# Patient Record
Sex: FEMALE | Race: WHITE | Employment: PART TIME | URBAN - METROPOLITAN AREA
[De-identification: names, ages, dates, MRNs, and addresses within clinical notes are randomized per-mention and may not be internally consistent; named-entity substitution may affect disease eponyms.]

---

## 2017-01-12 DIAGNOSIS — R60.0 BILATERAL EDEMA OF LOWER EXTREMITY: ICD-10-CM

## 2017-01-12 RX ORDER — LOSARTAN POTASSIUM AND HYDROCHLOROTHIAZIDE 12.5; 1 MG/1; MG/1
TABLET ORAL
Qty: 90 TABLET | Refills: 2 | Status: SHIPPED | OUTPATIENT
Start: 2017-01-12 | End: 2018-06-15 | Stop reason: SDUPTHER

## 2017-01-17 DIAGNOSIS — E55.9 HYPOVITAMINOSIS D: ICD-10-CM

## 2017-01-17 RX ORDER — ERGOCALCIFEROL 1.25 MG/1
CAPSULE ORAL
Qty: 12 CAPSULE | Refills: 1 | Status: SHIPPED | OUTPATIENT
Start: 2017-01-17 | End: 2017-07-09 | Stop reason: SDUPTHER

## 2017-02-08 RX ORDER — BISOPROLOL FUMARATE AND HYDROCHLOROTHIAZIDE 10; 6.25 MG/1; MG/1
TABLET ORAL
Qty: 180 TABLET | Refills: 0 | Status: SHIPPED | OUTPATIENT
Start: 2017-02-08 | End: 2017-05-06 | Stop reason: SDUPTHER

## 2017-02-14 ENCOUNTER — OFFICE VISIT (OUTPATIENT)
Dept: INTERNAL MEDICINE CLINIC | Age: 57
End: 2017-02-14

## 2017-02-14 VITALS
BODY MASS INDEX: 34.96 KG/M2 | WEIGHT: 190 LBS | OXYGEN SATURATION: 100 % | HEART RATE: 64 BPM | HEIGHT: 62 IN | SYSTOLIC BLOOD PRESSURE: 120 MMHG | TEMPERATURE: 98.3 F | DIASTOLIC BLOOD PRESSURE: 76 MMHG

## 2017-02-14 DIAGNOSIS — S29.9XXA CHEST WALL INJURY, INITIAL ENCOUNTER: Primary | ICD-10-CM

## 2017-02-14 PROCEDURE — 99214 OFFICE O/P EST MOD 30 MIN: CPT | Performed by: INTERNAL MEDICINE

## 2017-02-14 ASSESSMENT — ENCOUNTER SYMPTOMS
BACK PAIN: 0
SINUS PRESSURE: 0
APNEA: 0
STRIDOR: 0
CHOKING: 0
EYES NEGATIVE: 1
COLOR CHANGE: 0

## 2017-02-27 RX ORDER — SITAGLIPTIN 100 MG/1
TABLET, FILM COATED ORAL
Qty: 30 TABLET | Refills: 2 | Status: SHIPPED | OUTPATIENT
Start: 2017-02-27 | End: 2017-05-22 | Stop reason: SDUPTHER

## 2017-03-09 RX ORDER — GLIPIZIDE 10 MG/1
TABLET ORAL
Qty: 180 TABLET | Refills: 1 | Status: SHIPPED | OUTPATIENT
Start: 2017-03-09 | End: 2017-08-31 | Stop reason: SDUPTHER

## 2017-05-08 RX ORDER — BISOPROLOL FUMARATE AND HYDROCHLOROTHIAZIDE 10; 6.25 MG/1; MG/1
TABLET ORAL
Qty: 180 TABLET | Refills: 1 | Status: SHIPPED | OUTPATIENT
Start: 2017-05-08 | End: 2017-11-01 | Stop reason: SDUPTHER

## 2017-05-30 RX ORDER — DILTIAZEM HYDROCHLORIDE 240 MG/1
CAPSULE, COATED, EXTENDED RELEASE ORAL
Qty: 90 CAPSULE | Refills: 3 | Status: SHIPPED | OUTPATIENT
Start: 2017-05-30 | End: 2018-05-16 | Stop reason: SDUPTHER

## 2017-06-19 RX ORDER — SITAGLIPTIN 100 MG/1
TABLET, FILM COATED ORAL
Qty: 90 TABLET | Refills: 1 | Status: SHIPPED | OUTPATIENT
Start: 2017-06-19 | End: 2017-12-10 | Stop reason: SDUPTHER

## 2017-07-09 DIAGNOSIS — E55.9 HYPOVITAMINOSIS D: ICD-10-CM

## 2017-07-10 RX ORDER — ERGOCALCIFEROL 1.25 MG/1
CAPSULE ORAL
Qty: 12 CAPSULE | Refills: 1 | Status: SHIPPED | OUTPATIENT
Start: 2017-07-10 | End: 2018-03-29

## 2017-08-31 RX ORDER — GLIPIZIDE 10 MG/1
TABLET ORAL
Qty: 180 TABLET | Refills: 1 | Status: SHIPPED | OUTPATIENT
Start: 2017-08-31 | End: 2018-02-22 | Stop reason: SDUPTHER

## 2017-11-02 RX ORDER — BISOPROLOL FUMARATE AND HYDROCHLOROTHIAZIDE 10; 6.25 MG/1; MG/1
TABLET ORAL
Qty: 180 TABLET | Refills: 1 | Status: SHIPPED | OUTPATIENT
Start: 2017-11-02 | End: 2018-03-29 | Stop reason: SDUPTHER

## 2017-11-20 RX ORDER — ATORVASTATIN CALCIUM 10 MG/1
TABLET, FILM COATED ORAL
Qty: 90 TABLET | Refills: 3 | Status: SHIPPED | OUTPATIENT
Start: 2017-11-20 | End: 2018-11-05 | Stop reason: SDUPTHER

## 2017-12-11 RX ORDER — SITAGLIPTIN 100 MG/1
TABLET, FILM COATED ORAL
Qty: 90 TABLET | Refills: 1 | Status: SHIPPED | OUTPATIENT
Start: 2017-12-11 | End: 2018-03-29

## 2017-12-30 DIAGNOSIS — E55.9 HYPOVITAMINOSIS D: ICD-10-CM

## 2018-01-02 RX ORDER — ERGOCALCIFEROL 1.25 MG/1
CAPSULE ORAL
Qty: 12 CAPSULE | Refills: 1 | OUTPATIENT
Start: 2018-01-02

## 2018-01-02 NOTE — TELEPHONE ENCOUNTER
Her Vit D was in good range. She does not need large dose of Vit D. She can use 2000u. of Vit D daily from [facility].

## 2018-02-22 RX ORDER — GLIPIZIDE 10 MG/1
TABLET ORAL
Qty: 180 TABLET | Refills: 1 | Status: SHIPPED | OUTPATIENT
Start: 2018-02-22 | End: 2018-08-15 | Stop reason: SDUPTHER

## 2018-03-20 ENCOUNTER — TELEPHONE (OUTPATIENT)
Dept: INTERNAL MEDICINE CLINIC | Age: 58
End: 2018-03-20

## 2018-03-20 DIAGNOSIS — I15.0 RENOVASCULAR HYPERTENSION: ICD-10-CM

## 2018-03-20 DIAGNOSIS — E11.8 TYPE 2 DIABETES MELLITUS WITH COMPLICATION, UNSPECIFIED LONG TERM INSULIN USE STATUS: Primary | ICD-10-CM

## 2018-03-20 DIAGNOSIS — E55.9 VITAMIN D DEFICIENCY: ICD-10-CM

## 2018-03-20 NOTE — TELEPHONE ENCOUNTER
Lab orders requested    Physical Scheduled Date: F/U DM 3/29/18    Last Physical KFOZ:9-15-99- LOV    Physician Scheduled with: Dr. Melly Morales    Patient will be using [facility]) Lab Yes: place orders in chart    No: fax orders to: N/A patient will  orders- Please call when ready     Be sure to tell the patient to fast 10 to 12 hours before having their blood draw.

## 2018-03-23 ENCOUNTER — NURSE ONLY (OUTPATIENT)
Dept: INTERNAL MEDICINE CLINIC | Age: 58
End: 2018-03-23

## 2018-03-23 DIAGNOSIS — I15.0 RENOVASCULAR HYPERTENSION: ICD-10-CM

## 2018-03-23 DIAGNOSIS — E11.8 TYPE 2 DIABETES MELLITUS WITH COMPLICATION, UNSPECIFIED LONG TERM INSULIN USE STATUS: ICD-10-CM

## 2018-03-23 DIAGNOSIS — E55.9 VITAMIN D DEFICIENCY: ICD-10-CM

## 2018-03-23 LAB
A/G RATIO: 1.5 (ref 1.1–2.2)
ALBUMIN SERPL-MCNC: 4.5 G/DL (ref 3.4–5)
ALP BLD-CCNC: 57 U/L (ref 40–129)
ALT SERPL-CCNC: 65 U/L (ref 10–40)
ANION GAP SERPL CALCULATED.3IONS-SCNC: 17 MMOL/L (ref 3–16)
AST SERPL-CCNC: 45 U/L (ref 15–37)
BILIRUB SERPL-MCNC: 0.4 MG/DL (ref 0–1)
BUN BLDV-MCNC: 27 MG/DL (ref 7–20)
CALCIUM SERPL-MCNC: 10.1 MG/DL (ref 8.3–10.6)
CHLORIDE BLD-SCNC: 99 MMOL/L (ref 99–110)
CHOLESTEROL, TOTAL: 139 MG/DL (ref 0–199)
CO2: 26 MMOL/L (ref 21–32)
CREAT SERPL-MCNC: 1.2 MG/DL (ref 0.6–1.1)
GFR AFRICAN AMERICAN: 56
GFR NON-AFRICAN AMERICAN: 46
GLOBULIN: 3 G/DL
GLUCOSE BLD-MCNC: 87 MG/DL (ref 70–99)
HDLC SERPL-MCNC: 52 MG/DL (ref 40–60)
LDL CHOLESTEROL CALCULATED: 50 MG/DL
POTASSIUM SERPL-SCNC: 4.5 MMOL/L (ref 3.5–5.1)
SODIUM BLD-SCNC: 142 MMOL/L (ref 136–145)
TOTAL PROTEIN: 7.5 G/DL (ref 6.4–8.2)
TRIGL SERPL-MCNC: 183 MG/DL (ref 0–150)
VLDLC SERPL CALC-MCNC: 37 MG/DL

## 2018-03-23 PROCEDURE — 36415 COLL VENOUS BLD VENIPUNCTURE: CPT | Performed by: INTERNAL MEDICINE

## 2018-03-24 LAB
ESTIMATED AVERAGE GLUCOSE: 159.9 MG/DL
HBA1C MFR BLD: 7.2 %
VITAMIN D 25-HYDROXY: 48.8 NG/ML

## 2018-03-29 ENCOUNTER — OFFICE VISIT (OUTPATIENT)
Dept: INTERNAL MEDICINE CLINIC | Age: 58
End: 2018-03-29

## 2018-03-29 VITALS
DIASTOLIC BLOOD PRESSURE: 82 MMHG | BODY MASS INDEX: 32.74 KG/M2 | HEART RATE: 60 BPM | WEIGHT: 179 LBS | SYSTOLIC BLOOD PRESSURE: 130 MMHG | TEMPERATURE: 97.7 F

## 2018-03-29 DIAGNOSIS — E08.21 DIABETES MELLITUS DUE TO UNDERLYING CONDITION WITH DIABETIC NEPHROPATHY, WITHOUT LONG-TERM CURRENT USE OF INSULIN (HCC): Primary | ICD-10-CM

## 2018-03-29 DIAGNOSIS — Z12.11 COLON CANCER SCREENING: ICD-10-CM

## 2018-03-29 DIAGNOSIS — I15.0 RENOVASCULAR HYPERTENSION: ICD-10-CM

## 2018-03-29 PROCEDURE — G8417 CALC BMI ABV UP PARAM F/U: HCPCS | Performed by: INTERNAL MEDICINE

## 2018-03-29 PROCEDURE — G8482 FLU IMMUNIZE ORDER/ADMIN: HCPCS | Performed by: INTERNAL MEDICINE

## 2018-03-29 PROCEDURE — 1036F TOBACCO NON-USER: CPT | Performed by: INTERNAL MEDICINE

## 2018-03-29 PROCEDURE — G8427 DOCREV CUR MEDS BY ELIG CLIN: HCPCS | Performed by: INTERNAL MEDICINE

## 2018-03-29 PROCEDURE — 3014F SCREEN MAMMO DOC REV: CPT | Performed by: INTERNAL MEDICINE

## 2018-03-29 PROCEDURE — 99214 OFFICE O/P EST MOD 30 MIN: CPT | Performed by: INTERNAL MEDICINE

## 2018-03-29 PROCEDURE — 3017F COLORECTAL CA SCREEN DOC REV: CPT | Performed by: INTERNAL MEDICINE

## 2018-03-29 RX ORDER — BISOPROLOL FUMARATE AND HYDROCHLOROTHIAZIDE 10; 6.25 MG/1; MG/1
2 TABLET ORAL DAILY
Qty: 180 TABLET | Refills: 3 | Status: SHIPPED | OUTPATIENT
Start: 2018-03-29 | End: 2019-01-21

## 2018-03-29 ASSESSMENT — PATIENT HEALTH QUESTIONNAIRE - PHQ9
SUM OF ALL RESPONSES TO PHQ9 QUESTIONS 1 & 2: 0
1. LITTLE INTEREST OR PLEASURE IN DOING THINGS: 0
2. FEELING DOWN, DEPRESSED OR HOPELESS: 0
SUM OF ALL RESPONSES TO PHQ QUESTIONS 1-9: 0

## 2018-03-29 ASSESSMENT — ENCOUNTER SYMPTOMS
DIARRHEA: 1
RESPIRATORY NEGATIVE: 1

## 2018-03-29 NOTE — PROGRESS NOTES
Subjective:      Patient ID: Danii Guerrero is a 63 y.o. female. Patient presents with: Follow-up: discuss labs,diabetic check up    Danii Guerrero is a 63 y.o. female with the [facility]:  Patient Active Problem List    Diabetes mellitus, type 2 ([address]         Date Noted: 02/27/2012      Obesity         Date Noted: 02/24/2012      HTN (hypertension)         Date Noted: 01/31/2011      Current Outpatient Prescriptions:  bisoprolol-hydrochlorothiazide (ZIAC) 10-6.25 MG per tablet, Take 2 tablets by mouth daily, Disp: 180 tablet, Rfl: 3  glipiZIDE (GLUCOTROL) 10 MG tablet, TAKE 1 TABLET BY MOUTH 2 TIMES DAILY (BEFORE MEAL). , Disp: 180 tablet, Rfl: 1  atorvastatin (LIPITOR) 10 MG tablet, TAKE 1 TABLET BY MOUTH DAILY, Disp: 90 tablet, Rfl: 3  metFORMIN (GLUCOPHAGE) 1000 MG tablet, TAKE 1 TABLET BY MOUTH 2 TIMES DAILY (WITH MEALS), Disp: 180 tablet, Rfl: 3  diltiazem (CARDIZEM CD) 240 MG extended release capsule, TAKE 1 CAPSULE BY MOUTH DAILY, Disp: 90 capsule, Rfl: 3  losartan-hydrochlorothiazide (HYZAAR) 100-12.5 MG per tablet, TAKE 1 TABLET BY MOUTH DAILY. , Disp: 90 tablet, Rfl: 2  sitaGLIPtin (JANUVIA) 50 MG tablet, Take 1 tablet by mouth 2 times daily, Disp: 180 tablet, Rfl: 2  amLODIPine (NORVASC) 10 MG tablet, Take 1 tablet by mouth daily, Disp: 30 tablet, Rfl: 3  glucose blood VI test strips (ONE TOUCH ULTRA TEST) strip, 1 each by Does not apply route daily. As needed. , Disp: 100 each, Rfl: 4  vitamin B-12 (CYANOCOBALAMIN) 100 MCG tablet, Take 250 mcg by mouth daily. , Disp: , Rfl:   Multiple Vitamin CAPS, Take  by mouth daily. , Disp: , Rfl:   aspirin 81 MG tablet, Take 81 mg by mouth daily. , Disp: , Rfl:   ONE TOUCH LANCETS MISC, by Does not apply route., Disp: 100 each, Rfl: 2    No current facility-administered medications for this visit.      Allergies: Ciprofloxacin and Hydrocodone  Past Medical History:  2009: Chronic kidney disease      Comment: kidney stones  No date: Hypertension  2013: MRSA infection      Comment: left lower leg  No date: Type II or unspecified type diabetes mellitus *  Past Surgical History:  1988: CHOLECYSTECTOMY  2009: LITHOTRIPSY  Review of patient's family history indicates:    Cancer                         Mother                      Comment: pancreatic    Cancer                         Father                      Comment: lung    Heart Disease                  Father                      Comment: Bypass x 5    Heart Disease                  Sister                      Comment: Stent- CAD    Diabetes                       Sister                    Smoking status: Never Smoker                                                              Smokeless tobacco: Never Used                      Alcohol use: No                  Diabetes   She presents for her follow-up diabetic visit. She has type 2 diabetes mellitus. Her disease course has been improving. There are no hypoglycemic associated symptoms. There are no diabetic associated symptoms. Pertinent negatives for diabetes include no fatigue. Diabetic complications include nephropathy. Pertinent negatives for diabetic complications include no retinopathy. Current diabetic treatment includes oral agent (triple therapy). She is compliant with treatment all of the time. Her weight is stable. Her breakfast blood glucose range is generally 130-140 mg/dl. An ACE inhibitor/angiotensin II receptor blocker is being taken. Eye exam is current. Review of Systems   Constitutional: Negative for fatigue. HENT: Negative. Eyes: Negative for visual disturbance. Respiratory: Negative. Cardiovascular: Negative. Gastrointestinal: Positive for diarrhea. Genitourinary: Positive for enuresis. Dropped bladder. Musculoskeletal: Negative. Objective:   Physical Exam   Constitutional: She is oriented to person, place, and time. She appears well-developed and well-nourished.    HENT:   Head:

## 2018-05-17 RX ORDER — DILTIAZEM HYDROCHLORIDE 240 MG/1
CAPSULE, COATED, EXTENDED RELEASE ORAL
Qty: 90 CAPSULE | Refills: 3 | Status: SHIPPED | OUTPATIENT
Start: 2018-05-17 | End: 2019-04-24 | Stop reason: SDUPTHER

## 2018-05-31 ENCOUNTER — PAT TELEPHONE (OUTPATIENT)
Dept: PREADMISSION TESTING | Age: 58
End: 2018-05-31

## 2018-05-31 VITALS — BODY MASS INDEX: 32.76 KG/M2 | WEIGHT: 178 LBS | HEIGHT: 62 IN

## 2018-05-31 RX ORDER — ACETAMINOPHEN 160 MG
TABLET,DISINTEGRATING ORAL
COMMUNITY
End: 2020-02-28

## 2018-06-04 RX ORDER — SITAGLIPTIN 100 MG/1
TABLET, FILM COATED ORAL
Qty: 90 TABLET | Refills: 1 | Status: SHIPPED | OUTPATIENT
Start: 2018-06-04 | End: 2018-11-29 | Stop reason: SDUPTHER

## 2018-06-07 ENCOUNTER — HOSPITAL ENCOUNTER (OUTPATIENT)
Dept: ENDOSCOPY | Age: 58
Discharge: HOME | End: 2018-06-07
Attending: INTERNAL MEDICINE | Admitting: INTERNAL MEDICINE

## 2018-06-07 VITALS
OXYGEN SATURATION: 99 % | WEIGHT: 174 LBS | DIASTOLIC BLOOD PRESSURE: 90 MMHG | HEART RATE: 66 BPM | SYSTOLIC BLOOD PRESSURE: 147 MMHG | HEIGHT: 62 IN | BODY MASS INDEX: 32.02 KG/M2 | TEMPERATURE: 97 F | RESPIRATION RATE: 16 BRPM

## 2018-06-07 DIAGNOSIS — Z12.11 ENCOUNTER FOR SCREENING FOR MALIGNANT NEOPLASM OF COLON: ICD-10-CM

## 2018-06-07 LAB
GLUCOSE BLD-MCNC: 146 MG/DL (ref 70–99)
PERFORMED ON: ABNORMAL

## 2018-06-07 RX ORDER — SODIUM CHLORIDE 9 MG/ML
INJECTION, SOLUTION INTRAVENOUS CONTINUOUS
Status: DISCONTINUED | OUTPATIENT
Start: 2018-06-07 | End: 2018-06-08 | Stop reason: HOSPADM

## 2018-06-07 RX ORDER — SODIUM CHLORIDE 0.9 % (FLUSH) 0.9 %
10 SYRINGE (ML) INJECTION EVERY 12 HOURS SCHEDULED
Status: DISCONTINUED | OUTPATIENT
Start: 2018-06-07 | End: 2018-06-08 | Stop reason: HOSPADM

## 2018-06-07 RX ORDER — SODIUM CHLORIDE 0.9 % (FLUSH) 0.9 %
10 SYRINGE (ML) INJECTION PRN
Status: DISCONTINUED | OUTPATIENT
Start: 2018-06-07 | End: 2018-06-08 | Stop reason: HOSPADM

## 2018-06-07 RX ADMIN — SODIUM CHLORIDE: 9 INJECTION, SOLUTION INTRAVENOUS at 09:27

## 2018-06-07 ASSESSMENT — PAIN SCALES - GENERAL
PAINLEVEL_OUTOF10: 0

## 2018-06-07 ASSESSMENT — PAIN - FUNCTIONAL ASSESSMENT: PAIN_FUNCTIONAL_ASSESSMENT: 0-10

## 2018-06-15 RX ORDER — LOSARTAN POTASSIUM AND HYDROCHLOROTHIAZIDE 12.5; 1 MG/1; MG/1
TABLET ORAL
Qty: 90 TABLET | Refills: 0 | Status: SHIPPED | OUTPATIENT
Start: 2018-06-15 | End: 2018-09-13 | Stop reason: SDUPTHER

## 2018-08-16 RX ORDER — GLIPIZIDE 10 MG/1
TABLET ORAL
Qty: 180 TABLET | Refills: 1 | Status: SHIPPED | OUTPATIENT
Start: 2018-08-16 | End: 2019-03-05 | Stop reason: SDUPTHER

## 2018-09-13 RX ORDER — LOSARTAN POTASSIUM AND HYDROCHLOROTHIAZIDE 12.5; 1 MG/1; MG/1
TABLET ORAL
Qty: 90 TABLET | Refills: 0 | Status: SHIPPED | OUTPATIENT
Start: 2018-09-13 | End: 2018-09-21 | Stop reason: SDUPTHER

## 2018-09-18 ENCOUNTER — TELEPHONE (OUTPATIENT)
Dept: INTERNAL MEDICINE CLINIC | Age: 58
End: 2018-09-18

## 2018-09-18 ENCOUNTER — NURSE ONLY (OUTPATIENT)
Dept: INTERNAL MEDICINE CLINIC | Age: 58
End: 2018-09-18

## 2018-09-18 DIAGNOSIS — E11.8 TYPE 2 DIABETES MELLITUS WITH COMPLICATION, UNSPECIFIED WHETHER LONG TERM INSULIN USE: Primary | ICD-10-CM

## 2018-09-18 DIAGNOSIS — E11.8 TYPE 2 DIABETES MELLITUS WITH COMPLICATION, UNSPECIFIED WHETHER LONG TERM INSULIN USE: ICD-10-CM

## 2018-09-18 DIAGNOSIS — E78.5 HYPERLIPIDEMIA, UNSPECIFIED HYPERLIPIDEMIA TYPE: ICD-10-CM

## 2018-09-18 DIAGNOSIS — I10 HYPERTENSION, UNSPECIFIED TYPE: ICD-10-CM

## 2018-09-18 LAB
BILIRUBIN, POC: NORMAL
BLOOD URINE, POC: NORMAL
CLARITY, POC: CLEAR
COLOR, POC: YELLOW
GLUCOSE URINE, POC: NORMAL
KETONES, POC: NORMAL
LEUKOCYTE EST, POC: NORMAL
NITRITE, POC: NORMAL
PH, POC: 5
PROTEIN, POC: NORMAL
SPECIFIC GRAVITY, POC: 1.03
UROBILINOGEN, POC: 0.2

## 2018-09-18 PROCEDURE — 36415 COLL VENOUS BLD VENIPUNCTURE: CPT | Performed by: INTERNAL MEDICINE

## 2018-09-18 PROCEDURE — 81002 URINALYSIS NONAUTO W/O SCOPE: CPT | Performed by: INTERNAL MEDICINE

## 2018-09-19 LAB
A/G RATIO: 1.7 (ref 1.1–2.2)
ALBUMIN SERPL-MCNC: 4.8 G/DL (ref 3.4–5)
ALP BLD-CCNC: 59 U/L (ref 40–129)
ALT SERPL-CCNC: 49 U/L (ref 10–40)
ANION GAP SERPL CALCULATED.3IONS-SCNC: 18 MMOL/L (ref 3–16)
AST SERPL-CCNC: 34 U/L (ref 15–37)
BILIRUB SERPL-MCNC: 0.4 MG/DL (ref 0–1)
BUN BLDV-MCNC: 27 MG/DL (ref 7–20)
CALCIUM SERPL-MCNC: 10.3 MG/DL (ref 8.3–10.6)
CHLORIDE BLD-SCNC: 100 MMOL/L (ref 99–110)
CHOLESTEROL, TOTAL: 145 MG/DL (ref 0–199)
CO2: 25 MMOL/L (ref 21–32)
CREAT SERPL-MCNC: 1.1 MG/DL (ref 0.6–1.1)
ESTIMATED AVERAGE GLUCOSE: 154.2 MG/DL
GFR AFRICAN AMERICAN: >60
GFR NON-AFRICAN AMERICAN: 51
GLOBULIN: 2.8 G/DL
GLUCOSE BLD-MCNC: 91 MG/DL (ref 70–99)
HBA1C MFR BLD: 7 %
HDLC SERPL-MCNC: 50 MG/DL (ref 40–60)
LDL CHOLESTEROL CALCULATED: 60 MG/DL
POTASSIUM SERPL-SCNC: 4.4 MMOL/L (ref 3.5–5.1)
SODIUM BLD-SCNC: 143 MMOL/L (ref 136–145)
TOTAL PROTEIN: 7.6 G/DL (ref 6.4–8.2)
TRIGL SERPL-MCNC: 174 MG/DL (ref 0–150)
VLDLC SERPL CALC-MCNC: 35 MG/DL

## 2018-09-21 ENCOUNTER — OFFICE VISIT (OUTPATIENT)
Dept: INTERNAL MEDICINE CLINIC | Age: 58
End: 2018-09-21

## 2018-09-21 VITALS
SYSTOLIC BLOOD PRESSURE: 128 MMHG | RESPIRATION RATE: 16 BRPM | DIASTOLIC BLOOD PRESSURE: 72 MMHG | BODY MASS INDEX: 32.76 KG/M2 | HEIGHT: 62 IN | WEIGHT: 178 LBS | OXYGEN SATURATION: 98 % | HEART RATE: 64 BPM

## 2018-09-21 DIAGNOSIS — I10 HYPERTENSION, UNSPECIFIED TYPE: ICD-10-CM

## 2018-09-21 DIAGNOSIS — N18.4 CHRONIC RENAL FAILURE, STAGE 4 (SEVERE) (HCC): ICD-10-CM

## 2018-09-21 DIAGNOSIS — E11.8 TYPE 2 DIABETES MELLITUS WITH COMPLICATION, UNSPECIFIED WHETHER LONG TERM INSULIN USE: Primary | ICD-10-CM

## 2018-09-21 PROCEDURE — G8427 DOCREV CUR MEDS BY ELIG CLIN: HCPCS | Performed by: INTERNAL MEDICINE

## 2018-09-21 PROCEDURE — 3045F PR MOST RECENT HEMOGLOBIN A1C LEVEL 7.0-9.0%: CPT | Performed by: INTERNAL MEDICINE

## 2018-09-21 PROCEDURE — 1036F TOBACCO NON-USER: CPT | Performed by: INTERNAL MEDICINE

## 2018-09-21 PROCEDURE — 99214 OFFICE O/P EST MOD 30 MIN: CPT | Performed by: INTERNAL MEDICINE

## 2018-09-21 PROCEDURE — G8417 CALC BMI ABV UP PARAM F/U: HCPCS | Performed by: INTERNAL MEDICINE

## 2018-09-21 PROCEDURE — 2022F DILAT RTA XM EVC RTNOPTHY: CPT | Performed by: INTERNAL MEDICINE

## 2018-09-21 PROCEDURE — 3017F COLORECTAL CA SCREEN DOC REV: CPT | Performed by: INTERNAL MEDICINE

## 2018-09-21 RX ORDER — LOSARTAN POTASSIUM AND HYDROCHLOROTHIAZIDE 12.5; 1 MG/1; MG/1
1 TABLET ORAL DAILY
Qty: 90 TABLET | Refills: 3 | Status: SHIPPED | OUTPATIENT
Start: 2018-09-21 | End: 2019-11-27 | Stop reason: SDUPTHER

## 2018-09-21 ASSESSMENT — ENCOUNTER SYMPTOMS: BLURRED VISION: 0

## 2018-09-21 NOTE — PROGRESS NOTES
Subjective:      Patient ID: Quiana Johnston is a 63 y.o. female.     HPI    Review of Systems    Objective:   Physical Exam    Assessment:            Plan:              Shila Elias MD
date: Hyperlipidemia  No date: Hypertension  No date: MDRO (multiple drug resistant organisms) resis*  2013: MRSA infection      Comment: left lower leg  No date: Type II or unspecified type diabetes mellitus *  Past Surgical History:  No date:  SECTION  1988: CHOLECYSTECTOMY  2018: COLONOSCOPY      Comment: Dr. Michele Lopez  2009: LITHOTRIPSY  Review of patient's family history indicates:  Problem: Cancer      Relation: Mother       Age of Onset: (Not Specified)       Comment: pancreatic  Problem: Cancer      Relation: Father       Age of Onset: (Not Specified)       Comment: lung  Problem: Heart Disease      Relation: Father       Age of Onset: (Not Specified)       Comment: Bypass x 5  Problem: Heart Disease      Relation: Sister       Age of Onset: (Not Specified)       Comment: Stent- CAD  Problem: Diabetes      Relation: Sister       Age of Onset: (Not Specified)     Smoking status: Never Smoker                                                              Smokeless tobacco: Never Used                      Alcohol use: No                  Diabetes   She presents for her follow-up diabetic visit. She has type 2 diabetes mellitus. Her disease course has been stable. There are no hypoglycemic associated symptoms. Pertinent negatives for diabetes include no blurred vision. There are no hypoglycemic complications. Symptoms are stable. There are no diabetic complications. Risk factors for coronary artery disease include dyslipidemia, diabetes mellitus and obesity. Current diabetic treatment includes oral agent (triple therapy). She is compliant with treatment all of the time. Her weight is stable. She is following a generally healthy diet. Her breakfast blood glucose range is generally 130-140 mg/dl. Review of Systems   Eyes: Negative for blurred vision. Objective:   Physical Exam    Assessment:      Encounter Diagnoses   Name Primary?     Type 2 diabetes mellitus with complication, unspecified

## 2018-11-05 RX ORDER — ATORVASTATIN CALCIUM 10 MG/1
TABLET, FILM COATED ORAL
Qty: 90 TABLET | Refills: 3 | Status: SHIPPED | OUTPATIENT
Start: 2018-11-05 | End: 2019-10-21 | Stop reason: SDUPTHER

## 2018-11-30 RX ORDER — SITAGLIPTIN 100 MG/1
TABLET, FILM COATED ORAL
Qty: 90 TABLET | Refills: 1 | Status: SHIPPED | OUTPATIENT
Start: 2018-11-30 | End: 2019-05-18 | Stop reason: SDUPTHER

## 2019-01-21 ENCOUNTER — TELEPHONE (OUTPATIENT)
Dept: INTERNAL MEDICINE CLINIC | Age: 59
End: 2019-01-21

## 2019-01-21 RX ORDER — METOPROLOL SUCCINATE 50 MG/1
50 TABLET, EXTENDED RELEASE ORAL DAILY
Qty: 90 TABLET | Refills: 0 | Status: SHIPPED | OUTPATIENT
Start: 2019-01-21 | End: 2019-04-15 | Stop reason: SDUPTHER

## 2019-03-05 RX ORDER — GLIPIZIDE 10 MG/1
TABLET ORAL
Qty: 180 TABLET | Refills: 1 | Status: SHIPPED | OUTPATIENT
Start: 2019-03-05 | End: 2020-05-11

## 2019-04-15 RX ORDER — METOPROLOL SUCCINATE 50 MG/1
TABLET, EXTENDED RELEASE ORAL
Qty: 90 TABLET | Refills: 0 | Status: SHIPPED | OUTPATIENT
Start: 2019-04-15 | End: 2019-07-27 | Stop reason: SDUPTHER

## 2019-04-24 RX ORDER — DILTIAZEM HYDROCHLORIDE 240 MG/1
CAPSULE, COATED, EXTENDED RELEASE ORAL
Qty: 90 CAPSULE | Refills: 0 | Status: SHIPPED | OUTPATIENT
Start: 2019-04-24 | End: 2019-07-21 | Stop reason: SDUPTHER

## 2019-05-20 RX ORDER — SITAGLIPTIN 100 MG/1
TABLET, FILM COATED ORAL
Qty: 90 TABLET | Refills: 1 | Status: SHIPPED | OUTPATIENT
Start: 2019-05-20 | End: 2019-11-12 | Stop reason: SDUPTHER

## 2019-06-20 ENCOUNTER — TELEPHONE (OUTPATIENT)
Dept: INTERNAL MEDICINE CLINIC | Age: 59
End: 2019-06-20

## 2019-06-20 NOTE — TELEPHONE ENCOUNTER
Pt had a cyst on her breast removed and it's bleeding profusely. Pt's surgeon needs to know if any of her meds are blood thinners? Pl advise.

## 2019-07-22 RX ORDER — DILTIAZEM HYDROCHLORIDE 240 MG/1
CAPSULE, COATED, EXTENDED RELEASE ORAL
Qty: 90 CAPSULE | Refills: 0 | Status: SHIPPED | OUTPATIENT
Start: 2019-07-22 | End: 2019-07-30

## 2019-07-23 DIAGNOSIS — E11.8 TYPE 2 DIABETES MELLITUS WITH COMPLICATION, UNSPECIFIED WHETHER LONG TERM INSULIN USE: ICD-10-CM

## 2019-07-23 DIAGNOSIS — E11.8 TYPE 2 DIABETES MELLITUS WITH COMPLICATION, UNSPECIFIED WHETHER LONG TERM INSULIN USE: Primary | ICD-10-CM

## 2019-07-23 DIAGNOSIS — I10 HYPERTENSION, UNSPECIFIED TYPE: ICD-10-CM

## 2019-07-23 DIAGNOSIS — E78.5 HYPERLIPIDEMIA, UNSPECIFIED HYPERLIPIDEMIA TYPE: ICD-10-CM

## 2019-07-23 DIAGNOSIS — N18.4 CHRONIC RENAL FAILURE, STAGE 4 (SEVERE) (HCC): ICD-10-CM

## 2019-07-23 LAB
A/G RATIO: 1.5 (ref 1.1–2.2)
ALBUMIN SERPL-MCNC: 4.6 G/DL (ref 3.4–5)
ALP BLD-CCNC: 57 U/L (ref 40–129)
ALT SERPL-CCNC: 67 U/L (ref 10–40)
ANION GAP SERPL CALCULATED.3IONS-SCNC: 14 MMOL/L (ref 3–16)
AST SERPL-CCNC: 47 U/L (ref 15–37)
BASOPHILS ABSOLUTE: 0 K/UL (ref 0–0.2)
BASOPHILS RELATIVE PERCENT: 0.7 %
BILIRUB SERPL-MCNC: 0.3 MG/DL (ref 0–1)
BUN BLDV-MCNC: 16 MG/DL (ref 7–20)
CALCIUM SERPL-MCNC: 10.1 MG/DL (ref 8.3–10.6)
CHLORIDE BLD-SCNC: 100 MMOL/L (ref 99–110)
CHOLESTEROL, TOTAL: 134 MG/DL (ref 0–199)
CO2: 26 MMOL/L (ref 21–32)
CREAT SERPL-MCNC: 0.9 MG/DL (ref 0.6–1.1)
EOSINOPHILS ABSOLUTE: 0.1 K/UL (ref 0–0.6)
EOSINOPHILS RELATIVE PERCENT: 2.2 %
ESTIMATED AVERAGE GLUCOSE: 148.5 MG/DL
GFR AFRICAN AMERICAN: >60
GFR NON-AFRICAN AMERICAN: >60
GLOBULIN: 3.1 G/DL
GLUCOSE BLD-MCNC: 110 MG/DL (ref 70–99)
HBA1C MFR BLD: 6.8 %
HCT VFR BLD CALC: 40.9 % (ref 36–48)
HDLC SERPL-MCNC: 48 MG/DL (ref 40–60)
HEMOGLOBIN: 13.5 G/DL (ref 12–16)
LDL CHOLESTEROL CALCULATED: 42 MG/DL
LYMPHOCYTES ABSOLUTE: 2.2 K/UL (ref 1–5.1)
LYMPHOCYTES RELATIVE PERCENT: 35.5 %
MCH RBC QN AUTO: 29.7 PG (ref 26–34)
MCHC RBC AUTO-ENTMCNC: 32.9 G/DL (ref 31–36)
MCV RBC AUTO: 90.4 FL (ref 80–100)
MONOCYTES ABSOLUTE: 0.4 K/UL (ref 0–1.3)
MONOCYTES RELATIVE PERCENT: 6.9 %
NEUTROPHILS ABSOLUTE: 3.4 K/UL (ref 1.7–7.7)
NEUTROPHILS RELATIVE PERCENT: 54.7 %
PDW BLD-RTO: 13.6 % (ref 12.4–15.4)
PLATELET # BLD: 238 K/UL (ref 135–450)
PMV BLD AUTO: 9.4 FL (ref 5–10.5)
POTASSIUM SERPL-SCNC: 4.1 MMOL/L (ref 3.5–5.1)
RBC # BLD: 4.53 M/UL (ref 4–5.2)
SODIUM BLD-SCNC: 140 MMOL/L (ref 136–145)
TOTAL PROTEIN: 7.7 G/DL (ref 6.4–8.2)
TRIGL SERPL-MCNC: 218 MG/DL (ref 0–150)
VLDLC SERPL CALC-MCNC: 44 MG/DL
WBC # BLD: 6.2 K/UL (ref 4–11)

## 2019-07-25 RX ORDER — DILTIAZEM HYDROCHLORIDE 240 MG/1
CAPSULE, COATED, EXTENDED RELEASE ORAL
Qty: 90 CAPSULE | Refills: 0 | Status: SHIPPED | OUTPATIENT
Start: 2019-07-25 | End: 2020-02-11

## 2019-07-29 RX ORDER — METOPROLOL SUCCINATE 50 MG/1
TABLET, EXTENDED RELEASE ORAL
Qty: 90 TABLET | Refills: 0 | Status: SHIPPED | OUTPATIENT
Start: 2019-07-29 | End: 2019-07-30 | Stop reason: SDUPTHER

## 2019-07-30 ENCOUNTER — OFFICE VISIT (OUTPATIENT)
Dept: INTERNAL MEDICINE CLINIC | Age: 59
End: 2019-07-30
Payer: COMMERCIAL

## 2019-07-30 VITALS
DIASTOLIC BLOOD PRESSURE: 82 MMHG | HEART RATE: 79 BPM | TEMPERATURE: 97.4 F | SYSTOLIC BLOOD PRESSURE: 136 MMHG | WEIGHT: 177 LBS | BODY MASS INDEX: 32.37 KG/M2 | OXYGEN SATURATION: 98 %

## 2019-07-30 DIAGNOSIS — E11.8 TYPE 2 DIABETES MELLITUS WITH COMPLICATION, UNSPECIFIED WHETHER LONG TERM INSULIN USE: Primary | ICD-10-CM

## 2019-07-30 DIAGNOSIS — R74.8 ELEVATED LIVER ENZYMES: ICD-10-CM

## 2019-07-30 DIAGNOSIS — I10 HYPERTENSION, UNSPECIFIED TYPE: ICD-10-CM

## 2019-07-30 PROCEDURE — G8427 DOCREV CUR MEDS BY ELIG CLIN: HCPCS | Performed by: INTERNAL MEDICINE

## 2019-07-30 PROCEDURE — 3044F HG A1C LEVEL LT 7.0%: CPT | Performed by: INTERNAL MEDICINE

## 2019-07-30 PROCEDURE — 3017F COLORECTAL CA SCREEN DOC REV: CPT | Performed by: INTERNAL MEDICINE

## 2019-07-30 PROCEDURE — 99214 OFFICE O/P EST MOD 30 MIN: CPT | Performed by: INTERNAL MEDICINE

## 2019-07-30 PROCEDURE — G8417 CALC BMI ABV UP PARAM F/U: HCPCS | Performed by: INTERNAL MEDICINE

## 2019-07-30 PROCEDURE — 2022F DILAT RTA XM EVC RTNOPTHY: CPT | Performed by: INTERNAL MEDICINE

## 2019-07-30 PROCEDURE — 1036F TOBACCO NON-USER: CPT | Performed by: INTERNAL MEDICINE

## 2019-07-30 RX ORDER — METOPROLOL SUCCINATE 50 MG/1
50 TABLET, EXTENDED RELEASE ORAL DAILY
Qty: 90 TABLET | Refills: 3 | Status: SHIPPED | OUTPATIENT
Start: 2019-07-30 | End: 2020-11-02 | Stop reason: SDUPTHER

## 2019-07-30 ASSESSMENT — PATIENT HEALTH QUESTIONNAIRE - PHQ9
2. FEELING DOWN, DEPRESSED OR HOPELESS: 0
SUM OF ALL RESPONSES TO PHQ9 QUESTIONS 1 & 2: 0
SUM OF ALL RESPONSES TO PHQ QUESTIONS 1-9: 0
1. LITTLE INTEREST OR PLEASURE IN DOING THINGS: 0
SUM OF ALL RESPONSES TO PHQ QUESTIONS 1-9: 0

## 2019-07-30 ASSESSMENT — ENCOUNTER SYMPTOMS
GASTROINTESTINAL NEGATIVE: 1
RESPIRATORY NEGATIVE: 1

## 2019-07-30 NOTE — PROGRESS NOTES
(multiple drug resistant organisms) resistance  2013: MRSA infection      Comment:  left lower leg  No date: Type II or unspecified type diabetes mellitus without   mention of complication, not stated as uncontrolled  Past Surgical History:  No date:  SECTION  1988: CHOLECYSTECTOMY  2018: COLONOSCOPY      Comment:  Dr. Nico Mariano  2009: LITHOTRIPSY  Review of patient's family history indicates:  Problem: Cancer      Relation: Mother          Age of Onset: (Not Specified)          Comment: pancreatic  Problem: Cancer      Relation: Father          Age of Onset: (Not Specified)          Comment: lung  Problem: Heart Disease      Relation: Father          Age of Onset: (Not Specified)          Comment: Bypass x 5  Problem: Heart Disease      Relation: Sister          Age of Onset: (Not Specified)          Comment: Stent- CAD  Problem: Diabetes      Relation: Sister          Age of Onset: (Not Specified)    Social History    Tobacco Use      Smoking status: Never Smoker      Smokeless tobacco: Never Used    Alcohol use: No      Diabetes   She presents for her follow-up diabetic visit. She has type 2 diabetes mellitus. There are no hypoglycemic associated symptoms. There are no diabetic associated symptoms. Pertinent negatives for diabetes include no fatigue. There are no hypoglycemic complications. Symptoms are stable. There are no diabetic complications. Risk factors for coronary artery disease include hypertension and obesity. She is compliant with treatment all of the time. She is following a diabetic diet. She participates in exercise intermittently. An ACE inhibitor/angiotensin II receptor blocker is being taken. Review of Systems   Constitutional: Negative for fatigue. HENT: Negative. Respiratory: Negative. Cardiovascular: Negative. Gastrointestinal: Negative. Genitourinary: Negative. Musculoskeletal: Positive for arthralgias (hips and knees). Skin: Negative.     Neurological:

## 2019-10-21 RX ORDER — ATORVASTATIN CALCIUM 10 MG/1
TABLET, FILM COATED ORAL
Qty: 90 TABLET | Refills: 3 | Status: SHIPPED | OUTPATIENT
Start: 2019-10-21 | End: 2020-12-03 | Stop reason: SDUPTHER

## 2019-11-12 RX ORDER — SITAGLIPTIN 100 MG/1
TABLET, FILM COATED ORAL
Qty: 90 TABLET | Refills: 1 | Status: SHIPPED | OUTPATIENT
Start: 2019-11-12 | End: 2020-05-11

## 2019-11-27 RX ORDER — LOSARTAN POTASSIUM AND HYDROCHLOROTHIAZIDE 12.5; 1 MG/1; MG/1
TABLET ORAL
Qty: 90 TABLET | Refills: 1 | Status: SHIPPED | OUTPATIENT
Start: 2019-11-27 | End: 2019-11-27 | Stop reason: RX

## 2019-11-27 RX ORDER — HYDROCHLOROTHIAZIDE 12.5 MG/1
12.5 CAPSULE, GELATIN COATED ORAL EVERY MORNING
Qty: 90 CAPSULE | Refills: 1 | Status: SHIPPED | OUTPATIENT
Start: 2019-11-27 | End: 2020-05-22

## 2019-11-27 RX ORDER — LOSARTAN POTASSIUM 100 MG/1
100 TABLET ORAL DAILY
Qty: 90 TABLET | Refills: 1 | Status: SHIPPED | OUTPATIENT
Start: 2019-11-27 | End: 2020-05-22

## 2020-02-11 RX ORDER — DILTIAZEM HYDROCHLORIDE 240 MG/1
CAPSULE, EXTENDED RELEASE ORAL
Qty: 90 CAPSULE | Refills: 0 | Status: SHIPPED | OUTPATIENT
Start: 2020-02-11 | End: 2020-05-11

## 2020-02-14 ENCOUNTER — TELEPHONE (OUTPATIENT)
Dept: INTERNAL MEDICINE CLINIC | Age: 60
End: 2020-02-14

## 2020-02-26 DIAGNOSIS — R74.8 ELEVATED LIVER ENZYMES: ICD-10-CM

## 2020-02-26 DIAGNOSIS — E78.5 HYPERLIPIDEMIA, UNSPECIFIED HYPERLIPIDEMIA TYPE: ICD-10-CM

## 2020-02-26 DIAGNOSIS — E11.69 TYPE 2 DIABETES MELLITUS WITH OTHER SPECIFIED COMPLICATION, WITHOUT LONG-TERM CURRENT USE OF INSULIN (HCC): ICD-10-CM

## 2020-02-26 DIAGNOSIS — I10 HYPERTENSION, UNSPECIFIED TYPE: ICD-10-CM

## 2020-02-26 LAB
A/G RATIO: 1.3 (ref 1.1–2.2)
ALBUMIN SERPL-MCNC: 4.4 G/DL (ref 3.4–5)
ALP BLD-CCNC: 57 U/L (ref 40–129)
ALT SERPL-CCNC: 64 U/L (ref 10–40)
ANION GAP SERPL CALCULATED.3IONS-SCNC: 16 MMOL/L (ref 3–16)
AST SERPL-CCNC: 50 U/L (ref 15–37)
BILIRUB SERPL-MCNC: 0.4 MG/DL (ref 0–1)
BUN BLDV-MCNC: 24 MG/DL (ref 7–20)
CALCIUM SERPL-MCNC: 10.1 MG/DL (ref 8.3–10.6)
CHLORIDE BLD-SCNC: 98 MMOL/L (ref 99–110)
CHOLESTEROL, TOTAL: 133 MG/DL (ref 0–199)
CO2: 26 MMOL/L (ref 21–32)
CREAT SERPL-MCNC: 1.2 MG/DL (ref 0.6–1.1)
GFR AFRICAN AMERICAN: 56
GFR NON-AFRICAN AMERICAN: 46
GLOBULIN: 3.3 G/DL
GLUCOSE BLD-MCNC: 84 MG/DL (ref 70–99)
HDLC SERPL-MCNC: 39 MG/DL (ref 40–60)
LDL CHOLESTEROL CALCULATED: 53 MG/DL
POTASSIUM SERPL-SCNC: 4.1 MMOL/L (ref 3.5–5.1)
SODIUM BLD-SCNC: 140 MMOL/L (ref 136–145)
TOTAL PROTEIN: 7.7 G/DL (ref 6.4–8.2)
TRIGL SERPL-MCNC: 204 MG/DL (ref 0–150)
VLDLC SERPL CALC-MCNC: 41 MG/DL

## 2020-02-27 LAB
ESTIMATED AVERAGE GLUCOSE: 145.6 MG/DL
HBA1C MFR BLD: 6.7 %

## 2020-02-28 ENCOUNTER — OFFICE VISIT (OUTPATIENT)
Dept: INTERNAL MEDICINE CLINIC | Age: 60
End: 2020-02-28
Payer: COMMERCIAL

## 2020-02-28 VITALS
HEART RATE: 74 BPM | OXYGEN SATURATION: 96 % | DIASTOLIC BLOOD PRESSURE: 82 MMHG | BODY MASS INDEX: 31.64 KG/M2 | TEMPERATURE: 97.5 F | SYSTOLIC BLOOD PRESSURE: 136 MMHG | WEIGHT: 173 LBS

## 2020-02-28 PROCEDURE — 1036F TOBACCO NON-USER: CPT | Performed by: INTERNAL MEDICINE

## 2020-02-28 PROCEDURE — 2022F DILAT RTA XM EVC RTNOPTHY: CPT | Performed by: INTERNAL MEDICINE

## 2020-02-28 PROCEDURE — 3044F HG A1C LEVEL LT 7.0%: CPT | Performed by: INTERNAL MEDICINE

## 2020-02-28 PROCEDURE — 3017F COLORECTAL CA SCREEN DOC REV: CPT | Performed by: INTERNAL MEDICINE

## 2020-02-28 PROCEDURE — 99214 OFFICE O/P EST MOD 30 MIN: CPT | Performed by: INTERNAL MEDICINE

## 2020-02-28 PROCEDURE — G8484 FLU IMMUNIZE NO ADMIN: HCPCS | Performed by: INTERNAL MEDICINE

## 2020-02-28 PROCEDURE — G8417 CALC BMI ABV UP PARAM F/U: HCPCS | Performed by: INTERNAL MEDICINE

## 2020-02-28 PROCEDURE — G8427 DOCREV CUR MEDS BY ELIG CLIN: HCPCS | Performed by: INTERNAL MEDICINE

## 2020-02-28 NOTE — PROGRESS NOTES
Subjective:      Patient ID: Lloyd Moore is a 62 y.o. female. Diabetes   She presents for her follow-up diabetic visit. She has type 2 diabetes mellitus. There are no diabetic associated symptoms. Pertinent negatives for diabetes include no fatigue. Diabetic complications include nephropathy. Risk factors for coronary artery disease include dyslipidemia and diabetes mellitus. Current diabetic treatment includes oral agent (dual therapy). She is compliant with treatment all of the time. Her weight is stable. She participates in exercise intermittently. Her breakfast blood glucose range is generally 130-140 mg/dl. An ACE inhibitor/angiotensin II receptor blocker is being taken. Eye exam is current. Review of Systems   Constitutional: Negative for fatigue. Neurological: Positive for numbness. Sx of CTS. Objective:   Physical Exam  Constitutional:       General: She is not in acute distress. Appearance: She is well-developed. She is not diaphoretic. HENT:      Head: Normocephalic and atraumatic. Right Ear: External ear normal.      Left Ear: External ear normal.      Nose: Nose normal.      Mouth/Throat:      Pharynx: No oropharyngeal exudate. Eyes:      General: No scleral icterus. Right eye: No discharge. Left eye: No discharge. Conjunctiva/sclera: Conjunctivae normal.      Pupils: Pupils are equal, round, and reactive to light. Neck:      Musculoskeletal: Normal range of motion and neck supple. Thyroid: No thyromegaly. Vascular: No JVD. Trachea: No tracheal deviation. Cardiovascular:      Rate and Rhythm: Normal rate and regular rhythm. Heart sounds: Normal heart sounds. No murmur. No friction rub. No gallop. Pulmonary:      Effort: Pulmonary effort is normal. No respiratory distress. Breath sounds: Normal breath sounds. No stridor. No wheezing or rales. Chest:      Chest wall: No tenderness.    Abdominal:      General: There is no distension. Palpations: Abdomen is soft. There is no mass. Tenderness: There is no abdominal tenderness. There is no guarding or rebound. Genitourinary:     Vagina: Normal. No vaginal discharge. Rectum: Guaiac result negative. Musculoskeletal: Normal range of motion. General: No tenderness. Lymphadenopathy:      Cervical: No cervical adenopathy. Skin:     General: Skin is warm and dry. Coloration: Skin is not pale. Findings: No erythema or rash. Neurological:      Mental Status: She is alert and oriented to person, place, and time. Cranial Nerves: No cranial nerve deficit. Motor: No abnormal muscle tone. Coordination: Coordination normal.      Deep Tendon Reflexes: Reflexes are normal and symmetric. Reflexes normal.   Psychiatric:         Behavior: Behavior normal.         Thought Content: Thought content normal.         Judgment: Judgment normal.         Assessment:      Encounter Diagnoses   Name Primary?  Type 2 diabetes mellitus with other specified complication, without long-term current use of insulin (HCC) Yes    Chronic renal failure, stage 2 (mild)     Hypertension, unspecified type     Hyperlipidemia, unspecified hyperlipidemia type            Plan:      Louie Sarah was seen today for diabetes. Diagnoses and all orders for this visit:    Type 2 diabetes mellitus with other specified complication, without long-term current use of insulin (HCC)    Chronic renal failure, stage 2 (mild)    Hypertension, unspecified type    Hyperlipidemia, unspecified hyperlipidemia type    Other orders  -     US Abdomen Complete; Future      Episode of renal stones.         Trell Hogan MD

## 2020-02-28 NOTE — PATIENT INSTRUCTIONS
Please call your pharmacy if you need any refills of your medication(s). Please call our office at [phone] if you don't hear from us about your test results. Bring an accurate list of your medications with you at every appointment to ensure that we have the correct information.     Our office hours are: Monday - Friday 8:30 am- 5 pm    Phone lines turn on at 8:30 am

## 2020-03-19 ENCOUNTER — HOSPITAL ENCOUNTER (OUTPATIENT)
Dept: ULTRASOUND IMAGING | Age: 60
Discharge: HOME | End: 2020-03-19
Payer: COMMERCIAL

## 2020-03-19 PROCEDURE — 76700 US EXAM ABDOM COMPLETE: CPT

## 2020-05-11 RX ORDER — GLIPIZIDE 10 MG/1
TABLET ORAL
Qty: 180 TABLET | Refills: 1 | Status: SHIPPED | OUTPATIENT
Start: 2020-05-11 | End: 2020-11-03

## 2020-05-11 RX ORDER — SITAGLIPTIN 100 MG/1
TABLET, FILM COATED ORAL
Qty: 90 TABLET | Refills: 1 | Status: SHIPPED | OUTPATIENT
Start: 2020-05-11 | End: 2020-11-03

## 2020-05-11 RX ORDER — DILTIAZEM HYDROCHLORIDE 240 MG/1
CAPSULE, EXTENDED RELEASE ORAL
Qty: 90 CAPSULE | Refills: 0 | Status: SHIPPED | OUTPATIENT
Start: 2020-05-11 | End: 2020-08-03

## 2020-05-22 RX ORDER — LOSARTAN POTASSIUM 100 MG/1
TABLET ORAL
Qty: 90 TABLET | Refills: 1 | Status: SHIPPED | OUTPATIENT
Start: 2020-05-22 | End: 2020-11-16

## 2020-05-22 RX ORDER — HYDROCHLOROTHIAZIDE 12.5 MG/1
CAPSULE, GELATIN COATED ORAL
Qty: 90 CAPSULE | Refills: 1 | Status: SHIPPED | OUTPATIENT
Start: 2020-05-22 | End: 2020-11-16

## 2020-07-23 RX ORDER — BLOOD SUGAR DIAGNOSTIC
STRIP MISCELLANEOUS
Qty: 100 STRIP | Refills: 3 | Status: SHIPPED | OUTPATIENT
Start: 2020-07-23 | End: 2021-07-27

## 2020-08-03 RX ORDER — DILTIAZEM HYDROCHLORIDE 240 MG/1
CAPSULE, EXTENDED RELEASE ORAL
Qty: 90 CAPSULE | Refills: 0 | Status: SHIPPED | OUTPATIENT
Start: 2020-08-03 | End: 2020-11-12 | Stop reason: SDUPTHER

## 2020-11-02 ENCOUNTER — TELEPHONE (OUTPATIENT)
Dept: FAMILY MEDICINE CLINIC | Age: 60
End: 2020-11-02

## 2020-11-02 RX ORDER — METOPROLOL SUCCINATE 50 MG/1
50 TABLET, EXTENDED RELEASE ORAL DAILY
Qty: 90 TABLET | Refills: 0 | Status: SHIPPED | OUTPATIENT
Start: 2020-11-02 | End: 2021-01-19

## 2020-11-02 NOTE — TELEPHONE ENCOUNTER
Patient states ADIS Wooten has been trying to get a refill on Metoprolol succinate 50 mg. She is completely out.       Please advise, [phone]

## 2020-11-02 NOTE — TELEPHONE ENCOUNTER
Lm to return call, looks like the rx was written on 7/30/19 for #90 with 3 refills. Has she been out of medication for 3 months?

## 2020-11-03 RX ORDER — SITAGLIPTIN 100 MG/1
TABLET, FILM COATED ORAL
Qty: 90 TABLET | Refills: 0 | Status: SHIPPED | OUTPATIENT
Start: 2020-11-03 | End: 2021-01-28

## 2020-11-03 RX ORDER — GLIPIZIDE 10 MG/1
TABLET ORAL
Qty: 180 TABLET | Refills: 0 | Status: SHIPPED | OUTPATIENT
Start: 2020-11-03 | End: 2021-01-25

## 2020-11-12 RX ORDER — DILTIAZEM HYDROCHLORIDE 240 MG/1
CAPSULE, EXTENDED RELEASE ORAL
Qty: 90 CAPSULE | Refills: 0 | Status: SHIPPED | OUTPATIENT
Start: 2020-11-12 | End: 2021-02-04

## 2020-11-16 RX ORDER — HYDROCHLOROTHIAZIDE 12.5 MG/1
CAPSULE, GELATIN COATED ORAL
Qty: 90 CAPSULE | Refills: 1 | Status: SHIPPED | OUTPATIENT
Start: 2020-11-16 | End: 2021-05-14 | Stop reason: SDUPTHER

## 2020-11-16 RX ORDER — LOSARTAN POTASSIUM 100 MG/1
TABLET ORAL
Qty: 90 TABLET | Refills: 1 | Status: SHIPPED | OUTPATIENT
Start: 2020-11-16 | End: 2021-05-14 | Stop reason: SDUPTHER

## 2020-11-30 ENCOUNTER — TELEPHONE (OUTPATIENT)
Dept: FAMILY MEDICINE CLINIC | Age: 60
End: 2020-11-30

## 2020-11-30 DIAGNOSIS — I10 HYPERTENSION, UNSPECIFIED TYPE: ICD-10-CM

## 2020-11-30 DIAGNOSIS — Z00.00 ANNUAL PHYSICAL EXAM: ICD-10-CM

## 2020-11-30 DIAGNOSIS — E11.69 TYPE 2 DIABETES MELLITUS WITH OTHER SPECIFIED COMPLICATION, UNSPECIFIED WHETHER LONG TERM INSULIN USE (HCC): ICD-10-CM

## 2020-11-30 LAB
A/G RATIO: 1.4 (ref 1.1–2.2)
ALBUMIN SERPL-MCNC: 4.6 G/DL (ref 3.4–5)
ALP BLD-CCNC: 61 U/L (ref 40–129)
ALT SERPL-CCNC: 36 U/L (ref 10–40)
ANION GAP SERPL CALCULATED.3IONS-SCNC: 16 MMOL/L (ref 3–16)
AST SERPL-CCNC: 27 U/L (ref 15–37)
BACTERIA: ABNORMAL /HPF
BILIRUB SERPL-MCNC: 0.5 MG/DL (ref 0–1)
BILIRUBIN URINE: ABNORMAL
BLOOD, URINE: ABNORMAL
BUN BLDV-MCNC: 31 MG/DL (ref 7–20)
CALCIUM SERPL-MCNC: 10.1 MG/DL (ref 8.3–10.6)
CHLORIDE BLD-SCNC: 99 MMOL/L (ref 99–110)
CHOLESTEROL, TOTAL: 126 MG/DL (ref 0–199)
CLARITY: ABNORMAL
CO2: 23 MMOL/L (ref 21–32)
COLOR: ABNORMAL
CREAT SERPL-MCNC: 1.7 MG/DL (ref 0.6–1.1)
EPITHELIAL CELLS, UA: 19 /HPF (ref 0–5)
GFR AFRICAN AMERICAN: 37
GFR NON-AFRICAN AMERICAN: 31
GLOBULIN: 3.3 G/DL
GLUCOSE BLD-MCNC: 87 MG/DL (ref 70–99)
GLUCOSE URINE: NEGATIVE MG/DL
HCT VFR BLD CALC: 37.8 % (ref 36–48)
HDLC SERPL-MCNC: 43 MG/DL (ref 40–60)
HEMOGLOBIN: 13 G/DL (ref 12–16)
KETONES, URINE: ABNORMAL MG/DL
LDL CHOLESTEROL CALCULATED: 52 MG/DL
LEUKOCYTE ESTERASE, URINE: ABNORMAL
MCH RBC QN AUTO: 30.3 PG (ref 26–34)
MCHC RBC AUTO-ENTMCNC: 34.3 G/DL (ref 31–36)
MCV RBC AUTO: 88.2 FL (ref 80–100)
MICROSCOPIC EXAMINATION: YES
MUCUS: ABNORMAL /LPF
NITRITE, URINE: NEGATIVE
PDW BLD-RTO: 13.6 % (ref 12.4–15.4)
PH UA: 5.5 (ref 5–8)
PLATELET # BLD: 229 K/UL (ref 135–450)
PMV BLD AUTO: 10 FL (ref 5–10.5)
POTASSIUM SERPL-SCNC: 4.2 MMOL/L (ref 3.5–5.1)
PROTEIN UA: >=300 MG/DL
RBC # BLD: 4.29 M/UL (ref 4–5.2)
RBC UA: ABNORMAL /HPF (ref 0–4)
SODIUM BLD-SCNC: 138 MMOL/L (ref 136–145)
SPECIFIC GRAVITY UA: 1.02 (ref 1–1.03)
TOTAL PROTEIN: 7.9 G/DL (ref 6.4–8.2)
TRIGL SERPL-MCNC: 157 MG/DL (ref 0–150)
URINE TYPE: ABNORMAL
UROBILINOGEN, URINE: 0.2 E.U./DL
VLDLC SERPL CALC-MCNC: 31 MG/DL
WBC # BLD: 7.8 K/UL (ref 4–11)
WBC UA: 621 /HPF (ref 0–5)

## 2020-12-01 LAB
ESTIMATED AVERAGE GLUCOSE: 137 MG/DL
HBA1C MFR BLD: 6.4 %

## 2020-12-03 ENCOUNTER — OFFICE VISIT (OUTPATIENT)
Dept: FAMILY MEDICINE CLINIC | Age: 60
End: 2020-12-03
Payer: COMMERCIAL

## 2020-12-03 VITALS
DIASTOLIC BLOOD PRESSURE: 62 MMHG | WEIGHT: 168 LBS | HEART RATE: 72 BPM | OXYGEN SATURATION: 98 % | BODY MASS INDEX: 30.73 KG/M2 | SYSTOLIC BLOOD PRESSURE: 116 MMHG | TEMPERATURE: 98.1 F

## 2020-12-03 PROCEDURE — 1036F TOBACCO NON-USER: CPT | Performed by: INTERNAL MEDICINE

## 2020-12-03 PROCEDURE — 3017F COLORECTAL CA SCREEN DOC REV: CPT | Performed by: INTERNAL MEDICINE

## 2020-12-03 PROCEDURE — G8427 DOCREV CUR MEDS BY ELIG CLIN: HCPCS | Performed by: INTERNAL MEDICINE

## 2020-12-03 PROCEDURE — 3044F HG A1C LEVEL LT 7.0%: CPT | Performed by: INTERNAL MEDICINE

## 2020-12-03 PROCEDURE — 99214 OFFICE O/P EST MOD 30 MIN: CPT | Performed by: INTERNAL MEDICINE

## 2020-12-03 PROCEDURE — 2022F DILAT RTA XM EVC RTNOPTHY: CPT | Performed by: INTERNAL MEDICINE

## 2020-12-03 PROCEDURE — G8484 FLU IMMUNIZE NO ADMIN: HCPCS | Performed by: INTERNAL MEDICINE

## 2020-12-03 PROCEDURE — G8417 CALC BMI ABV UP PARAM F/U: HCPCS | Performed by: INTERNAL MEDICINE

## 2020-12-03 RX ORDER — NITROFURANTOIN 25; 75 MG/1; MG/1
100 CAPSULE ORAL 2 TIMES DAILY
Qty: 14 CAPSULE | Refills: 0 | Status: SHIPPED | OUTPATIENT
Start: 2020-12-03 | End: 2020-12-10

## 2020-12-03 RX ORDER — ATORVASTATIN CALCIUM 10 MG/1
TABLET, FILM COATED ORAL
Qty: 90 TABLET | Refills: 3 | Status: SHIPPED | OUTPATIENT
Start: 2020-12-03 | End: 2021-08-23 | Stop reason: SDUPTHER

## 2020-12-03 ASSESSMENT — PATIENT HEALTH QUESTIONNAIRE - PHQ9
SUM OF ALL RESPONSES TO PHQ QUESTIONS 1-9: 0
2. FEELING DOWN, DEPRESSED OR HOPELESS: 0
1. LITTLE INTEREST OR PLEASURE IN DOING THINGS: 0
SUM OF ALL RESPONSES TO PHQ9 QUESTIONS 1 & 2: 0
SUM OF ALL RESPONSES TO PHQ QUESTIONS 1-9: 0
SUM OF ALL RESPONSES TO PHQ QUESTIONS 1-9: 0

## 2020-12-03 ASSESSMENT — ENCOUNTER SYMPTOMS
BACK PAIN: 0
RESPIRATORY NEGATIVE: 1

## 2020-12-03 NOTE — PROGRESS NOTES
Subjective:      Patient ID: Joseline Barros is a 62 y.o. female. Patient presents with:  Diabetes: diabetic check up, discuss labs, refill atorvastatin. Joseline Barros is a 62 y.o. female with the following history as recorded in [facility]:  Patient Active Problem List    Diabetes mellitus, type 2 ([facility] [address]         Date Noted: 02/27/2012      Obesity         Date Noted: 02/24/2012      HTN (hypertension)         Date Noted: 01/31/2011      Current Outpatient Medications:  hydroCHLOROthiazide (MICROZIDE) 12.5 MG capsule, TAKE 1 CAPSULE BY MOUTH EVERY DAY IN THE MORNING, Disp: 90 capsule, Rfl: 1  losartan (COZAAR) 100 MG tablet, TAKE 1 TABLET BY MOUTH EVERY DAY, Disp: 90 tablet, Rfl: 1  dilTIAZem (DILT-XR) 240 MG extended release capsule, TAKE 1 CAPSULE BY MOUTH EVERY DAY, Disp: 90 capsule, Rfl: 0  JANUVIA 100 MG tablet, TAKE 1 TABLET BY MOUTH EVERY DAY, Disp: 90 tablet, Rfl: 0  glipiZIDE (GLUCOTROL) 10 MG tablet, TAKE 1 TABLET BY MOUTH 2 TIMES DAILY (BEFORE MEAL). , Disp: 180 tablet, Rfl: 0  metoprolol succinate (TOPROL XL) 50 MG extended release tablet, Take 1 tablet by mouth daily, Disp: 90 tablet, Rfl: 0  metFORMIN (GLUCOPHAGE) 1000 MG tablet, TAKE 1 TABLET BY MOUTH 2 TIMES DAILY (WITH MEALS), Disp: 180 tablet, Rfl: 2  ONETOUCH ULTRA strip, 1 EACH BY DOES NOT APPLY ROUTE DAILY AS NEEDED. **ASK PT WHICH MONITOR SHES USING, Disp: 100 strip, Rfl: 3  atorvastatin (LIPITOR) 10 MG tablet, TAKE 1 TABLET BY MOUTH DAILY, Disp: 90 tablet, Rfl: 3  vitamin B-12 (CYANOCOBALAMIN) 100 MCG tablet, Take 250 mcg by mouth daily. , Disp: , Rfl:   Multiple Vitamin CAPS, Take  by mouth daily. , Disp: , Rfl:   ONE TOUCH LANCETS MISC, by Does not apply route., Disp: 100 each, Rfl: 2    No current facility-administered medications for this visit.      Allergies: Ciprofloxacin and Hydrocodone  Past Medical History:  2009: Chronic kidney disease      Comment:  kidney stones  No date: Hyperlipidemia  No date: Hypertension  No date: MDRO (multiple drug resistant organisms) resistance  2013: MRSA infection      Comment:  left lower leg  No date: Type II or unspecified type diabetes mellitus without   mention of complication, not stated as uncontrolled  Past Surgical History:  No date:  SECTION  1988: CHOLECYSTECTOMY  2018: COLONOSCOPY      Comment:  Dr. Ines Rojas  2009: LITHOTRIPSY  Review of patient's family history indicates:  Problem: Cancer      Relation: Mother          Age of Onset: (Not Specified)          Comment: pancreatic  Problem: Cancer      Relation: Father          Age of Onset: (Not Specified)          Comment: lung  Problem: Heart Disease      Relation: Father          Age of Onset: (Not Specified)          Comment: Bypass x 5  Problem: Heart Disease      Relation: Sister          Age of Onset: (Not Specified)          Comment: Stent- CAD  Problem: Diabetes      Relation: Sister          Age of Onset: (Not Specified)    Social History    Tobacco Use      Smoking status: Never Smoker      Smokeless tobacco: Never Used    Alcohol use: No      Diabetes   She presents for her follow-up diabetic visit. She has type 2 diabetes mellitus. Pertinent negatives for hypoglycemia include no dizziness. Associated symptoms include fatigue. There are no hypoglycemic complications. Symptoms are stable. Diabetic complications include nephropathy. Risk factors for coronary artery disease include dyslipidemia, diabetes mellitus and hypertension. Current diabetic treatment includes oral agent (triple therapy). She is compliant with treatment all of the time. Her weight is stable. She is following a low fat/cholesterol diet. She rarely participates in exercise. Her breakfast blood glucose range is generally 110-130 mg/dl. An ACE inhibitor/angiotensin II receptor blocker is being taken. Eye exam is current. Review of Systems   Constitutional: Positive for fatigue. Negative for chills. Eyes: Negative for visual disturbance.    Respiratory: Negative. Cardiovascular: Negative. Genitourinary: Positive for enuresis and frequency. Negative for difficulty urinating. Musculoskeletal: Negative. Negative for back pain. Neurological: Negative. Negative for dizziness. Psychiatric/Behavioral: Negative. Objective:   Physical Exam  Constitutional:       General: She is not in acute distress. Appearance: She is well-developed. She is not diaphoretic. HENT:      Head: Normocephalic and atraumatic. Right Ear: External ear normal.      Left Ear: External ear normal.      Nose: Nose normal.      Mouth/Throat:      Pharynx: No oropharyngeal exudate. Eyes:      General: No scleral icterus. Right eye: No discharge. Left eye: No discharge. Conjunctiva/sclera: Conjunctivae normal.      Pupils: Pupils are equal, round, and reactive to light. Neck:      Musculoskeletal: Normal range of motion and neck supple. Thyroid: No thyromegaly. Vascular: No JVD. Trachea: No tracheal deviation. Cardiovascular:      Rate and Rhythm: Normal rate and regular rhythm. Heart sounds: Normal heart sounds. No murmur. No friction rub. No gallop. Pulmonary:      Effort: Pulmonary effort is normal. No respiratory distress. Breath sounds: Normal breath sounds. No stridor. No wheezing or rales. Chest:      Chest wall: No tenderness. Abdominal:      General: There is no distension. Palpations: Abdomen is soft. There is no mass. Tenderness: There is no abdominal tenderness. There is no guarding or rebound. Genitourinary:     Vagina: Normal. No vaginal discharge. Rectum: Guaiac result negative. Musculoskeletal: Normal range of motion. General: No tenderness. Lymphadenopathy:      Cervical: No cervical adenopathy. Skin:     General: Skin is warm and dry. Coloration: Skin is not pale. Findings: No erythema or rash.    Neurological:      Mental Status: She is alert and oriented to

## 2020-12-03 NOTE — PATIENT INSTRUCTIONS
Please call your pharmacy if you need any refills of your medication(s). Please call our office at [phone] if you don't hear from us about your test results. Bring an accurate list of your medications with you at every appointment to ensure that we have the correct information.     Our office hours are: Monday - Friday 8:30 am- 5 pm    Phone lines turn on at 8:30 am

## 2020-12-03 NOTE — TELEPHONE ENCOUNTER
Pt asked for refill at today's visit but it wasn't done.  pt is waiting at [facility]

## 2021-01-19 DIAGNOSIS — I10 HYPERTENSION, UNSPECIFIED TYPE: ICD-10-CM

## 2021-01-19 RX ORDER — METOPROLOL SUCCINATE 50 MG/1
TABLET, EXTENDED RELEASE ORAL
Qty: 90 TABLET | Refills: 0 | Status: SHIPPED | OUTPATIENT
Start: 2021-01-19 | End: 2021-02-11 | Stop reason: SDUPTHER

## 2021-01-28 RX ORDER — SITAGLIPTIN 100 MG/1
TABLET, FILM COATED ORAL
Qty: 90 TABLET | Refills: 0 | Status: SHIPPED | OUTPATIENT
Start: 2021-01-28 | End: 2021-02-08

## 2021-02-04 RX ORDER — DILTIAZEM HYDROCHLORIDE 240 MG/1
CAPSULE, EXTENDED RELEASE ORAL
Qty: 90 CAPSULE | Refills: 0 | Status: SHIPPED | OUTPATIENT
Start: 2021-02-04 | End: 2021-05-06 | Stop reason: SDUPTHER

## 2021-02-08 RX ORDER — SITAGLIPTIN 100 MG/1
TABLET, FILM COATED ORAL
Qty: 90 TABLET | Refills: 0 | Status: SHIPPED | OUTPATIENT
Start: 2021-02-08 | End: 2021-09-16 | Stop reason: SDUPTHER

## 2021-02-11 DIAGNOSIS — I10 HYPERTENSION, UNSPECIFIED TYPE: ICD-10-CM

## 2021-02-11 RX ORDER — METOPROLOL SUCCINATE 50 MG/1
TABLET, EXTENDED RELEASE ORAL
Qty: 90 TABLET | Refills: 0 | Status: SHIPPED | OUTPATIENT
Start: 2021-02-11 | End: 2021-06-28 | Stop reason: SDUPTHER

## 2021-05-06 RX ORDER — DILTIAZEM HYDROCHLORIDE 240 MG/1
CAPSULE, EXTENDED RELEASE ORAL
Qty: 90 CAPSULE | Refills: 0 | Status: SHIPPED | OUTPATIENT
Start: 2021-05-06 | End: 2021-07-29

## 2021-05-14 RX ORDER — HYDROCHLOROTHIAZIDE 12.5 MG/1
CAPSULE, GELATIN COATED ORAL
Qty: 90 CAPSULE | Refills: 0 | Status: SHIPPED | OUTPATIENT
Start: 2021-05-14 | End: 2021-08-05

## 2021-05-14 RX ORDER — LOSARTAN POTASSIUM 100 MG/1
TABLET ORAL
Qty: 90 TABLET | Refills: 0 | Status: SHIPPED | OUTPATIENT
Start: 2021-05-14 | End: 2021-08-05

## 2021-06-01 ENCOUNTER — OFFICE VISIT (OUTPATIENT)
Dept: FAMILY MEDICINE CLINIC | Age: 61
End: 2021-06-01
Payer: COMMERCIAL

## 2021-06-01 VITALS
HEART RATE: 72 BPM | TEMPERATURE: 99.1 F | HEIGHT: 62 IN | BODY MASS INDEX: 30.18 KG/M2 | SYSTOLIC BLOOD PRESSURE: 130 MMHG | WEIGHT: 164 LBS | DIASTOLIC BLOOD PRESSURE: 82 MMHG

## 2021-06-01 DIAGNOSIS — I10 ESSENTIAL HYPERTENSION: Primary | ICD-10-CM

## 2021-06-01 DIAGNOSIS — Z11.59 NEED FOR HEPATITIS C SCREENING TEST: ICD-10-CM

## 2021-06-01 DIAGNOSIS — R80.9 TYPE 2 DIABETES MELLITUS WITH MICROALBUMINURIA, WITHOUT LONG-TERM CURRENT USE OF INSULIN (HCC): ICD-10-CM

## 2021-06-01 DIAGNOSIS — Z12.4 CERVICAL CANCER SCREENING: ICD-10-CM

## 2021-06-01 DIAGNOSIS — E11.29 TYPE 2 DIABETES MELLITUS WITH MICROALBUMINURIA, WITHOUT LONG-TERM CURRENT USE OF INSULIN (HCC): ICD-10-CM

## 2021-06-01 DIAGNOSIS — E78.49 OTHER HYPERLIPIDEMIA: ICD-10-CM

## 2021-06-01 PROCEDURE — G8427 DOCREV CUR MEDS BY ELIG CLIN: HCPCS | Performed by: FAMILY MEDICINE

## 2021-06-01 PROCEDURE — 3017F COLORECTAL CA SCREEN DOC REV: CPT | Performed by: FAMILY MEDICINE

## 2021-06-01 PROCEDURE — 1036F TOBACCO NON-USER: CPT | Performed by: FAMILY MEDICINE

## 2021-06-01 PROCEDURE — 2022F DILAT RTA XM EVC RTNOPTHY: CPT | Performed by: FAMILY MEDICINE

## 2021-06-01 PROCEDURE — 3046F HEMOGLOBIN A1C LEVEL >9.0%: CPT | Performed by: FAMILY MEDICINE

## 2021-06-01 PROCEDURE — G8417 CALC BMI ABV UP PARAM F/U: HCPCS | Performed by: FAMILY MEDICINE

## 2021-06-01 PROCEDURE — 99214 OFFICE O/P EST MOD 30 MIN: CPT | Performed by: FAMILY MEDICINE

## 2021-06-01 SDOH — ECONOMIC STABILITY: FOOD INSECURITY: WITHIN THE PAST 12 MONTHS, THE FOOD YOU BOUGHT JUST DIDN'T LAST AND YOU DIDN'T HAVE MONEY TO GET MORE.: NEVER TRUE

## 2021-06-01 SDOH — ECONOMIC STABILITY: FOOD INSECURITY: WITHIN THE PAST 12 MONTHS, YOU WORRIED THAT YOUR FOOD WOULD RUN OUT BEFORE YOU GOT MONEY TO BUY MORE.: NEVER TRUE

## 2021-06-01 ASSESSMENT — PATIENT HEALTH QUESTIONNAIRE - PHQ9
SUM OF ALL RESPONSES TO PHQ9 QUESTIONS 1 & 2: 0
1. LITTLE INTEREST OR PLEASURE IN DOING THINGS: 0
SUM OF ALL RESPONSES TO PHQ QUESTIONS 1-9: 0
2. FEELING DOWN, DEPRESSED OR HOPELESS: 0
SUM OF ALL RESPONSES TO PHQ QUESTIONS 1-9: 0
SUM OF ALL RESPONSES TO PHQ QUESTIONS 1-9: 0

## 2021-06-01 ASSESSMENT — ENCOUNTER SYMPTOMS
ABDOMINAL DISTENTION: 0
SHORTNESS OF BREATH: 0
NAUSEA: 0
ABDOMINAL PAIN: 0
CHEST TIGHTNESS: 0
DIARRHEA: 0
BLOOD IN STOOL: 0
VOMITING: 0
CONSTIPATION: 0

## 2021-06-01 ASSESSMENT — SOCIAL DETERMINANTS OF HEALTH (SDOH): HOW HARD IS IT FOR YOU TO PAY FOR THE VERY BASICS LIKE FOOD, HOUSING, MEDICAL CARE, AND HEATING?: NOT HARD AT ALL

## 2021-06-01 NOTE — PROGRESS NOTES
Subjective:      Patient ID: Hugo An is a 62 y.o. female. Chief Complaint   Patient presents with   Patt Martinez Doctor     hypertension, diabetes        Patient presents with:  Established New Doctor: hypertension, diabetes    Here for the above  She see dr Vannie Epley for renal    She is well and no c/o    YOB: 1960    Date of Visit:  6/1/2021     -- Ciprofloxacin -- Other (See Comments)    --  hallucinations   -- Hydrocodone -- Itching    Current Outpatient Medications:  citric acid-potassium citrate (POLYCITRA) 1100-334 MG/5ML solution, Take 5 mLs by mouth 2 times daily, Disp: , Rfl:   metFORMIN (GLUCOPHAGE) 1000 MG tablet, Take 1 tablet by mouth 2 times daily (with meals), Disp: 180 tablet, Rfl: 0  hydroCHLOROthiazide (MICROZIDE) 12.5 MG capsule, TAKE 1 CAPSULE BY MOUTH EVERY DAY IN THE MORNING, Disp: 90 capsule, Rfl: 0  losartan (COZAAR) 100 MG tablet, TAKE 1 TABLET BY MOUTH EVERY DAY, Disp: 90 tablet, Rfl: 0  dilTIAZem (DILT-XR) 240 MG extended release capsule, TAKE 1 CAPSULE BY MOUTH EVERY DAY, Disp: 90 capsule, Rfl: 0  metoprolol succinate (TOPROL XL) 50 MG extended release tablet, TAKE 1 TABLET BY MOUTH EVERY DAY, Disp: 90 tablet, Rfl: 0  JANUVIA 100 MG tablet, TAKE 1 TABLET BY MOUTH EVERY DAY, Disp: 90 tablet, Rfl: 0  glipiZIDE (GLUCOTROL) 10 MG tablet, TAKE 1 TABLET BY MOUTH 2 TIMES DAILY (BEFORE MEAL). , Disp: 180 tablet, Rfl: 3  atorvastatin (LIPITOR) 10 MG tablet, TAKE 1 TABLET BY MOUTH DAILY, Disp: 90 tablet, Rfl: 3  ONETOUCH ULTRA strip, 1 EACH BY DOES NOT APPLY ROUTE DAILY AS NEEDED. **ASK PT WHICH MONITOR SHES USING, Disp: 100 strip, Rfl: 3  vitamin B-12 (CYANOCOBALAMIN) 100 MCG tablet, Take 250 mcg by mouth daily. , Disp: , Rfl:   Multiple Vitamin CAPS, Take  by mouth daily. , Disp: , Rfl:   ONE TOUCH LANCETS MISC, by Does not apply route., Disp: 100 each, Rfl: 2    No current facility-administered medications for this visit.      --------------------------- 06/01/21                      1311         ---------------------------   BP:          130/82         Site:    Left Upper Arm     Position:     Sitting        Cuff Size:  Medium Adult      Pulse:         72           Temp:   99.1 °F (37.3 °C)   TempSrc:    Temporal        Weight: 164 lb (74.4 kg)    Height:  5' 2\" (1.575 m)   ---------------------------  Body mass index is 30 kg/m². Wt Readings from Last 3 Encounters:  She has been trying to lose   06/01/21 : 164 lb (74.4 kg)  12/03/20 : 168 lb (76.2 kg)  02/28/20 : 173 lb (78.5 kg)    BP Readings from Last 3 Encounters:  06/01/21 : 130/82  12/03/20 : 116/62  02/28/20 : 136/82        Review of Systems   Constitutional: Negative for appetite change, chills, fever and unexpected weight change. Respiratory: Negative for chest tightness and shortness of breath. Cardiovascular: Negative for chest pain, palpitations and leg swelling. Gastrointestinal: Negative for abdominal distention, abdominal pain, blood in stool, constipation, diarrhea, nausea and vomiting. Genitourinary: Negative for difficulty urinating, dysuria and hematuria. Musculoskeletal:        Feet ok no sores on the feet   No numbness    Neurological: Negative for dizziness and headaches. Objective:   Physical Exam  Constitutional:       General: She is not in acute distress. Appearance: Normal appearance. She is well-developed. She is not ill-appearing or diaphoretic. HENT:      Head: Normocephalic and atraumatic. Eyes:      General: No scleral icterus. Neck:      Thyroid: No thyroid mass or thyromegaly. Cardiovascular:      Rate and Rhythm: Normal rate and regular rhythm. Pulses:           Dorsalis pedis pulses are 2+ on the right side and 2+ on the left side. Posterior tibial pulses are 2+ on the right side and 2+ on the left side. Heart sounds: Normal heart sounds. No murmur heard. No friction rub. No gallop. Comments:   No edema legs  Pulmonary:      Effort: Pulmonary effort is normal. No tachypnea, accessory muscle usage or respiratory distress. Breath sounds: Normal breath sounds. No decreased breath sounds, wheezing, rhonchi or rales. Abdominal:      General: Bowel sounds are normal. There is no distension or abdominal bruit. Palpations: Abdomen is soft. There is no hepatomegaly, splenomegaly, mass or pulsatile mass. Tenderness: There is no abdominal tenderness. There is no guarding. Musculoskeletal:      Comments: Feet are warm, pink, dry. No lesions and the monofilament is normal both sides. Lymphadenopathy:      Cervical: No cervical adenopathy. Upper Body:      Right upper body: No supraclavicular adenopathy. Left upper body: No supraclavicular adenopathy. Skin:     General: Skin is warm and dry. Coloration: Skin is not pale. Nails: There is no clubbing. Neurological:      General: No focal deficit present. Mental Status: She is alert. Assessment:        Diagnosis Orders   1. Essential hypertension  Vitamin B12 & Folate   2. Type 2 diabetes mellitus with microalbuminuria, without long-term current use of insulin (HCC)  Vitamin B12 & Folate    Glucose    Hemoglobin A1C    HM DIABETES FOOT EXAM   3. Other hyperlipidemia     4. Need for hepatitis C screening test  HEPATITIS C ANTIBODY   5. Cervical cancer screening  [facility] - Jerson Alonso MD, Gynecology, [facility]         Lab Results   Component Value Date    LABA1C 6.4 11/30/2020     Lab Results   Component Value Date    .0 11/30/2020     Bmp much better on 5/26/21 and the cbc same day was fine   Hepatic profile on 2/12/21 was normal   Vit d ok on 2/12/21   Lipid on 11/30/20 was great   Former gyne is dr Moon Yousif   She declined covid vaccine    Colon on 6/7/18 because of family hx repeat in 5 years. Dr Oneida Ahuja.  Polyps adenoma          Plan:      Do remember to get the mammogram as you are due Remain on the diet and the medicines   Dr Roverto Salinas for the gynecology or her associate   When you have the visit with dr Torre this week ask to see if it is ok to remain on the metformin  See me back in about 3-4 months         Delores Lopez MD

## 2021-06-01 NOTE — PATIENT INSTRUCTIONS
Do remember to get the mammogram as you are due  Remain on the diet and the medicines   Dr Barrett De La Cruz for the gynecology or her associate   When you have the visit with dr Magali Rojas this week ask to see if it is ok to remain on the metformin  See me back in about 3-4 months

## 2021-06-28 DIAGNOSIS — I10 HYPERTENSION, UNSPECIFIED TYPE: ICD-10-CM

## 2021-06-28 RX ORDER — METOPROLOL SUCCINATE 50 MG/1
TABLET, EXTENDED RELEASE ORAL
Qty: 90 TABLET | Refills: 0 | Status: SHIPPED | OUTPATIENT
Start: 2021-06-28 | End: 2021-09-30

## 2021-07-12 ENCOUNTER — NURSE TRIAGE (OUTPATIENT)
Dept: OTHER | Facility: CLINIC | Age: 61
End: 2021-07-12

## 2021-07-12 ENCOUNTER — TELEPHONE (OUTPATIENT)
Dept: FAMILY MEDICINE CLINIC | Age: 61
End: 2021-07-12

## 2021-07-12 NOTE — TELEPHONE ENCOUNTER
She likely has covid   She should be seen at the local urgent care  Hospital is not letting us see in office  If [facility] she can go there  Or to ER especially if having breathing difficulties

## 2021-07-12 NOTE — TELEPHONE ENCOUNTER
Patient has headache, worn out, achy, no fever, can not taste anything. Her  is currently in hospital with covid, flu B, pneumonia. She had rapid test done last Wednesday and it was negative.  NO VV lives in [place]>      Please call, [phone]

## 2021-07-12 NOTE — TELEPHONE ENCOUNTER
Reason for Disposition   [1] HIGH RISK patient (e.g., age > 59 years, diabetes, heart or lung disease, weak immune system) AND [2] new or worsening symptoms    Answer Assessment - Initial Assessment Questions  1. COVID-19 DIAGNOSIS: \"Who made your Coronavirus (COVID-19) diagnosis? \" \"Was it confirmed by a positive lab test?\" If not diagnosed by a HCP, ask \"Are there lots of cases (community spread) where you live? \" (See public health department website, if unsure)      No dx    2. COVID-19 EXPOSURE: \"Was there any known exposure to COVID before the symptoms began? \" CDC Definition of close contact: within 6 feet (2 meters) for a total of 15 minutes or more over a 24-hour period.  is currently in hospital for Covid, Influenza B, and pneumonia    3. ONSET: \"When did the COVID-19 symptoms start? \"       A day or two ago    4. WORST SYMPTOM: \"What is your worst symptom? \" (e.g., cough, fever, shortness of breath, muscle aches)      Weakness    5. COUGH: \"Do you have a cough? \" If so, ask: \"How bad is the cough? \"        No     6. FEVER: \"Do you have a fever? \" If so, ask: \"What is your temperature, how was it measured, and when did it start? \"      Did have a fever of 101, but has gone away    7. RESPIRATORY STATUS: \"Describe your breathing? \" (e.g., shortness of breath, wheezing, unable to speak)       Denies     8. BETTER-SAME-WORSE: Nanette Lopes you getting better, staying the same or getting worse compared to yesterday? \"  If getting worse, ask, \"In what way? \"      About the same    9. HIGH RISK DISEASE: \"Do you have any chronic medical problems? \" (e.g., asthma, heart or lung disease, weak immune system, obesity, etc.)      \"kidney problems\", DM    10. PREGNANCY: \"Is there any chance you are pregnant? \" \"When was your last menstrual period? \"        N/a     11. OTHER SYMPTOMS: \"Do you have any other symptoms? \"  (e.g., chills, fatigue, headache, loss of smell or taste, muscle pain, sore throat; new loss of smell or taste especially support the diagnosis of COVID-19)        Fatigue, HA, loss of smell/taste, achiness on left side, not sleeping well, weakness    Protocols used: CORONAVIRUS (COVID-19) DIAGNOSED OR SUSPECTED-ADULT-AH    Received call from [facility] at [facility] with Red Flag Complaint. Brief description of triage: see above. Triage indicates for patient to call PCP now. Advised to go to [facility], walk in clinic or ED if no appts available. Care advice provided, patient verbalizes understanding; denies any other questions or concerns; instructed to call back for any new or worsening symptoms. Writer provided warm transfer to [employer] at [facility] for appointment scheduling. Attention Provider: Thank you for allowing me to participate in the care of your patient. The patient was connected to triage in response to information provided to the ECC. Please do not respond through this encounter as the response is not directed to a shared pool.

## 2021-07-13 ENCOUNTER — HOSPITAL ENCOUNTER (INPATIENT)
Age: 61
LOS: 11 days | Discharge: HOME | DRG: 987 | End: 2021-07-25
Attending: EMERGENCY MEDICINE | Admitting: HOSPITALIST
Payer: COMMERCIAL

## 2021-07-13 ENCOUNTER — APPOINTMENT (OUTPATIENT)
Dept: GENERAL RADIOLOGY | Age: 61
DRG: 987 | End: 2021-07-13
Payer: COMMERCIAL

## 2021-07-13 DIAGNOSIS — N17.9 AKI (ACUTE KIDNEY INJURY) (HCC): Primary | ICD-10-CM

## 2021-07-13 LAB
ANION GAP SERPL CALCULATED.3IONS-SCNC: 15 MMOL/L (ref 3–16)
BASOPHILS ABSOLUTE: 0 K/UL (ref 0–0.2)
BASOPHILS RELATIVE PERCENT: 0.5 %
BUN BLDV-MCNC: 55 MG/DL (ref 7–20)
CALCIUM SERPL-MCNC: 9 MG/DL (ref 8.3–10.6)
CHLORIDE BLD-SCNC: 94 MMOL/L (ref 99–110)
CO2: 23 MMOL/L (ref 21–32)
CREAT SERPL-MCNC: 2.3 MG/DL (ref 0.6–1.2)
EOSINOPHILS ABSOLUTE: 0 K/UL (ref 0–0.6)
EOSINOPHILS RELATIVE PERCENT: 0 %
GFR AFRICAN AMERICAN: 26
GFR NON-AFRICAN AMERICAN: 22
GLUCOSE BLD-MCNC: 104 MG/DL (ref 70–99)
HCT VFR BLD CALC: 37.7 % (ref 36–48)
HEMOGLOBIN: 13.2 G/DL (ref 12–16)
LYMPHOCYTES ABSOLUTE: 0.5 K/UL (ref 1–5.1)
LYMPHOCYTES RELATIVE PERCENT: 20.9 %
MCH RBC QN AUTO: 30.8 PG (ref 26–34)
MCHC RBC AUTO-ENTMCNC: 35.1 G/DL (ref 31–36)
MCV RBC AUTO: 87.7 FL (ref 80–100)
MONOCYTES ABSOLUTE: 0.3 K/UL (ref 0–1.3)
MONOCYTES RELATIVE PERCENT: 11.1 %
NEUTROPHILS ABSOLUTE: 1.7 K/UL (ref 1.7–7.7)
NEUTROPHILS RELATIVE PERCENT: 67.5 %
PDW BLD-RTO: 12.9 % (ref 12.4–15.4)
PLATELET # BLD: 93 K/UL (ref 135–450)
PLATELET SLIDE REVIEW: ABNORMAL
PMV BLD AUTO: 9.5 FL (ref 5–10.5)
POTASSIUM REFLEX MAGNESIUM: 4.6 MMOL/L (ref 3.5–5.1)
PRO-BNP: 55 PG/ML (ref 0–124)
RBC # BLD: 4.3 M/UL (ref 4–5.2)
RBC # BLD: NORMAL 10*6/UL
SARS-COV-2, NAAT: DETECTED
SLIDE REVIEW: ABNORMAL
SODIUM BLD-SCNC: 132 MMOL/L (ref 136–145)
TROPONIN: <0.01 NG/ML
WBC # BLD: 2.6 K/UL (ref 4–11)

## 2021-07-13 PROCEDURE — 85025 COMPLETE CBC W/AUTO DIFF WBC: CPT

## 2021-07-13 PROCEDURE — 96374 THER/PROPH/DIAG INJ IV PUSH: CPT

## 2021-07-13 PROCEDURE — 36415 COLL VENOUS BLD VENIPUNCTURE: CPT

## 2021-07-13 PROCEDURE — 83880 ASSAY OF NATRIURETIC PEPTIDE: CPT

## 2021-07-13 PROCEDURE — 0T768DZ DILATION OF RIGHT URETER WITH INTRALUMINAL DEVICE, VIA NATURAL OR ARTIFICIAL OPENING ENDOSCOPIC: ICD-10-PCS | Performed by: UROLOGY

## 2021-07-13 PROCEDURE — 87635 SARS-COV-2 COVID-19 AMP PRB: CPT

## 2021-07-13 PROCEDURE — 6360000002 HC RX W HCPCS: Performed by: EMERGENCY MEDICINE

## 2021-07-13 PROCEDURE — 94640 AIRWAY INHALATION TREATMENT: CPT

## 2021-07-13 PROCEDURE — 99284 EMERGENCY DEPT VISIT MOD MDM: CPT

## 2021-07-13 PROCEDURE — 2580000003 HC RX 258: Performed by: EMERGENCY MEDICINE

## 2021-07-13 PROCEDURE — 84484 ASSAY OF TROPONIN QUANT: CPT

## 2021-07-13 PROCEDURE — 80048 BASIC METABOLIC PNL TOTAL CA: CPT

## 2021-07-13 PROCEDURE — BT1D1ZZ FLUOROSCOPY OF RIGHT KIDNEY, URETER AND BLADDER USING LOW OSMOLAR CONTRAST: ICD-10-PCS | Performed by: UROLOGY

## 2021-07-13 PROCEDURE — 71045 X-RAY EXAM CHEST 1 VIEW: CPT

## 2021-07-13 PROCEDURE — 6370000000 HC RX 637 (ALT 250 FOR IP): Performed by: EMERGENCY MEDICINE

## 2021-07-13 PROCEDURE — 80076 HEPATIC FUNCTION PANEL: CPT

## 2021-07-13 PROCEDURE — 93005 ELECTROCARDIOGRAM TRACING: CPT | Performed by: EMERGENCY MEDICINE

## 2021-07-13 PROCEDURE — 94760 N-INVAS EAR/PLS OXIMETRY 1: CPT

## 2021-07-13 RX ORDER — ALBUTEROL SULFATE 2.5 MG/3ML
5 SOLUTION RESPIRATORY (INHALATION) ONCE
Status: DISCONTINUED | OUTPATIENT
Start: 2021-07-13 | End: 2021-07-13

## 2021-07-13 RX ORDER — DEXAMETHASONE SODIUM PHOSPHATE 10 MG/ML
6 INJECTION, SOLUTION INTRAMUSCULAR; INTRAVENOUS ONCE
Status: COMPLETED | OUTPATIENT
Start: 2021-07-13 | End: 2021-07-13

## 2021-07-13 RX ORDER — 0.9 % SODIUM CHLORIDE 0.9 %
1000 INTRAVENOUS SOLUTION INTRAVENOUS ONCE
Status: COMPLETED | OUTPATIENT
Start: 2021-07-13 | End: 2021-07-14

## 2021-07-13 RX ORDER — ALBUTEROL SULFATE 90 UG/1
2 AEROSOL, METERED RESPIRATORY (INHALATION) EVERY 6 HOURS PRN
Status: DISCONTINUED | OUTPATIENT
Start: 2021-07-13 | End: 2021-07-25 | Stop reason: HOSPADM

## 2021-07-13 RX ADMIN — DEXAMETHASONE SODIUM PHOSPHATE 6 MG: 10 INJECTION, SOLUTION INTRAMUSCULAR; INTRAVENOUS at 23:24

## 2021-07-13 RX ADMIN — ALBUTEROL SULFATE 2 PUFF: 90 AEROSOL, METERED RESPIRATORY (INHALATION) at 23:23

## 2021-07-13 RX ADMIN — SODIUM CHLORIDE 1000 ML: 9 INJECTION, SOLUTION INTRAVENOUS at 23:24

## 2021-07-13 ASSESSMENT — PAIN SCALES - GENERAL: PAINLEVEL_OUTOF10: 3

## 2021-07-13 ASSESSMENT — PAIN DESCRIPTION - DESCRIPTORS: DESCRIPTORS: ACHING

## 2021-07-13 ASSESSMENT — PAIN DESCRIPTION - LOCATION: LOCATION: FLANK

## 2021-07-13 ASSESSMENT — PAIN DESCRIPTION - PAIN TYPE: TYPE: ACUTE PAIN

## 2021-07-13 ASSESSMENT — PAIN DESCRIPTION - ONSET: ONSET: ON-GOING

## 2021-07-13 ASSESSMENT — PAIN DESCRIPTION - PROGRESSION: CLINICAL_PROGRESSION: NOT CHANGED

## 2021-07-13 ASSESSMENT — PAIN DESCRIPTION - ORIENTATION: ORIENTATION: RIGHT;LEFT

## 2021-07-13 ASSESSMENT — PAIN DESCRIPTION - FREQUENCY: FREQUENCY: CONTINUOUS

## 2021-07-14 ENCOUNTER — APPOINTMENT (OUTPATIENT)
Dept: GENERAL RADIOLOGY | Age: 61
DRG: 987 | End: 2021-07-14
Payer: COMMERCIAL

## 2021-07-14 PROBLEM — N17.9 ACUTE KIDNEY INJURY SUPERIMPOSED ON CKD (HCC): Status: ACTIVE | Noted: 2021-07-14

## 2021-07-14 PROBLEM — U07.1 COVID-19 VIRUS INFECTION: Status: ACTIVE | Noted: 2021-07-14

## 2021-07-14 PROBLEM — N18.9 ACUTE KIDNEY INJURY SUPERIMPOSED ON CKD (HCC): Status: ACTIVE | Noted: 2021-07-14

## 2021-07-14 LAB
ALBUMIN SERPL-MCNC: 3.2 G/DL (ref 3.4–5)
ALBUMIN SERPL-MCNC: 4 G/DL (ref 3.4–5)
ALP BLD-CCNC: 43 U/L (ref 40–129)
ALP BLD-CCNC: 47 U/L (ref 40–129)
ALT SERPL-CCNC: 37 U/L (ref 10–40)
ALT SERPL-CCNC: 45 U/L (ref 10–40)
ANION GAP SERPL CALCULATED.3IONS-SCNC: 20 MMOL/L (ref 3–16)
AST SERPL-CCNC: 34 U/L (ref 15–37)
AST SERPL-CCNC: 43 U/L (ref 15–37)
BILIRUB SERPL-MCNC: <0.2 MG/DL (ref 0–1)
BILIRUB SERPL-MCNC: <0.2 MG/DL (ref 0–1)
BILIRUBIN DIRECT: <0.2 MG/DL (ref 0–0.3)
BILIRUBIN DIRECT: <0.2 MG/DL (ref 0–0.3)
BILIRUBIN, INDIRECT: ABNORMAL MG/DL (ref 0–1)
BILIRUBIN, INDIRECT: ABNORMAL MG/DL (ref 0–1)
BUN BLDV-MCNC: 55 MG/DL (ref 7–20)
CALCIUM SERPL-MCNC: 8.2 MG/DL (ref 8.3–10.6)
CHLORIDE BLD-SCNC: 96 MMOL/L (ref 99–110)
CO2: 18 MMOL/L (ref 21–32)
CREAT SERPL-MCNC: 1.6 MG/DL (ref 0.6–1.2)
EKG ATRIAL RATE: 94 BPM
EKG DIAGNOSIS: NORMAL
EKG P AXIS: 27 DEGREES
EKG P-R INTERVAL: 168 MS
EKG Q-T INTERVAL: 342 MS
EKG QRS DURATION: 88 MS
EKG QTC CALCULATION (BAZETT): 427 MS
EKG R AXIS: 4 DEGREES
EKG T AXIS: 62 DEGREES
EKG VENTRICULAR RATE: 94 BPM
GFR AFRICAN AMERICAN: 40
GFR NON-AFRICAN AMERICAN: 33
GLUCOSE BLD-MCNC: 122 MG/DL (ref 70–99)
GLUCOSE BLD-MCNC: 171 MG/DL (ref 70–99)
GLUCOSE BLD-MCNC: 238 MG/DL (ref 70–99)
GLUCOSE BLD-MCNC: 247 MG/DL (ref 70–99)
GLUCOSE BLD-MCNC: 248 MG/DL (ref 70–99)
GLUCOSE BLD-MCNC: 263 MG/DL (ref 70–99)
HCT VFR BLD CALC: 34.6 % (ref 36–48)
HEMATOLOGY PATH CONSULT: YES
HEMOGLOBIN: 12.1 G/DL (ref 12–16)
MCH RBC QN AUTO: 30.6 PG (ref 26–34)
MCHC RBC AUTO-ENTMCNC: 34.9 G/DL (ref 31–36)
MCV RBC AUTO: 87.8 FL (ref 80–100)
PDW BLD-RTO: 12.6 % (ref 12.4–15.4)
PERFORMED ON: ABNORMAL
PLATELET # BLD: 80 K/UL (ref 135–450)
PMV BLD AUTO: 10.1 FL (ref 5–10.5)
POTASSIUM REFLEX MAGNESIUM: 3.7 MMOL/L (ref 3.5–5.1)
RBC # BLD: 3.94 M/UL (ref 4–5.2)
SODIUM BLD-SCNC: 134 MMOL/L (ref 136–145)
TOTAL PROTEIN: 6.7 G/DL (ref 6.4–8.2)
TOTAL PROTEIN: 7.8 G/DL (ref 6.4–8.2)
WBC # BLD: 1.1 K/UL (ref 4–11)

## 2021-07-14 PROCEDURE — 1200000000 HC SEMI PRIVATE

## 2021-07-14 PROCEDURE — 80048 BASIC METABOLIC PNL TOTAL CA: CPT

## 2021-07-14 PROCEDURE — 94761 N-INVAS EAR/PLS OXIMETRY MLT: CPT

## 2021-07-14 PROCEDURE — 6360000002 HC RX W HCPCS: Performed by: NURSE PRACTITIONER

## 2021-07-14 PROCEDURE — 94640 AIRWAY INHALATION TREATMENT: CPT

## 2021-07-14 PROCEDURE — 6370000000 HC RX 637 (ALT 250 FOR IP): Performed by: NURSE PRACTITIONER

## 2021-07-14 PROCEDURE — 80076 HEPATIC FUNCTION PANEL: CPT

## 2021-07-14 PROCEDURE — 2580000003 HC RX 258: Performed by: INTERNAL MEDICINE

## 2021-07-14 PROCEDURE — 93010 ELECTROCARDIOGRAM REPORT: CPT | Performed by: INTERNAL MEDICINE

## 2021-07-14 PROCEDURE — 6370000000 HC RX 637 (ALT 250 FOR IP): Performed by: HOSPITALIST

## 2021-07-14 PROCEDURE — 2580000003 HC RX 258: Performed by: NURSE PRACTITIONER

## 2021-07-14 PROCEDURE — 85027 COMPLETE CBC AUTOMATED: CPT

## 2021-07-14 PROCEDURE — 71045 X-RAY EXAM CHEST 1 VIEW: CPT

## 2021-07-14 PROCEDURE — 36415 COLL VENOUS BLD VENIPUNCTURE: CPT

## 2021-07-14 PROCEDURE — 6370000000 HC RX 637 (ALT 250 FOR IP): Performed by: INTERNAL MEDICINE

## 2021-07-14 RX ORDER — NICOTINE POLACRILEX 4 MG
15 LOZENGE BUCCAL PRN
Status: DISCONTINUED | OUTPATIENT
Start: 2021-07-14 | End: 2021-07-25 | Stop reason: HOSPADM

## 2021-07-14 RX ORDER — ACETAMINOPHEN 650 MG/1
650 SUPPOSITORY RECTAL EVERY 6 HOURS PRN
Status: DISCONTINUED | OUTPATIENT
Start: 2021-07-14 | End: 2021-07-25 | Stop reason: HOSPADM

## 2021-07-14 RX ORDER — DILTIAZEM HYDROCHLORIDE 240 MG/1
240 CAPSULE, COATED, EXTENDED RELEASE ORAL DAILY
Status: DISCONTINUED | OUTPATIENT
Start: 2021-07-14 | End: 2021-07-20

## 2021-07-14 RX ORDER — ALBUTEROL SULFATE 90 UG/1
2 AEROSOL, METERED RESPIRATORY (INHALATION) 4 TIMES DAILY
Status: DISCONTINUED | OUTPATIENT
Start: 2021-07-14 | End: 2021-07-18

## 2021-07-14 RX ORDER — ASCORBIC ACID 500 MG
500 TABLET ORAL DAILY
Status: DISCONTINUED | OUTPATIENT
Start: 2021-07-14 | End: 2021-07-25 | Stop reason: HOSPADM

## 2021-07-14 RX ORDER — DEXTROSE MONOHYDRATE 50 MG/ML
100 INJECTION, SOLUTION INTRAVENOUS PRN
Status: DISCONTINUED | OUTPATIENT
Start: 2021-07-14 | End: 2021-07-25 | Stop reason: HOSPADM

## 2021-07-14 RX ORDER — SODIUM CHLORIDE 9 MG/ML
25 INJECTION, SOLUTION INTRAVENOUS PRN
Status: DISCONTINUED | OUTPATIENT
Start: 2021-07-14 | End: 2021-07-25 | Stop reason: HOSPADM

## 2021-07-14 RX ORDER — SODIUM CHLORIDE 0.9 % (FLUSH) 0.9 %
5-40 SYRINGE (ML) INJECTION EVERY 12 HOURS SCHEDULED
Status: DISCONTINUED | OUTPATIENT
Start: 2021-07-14 | End: 2021-07-25 | Stop reason: HOSPADM

## 2021-07-14 RX ORDER — FAMOTIDINE 20 MG/1
20 TABLET, FILM COATED ORAL 2 TIMES DAILY
Status: DISCONTINUED | OUTPATIENT
Start: 2021-07-14 | End: 2021-07-14 | Stop reason: DRUGHIGH

## 2021-07-14 RX ORDER — VITAMIN B COMPLEX
1000 TABLET ORAL DAILY
Status: DISCONTINUED | OUTPATIENT
Start: 2021-07-14 | End: 2021-07-25 | Stop reason: HOSPADM

## 2021-07-14 RX ORDER — ZINC SULFATE 50(220)MG
50 CAPSULE ORAL DAILY
Status: DISCONTINUED | OUTPATIENT
Start: 2021-07-14 | End: 2021-07-25 | Stop reason: HOSPADM

## 2021-07-14 RX ORDER — 0.9 % SODIUM CHLORIDE 0.9 %
500 INTRAVENOUS SOLUTION INTRAVENOUS ONCE
Status: COMPLETED | OUTPATIENT
Start: 2021-07-14 | End: 2021-07-14

## 2021-07-14 RX ORDER — DEXTROSE MONOHYDRATE 25 G/50ML
12.5 INJECTION, SOLUTION INTRAVENOUS PRN
Status: DISCONTINUED | OUTPATIENT
Start: 2021-07-14 | End: 2021-07-25 | Stop reason: HOSPADM

## 2021-07-14 RX ORDER — POTASSIUM CITRATE 10 MEQ/1
10 TABLET, EXTENDED RELEASE ORAL 2 TIMES DAILY WITH MEALS
Status: DISCONTINUED | OUTPATIENT
Start: 2021-07-14 | End: 2021-07-25 | Stop reason: HOSPADM

## 2021-07-14 RX ORDER — SODIUM CHLORIDE 9 MG/ML
INJECTION, SOLUTION INTRAVENOUS CONTINUOUS
Status: DISCONTINUED | OUTPATIENT
Start: 2021-07-14 | End: 2021-07-15

## 2021-07-14 RX ORDER — BENZONATATE 100 MG/1
100 CAPSULE ORAL 3 TIMES DAILY PRN
Status: DISCONTINUED | OUTPATIENT
Start: 2021-07-14 | End: 2021-07-14

## 2021-07-14 RX ORDER — ONDANSETRON 2 MG/ML
4 INJECTION INTRAMUSCULAR; INTRAVENOUS EVERY 6 HOURS PRN
Status: DISCONTINUED | OUTPATIENT
Start: 2021-07-14 | End: 2021-07-25 | Stop reason: HOSPADM

## 2021-07-14 RX ORDER — POLYETHYLENE GLYCOL 3350 17 G/17G
17 POWDER, FOR SOLUTION ORAL DAILY PRN
Status: DISCONTINUED | OUTPATIENT
Start: 2021-07-14 | End: 2021-07-25 | Stop reason: HOSPADM

## 2021-07-14 RX ORDER — ATORVASTATIN CALCIUM 10 MG/1
10 TABLET, FILM COATED ORAL DAILY
Status: DISCONTINUED | OUTPATIENT
Start: 2021-07-14 | End: 2021-07-25 | Stop reason: HOSPADM

## 2021-07-14 RX ORDER — ACETAMINOPHEN 325 MG/1
650 TABLET ORAL EVERY 6 HOURS PRN
Status: DISCONTINUED | OUTPATIENT
Start: 2021-07-14 | End: 2021-07-25 | Stop reason: HOSPADM

## 2021-07-14 RX ORDER — BENZONATATE 100 MG/1
200 CAPSULE ORAL 3 TIMES DAILY PRN
Status: DISCONTINUED | OUTPATIENT
Start: 2021-07-14 | End: 2021-07-25 | Stop reason: HOSPADM

## 2021-07-14 RX ORDER — METOPROLOL SUCCINATE 50 MG/1
50 TABLET, EXTENDED RELEASE ORAL DAILY
Status: DISCONTINUED | OUTPATIENT
Start: 2021-07-14 | End: 2021-07-21

## 2021-07-14 RX ORDER — ONDANSETRON 4 MG/1
4 TABLET, ORALLY DISINTEGRATING ORAL EVERY 8 HOURS PRN
Status: DISCONTINUED | OUTPATIENT
Start: 2021-07-14 | End: 2021-07-25 | Stop reason: HOSPADM

## 2021-07-14 RX ORDER — FAMOTIDINE 20 MG/1
20 TABLET, FILM COATED ORAL DAILY
Status: DISCONTINUED | OUTPATIENT
Start: 2021-07-14 | End: 2021-07-25 | Stop reason: HOSPADM

## 2021-07-14 RX ORDER — DEXAMETHASONE SODIUM PHOSPHATE 4 MG/ML
8 INJECTION, SOLUTION INTRA-ARTICULAR; INTRALESIONAL; INTRAMUSCULAR; INTRAVENOUS; SOFT TISSUE NIGHTLY
Status: DISCONTINUED | OUTPATIENT
Start: 2021-07-14 | End: 2021-07-16

## 2021-07-14 RX ORDER — SODIUM CHLORIDE 0.9 % (FLUSH) 0.9 %
5-40 SYRINGE (ML) INJECTION PRN
Status: DISCONTINUED | OUTPATIENT
Start: 2021-07-14 | End: 2021-07-25 | Stop reason: HOSPADM

## 2021-07-14 RX ADMIN — SODIUM CHLORIDE, PRESERVATIVE FREE 10 ML: 5 INJECTION INTRAVENOUS at 22:14

## 2021-07-14 RX ADMIN — INSULIN LISPRO 2 UNITS: 100 INJECTION, SOLUTION INTRAVENOUS; SUBCUTANEOUS at 13:06

## 2021-07-14 RX ADMIN — POTASSIUM CITRATE 10 MEQ: 10 TABLET ORAL at 18:31

## 2021-07-14 RX ADMIN — Medication 1000 UNITS: at 08:41

## 2021-07-14 RX ADMIN — ZINC SULFATE 220 MG (50 MG) CAPSULE 50 MG: CAPSULE at 09:08

## 2021-07-14 RX ADMIN — INSULIN LISPRO 2 UNITS: 100 INJECTION, SOLUTION INTRAVENOUS; SUBCUTANEOUS at 18:29

## 2021-07-14 RX ADMIN — Medication 2 PUFF: at 20:18

## 2021-07-14 RX ADMIN — Medication 2 PUFF: at 17:05

## 2021-07-14 RX ADMIN — OXYCODONE HYDROCHLORIDE AND ACETAMINOPHEN 500 MG: 500 TABLET ORAL at 08:40

## 2021-07-14 RX ADMIN — Medication 2 PUFF: at 08:29

## 2021-07-14 RX ADMIN — BENZONATATE 200 MG: 100 CAPSULE ORAL at 22:13

## 2021-07-14 RX ADMIN — DEXAMETHASONE SODIUM PHOSPHATE 8 MG: 4 INJECTION, SOLUTION INTRAMUSCULAR; INTRAVENOUS at 22:13

## 2021-07-14 RX ADMIN — INSULIN LISPRO 2 UNITS: 100 INJECTION, SOLUTION INTRAVENOUS; SUBCUTANEOUS at 22:18

## 2021-07-14 RX ADMIN — ACETAMINOPHEN 650 MG: 325 TABLET ORAL at 18:37

## 2021-07-14 RX ADMIN — FAMOTIDINE 20 MG: 20 TABLET ORAL at 08:41

## 2021-07-14 RX ADMIN — SODIUM CHLORIDE: 9 INJECTION, SOLUTION INTRAVENOUS at 09:08

## 2021-07-14 RX ADMIN — DILTIAZEM HYDROCHLORIDE 240 MG: 240 CAPSULE, COATED, EXTENDED RELEASE ORAL at 08:40

## 2021-07-14 RX ADMIN — SODIUM CHLORIDE 500 ML: 9 INJECTION, SOLUTION INTRAVENOUS at 11:42

## 2021-07-14 RX ADMIN — INSULIN LISPRO 1 UNITS: 100 INJECTION, SOLUTION INTRAVENOUS; SUBCUTANEOUS at 08:56

## 2021-07-14 RX ADMIN — ENOXAPARIN SODIUM 30 MG: 30 INJECTION SUBCUTANEOUS at 08:41

## 2021-07-14 RX ADMIN — Medication 2 PUFF: at 12:04

## 2021-07-14 RX ADMIN — ATORVASTATIN CALCIUM 10 MG: 10 TABLET, FILM COATED ORAL at 08:41

## 2021-07-14 ASSESSMENT — ENCOUNTER SYMPTOMS
COLOR CHANGE: 0
SHORTNESS OF BREATH: 1
CONSTIPATION: 0
EYE ITCHING: 0
ABDOMINAL PAIN: 0
VOMITING: 0
EYE DISCHARGE: 0
COUGH: 1

## 2021-07-14 ASSESSMENT — PAIN SCALES - GENERAL
PAINLEVEL_OUTOF10: 3
PAINLEVEL_OUTOF10: 0
PAINLEVEL_OUTOF10: 0

## 2021-07-14 ASSESSMENT — PAIN DESCRIPTION - PROGRESSION
CLINICAL_PROGRESSION: NOT CHANGED

## 2021-07-14 NOTE — ED NOTES
Report called to Kathi Foster RN to assume care, all questions answered. Oneal fall/ pain discussed.      Baljit Palma RN  07/14/21 8081

## 2021-07-14 NOTE — PROGRESS NOTES
This patient has been alert, oriented x4, all morning, no change in LOC. This patient de-saturated down to 89%  while she was sleeping and came back up to 90 while awake. Patient placed on one liter of oxygen while sleeping. Patient still satting at 87-89% while awake on room air.  Place on continuous O2 at 1 LPM - O2 saturation : 91%    Patient's O2 is 88 at 1Lpm. Patient up to 2lpm

## 2021-07-14 NOTE — H&P
Hospital Medicine History & Physical      PCP: Mikaela Unger MD    Date of Admission: 7/13/2021    Date of Service: Pt seen/examined on 7/14/2021 and Admitted to Inpatient     Chief Complaint: Generalized weakness, poor appetite, poor p.o. intake      History Of Present Illness: The patient is a 62 y.o. female who presents to [facility] with PMHx: CKD, HLD, DM, HTN    No anticoagulation therapy  No recent travel  Patient spouse is admitted to the ICU: Positive for Covid    Patient presented to the emergency department with encouragement of her family due to the fact that her  was positive for Covid and she seems to be coming symptomatic with decreased appetite, generalized weakness. Apparently she is not really ate or drank anything over a 24 to 48-hour. .  She is reporting some shortness of breath without fever or chills. No diarrhea. No noticeable cough or abdominal pain. Patient did not receive the Covid vaccination. ED work-up: Mild leukopenia 2.6, mild thrombocytopenia of 93, MARCI on CKD: BUN 55, creatinine 2.3, GFR 22. Blood glucose is stable at 104. No indication of MA with AG. Potassium is stable. EKG is unremarkable. Chest x-ray no significant consolidation or opacities noted. Covid was positive. Patient was started on IV fluids, she was given an inhaler treatment and started on Decadron. On my exam the patient was very sleepy, looks very tired. She was awakened and remarked that she had not really gotten much sleep and was very tired. She did not appear to be any any type of respiratory distress. Skin was warm and dry. Lung fields clear. SPO2 93 to 94% on room air. Respirations were easy and regular.     Lives at home with her spouse,  CODE STATUS full    Past Medical History:        Diagnosis Date    Chronic kidney disease 2009 kidney stones    Hyperlipidemia     Hypertension     MDRO (multiple drug resistant organisms) resistance     MRSA infection 2013    left lower leg    Type II or unspecified type diabetes mellitus without mention of complication, not stated as uncontrolled        Past Surgical History:        Procedure Laterality Date   [address] COLONOSCOPY  06/07/2018    Dr. Merced Aguirre. adenoma polyp, repeat in 5 years    LITHOTRIPSY  2009       Medications Prior to Admission:    Prior to Admission medications    Medication Sig Start Date End Date Taking? Authorizing Provider   metoprolol succinate (TOPROL XL) 50 MG extended release tablet TAKE 1 TABLET BY MOUTH EVERY DAY 6/28/21  Yes Vaishali Jo MD   citric acid-potassium citrate [facility]) 1100-334 MG/5ML solution Take 5 mLs by mouth 2 times daily 5/15/21  Yes Historical Provider, MD   metFORMIN (GLUCOPHAGE) 1000 MG tablet Take 1 tablet by mouth 2 times daily (with meals) 5/24/21  Yes Vaishali Jo MD   hydroCHLOROthiazide (MICROZIDE) 12.5 MG capsule TAKE 1 CAPSULE BY MOUTH EVERY DAY IN THE MORNING 5/14/21  Yes Vaishali Jo MD   losartan (COZAAR) 100 MG tablet TAKE 1 TABLET BY MOUTH EVERY DAY 5/14/21  Yes Vaishali Jo MD   dilTIAZem (DILT-XR) 240 MG extended release capsule TAKE 1 CAPSULE BY MOUTH EVERY DAY 5/6/21  Yes Vaishali Jo MD   JANUVIA 100 MG tablet TAKE 1 TABLET BY MOUTH EVERY DAY 2/8/21  Yes Shira Jimenez MD   glipiZIDE (GLUCOTROL) 10 MG tablet TAKE 1 TABLET BY MOUTH 2 TIMES DAILY (BEFORE MEAL). 1/25/21  Yes Shira Jimenez MD   atorvastatin (LIPITOR) 10 MG tablet TAKE 1 TABLET BY MOUTH DAILY 12/3/20  Yes Shira Jimenez MD   ONETOUCH ULTRA strip 1 EACH BY DOES NOT APPLY ROUTE DAILY AS NEEDED. **ASK PT WHICH MONITOR SHES USING 7/23/20  Yes Shira Jimenez MD   vitamin B-12 (CYANOCOBALAMIN) 100 MCG tablet Take 250 mcg by mouth daily. Yes Historical Provider, MD   Multiple Vitamin CAPS Take  by mouth daily.    Yes Historical Provider, MD ONE TOUCH LANCETS MISC by Does not apply route. 2/27/12  Yes Nick Vanessa MD       Allergies:  Ciprofloxacin and Hydrocodone    Social History:  The patient currently lives at home    TOBACCO:   reports that she has never smoked. She has never used smokeless tobacco.  ETOH:   reports no history of alcohol use. Family History:  Reviewed in detail and negative for DM, Early CAD, Cancer, CVA. Positive as follows:        Problem Relation Age of Onset   Antoinette Porras Cancer Mother         pancreatic    Cancer Father         lung    Heart Disease Father         Bypass x 5    Heart Disease Sister         Stent- CAD    Diabetes Sister        REVIEW OF SYSTEMS:   Positive for fatigue, weakness, loss of appetite and as noted in the HPI. All other systems reviewed and negative. PHYSICAL EXAM:    BP (!) 90/58   Pulse 81   Temp 97.8 °F (36.6 °C) (Oral)   Resp 16   Ht 5' 2\" (1.575 m)   Wt 157 lb 3 oz (71.3 kg)   SpO2 91%   BMI 28.75 kg/m²     General appearance: No apparent distress appears stated age and cooperative. HEENT Normal cephalic, atraumatic without obvious deformity. Pupils equal, round, and reactive to light. Extra ocular muscles intact. Conjunctivae/corneas clear. Neck: Supple, No jugular venous distention/bruits. Trachea midline without thyromegaly or adenopathy with full range of motion. Lungs: Clear to auscultation, bilaterally without Rales/Wheezes/Rhonchi with good respiratory effort. Heart: Regular rate and rhythm with Normal S1/S2 without murmurs, rubs or gallops, point of maximum impulse non-displaced  Abdomen: Soft, non-tender or non-distended without rigidity or guarding and positive bowel sounds all four quadrants. Extremities: No clubbing, cyanosis, or edema bilaterally. Full range of motion without deformity and normal gait intact. Skin: Skin color, texture, turgor normal.  No rashes or lesions.   Neurologic: Alert and oriented X 3, neurovascularly intact with sensory/motor intact upper extremities/lower extremities, bilaterally. Cranial nerves: II-XII intact, grossly non-focal.  Mental status: Alert, oriented, thought content appropriate. Capillary Refill: Acceptable  < 3 seconds  Peripheral Pulses: +3 Easily felt, not easily obliterated with pressure      CXR:  I have reviewed the CXR with the following interpretation: Hypoinflation, scarring of the lung bases. EKG:  I have reviewed the EKG with the following interpretation: Sinus rhythm rate 94, narrow complex. MO interval 168, QRS 8 8,     CBC   Recent Labs     07/13/21 2145   WBC 2.6*   HGB 13.2   HCT 37.7   PLT 93*      RENAL  Recent Labs     07/13/21 2145   *   K 4.6   CL 94*   CO2 23   BUN 55*   CREATININE 2.3*     LFT'S  No results for input(s): AST, ALT, ALB, BILIDIR, BILITOT, ALKPHOS in the last 72 hours. COAG  No results for input(s): INR in the last 72 hours.   CARDIAC ENZYMES  Recent Labs     07/13/21 2145   TROPONINI <0.01       U/A:    Lab Results   Component Value Date    NITRITE n 09/18/2018    COLORU DK YELLOW 11/30/2020    WBCUA 621 11/30/2020    RBCUA 5-10 11/30/2020    MUCUS Rare 11/30/2020    BACTERIA 4+ 11/30/2020    CLARITYU TURBID 11/30/2020    SPECGRAV 1.024 11/30/2020    LEUKOCYTESUR LARGE 11/30/2020    BLOODU LARGE 11/30/2020    GLUCOSEU Negative 11/30/2020    AMORPHOUS Rare 01/12/2018       ABG  No results found for: GUH0PPA, BEART, K0RYAJAF, PHART, THGBART, FVS9XYN, PO2ART, BQE1DQN        Active Hospital Problems    Diagnosis Date Noted    COVID-19 virus infection [U07.1] 07/14/2021    Acute kidney injury superimposed on CKD ([facility] [address] [N17.9, N18.9] 07/14/2021         PHYSICIANS CERTIFICATION:    I certify that Cosme Ovalle is expected to be hospitalized for less than 2 midnights based on the following assessment and plan:    Proper PPE was donned to care for this patient: N95, surgical mask, face shield, isolation gown and gloves    ASSESSMENT/PLAN:    MARCI on CKD: 2/2-> loss of appetite, poor p.o. intake: 2/2-> Covid  Nephrologist: Dr. Hemanth Rodriguez  BUN 55 CR 2.3, GFR 22  Seem to have normal renal function up through July 2019  2020-> Baseline BUN range 26-33, baseline creatinine range 1.1-1.7, baseline GFR 30-50  No current HD or PD  No MA or electrolyte derangement  Maintenance IVF  Reevaluate and monitor for improvement of BUN/creatinine  Renal and DM diet  Holding losartan, spironolactone and HCTZ for now      Covid: Continue isolation precaution  Covid positive  Mild leukopenia 2.6, mild thrombocytopenia 93  Liver enzymes pending  Oxygen therapy titrate as needed  Incentive spirometer  Albuterol and ipratropium inhaler as needed  Zinc, vitamin D, vitamin C  Decadron 8 mg IVP daily  Consulted pharmacy for remdesivir      History of hypertension: Currently blood pressure is very stable: Continue to monitor may need to add IVP as needed, currently holding current blood pressure regimen due to current renal function    DM:   Hold Januvia and home medications  FSBS, DM and renal diet, hypoglycemia protocol, SSI      DVT Prophylaxis: Lovenox  Diet: Diet NPO Effective Now  Code Status: No Order  PT/OT Eval Status: Independent    Dispo -admit, inpatient       Norma Mensah, APRN - CNP    Thank you Meggan Montiel MD for the opportunity to be involved in this patient's care. If you have any questions or concerns please feel free to contact me at [phone].

## 2021-07-14 NOTE — ACP (ADVANCE CARE PLANNING)
Advance Care Planning     Advance Care Planning Activator (Inpatient)  Conversation Note      Date of ACP Conversation: 7/14/2021     Conversation Conducted with: Patient with Decision Making Capacity    ACP Activator: Trent Salazar RN    Health Care Decision Maker:     Current Designated Health Care Decision Maker:   Colleen Teixeira     Spouse     [phone]    Care Preferences    Ventilation: \"If you were in your present state of health and suddenly became very ill and were unable to breathe on your own, what would your preference be about the use of a ventilator (breathing machine) if it were available to you? \"      Would the patient desire the use of ventilator (breathing machine)?: yes    \"If your health worsens and it becomes clear that your chance of recovery is unlikely, what would your preference be about the use of a ventilator (breathing machine) if it were available to you? \"     Would the patient desire the use of ventilator (breathing machine)?: Yes      Resuscitation  \"CPR works best to restart the heart when there is a sudden event, like a heart attack, in someone who is otherwise healthy. Unfortunately, CPR does not typically restart the heart for people who have serious health conditions or who are very sick. \"    \"In the event your heart stopped as a result of an underlying serious health condition, would you want attempts to be made to restart your heart (answer \"yes\" for attempt to resuscitate) or would you prefer a natural death (answer \"no\" for do not attempt to resuscitate)? \" yes       [] Yes   [x] No   Educated Patient / Olivia Castellano regarding differences between Advance Directives and portable DNR orders.     Length of ACP Conversation in minutes:      Conversation Outcomes:  [x] ACP discussion completed  [] Existing advance directive reviewed with patient; no changes to patient's previously recorded wishes  [] New Advance Directive completed  [] Portable Do Not Rescitate prepared for Provider review and signature  [] POLST/POST/MOLST/MOST prepared for Provider review and signature      Follow-up plan:    [] Schedule follow-up conversation to continue planning  [] Referred individual to Provider for additional questions/concerns   [] Advised patient/agent/surrogate to review completed ACP document and update if needed with changes in condition, patient preferences or care setting    [x] This note routed to one or more involved healthcare providers

## 2021-07-14 NOTE — ED PROVIDER NOTES
EMERGENCY DEPARTMENT ENCOUNTER      Pt Name: Jessica Soni  MRN: [id number]  [place] 1960  Date of evaluation: 7/13/2021  Provider: Jonathan Aragon MD    CHIEF COMPLAINT       Chief Complaint   Patient presents with    Shortness of Breath     + covid. sob for 2 days.  + covid and in ICU. denies fever, + chills. HISTORY OF PRESENT ILLNESS    Jessica Soni is a 62 y.o. female who presents to the emergency department with cough, shortness of breath. Patient Dors is cough and shortness of breath for the last few days. Positive for Covid. States her  is also Covid positive and in the ICU here. States she has not been eating or drinking the last 24 hours. She has history of kidney dysfunction is concern for kidneys. Denies chest pain. States shortness of breath is worse with exertion. Better with rest.  Never happened before. No other associated symptoms. Nursing Notes were reviewed. Including nursing noted for FM, Surgical History, Past Medical History, Social History, vitals, and allergies; agree with all. REVIEW OF SYSTEMS       Review of Systems   Constitutional: Positive for activity change, appetite change, chills and fatigue. Negative for diaphoresis and unexpected weight change. HENT: Negative for congestion and dental problem. Eyes: Negative for discharge and itching. Respiratory: Positive for cough and shortness of breath. Cardiovascular: Negative for chest pain and leg swelling. Gastrointestinal: Negative for abdominal pain, constipation and vomiting. Endocrine: Negative for cold intolerance and heat intolerance. Genitourinary: Negative for vaginal bleeding, vaginal discharge and vaginal pain. Musculoskeletal: Negative for neck pain and neck stiffness. Skin: Negative for color change and pallor. Neurological: Negative for tremors and weakness. Psychiatric/Behavioral: Negative for agitation and behavioral problems.        Except as noted above the remainder of the review of systems was reviewed and negative. PAST MEDICAL HISTORY     Past Medical History:   Diagnosis Date    Chronic kidney disease 2009    kidney stones    Hyperlipidemia     Hypertension     MDRO (multiple drug resistant organisms) resistance     MRSA infection 2013    left lower leg    Type II or unspecified type diabetes mellitus without mention of complication, not stated as uncontrolled        SURGICAL HISTORY       Past Surgical History:   Procedure Laterality Date   Andrew Cornelius    COLONOSCOPY  06/07/2018    Dr. Marlene Corona. adenoma polyp, repeat in 5 years    LITHOTRIPSY  2009       CURRENT MEDICATIONS       Current Discharge Medication List      CONTINUE these medications which have NOT CHANGED    Details   metoprolol succinate (TOPROL XL) 50 MG extended release tablet TAKE 1 TABLET BY MOUTH EVERY DAY  Qty: 90 tablet, Refills: 0    Associated Diagnoses: Hypertension, unspecified type      citric acid-potassium citrate (POLYCITRA) 1100-334 MG/5ML solution Take 5 mLs by mouth 2 times daily      metFORMIN (GLUCOPHAGE) 1000 MG tablet Take 1 tablet by mouth 2 times daily (with meals)  Qty: 180 tablet, Refills: 0      hydroCHLOROthiazide (MICROZIDE) 12.5 MG capsule TAKE 1 CAPSULE BY MOUTH EVERY DAY IN THE MORNING  Qty: 90 capsule, Refills: 0      losartan (COZAAR) 100 MG tablet TAKE 1 TABLET BY MOUTH EVERY DAY  Qty: 90 tablet, Refills: 0      dilTIAZem (DILT-XR) 240 MG extended release capsule TAKE 1 CAPSULE BY MOUTH EVERY DAY  Qty: 90 capsule, Refills: 0      JANUVIA 100 MG tablet TAKE 1 TABLET BY MOUTH EVERY DAY  Qty: 90 tablet, Refills: 0      glipiZIDE (GLUCOTROL) 10 MG tablet TAKE 1 TABLET BY MOUTH 2 TIMES DAILY (BEFORE MEAL). Qty: 180 tablet, Refills: 3      atorvastatin (LIPITOR) 10 MG tablet TAKE 1 TABLET BY MOUTH DAILY  Qty: 90 tablet, Refills: 3      ONETOUCH ULTRA strip 1 EACH BY DOES NOT APPLY ROUTE DAILY AS NEEDED.  **ASK PT WHICH MONITOR SHES USING  Qty: 100 strip, Refills: 3    Associated Diagnoses: Type 2 diabetes mellitus with complication (HCC)      vitamin B-12 (CYANOCOBALAMIN) 100 MCG tablet Take 250 mcg by mouth daily. Multiple Vitamin CAPS Take  by mouth daily. ONE TOUCH LANCETS MISC by Does not apply route. Qty: 100 each, Refills: 2             ALLERGIES     Ciprofloxacin and Hydrocodone    FAMILY HISTORY        Family History   Problem Relation Age of Onset   [employer] Cancer Mother         pancreatic    Cancer Father         lung    Heart Disease Father         Bypass x 5    Heart Disease Sister         Stent- CAD    Diabetes Sister        SOCIAL HISTORY       Social History     Socioeconomic History    Marital status:      Spouse name: None    Number of children: None    Years of education: None    Highest education level: None   Occupational History    None   Tobacco Use    Smoking status: Never Smoker    Smokeless tobacco: Never Used   Substance and Sexual Activity    Alcohol use: No    Drug use: No    Sexual activity: Yes     Partners: Male   Other Topics Concern    None   Social History Narrative    None     Social Determinants of Health     Financial Resource Strain: Low Risk     Difficulty of Paying Living Expenses: Not hard at all   Food Insecurity: No Food Insecurity    Worried About Running Out of Food in the Last Year: Never true    Zina of Food in the Last Year: Never true   Transportation Needs:     Lack of Transportation (Medical):      Lack of Transportation (Non-Medical):    Physical Activity:     Days of Exercise per Week:     Minutes of Exercise per Session:    Stress:     Feeling of Stress :    Social Connections:     Frequency of Communication with Friends and Family:     Frequency of Social Gatherings with Friends and Family:     Attends Hindu Services:     Active Member of Clubs or Organizations:     Attends Club or Organization Meetings:     Marital Status:    Intimate Partner Violence:     Fear of Current or Ex-Partner:     Emotionally Abused:     Physically Abused:     Sexually Abused:        PHYSICAL EXAM       ED Triage Vitals   BP Temp Temp Source Pulse Resp SpO2 Height Weight   07/13/21 2019 07/13/21 2019 07/13/21 2019 07/13/21 2019 07/13/21 2019 07/13/21 2019 07/14/21 0222 07/14/21 0222   96/63 97.9 °F (36.6 °C) Oral 96 17 94 % 5' 2\" (1.575 m) 157 lb 3 oz (71.3 kg)       Physical Exam  Vitals and nursing note reviewed. Constitutional:       General: She is not in acute distress. Appearance: She is well-developed. She is ill-appearing. She is not toxic-appearing or diaphoretic. HENT:      Head: Normocephalic and atraumatic. Right Ear: External ear normal.      Left Ear: External ear normal.   Eyes:      General:         Right eye: No discharge. Left eye: No discharge. Conjunctiva/sclera: Conjunctivae normal.      Pupils: Pupils are equal, round, and reactive to light. Cardiovascular:      Rate and Rhythm: Normal rate and regular rhythm. Heart sounds: No murmur heard. Pulmonary:      Effort: Pulmonary effort is normal. No respiratory distress. Breath sounds: Normal breath sounds. No wheezing or rales. Abdominal:      General: Bowel sounds are normal. There is no distension. Palpations: Abdomen is soft. There is no mass. Tenderness: There is no abdominal tenderness. There is no guarding or rebound. Genitourinary:     Comments: Deferred  Musculoskeletal:         General: No deformity. Normal range of motion. Cervical back: Normal range of motion and neck supple. Skin:     General: Skin is warm. Findings: No erythema or rash. Neurological:      Mental Status: She is alert and oriented to person, place, and time. She is not disoriented. Cranial Nerves: No cranial nerve deficit. Motor: No atrophy or abnormal muscle tone.       Coordination: Coordination normal.   Psychiatric: Behavior: Behavior normal.         Thought Content:  Thought content normal.         DIAGNOSTIC RESULTS     RADIOLOGY:   Non-plain film images such as CT, Ultrasoundand MRI are read by the radiologist. Plain radiographic images are visualized and preliminarily interpreted by the emergency physician with the below findings:    Chest x-ray was reassuring    ED BEDSIDE ULTRASOUND:   Performed by ED Physician - none    LABS:  Labs Reviewed   COVID-19, RAPID - Abnormal; Notable for the following components:       Result Value    SARS-CoV-2, NAAT DETECTED (*)     All other components within normal limits    Narrative:     Performed at:  [address] [employer]   Phone [phone]   CBC WITH AUTO DIFFERENTIAL - Abnormal; Notable for the following components:    WBC 2.6 (*)     Platelets 93 (*)     Lymphocytes Absolute 0.5 (*)     All other components within normal limits    Narrative:     Performed at:  [address] [employer] [address]   Phone [phone]   BASIC METABOLIC PANEL W/ REFLEX TO MG FOR LOW K - Abnormal; Notable for the following components:    Sodium 132 (*)     Chloride 94 (*)     Glucose 104 (*)     BUN 55 (*)     CREATININE 2.3 (*)     GFR Non- 22 (*)     GFR  26 (*)     All other components within normal limits    Narrative:     Performed at:  [facility]  [address] [place] [address] [employer] [address]   Phone [phone]   POCT GLUCOSE - Abnormal; Notable for the following components:    POC Glucose 122 (*)     All other components within normal limits    Narrative:     Performed at:  [address] [employer] [address]   Phone [phone]   BRAIN NATRIURETIC PEPTIDE    Narrative:     Performed at:  [facility]  [address] [place] [address] [address] [employer] [address]   Phone [phone]   TROPONIN    Narrative:     Performed at:  [facility]  [address] [facility] [place] [address] [employer] [address]   Phone [phone]   HEPATIC FUNCTION PANEL       All other labs were withinnormal range or not returned as of this dictation. EMERGENCY DEPARTMENT COURSE and DIFFERENTIAL DIAGNOSIS/MDM:     PMH, Surgical Hx, FH, Social Hx reviewed by myself (ETOH usage, Tobacco usage, Drug usage reviewed by myself, no pertinent Hx)- No Pertinent Hx     Old records were reviewed by me     62-year-old Covid pneumonia. Also an MARCI. Fluids and steroid started. Admission for further inpatient evaluation. CRITICAL CARE TIME   Total Critical Caretime was 39 minutes, excluding separately reportable procedures. There was a high probability of clinically significant/life threatening deterioration in the patient's condition which required my urgent intervention. PROCEDURES:  Unlessotherwise noted below, none    FINAL IMPRESSION      1.  MARCI (acute kidney injury) [facility])          DISPOSITION/PLAN   DISPOSITION Admitted 07/14/2021 12:20:18 AM    (Please note that portions ofthis note were completed with a voice recognition program.  Efforts were made to edit the dictations but occasionally words are mis-transcribed.)    Ty Garcia MD(electronically signed)  Attending Emergency Physician       Ty Garcia MD  07/14/21 0836

## 2021-07-14 NOTE — PROGRESS NOTES
Patient seen and evaluated at bedside.  Monitor BMP for MARCI, continue current management, review same day H&P for further recommendations

## 2021-07-14 NOTE — CARE COORDINATION
INITIAL CASE MANAGEMENT ASSESSMENT    Reviewed chart, met with patient to assess possible discharge needs. Explained Case Management role/services. Living Situation: Patient lives with her  in a house with 2 steps to enter. ADLs: Independent     DME: None    PT/OT Recs: N/A     Active Services: None     Transportation: Active /Family will transport     Medications: [facility] [place]/No barriers    PCP: Kajal Kat MD      HD/PD: N/A    PLAN/COMMENTS: Patient plans to return to home with her family. Patient's  is currently in the ICU with COVID-19. SW/CM provided contact information for patient or family to call with any questions. SW/CM will follow and assist as needed.   Electronically signed by Bobbette Hodgkins, RN on 7/14/2021 at 2:06 PM

## 2021-07-15 ENCOUNTER — APPOINTMENT (OUTPATIENT)
Dept: GENERAL RADIOLOGY | Age: 61
DRG: 987 | End: 2021-07-15
Payer: COMMERCIAL

## 2021-07-15 LAB
A/G RATIO: 1 (ref 1.1–2.2)
ALBUMIN SERPL-MCNC: 3.4 G/DL (ref 3.4–5)
ALP BLD-CCNC: 44 U/L (ref 40–129)
ALT SERPL-CCNC: 34 U/L (ref 10–40)
ANION GAP SERPL CALCULATED.3IONS-SCNC: 12 MMOL/L (ref 3–16)
AST SERPL-CCNC: 28 U/L (ref 15–37)
BASOPHILS ABSOLUTE: 0 K/UL (ref 0–0.2)
BASOPHILS RELATIVE PERCENT: 0.1 %
BILIRUB SERPL-MCNC: <0.2 MG/DL (ref 0–1)
BUN BLDV-MCNC: 40 MG/DL (ref 7–20)
CALCIUM SERPL-MCNC: 8.4 MG/DL (ref 8.3–10.6)
CHLORIDE BLD-SCNC: 103 MMOL/L (ref 99–110)
CO2: 23 MMOL/L (ref 21–32)
CREAT SERPL-MCNC: 1.1 MG/DL (ref 0.6–1.2)
D DIMER: 403 NG/ML DDU (ref 0–229)
EOSINOPHILS ABSOLUTE: 0 K/UL (ref 0–0.6)
EOSINOPHILS RELATIVE PERCENT: 0 %
GFR AFRICAN AMERICAN: >60
GFR NON-AFRICAN AMERICAN: 51
GLOBULIN: 3.3 G/DL
GLUCOSE BLD-MCNC: 290 MG/DL (ref 70–99)
GLUCOSE BLD-MCNC: 300 MG/DL (ref 70–99)
GLUCOSE BLD-MCNC: 310 MG/DL (ref 70–99)
GLUCOSE BLD-MCNC: 338 MG/DL (ref 70–99)
GLUCOSE BLD-MCNC: 397 MG/DL (ref 70–99)
HCT VFR BLD CALC: 36.6 % (ref 36–48)
HEMATOLOGY PATH CONSULT: NORMAL
HEMOGLOBIN: 12.9 G/DL (ref 12–16)
LYMPHOCYTES ABSOLUTE: 0.6 K/UL (ref 1–5.1)
LYMPHOCYTES RELATIVE PERCENT: 15.1 %
MCH RBC QN AUTO: 30.8 PG (ref 26–34)
MCHC RBC AUTO-ENTMCNC: 35.1 G/DL (ref 31–36)
MCV RBC AUTO: 87.5 FL (ref 80–100)
MONOCYTES ABSOLUTE: 0.2 K/UL (ref 0–1.3)
MONOCYTES RELATIVE PERCENT: 6.1 %
NEUTROPHILS ABSOLUTE: 3.1 K/UL (ref 1.7–7.7)
NEUTROPHILS RELATIVE PERCENT: 78.7 %
PDW BLD-RTO: 13 % (ref 12.4–15.4)
PERFORMED ON: ABNORMAL
PLATELET # BLD: 91 K/UL (ref 135–450)
PMV BLD AUTO: 9.6 FL (ref 5–10.5)
POTASSIUM REFLEX MAGNESIUM: 4.3 MMOL/L (ref 3.5–5.1)
RBC # BLD: 4.18 M/UL (ref 4–5.2)
SODIUM BLD-SCNC: 138 MMOL/L (ref 136–145)
TOTAL PROTEIN: 6.7 G/DL (ref 6.4–8.2)
WBC # BLD: 3.9 K/UL (ref 4–11)

## 2021-07-15 PROCEDURE — 6370000000 HC RX 637 (ALT 250 FOR IP): Performed by: NURSE PRACTITIONER

## 2021-07-15 PROCEDURE — 1200000000 HC SEMI PRIVATE

## 2021-07-15 PROCEDURE — 6360000002 HC RX W HCPCS: Performed by: NURSE PRACTITIONER

## 2021-07-15 PROCEDURE — 2700000000 HC OXYGEN THERAPY PER DAY

## 2021-07-15 PROCEDURE — 2580000003 HC RX 258: Performed by: NURSE PRACTITIONER

## 2021-07-15 PROCEDURE — 94761 N-INVAS EAR/PLS OXIMETRY MLT: CPT

## 2021-07-15 PROCEDURE — 71045 X-RAY EXAM CHEST 1 VIEW: CPT

## 2021-07-15 PROCEDURE — 2580000003 HC RX 258: Performed by: INTERNAL MEDICINE

## 2021-07-15 PROCEDURE — 80053 COMPREHEN METABOLIC PANEL: CPT

## 2021-07-15 PROCEDURE — 6370000000 HC RX 637 (ALT 250 FOR IP): Performed by: INTERNAL MEDICINE

## 2021-07-15 PROCEDURE — 85025 COMPLETE CBC W/AUTO DIFF WBC: CPT

## 2021-07-15 PROCEDURE — 85379 FIBRIN DEGRADATION QUANT: CPT

## 2021-07-15 PROCEDURE — XW033E5 INTRODUCTION OF REMDESIVIR ANTI-INFECTIVE INTO PERIPHERAL VEIN, PERCUTANEOUS APPROACH, NEW TECHNOLOGY GROUP 5: ICD-10-PCS | Performed by: INTERNAL MEDICINE

## 2021-07-15 PROCEDURE — 94640 AIRWAY INHALATION TREATMENT: CPT

## 2021-07-15 PROCEDURE — 6370000000 HC RX 637 (ALT 250 FOR IP): Performed by: HOSPITALIST

## 2021-07-15 PROCEDURE — 2500000003 HC RX 250 WO HCPCS: Performed by: INTERNAL MEDICINE

## 2021-07-15 PROCEDURE — 36415 COLL VENOUS BLD VENIPUNCTURE: CPT

## 2021-07-15 RX ORDER — 0.9 % SODIUM CHLORIDE 0.9 %
30 INTRAVENOUS SOLUTION INTRAVENOUS PRN
Status: DISCONTINUED | OUTPATIENT
Start: 2021-07-15 | End: 2021-07-25 | Stop reason: HOSPADM

## 2021-07-15 RX ORDER — INSULIN GLARGINE 100 [IU]/ML
18 INJECTION, SOLUTION SUBCUTANEOUS NIGHTLY
Status: COMPLETED | OUTPATIENT
Start: 2021-07-15 | End: 2021-07-19

## 2021-07-15 RX ADMIN — INSULIN LISPRO 6 UNITS: 100 INJECTION, SOLUTION INTRAVENOUS; SUBCUTANEOUS at 20:53

## 2021-07-15 RX ADMIN — OXYCODONE HYDROCHLORIDE AND ACETAMINOPHEN 500 MG: 500 TABLET ORAL at 09:45

## 2021-07-15 RX ADMIN — POTASSIUM CITRATE 10 MEQ: 10 TABLET ORAL at 17:43

## 2021-07-15 RX ADMIN — Medication 2 PUFF: at 11:57

## 2021-07-15 RX ADMIN — FAMOTIDINE 20 MG: 20 TABLET ORAL at 09:45

## 2021-07-15 RX ADMIN — DEXAMETHASONE SODIUM PHOSPHATE 8 MG: 4 INJECTION, SOLUTION INTRAMUSCULAR; INTRAVENOUS at 20:53

## 2021-07-15 RX ADMIN — Medication 2 PUFF: at 09:01

## 2021-07-15 RX ADMIN — DILTIAZEM HYDROCHLORIDE 240 MG: 240 CAPSULE, COATED, EXTENDED RELEASE ORAL at 09:44

## 2021-07-15 RX ADMIN — INSULIN GLARGINE 18 UNITS: 100 INJECTION, SOLUTION SUBCUTANEOUS at 20:54

## 2021-07-15 RX ADMIN — SODIUM CHLORIDE, PRESERVATIVE FREE 10 ML: 5 INJECTION INTRAVENOUS at 09:46

## 2021-07-15 RX ADMIN — BENZONATATE 200 MG: 100 CAPSULE ORAL at 05:17

## 2021-07-15 RX ADMIN — REMDESIVIR 200 MG: 100 INJECTION, POWDER, LYOPHILIZED, FOR SOLUTION INTRAVENOUS at 17:44

## 2021-07-15 RX ADMIN — ACETAMINOPHEN 650 MG: 325 TABLET ORAL at 18:13

## 2021-07-15 RX ADMIN — Medication 1000 UNITS: at 09:45

## 2021-07-15 RX ADMIN — Medication 2 PUFF: at 20:08

## 2021-07-15 RX ADMIN — ATORVASTATIN CALCIUM 10 MG: 10 TABLET, FILM COATED ORAL at 09:46

## 2021-07-15 RX ADMIN — SODIUM CHLORIDE, PRESERVATIVE FREE 10 ML: 5 INJECTION INTRAVENOUS at 20:53

## 2021-07-15 RX ADMIN — SODIUM CHLORIDE: 9 INJECTION, SOLUTION INTRAVENOUS at 05:17

## 2021-07-15 RX ADMIN — INSULIN LISPRO 5 UNITS: 100 INJECTION, SOLUTION INTRAVENOUS; SUBCUTANEOUS at 12:58

## 2021-07-15 RX ADMIN — BENZONATATE 200 MG: 100 CAPSULE ORAL at 21:01

## 2021-07-15 RX ADMIN — POTASSIUM CITRATE 10 MEQ: 10 TABLET ORAL at 09:45

## 2021-07-15 RX ADMIN — INSULIN LISPRO 7 UNITS: 100 INJECTION, SOLUTION INTRAVENOUS; SUBCUTANEOUS at 17:36

## 2021-07-15 RX ADMIN — INSULIN LISPRO 4 UNITS: 100 INJECTION, SOLUTION INTRAVENOUS; SUBCUTANEOUS at 09:40

## 2021-07-15 RX ADMIN — BENZONATATE 200 MG: 100 CAPSULE ORAL at 13:12

## 2021-07-15 RX ADMIN — ALBUTEROL SULFATE 2 PUFF: 90 AEROSOL, METERED RESPIRATORY (INHALATION) at 05:49

## 2021-07-15 RX ADMIN — INSULIN LISPRO 12 UNITS: 100 INJECTION, SOLUTION INTRAVENOUS; SUBCUTANEOUS at 17:36

## 2021-07-15 RX ADMIN — ZINC SULFATE 220 MG (50 MG) CAPSULE 50 MG: CAPSULE at 09:44

## 2021-07-15 RX ADMIN — METOPROLOL SUCCINATE 50 MG: 50 TABLET, EXTENDED RELEASE ORAL at 09:44

## 2021-07-15 RX ADMIN — ENOXAPARIN SODIUM 30 MG: 30 INJECTION SUBCUTANEOUS at 09:45

## 2021-07-15 RX ADMIN — ACETAMINOPHEN 650 MG: 325 TABLET ORAL at 05:21

## 2021-07-15 ASSESSMENT — PAIN DESCRIPTION - PAIN TYPE: TYPE: ACUTE PAIN

## 2021-07-15 ASSESSMENT — PAIN DESCRIPTION - FREQUENCY: FREQUENCY: INTERMITTENT

## 2021-07-15 ASSESSMENT — PAIN DESCRIPTION - PROGRESSION
CLINICAL_PROGRESSION: NOT CHANGED
CLINICAL_PROGRESSION: GRADUALLY WORSENING
CLINICAL_PROGRESSION: NOT CHANGED

## 2021-07-15 ASSESSMENT — PAIN SCALES - GENERAL
PAINLEVEL_OUTOF10: 0
PAINLEVEL_OUTOF10: 0
PAINLEVEL_OUTOF10: 1
PAINLEVEL_OUTOF10: 3
PAINLEVEL_OUTOF10: 0

## 2021-07-15 ASSESSMENT — PAIN DESCRIPTION - ORIENTATION: ORIENTATION: OTHER (COMMENT)

## 2021-07-15 ASSESSMENT — PAIN DESCRIPTION - DESCRIPTORS: DESCRIPTORS: HEADACHE

## 2021-07-15 ASSESSMENT — PAIN DESCRIPTION - LOCATION: LOCATION: HEAD

## 2021-07-15 ASSESSMENT — PAIN - FUNCTIONAL ASSESSMENT: PAIN_FUNCTIONAL_ASSESSMENT: PREVENTS OR INTERFERES SOME ACTIVE ACTIVITIES AND ADLS

## 2021-07-15 ASSESSMENT — PAIN DESCRIPTION - ONSET: ONSET: ON-GOING

## 2021-07-15 NOTE — CARE COORDINATION
[facility]  Diabetes Education   Progress Note       NAME:  Cathleen Gaona7 RECORD NUMBER:  [id number]  AGE: 62 y.o. GENDER: female  : 1960  TODAY'S DATE:  7/15/2021    Subjective   Reason for Diabetic Education Evaluation and Assessment: genreal diabetes support    Telephonic education session. Joaquin Cohen describes her diabetes as well controlled at home. She is surprised her blood sugars are elevated. Visit Type: evaluation      Sp Ontiveros is a 62 y.o. female referred by:     [x] Physician  [] Nursing  [] Chart Review   [] Other:     PAST MEDICAL HISTORY        Diagnosis Date    Chronic kidney disease     kidney stones    Hyperlipidemia     Hypertension     MDRO (multiple drug resistant organisms) resistance     MRSA infection     left lower leg    Type II or unspecified type diabetes mellitus without mention of complication, not stated as uncontrolled        PAST SURGICAL HISTORY    Past Surgical History:   Procedure Laterality Date   Andrew 87    COLONOSCOPY  2018    Dr. Melissa Delcid.  adenoma polyp, repeat in 5 years    LITHOTRIPSY  2009       FAMILY HISTORY    Family History   Problem Relation Age of Onset    Cancer Mother         pancreatic    Cancer Father         lung    Heart Disease Father         Bypass x 5    Heart Disease Sister         Stent- CAD    Diabetes Sister        SOCIAL HISTORY    Social History     Tobacco Use    Smoking status: Never Smoker    Smokeless tobacco: Never Used   Substance Use Topics    Alcohol use: No    Drug use: No       ALLERGIES    Allergies   Allergen Reactions    Ciprofloxacin Other (See Comments)     hallucinations    Hydrocodone Itching       MEDICATIONS     atorvastatin  10 mg Oral Daily    dilTIAZem  240 mg Oral Daily    metoprolol succinate  50 mg Oral Daily    sodium chloride flush  5-40 mL Intravenous 2 times per day    dexamethasone  8 mg Intravenous Nightly    albuterol sulfate HFA  2 puff Inhalation 4x daily    And    ipratropium  2 puff Inhalation 4x daily    insulin lispro  0-6 Units Subcutaneous TID WC    insulin lispro  0-3 Units Subcutaneous Nightly    Vitamin D  1,000 Units Oral Daily    zinc sulfate  50 mg Oral Daily    ascorbic acid  500 mg Oral Daily    enoxaparin  30 mg Subcutaneous Daily    famotidine  20 mg Oral Daily    potassium citrate  10 mEq Oral BID WC       Objective        Patient Active Problem List   Diagnosis Code    Obesity E66.9    HTN (hypertension) I10    Diabetes mellitus, type 2 (HCC) E11.9    COVID-19 virus infection U07.1    Acute kidney injury superimposed on CKD ([facility] [address] N17.9, N18.9        /72   Pulse 79   Temp 97.4 °F (36.3 °C) (Oral)   Resp 18   Ht 5' 2\" (1.575 m)   Wt 156 lb 15.5 oz (71.2 kg)   SpO2 (!) 88%   BMI 28.71 kg/m²     HgBA1c:    Lab Results   Component Value Date    LABA1C 6.4 11/30/2020       Recent Labs     07/14/21  1652 07/14/21  2151 07/15/21  0745 07/15/21  1125   POCGLU 248* 263* 338* 397*       BUN/Creatinine:    Lab Results   Component Value Date    BUN 40 07/15/2021    CREATININE 1.1 07/15/2021       Assessment        Diabetes Management and Education    Does the patient have a Primary Care Physician? Yes, Son Gutierrez MD       Does the patient require new medication instruction? Yes  Discussed impact of steroids on blood sugars. Discussed inpatient insulin strategies. Level of patient/caregiver understanding able to:       [x] Verbalized Understanding   [] Demonstrated Understanding       [] Teach Back       [] Needs Reinforcement     []  Other:        Does the patient/caregiver monitor Blood Glucoses? Yes    Does the patient/caregiver follow a Meal Plan? Yes - States is getting enough food. She is able to describe common carb containing foods. Does the patient/caregiver understand S/S of Hypoglycemia? No: No experience.     Reviewed symptoms, prevention and treatment. Level of patient/caregiver understanding able to:       [x] Verbalized Understanding   [] Demonstrated Understanding       [] Teach Back       [] Needs Reinforcement     [x]  Other:  Agrees to notify staff if she is experiencing symptoms. Does the patient/caregiver understand S/S of Hyperglycemia? No: She has noticed increased thirst and urination. Reviewed symptoms, prevention and treatment. Level of patient/caregiver understanding able to:        [x] Verbalized Understanding   [] Demonstrated Understanding       [] Teach Back       [] Needs Reinforcement     []  Other:           Plan        Ongoing diabetes education and blood glucose monitoring. Recommend starting weight based Lantus and Humalog and increasing correction scale.   Notified Chanel Mckenzie MD.          Teaching Time Diabetes Education:  10 minutes     Electronically signed by Sandra Smith on 7/15/2021 at 2:59 PM

## 2021-07-15 NOTE — PROGRESS NOTES
4 Eyes Skin Assessment     NAME:  Joseline Barros  YOB: 1960  MEDICAL RECORD NUMBER:  [id number]    The patient is being assess for  Admission    I agree that 2 RN's have performed a thorough Head to Toe Skin Assessment on the patient. ALL assessment sites listed below have been assessed. Areas assessed by both nurses:    Head, Face, Ears, Shoulders, Back, Chest, Arms, Elbows, Hands, Sacrum. Buttock, Coccyx, Ischium and Legs. Feet and Heels        Does the Patient have a Wound?  No noted wound(s)       Stanley Prevention initiated:  No   Wound Care Orders initiated:  No    Pressure Injury (Stage 3,4, Unstageable, DTI, NWPT, and Complex wounds) if present place consult order under [de-identified] No    New and Established Ostomies if present place consult order under : No      Nurse 1 eSignature: Electronically signed by Katey Roberson RN on 7/15/21 at 2:08 AM EDT      Nurse 2 eSignature: Electronically signed by Miesha Tan RN on 7/15/21 at 2:11 AM EDT

## 2021-07-15 NOTE — PLAN OF CARE
Problem: Airway Clearance - Ineffective  Goal: Achieve or maintain patent airway  7/15/2021 0006 by Cari Cloud RN  Outcome: Ongoing  7/14/2021 1740 by Ira Arriaza RN  Outcome: Ongoing     Problem: Gas Exchange - Impaired  Goal: Absence of hypoxia  7/15/2021 0006 by Cari Cloud RN  Outcome: Ongoing  7/14/2021 1740 by Ira Arriaza RN  Outcome: Ongoing  Goal: Promote optimal lung function  7/15/2021 0006 by Cari Cloud RN  Outcome: Ongoing  7/14/2021 1740 by Ira Arriaza RN  Outcome: Ongoing     Problem: Breathing Pattern - Ineffective  Goal: Ability to achieve and maintain a regular respiratory rate  7/15/2021 0006 by Cari Cloud RN  Outcome: Ongoing  7/14/2021 1740 by Ira Arriaza RN  Outcome: Ongoing     Problem:  Body Temperature -  Risk of, Imbalanced  Goal: Ability to maintain a body temperature within defined limits  7/15/2021 0006 by Cari Cloud RN  Outcome: Ongoing  7/14/2021 1740 by Ira Arriaza RN  Outcome: Ongoing  Goal: Will regain or maintain usual level of consciousness  7/15/2021 0006 by Cari Cloud RN  Outcome: Ongoing  7/14/2021 1740 by Ira Arriaza RN  Outcome: Ongoing  Goal: Complications related to the disease process, condition or treatment will be avoided or minimized  7/15/2021 0006 by Cari Cloud RN  Outcome: Ongoing  7/14/2021 1740 by Ira Arriaza RN  Outcome: Ongoing     Problem: Isolation Precautions - Risk of Spread of Infection  Goal: Prevent transmission of infection  7/15/2021 0006 by Cari Cloud RN  Outcome: Ongoing  7/14/2021 1740 by Ira Arriaza RN  Outcome: Ongoing     Problem: Nutrition Deficits  Goal: Optimize nutritional status  7/15/2021 0006 by Cari Cloud RN  Outcome: Ongoing  7/14/2021 1740 by Ira Arriaza RN  Outcome: Ongoing     Problem: Risk for Fluid Volume Deficit  Goal: Maintain normal heart rhythm  7/15/2021 0006 by Cari Cloud RN  Outcome: Ongoing  7/14/2021 1740 by Ira Arriaza RN  Outcome: Ongoing  Goal: Maintain absence of muscle cramping  7/15/2021 0006 by Claire Khanna RN  Outcome: Ongoing  7/14/2021 1740 by Mignon Chavarria RN  Outcome: Ongoing  Goal: Maintain normal serum potassium, sodium, calcium, phosphorus, and pH  7/15/2021 0006 by Claire Khanna RN  Outcome: Ongoing  7/14/2021 1740 by Mignon Chavarria RN  Outcome: Ongoing     Problem: Loneliness or Risk for Loneliness  Goal: Demonstrate positive use of time alone when socialization is not possible  7/15/2021 0006 by Claire Khanna RN  Outcome: Ongoing  7/14/2021 1740 by Mignon Chavarria RN  Outcome: Ongoing     Problem: Fatigue  Goal: Verbalize increase energy and improved vitality  7/15/2021 0006 by Claire Khanna RN  Outcome: Ongoing  7/14/2021 1740 by Mignon Chavarria RN  Outcome: Ongoing     Problem: Patient Education: Go to Patient Education Activity  Goal: Patient/Family Education  7/15/2021 0006 by Claire Khanna RN  Outcome: Ongoing  7/14/2021 1740 by Mignon Chavarria RN  Outcome: Ongoing     Problem: Falls - Risk of: Bed alarm in place and call light within reach.    Goal: Will remain free from falls  Description: Will remain free from falls  7/15/2021 0006 by Claire Khanna RN  Outcome: Ongoing  7/14/2021 1740 by Mignon Chavarria RN  Outcome: Ongoing  Goal: Absence of physical injury  Description: Absence of physical injury  7/15/2021 0006 by Claire Khanna RN  Outcome: Ongoing  7/14/2021 1740 by Mignon Chavarria RN  Outcome: Ongoing

## 2021-07-15 NOTE — PROGRESS NOTES
Pt awakened with labored respirations at this time. O2 is currently 87 % on 2 L. O2 increased to 3 L for comfort. O2 96%. RT notified for prn inhaler.

## 2021-07-15 NOTE — PROGRESS NOTES
Physician Progress Note      Sandi Rees  CSN #:                  [id number]  :                       1960  ADMIT DATE:       2021 9:22 PM  [address] [place] DATE:  RESPONDING  PROVIDER #:        Baron Reilly MD          QUERY TEXT:    Patient admitted with COVID-19 and MARCI on CKD. If possible, please document   in progress notes and discharge summary if you are evaluating and/or treating   any of the following: The medical record reflects the following:  Risk Factors: DM, HTN  Clinical Indicators: BUN 55, creat 2.3, GFR 22; prior renals from 2021 show   BUN 26, creat 1.1, GFR 51  Treatment: IV fluids, monitoring renal labs    Thank you,  Megan Carcamo RN, BSN< [email or web address]  Options provided:  -- CKD Stage 1 GFR>90  -- CKD Stage 2 GFR 60-90  -- CKD Stage 3a GFR 45-59  -- CKD Stage 3b GFR 30-44  -- CKD Stage 4 GFR 15-29  -- CKD Stage 5 GFR<15  -- ESRD  -- Other - I will add my own diagnosis  -- Disagree - Not applicable / Not valid  -- Disagree - Clinically unable to determine / Unknown  -- Refer to Clinical Documentation Reviewer    PROVIDER RESPONSE TEXT:    This patient has CKD Stage 4.     Query created by: Chana Ch on 2021 9:14 AM      Electronically signed by:  Baron Reilly MD 7/15/2021 10:20 AM

## 2021-07-15 NOTE — PROGRESS NOTES
Patient alert and oriented this morning, swallowed medications whole with water. This patient's oxygenation is continually at 90-93 on 2L of oxygen This patient has enough strength and confidence in her gait today to be ambulate safely on her own, but gets frustrated at how quickly she becomes fatigued while up, and about all of the lines and sensors attached to her. This patient is refusing to wear a standard hospital gown.

## 2021-07-15 NOTE — PLAN OF CARE
Problem: Airway Clearance - Ineffective  Goal: Achieve or maintain patent airway  7/15/2021 1110 by Stormy Elias RN  Outcome: Ongoing  7/15/2021 0006 by Wendy Felix RN  Outcome: Ongoing     Problem: Gas Exchange - Impaired  Goal: Absence of hypoxia  7/15/2021 1110 by Stormy Elias RN  Outcome: Ongoing  7/15/2021 0006 by Wendy Felix RN  Outcome: Ongoing  Goal: Promote optimal lung function  7/15/2021 1110 by Stormy Elias RN  Outcome: Ongoing  7/15/2021 0006 by Wendy Felix RN  Outcome: Ongoing     Problem: Breathing Pattern - Ineffective  Goal: Ability to achieve and maintain a regular respiratory rate  7/15/2021 1110 by Stormy Elias RN  Outcome: Ongoing  7/15/2021 0006 by Wendy Felix RN  Outcome: Ongoing     Problem:  Body Temperature -  Risk of, Imbalanced  Goal: Ability to maintain a body temperature within defined limits  7/15/2021 1110 by Stormy Elias RN  Outcome: Ongoing  7/15/2021 0006 by Wendy Felix RN  Outcome: Ongoing  Goal: Will regain or maintain usual level of consciousness  7/15/2021 1110 by Stormy Elias RN  Outcome: Ongoing  7/15/2021 0006 by Wendy Felix RN  Outcome: Ongoing  Goal: Complications related to the disease process, condition or treatment will be avoided or minimized  7/15/2021 1110 by Stormy Elias RN  Outcome: Ongoing  7/15/2021 0006 by Wendy Felix RN  Outcome: Ongoing     Problem: Isolation Precautions - Risk of Spread of Infection  Goal: Prevent transmission of infection  7/15/2021 1110 by Stormy Elias RN  Outcome: Ongoing  7/15/2021 0006 by Wendy Felix RN  Outcome: Ongoing     Problem: Nutrition Deficits  Goal: Optimize nutritional status  7/15/2021 1110 by Stormy Elias RN  Outcome: Ongoing  7/15/2021 0006 by Wendy Felix RN  Outcome: Ongoing     Problem: Risk for Fluid Volume Deficit  Goal: Maintain normal heart rhythm  7/15/2021 1110 by Stormy Elias RN  Outcome: Ongoing  7/15/2021 0006 by Wendy Felix RN  Outcome: Ongoing  Goal: Maintain absence of muscle cramping  7/15/2021 1110 by Caleb Cortez RN  Outcome: Ongoing  7/15/2021 0006 by Pranay Maria RN  Outcome: Ongoing  Goal: Maintain normal serum potassium, sodium, calcium, phosphorus, and pH  7/15/2021 1110 by Caleb Cortez RN  Outcome: Ongoing  7/15/2021 0006 by Pranay Maria RN  Outcome: Ongoing     Problem: Loneliness or Risk for Loneliness  Goal: Demonstrate positive use of time alone when socialization is not possible  7/15/2021 1110 by Caleb Cortez RN  Outcome: Ongoing  7/15/2021 0006 by Pranay Maria RN  Outcome: Ongoing     Problem: Fatigue  Goal: Verbalize increase energy and improved vitality  7/15/2021 1110 by Caleb Cortez RN  Outcome: Ongoing  7/15/2021 0006 by Pranay Maria RN  Outcome: Ongoing     Problem: Patient Education: Go to Patient Education Activity  Goal: Patient/Family Education  7/15/2021 1110 by Caleb Cortez RN  Outcome: Ongoing  7/15/2021 0006 by Pranay Maria RN  Outcome: Ongoing     Problem: Falls - Risk of:  Goal: Will remain free from falls  Description: Will remain free from falls  7/15/2021 1110 by Caleb Cortez RN  Outcome: Ongoing  7/15/2021 0006 by Pranay Maria RN  Outcome: Ongoing  Goal: Absence of physical injury  Description: Absence of physical injury  7/15/2021 1110 by Caleb Cortez RN  Outcome: Ongoing  7/15/2021 0006 by Pranay Maria RN  Outcome: Ongoing

## 2021-07-15 NOTE — CONSULTS
RN, please provide patient/caregiver fact sheet prior to initial dose and document receipt by patient in nursing note.     Consult from Dr. Jazmín Bain

## 2021-07-15 NOTE — PROGRESS NOTES
Hospitalist Progress Note      PCP: Marga Feliciano MD    Chief Complaint. Presented to hospital for SOB    Date of Admission: 7/13/2021      Subjective:   denies chest pain, nausea, vomiting, shortness of breath, fever or chills. mention feels overall better    Medications:  Reviewed    Infusion Medications    sodium chloride      dextrose       Scheduled Medications    insulin glargine  18 Units Subcutaneous Nightly    insulin lispro  7 Units Subcutaneous TID WC    insulin lispro  0-18 Units Subcutaneous TID WC    insulin lispro  0-9 Units Subcutaneous Nightly    atorvastatin  10 mg Oral Daily    dilTIAZem  240 mg Oral Daily    metoprolol succinate  50 mg Oral Daily    sodium chloride flush  5-40 mL Intravenous 2 times per day    dexamethasone  8 mg Intravenous Nightly    albuterol sulfate HFA  2 puff Inhalation 4x daily    And    ipratropium  2 puff Inhalation 4x daily    Vitamin D  1,000 Units Oral Daily    zinc sulfate  50 mg Oral Daily    ascorbic acid  500 mg Oral Daily    enoxaparin  30 mg Subcutaneous Daily    famotidine  20 mg Oral Daily    potassium citrate  10 mEq Oral BID WC     PRN Meds: sodium chloride flush, sodium chloride, ondansetron **OR** ondansetron, polyethylene glycol, acetaminophen **OR** acetaminophen, glucose, dextrose, glucagon (rDNA), dextrose, promethazine, benzonatate, albuterol sulfate HFA      Intake/Output Summary (Last 24 hours) at 7/15/2021 1623  Last data filed at 7/15/2021 1346  Gross per 24 hour   Intake 730 ml   Output --   Net 730 ml       Physical Exam Performed:    /72   Pulse 79   Temp 97.4 °F (36.3 °C) (Oral)   Resp 18   Ht 5' 2\" (1.575 m)   Wt 156 lb 15.5 oz (71.2 kg)   SpO2 (!) 88%   BMI 28.71 kg/m²     General appearance: No apparent distress,   HEENT:  Conjunctivae/corneas clear. Neck: Supple, with full range of motion. Respiratory:  Normal respiratory effort.  Clear to auscultation, bilaterally without Rales/Wheezes/Rhonchi. Cardiovascular: Regular rate and rhythm with normal S1/S2 without murmurs or rubs  Abdomen: Soft, non-tender, non-distended, normal bowel sounds. Musculoskeletal: No cyanosis or edema bilaterally  Neurologic:  without any focal sensory/motor deficits. grossly non-focal.  Psychiatric: Alert and oriented, Normal mood  Peripheral Pulses: +2 palpable, equal bilaterally       Labs:   Recent Labs     07/13/21  2145 07/14/21  1127 07/15/21  0545   WBC 2.6* 1.1* 3.9*   HGB 13.2 12.1 12.9   HCT 37.7 34.6* 36.6   PLT 93* 80* 91*     Recent Labs     07/13/21  2145 07/14/21  1127 07/15/21  0545   * 134* 138   K 4.6 3.7 4.3   CL 94* 96* 103   CO2 23 18* 23   BUN 55* 55* 40*   CREATININE 2.3* 1.6* 1.1   CALCIUM 9.0 8.2* 8.4     Recent Labs     07/13/21  2145 07/14/21  1127 07/15/21  0545   AST 43* 34 28   ALT 45* 37 34   BILIDIR <0.2 <0.2  --    BILITOT <0.2 <0.2 <0.2   ALKPHOS 47 43 44     No results for input(s): INR in the last 72 hours. Recent Labs     07/13/21 2145   TROPONINI <0.01       Urinalysis:      Lab Results   Component Value Date    NITRU Negative 11/30/2020    WBCUA 621 11/30/2020    BACTERIA 4+ 11/30/2020    RBCUA 5-10 11/30/2020    BLOODU LARGE 11/30/2020    SPECGRAV 1.024 11/30/2020    GLUCOSEU Negative 11/30/2020       Radiology:  XR CHEST 1 VIEW   Final Result   Increasing vascular congestion and suggestion of developing perihilar   interstitial edema. Unchanged bilateral lower lobe atelectasis. XR CHEST 1 VIEW   Final Result   New bilateral lower lobe airspace disease most likely due to atelectasis on   this low lung volume expiratory portable chest x-ray although edema is also   in the differential.  Pneumonia is unlikely but not excluded. No evidence of   pleural effusion         XR CHEST PORTABLE   Final Result   Hypoinflation with mild discoid atelectasis or scarring along the lung bases.                Assessment/Plan:    Active Hospital Problems Diagnosis     COVID-19 virus infection [U07.1]     Acute kidney injury superimposed on CKD ([facility] [address] [N17.9, N18.9]        MARCI on CKD: - resolved        Covid: Continue isolation precaution  Covid positive  Mild leukopenia 2.6, mild thrombocytopenia 93  Liver enzymes pending  Oxygen therapy titrate as needed  Incentive spirometer  Albuterol and ipratropium inhaler as needed  Zinc, vitamin D, vitamin C  Decadron 8 mg IVP daily  Consulted pharmacy for remdesivir         History of hypertension: Currently blood pressure is very stable: Continue to monitor may need to add IVP as needed, currently holding current blood pressure regimen due to current renal function     DM:   Hold Januvia and home medications  FSBS, DM and renal diet, hypoglycemia protocol, SSI        DVT Prophylaxis: Lovenox  Diet: ADULT DIET; Regular; 3 carb choices (45 gm/meal); Low Potassium (Less than 3000 mg/day);  Low Phosphorus (Less than 1000 mg)  Code Status: Full Code    PT/OT Eval Status: ordered    Dispo - continue management as above, will start on remdesvir    Juan Antonio Jeronimo MD

## 2021-07-16 PROBLEM — J12.82 PNEUMONIA DUE TO COVID-19 VIRUS: Status: ACTIVE | Noted: 2021-07-14

## 2021-07-16 LAB
A/G RATIO: 1 (ref 1.1–2.2)
ALBUMIN SERPL-MCNC: 3.3 G/DL (ref 3.4–5)
ALP BLD-CCNC: 39 U/L (ref 40–129)
ALT SERPL-CCNC: 26 U/L (ref 10–40)
ANION GAP SERPL CALCULATED.3IONS-SCNC: 12 MMOL/L (ref 3–16)
AST SERPL-CCNC: 23 U/L (ref 15–37)
BILIRUB SERPL-MCNC: <0.2 MG/DL (ref 0–1)
BUN BLDV-MCNC: 25 MG/DL (ref 7–20)
C-REACTIVE PROTEIN: 31.5 MG/L (ref 0–5.1)
CALCIUM SERPL-MCNC: 8.7 MG/DL (ref 8.3–10.6)
CHLORIDE BLD-SCNC: 104 MMOL/L (ref 99–110)
CO2: 23 MMOL/L (ref 21–32)
CREAT SERPL-MCNC: 1 MG/DL (ref 0.6–1.2)
FERRITIN: 553.4 NG/ML (ref 15–150)
GFR AFRICAN AMERICAN: >60
GFR NON-AFRICAN AMERICAN: 56
GLOBULIN: 3.3 G/DL
GLUCOSE BLD-MCNC: 210 MG/DL (ref 70–99)
GLUCOSE BLD-MCNC: 232 MG/DL (ref 70–99)
GLUCOSE BLD-MCNC: 270 MG/DL (ref 70–99)
GLUCOSE BLD-MCNC: 270 MG/DL (ref 70–99)
GLUCOSE BLD-MCNC: 319 MG/DL (ref 70–99)
PERFORMED ON: ABNORMAL
POTASSIUM SERPL-SCNC: 4.4 MMOL/L (ref 3.5–5.1)
PROCALCITONIN: 0.08 NG/ML (ref 0–0.15)
SODIUM BLD-SCNC: 139 MMOL/L (ref 136–145)
TOTAL PROTEIN: 6.6 G/DL (ref 6.4–8.2)

## 2021-07-16 PROCEDURE — 2580000003 HC RX 258: Performed by: INTERNAL MEDICINE

## 2021-07-16 PROCEDURE — 6370000000 HC RX 637 (ALT 250 FOR IP): Performed by: INTERNAL MEDICINE

## 2021-07-16 PROCEDURE — 82728 ASSAY OF FERRITIN: CPT

## 2021-07-16 PROCEDURE — 99223 1ST HOSP IP/OBS HIGH 75: CPT | Performed by: INTERNAL MEDICINE

## 2021-07-16 PROCEDURE — 94761 N-INVAS EAR/PLS OXIMETRY MLT: CPT

## 2021-07-16 PROCEDURE — 1200000000 HC SEMI PRIVATE

## 2021-07-16 PROCEDURE — 36415 COLL VENOUS BLD VENIPUNCTURE: CPT

## 2021-07-16 PROCEDURE — 6370000000 HC RX 637 (ALT 250 FOR IP): Performed by: HOSPITALIST

## 2021-07-16 PROCEDURE — 2580000003 HC RX 258: Performed by: NURSE PRACTITIONER

## 2021-07-16 PROCEDURE — 80053 COMPREHEN METABOLIC PANEL: CPT

## 2021-07-16 PROCEDURE — 6370000000 HC RX 637 (ALT 250 FOR IP): Performed by: NURSE PRACTITIONER

## 2021-07-16 PROCEDURE — 84145 PROCALCITONIN (PCT): CPT

## 2021-07-16 PROCEDURE — 94640 AIRWAY INHALATION TREATMENT: CPT

## 2021-07-16 PROCEDURE — 6360000002 HC RX W HCPCS: Performed by: INTERNAL MEDICINE

## 2021-07-16 PROCEDURE — 2700000000 HC OXYGEN THERAPY PER DAY

## 2021-07-16 PROCEDURE — 86140 C-REACTIVE PROTEIN: CPT

## 2021-07-16 PROCEDURE — 2500000003 HC RX 250 WO HCPCS: Performed by: INTERNAL MEDICINE

## 2021-07-16 RX ORDER — DEXAMETHASONE 4 MG/1
10 TABLET ORAL DAILY
Status: COMPLETED | OUTPATIENT
Start: 2021-07-21 | End: 2021-07-25

## 2021-07-16 RX ORDER — GUAIFENESIN/DEXTROMETHORPHAN 100-10MG/5
5 SYRUP ORAL EVERY 4 HOURS PRN
Status: DISCONTINUED | OUTPATIENT
Start: 2021-07-16 | End: 2021-07-25 | Stop reason: HOSPADM

## 2021-07-16 RX ADMIN — Medication 2 PUFF: at 12:04

## 2021-07-16 RX ADMIN — REMDESIVIR 100 MG: 100 INJECTION, POWDER, LYOPHILIZED, FOR SOLUTION INTRAVENOUS at 18:09

## 2021-07-16 RX ADMIN — GUAIFENESIN SYRUP AND DEXTROMETHORPHAN 5 ML: 100; 10 SYRUP ORAL at 20:19

## 2021-07-16 RX ADMIN — BENZONATATE 200 MG: 100 CAPSULE ORAL at 20:19

## 2021-07-16 RX ADMIN — INSULIN LISPRO 7 UNITS: 100 INJECTION, SOLUTION INTRAVENOUS; SUBCUTANEOUS at 17:40

## 2021-07-16 RX ADMIN — INSULIN LISPRO 7 UNITS: 100 INJECTION, SOLUTION INTRAVENOUS; SUBCUTANEOUS at 10:22

## 2021-07-16 RX ADMIN — Medication 2 PUFF: at 21:00

## 2021-07-16 RX ADMIN — ENOXAPARIN SODIUM 40 MG: 40 INJECTION SUBCUTANEOUS at 10:13

## 2021-07-16 RX ADMIN — Medication 2 PUFF: at 06:00

## 2021-07-16 RX ADMIN — ALBUTEROL SULFATE 2 PUFF: 90 AEROSOL, METERED RESPIRATORY (INHALATION) at 07:55

## 2021-07-16 RX ADMIN — OXYCODONE HYDROCHLORIDE AND ACETAMINOPHEN 500 MG: 500 TABLET ORAL at 10:12

## 2021-07-16 RX ADMIN — Medication 1000 UNITS: at 10:13

## 2021-07-16 RX ADMIN — ATORVASTATIN CALCIUM 10 MG: 10 TABLET, FILM COATED ORAL at 10:12

## 2021-07-16 RX ADMIN — SODIUM CHLORIDE, PRESERVATIVE FREE 10 ML: 5 INJECTION INTRAVENOUS at 10:22

## 2021-07-16 RX ADMIN — INSULIN LISPRO 5 UNITS: 100 INJECTION, SOLUTION INTRAVENOUS; SUBCUTANEOUS at 20:19

## 2021-07-16 RX ADMIN — Medication 2 PUFF: at 20:59

## 2021-07-16 RX ADMIN — POTASSIUM CITRATE 10 MEQ: 10 TABLET ORAL at 10:12

## 2021-07-16 RX ADMIN — GUAIFENESIN SYRUP AND DEXTROMETHORPHAN 5 ML: 100; 10 SYRUP ORAL at 11:39

## 2021-07-16 RX ADMIN — FAMOTIDINE 20 MG: 20 TABLET ORAL at 10:12

## 2021-07-16 RX ADMIN — Medication 2 PUFF: at 16:24

## 2021-07-16 RX ADMIN — INSULIN LISPRO 12 UNITS: 100 INJECTION, SOLUTION INTRAVENOUS; SUBCUTANEOUS at 11:51

## 2021-07-16 RX ADMIN — INSULIN LISPRO 6 UNITS: 100 INJECTION, SOLUTION INTRAVENOUS; SUBCUTANEOUS at 10:22

## 2021-07-16 RX ADMIN — GUAIFENESIN SYRUP AND DEXTROMETHORPHAN 5 ML: 100; 10 SYRUP ORAL at 06:41

## 2021-07-16 RX ADMIN — Medication 2 PUFF: at 12:02

## 2021-07-16 RX ADMIN — Medication 2 PUFF: at 16:25

## 2021-07-16 RX ADMIN — BENZONATATE 200 MG: 100 CAPSULE ORAL at 05:38

## 2021-07-16 RX ADMIN — ZINC SULFATE 220 MG (50 MG) CAPSULE 50 MG: CAPSULE at 10:12

## 2021-07-16 RX ADMIN — DILTIAZEM HYDROCHLORIDE 240 MG: 240 CAPSULE, COATED, EXTENDED RELEASE ORAL at 10:12

## 2021-07-16 RX ADMIN — POTASSIUM CITRATE 10 MEQ: 10 TABLET ORAL at 17:40

## 2021-07-16 RX ADMIN — INSULIN GLARGINE 18 UNITS: 100 INJECTION, SOLUTION SUBCUTANEOUS at 20:19

## 2021-07-16 RX ADMIN — ACETAMINOPHEN 650 MG: 325 TABLET ORAL at 05:38

## 2021-07-16 RX ADMIN — INSULIN LISPRO 9 UNITS: 100 INJECTION, SOLUTION INTRAVENOUS; SUBCUTANEOUS at 17:40

## 2021-07-16 RX ADMIN — DEXAMETHASONE SODIUM PHOSPHATE 20 MG: 4 INJECTION, SOLUTION INTRA-ARTICULAR; INTRALESIONAL; INTRAMUSCULAR; INTRAVENOUS; SOFT TISSUE at 14:02

## 2021-07-16 RX ADMIN — INSULIN LISPRO 7 UNITS: 100 INJECTION, SOLUTION INTRAVENOUS; SUBCUTANEOUS at 11:51

## 2021-07-16 RX ADMIN — METOPROLOL SUCCINATE 50 MG: 50 TABLET, EXTENDED RELEASE ORAL at 10:13

## 2021-07-16 ASSESSMENT — PAIN DESCRIPTION - PROGRESSION
CLINICAL_PROGRESSION: NOT CHANGED

## 2021-07-16 ASSESSMENT — PAIN SCALES - GENERAL
PAINLEVEL_OUTOF10: 0
PAINLEVEL_OUTOF10: 0
PAINLEVEL_OUTOF10: 2

## 2021-07-16 NOTE — PROGRESS NOTES
Hospitalist Progress Note      PCP: Iman Parra MD    Chief Complaint. Presented to hospital for SOB    Date of Admission: 7/13/2021    Subjective:  Still feels SOB, denies chest pain, nausea, vomiting, fever or chills. mention feels overall better    Medications:  Reviewed    Infusion Medications    sodium chloride      dextrose       Scheduled Medications    enoxaparin  40 mg Subcutaneous Daily    dexamethasone  20 mg Intravenous Q24H    [START ON 7/21/2021] dexamethasone  10 mg Oral Daily    insulin glargine  18 Units Subcutaneous Nightly    insulin lispro  7 Units Subcutaneous TID WC    insulin lispro  0-18 Units Subcutaneous TID WC    insulin lispro  0-9 Units Subcutaneous Nightly    remdesivir IVPB  100 mg Intravenous Q24H    atorvastatin  10 mg Oral Daily    dilTIAZem  240 mg Oral Daily    metoprolol succinate  50 mg Oral Daily    sodium chloride flush  5-40 mL Intravenous 2 times per day    albuterol sulfate HFA  2 puff Inhalation 4x daily    And    ipratropium  2 puff Inhalation 4x daily    Vitamin D  1,000 Units Oral Daily    zinc sulfate  50 mg Oral Daily    ascorbic acid  500 mg Oral Daily    famotidine  20 mg Oral Daily    potassium citrate  10 mEq Oral BID WC     PRN Meds: guaiFENesin-dextromethorphan, sodium chloride, sodium chloride flush, sodium chloride, ondansetron **OR** ondansetron, polyethylene glycol, acetaminophen **OR** acetaminophen, glucose, dextrose, glucagon (rDNA), dextrose, promethazine, benzonatate, albuterol sulfate HFA      Intake/Output Summary (Last 24 hours) at 7/16/2021 1714  Last data filed at 7/16/2021 1441  Gross per 24 hour   Intake 540 ml   Output --   Net 540 ml       Physical Exam Performed:    /70   Pulse 85   Temp 97.7 °F (36.5 °C) (Oral)   Resp 18   Ht 5' 2\" (1.575 m)   Wt 156 lb 15.5 oz (71.2 kg)   SpO2 90%   BMI 28.71 kg/m²     General appearance:NAD  HEENT:  Conjunctivae/corneas clear.   Neck: Supple, with full range of motion. Respiratory:  Normal respiratory effort. Clear to auscultation, bilaterally without Rales/Wheezes/Rhonchi. Cardiovascular: Regular rate and rhythm with normal S1/S2 without murmurs or rubs  Abdomen: Soft, non-tender, non-distended, normal bowel sounds. Musculoskeletal: No cyanosis or edema bilaterally  Neurologic:  without any focal sensory/motor deficits. grossly non-focal.  Psychiatric: Alert and oriented, Normal mood  Peripheral Pulses: +2 palpable, equal bilaterally       Labs:   Recent Labs     07/13/21  2145 07/14/21  1127 07/15/21  0545   WBC 2.6* 1.1* 3.9*   HGB 13.2 12.1 12.9   HCT 37.7 34.6* 36.6   PLT 93* 80* 91*     Recent Labs     07/14/21  1127 07/15/21  0545 07/16/21  0539   * 138 139   K 3.7 4.3 4.4   CL 96* 103 104   CO2 18* 23 23   BUN 55* 40* 25*   CREATININE 1.6* 1.1 1.0   CALCIUM 8.2* 8.4 8.7     Recent Labs     07/13/21  2145 07/13/21  2145 07/14/21  1127 07/15/21  0545 07/16/21  0539   AST 43*   < > 34 28 23   ALT 45*   < > 37 34 26   BILIDIR <0.2  --  <0.2  --   --    BILITOT <0.2   < > <0.2 <0.2 <0.2   ALKPHOS 47   < > 43 44 39*    < > = values in this interval not displayed. No results for input(s): INR in the last 72 hours. Recent Labs     07/13/21 2145   TROPONINI <0.01       Urinalysis:      Lab Results   Component Value Date    NITRU Negative 11/30/2020    WBCUA 621 11/30/2020    BACTERIA 4+ 11/30/2020    RBCUA 5-10 11/30/2020    BLOODU LARGE 11/30/2020    SPECGRAV 1.024 11/30/2020    GLUCOSEU Negative 11/30/2020       Radiology:  XR CHEST 1 VIEW   Final Result   Increasing vascular congestion and suggestion of developing perihilar   interstitial edema. Unchanged bilateral lower lobe atelectasis. XR CHEST 1 VIEW   Final Result   New bilateral lower lobe airspace disease most likely due to atelectasis on   this low lung volume expiratory portable chest x-ray although edema is also   in the differential.  Pneumonia is unlikely but not excluded.   No evidence of   pleural effusion         XR CHEST PORTABLE   Final Result   Hypoinflation with mild discoid atelectasis or scarring along the lung bases. Assessment/Plan:    Active Hospital Problems    Diagnosis     Acute respiratory failure with hypoxia (HCC) [J96.01]     Neutropenia (HCC) [D70.9]     Pneumonia due to COVID-19 virus [U07.1, J12.82]     MARCI (acute kidney injury) ([facility] [address] [N17.9]        MARCI on CKD: - resolved      Covid: Continue isolation precaution  Covid positive  Decadron IV, remdesvir, consulted pulmonary        History of hypertension: Currently blood pressure is very stable: Continue to monitor may need to add IVP as needed, currently holding current blood pressure regimen due to current renal function     DM:   Hold Januvia and home medications  FSBS, DM and renal diet, hypoglycemia protocol, SSI        DVT Prophylaxis: Lovenox  Diet: ADULT DIET; Regular; 3 carb choices (45 gm/meal); Low Potassium (Less than 3000 mg/day);  Low Phosphorus (Less than 1000 mg)  Code Status: Full Code    PT/OT Eval Status: ordered    Dispo - started on remdesvir, continue Shalom Stein MD

## 2021-07-16 NOTE — CARE COORDINATION
7/16 From Home. COVID +. On 5 L/M currently. Follow for needs.  Electronically signed by Martin Cueva RN on 7/16/2021 at 4:24 PM

## 2021-07-16 NOTE — PLAN OF CARE
Problem: Airway Clearance - Ineffective  Goal: Achieve or maintain patent airway  7/16/2021 0100 by Susana Catherine RN  Outcome: Ongoing  7/15/2021 1110 by Tato Mcgovern RN  Outcome: Ongoing     Problem: Gas Exchange - Impaired  Goal: Absence of hypoxia  7/16/2021 0100 by Susana Catherine RN  Outcome: Ongoing  7/15/2021 1110 by Tato Mcgovern RN  Outcome: Ongoing  Goal: Promote optimal lung function  7/16/2021 0100 by Susana Catherine RN  Outcome: Ongoing  7/15/2021 1110 by Tato Mcgovern RN  Outcome: Ongoing     Problem: Breathing Pattern - Ineffective  Goal: Ability to achieve and maintain a regular respiratory rate  7/16/2021 0100 by Susana Catherine RN  Outcome: Ongoing  7/15/2021 1110 by Tato Mcgovern RN  Outcome: Ongoing     Problem:  Body Temperature -  Risk of, Imbalanced  Goal: Ability to maintain a body temperature within defined limits  7/16/2021 0100 by Susana Catherine RN  Outcome: Ongoing  7/15/2021 1110 by Tato Mcgovern RN  Outcome: Ongoing  Goal: Will regain or maintain usual level of consciousness  7/16/2021 0100 by Susana Catherine RN  Outcome: Ongoing  7/15/2021 1110 by Tato Mcgovern RN  Outcome: Ongoing  Goal: Complications related to the disease process, condition or treatment will be avoided or minimized  7/16/2021 0100 by Susana Catherine RN  Outcome: Ongoing  7/15/2021 1110 by Tato Mcgovern RN  Outcome: Ongoing     Problem: Isolation Precautions - Risk of Spread of Infection  Goal: Prevent transmission of infection  7/16/2021 0100 by Susana Catherine RN  Outcome: Ongoing  7/15/2021 1110 by Tato Mcgovern RN  Outcome: Ongoing     Problem: Nutrition Deficits  Goal: Optimize nutritional status  7/16/2021 0100 by Susana Catherine RN  Outcome: Ongoing  7/15/2021 1110 by Tato Mcgovern RN  Outcome: Ongoing     Problem: Risk for Fluid Volume Deficit  Goal: Maintain normal heart rhythm  7/16/2021 0100 by Susana Catherine RN  Outcome: Ongoing  7/15/2021 1110 by Tato Mcgovern RN  Outcome: Ongoing  Goal: Maintain absence of muscle cramping  7/16/2021 0100 by Cari Cloud RN  Outcome: Ongoing  7/15/2021 1110 by Ira Arriaza RN  Outcome: Ongoing  Goal: Maintain normal serum potassium, sodium, calcium, phosphorus, and pH  7/16/2021 0100 by Cari Cloud RN  Outcome: Ongoing  7/15/2021 1110 by Ira Arriaza RN  Outcome: Ongoing     Problem: Loneliness or Risk for Loneliness  Goal: Demonstrate positive use of time alone when socialization is not possible  7/16/2021 0100 by Cari Cloud RN  Outcome: Ongoing  7/15/2021 1110 by Ira Arriaza RN  Outcome: Ongoing     Problem: Fatigue  Goal: Verbalize increase energy and improved vitality  7/16/2021 0100 by Cari Cloud RN  Outcome: Ongoing  7/15/2021 1110 by Ira Arriaza RN  Outcome: Ongoing     Problem: Patient Education: Go to Patient Education Activity  Goal: Patient/Family Education  7/16/2021 0100 by Cari Cloud RN  Outcome: Ongoing  7/15/2021 1110 by Ira Arriaza RN  Outcome: Ongoing     Problem: Falls - Risk of: bed alarm in place and call light within reach  Goal: Will remain free from falls  Description: Will remain free from falls  7/16/2021 0100 by Cari Cloud RN  Outcome: Ongoing  7/15/2021 1110 by Ira Arriaza RN  Outcome: Ongoing  Goal: Absence of physical injury  Description: Absence of physical injury  7/16/2021 0100 by Cari Cloud RN  Outcome: Ongoing  7/15/2021 1110 by Ira Arriaza RN  Outcome: Ongoing

## 2021-07-16 NOTE — PROGRESS NOTES
Provider notified per patient request for cough medication. Per Jona Urrutia MD. N.O. Robitussin DM every 4 hours as needed for cough.

## 2021-07-16 NOTE — CONSULTS
PATIENT IS SEEN AT THE REQUEST OF DR. Benson for COVID pneumonia     CONSULTING PHYSICIAN: Marcelino    HISTORY OF PRESENT ILLNESS:  This is a 62 y.o. female who presented to the ED on 7/13 with a CC of SOB with recent positive COVID test.   currently in the ICU with COVID as well. Said she tested positive on Tuesday but started to get sick before that. They went on camping trip. No vaccine. No history of lung disease. Admits to SOB and dry coughing       Established Pulmonologist:  None    PAST MEDICAL HISTORY:  Past Medical History:   Diagnosis Date    Chronic kidney disease 2009    kidney stones    Hyperlipidemia     Hypertension     MDRO (multiple drug resistant organisms) resistance     MRSA infection 2013    left lower leg    Type II or unspecified type diabetes mellitus without mention of complication, not stated as uncontrolled        PAST SURGICAL HISTORY:  Past Surgical History:   Procedure Laterality Date   [address]    COLONOSCOPY  06/07/2018    Dr. Marshal Anderson. adenoma polyp, repeat in 5 years    LITHOTRIPSY  2009       FAMILY HISTORY:  family history includes Cancer in her father and mother; Diabetes in her sister; Heart Disease in her father and sister. SOCIAL HISTORY:   reports that she has never smoked. She has never used smokeless tobacco.  Works at [employer].   Does not drink alcohol     Scheduled Meds:   enoxaparin  40 mg Subcutaneous Daily    insulin glargine  18 Units Subcutaneous Nightly    insulin lispro  7 Units Subcutaneous TID WC    insulin lispro  0-18 Units Subcutaneous TID WC    insulin lispro  0-9 Units Subcutaneous Nightly    remdesivir IVPB  100 mg Intravenous Q24H    atorvastatin  10 mg Oral Daily    dilTIAZem  240 mg Oral Daily    metoprolol succinate  50 mg Oral Daily    sodium chloride flush  5-40 mL Intravenous 2 times per day    dexamethasone  8 mg Intravenous Nightly    albuterol sulfate HFA  2 puff Inhalation 4x daily    And    ipratropium  2 puff Inhalation 4x daily    Vitamin D  1,000 Units Oral Daily    zinc sulfate  50 mg Oral Daily    ascorbic acid  500 mg Oral Daily    famotidine  20 mg Oral Daily    potassium citrate  10 mEq Oral BID WC       Continuous Infusions:   sodium chloride      dextrose         PRN Meds:  guaiFENesin-dextromethorphan, sodium chloride, sodium chloride flush, sodium chloride, ondansetron **OR** ondansetron, polyethylene glycol, acetaminophen **OR** acetaminophen, glucose, dextrose, glucagon (rDNA), dextrose, promethazine, benzonatate, albuterol sulfate HFA    ALLERGIES:  Patient is allergic to ciprofloxacin and hydrocodone. REVIEW OF SYSTEMS:  Constitutional: has been ill   HENT: Negative for sore throat  Eyes: Negative for redness   Respiratory: SOb, dry coughing   Cardiovascular: Negative for chest pain  Gastrointestinal: Negative for vomiting, diarrhea   Genitourinary: Negative for hematuria, negative for dysuria  Musculoskeletal: Negative for arthralgias   Skin: Negative for rash  Neurological: Negative for syncope  Hematological: Negative for adenopathy  Extremities:  Negative for swelling    PHYSICAL EXAM:  Blood pressure (!) 123/54, pulse 80, temperature 98.9 °F (37.2 °C), temperature source Oral, resp. rate 18, height 5' 2\" (1.575 m), weight 156 lb 15.5 oz (71.2 kg), SpO2 92 %, not currently breastfeeding.'  Gen: No distress. Eyes: PERRL. No sclera icterus. No conjunctival injection. ENT: No discharge. Pharynx clear. Neck: Trachea midline. No obvious mass. Resp: Diffuse crackles with coughing during exam   CV: Regular rate. Regular rhythm. No murmur or rub. GI: Non-tender. Non-distended. No hernia. BS present. Skin: Warm and dry. No nodule on exposed extremities. Lymph: No cervical LAD. No supraclavicular LAD. M/S: No cyanosis. No joint deformity. Neuro: Awake. Alert. Moves all four extremities.    EXT:   no edema, no clubbing    LABS:  CBC: Recent Labs     07/13/21  2145 07/14/21  1127 07/15/21  0545   WBC 2.6* 1.1* 3.9*   HGB 13.2 12.1 12.9   HCT 37.7 34.6* 36.6   MCV 87.7 87.8 87.5   PLT 93* 80* 91*     BMP:   Recent Labs     07/14/21  1127 07/15/21  0545 07/16/21  0539   * 138 139   K 3.7 4.3 4.4   CL 96* 103 104   CO2 18* 23 23   BUN 55* 40* 25*   CREATININE 1.6* 1.1 1.0     LIVER PROFILE:   Recent Labs     07/13/21  2145 07/13/21  2145 07/14/21  1127 07/15/21  0545 07/16/21  0539   AST 43*   < > 34 28 23   ALT 45*   < > 37 34 26   BILIDIR <0.2  --  <0.2  --   --    BILITOT <0.2   < > <0.2 <0.2 <0.2   ALKPHOS 47   < > 43 44 39*    < > = values in this interval not displayed. PT/INR: No results for input(s): PROTIME, INR in the last 72 hours. APTT: No results for input(s): APTT in the last 72 hours. UA:No results for input(s): NITRITE, COLORU, PHUR, LABCAST, WBCUA, RBCUA, MUCUS, TRICHOMONAS, YEAST, BACTERIA, CLARITYU, SPECGRAV, LEUKOCYTESUR, UROBILINOGEN, BILIRUBINUR, BLOODU, GLUCOSEU, AMORPHOUS in the last 72 hours. Invalid input(s): KETONESU  No results for input(s): PHART, HZH4BHS, PO2ART in the last 72 hours. Cultures:       PFTs:  None       ECHO:  None    ABG:  None    Chest X-ray:  Chest imaging was reviewed by me and showed low lung volumes with bilateral airspace disease       I reviewed all the above labs and studies pertaining to this visit. ASSESSMENT/PLAN:  · Acute Hypoxic Respiratory Failure with saturations less than 90% on room air due to COVID-19 pneumonia  · Increase decadron to 20 mg for 5 days, then 10 mg for 5 days  · Remdesivir protocol  · Keep lovenox daily due to thrombocytopenia  · Bronchodilators   · Get procal, CRP and ferritin for baseline   · Neutropenia/Thrombocytopenia   · Unclear if related to COVID or not   · No alcohol abuse   · MARCI   · Caution with IV fluids.   Keep lung dry     DVT prophylaxis  Lovenox    Rosemary Minors, DO  [facility]

## 2021-07-16 NOTE — PLAN OF CARE
Problem: Airway Clearance - Ineffective  Goal: Achieve or maintain patent airway  7/16/2021 1252 by Asad Calhoun RN  Outcome: Ongoing  7/16/2021 0100 by Wendy Felix RN  Outcome: Ongoing     Problem: Gas Exchange - Impaired  Goal: Absence of hypoxia  7/16/2021 1252 by Asad Calhoun RN  Outcome: Ongoing  7/16/2021 0100 by Wendy Felix RN  Outcome: Ongoing  Goal: Promote optimal lung function  7/16/2021 1252 by Asad Calhoun RN  Outcome: Ongoing  7/16/2021 0100 by Wendy Felix RN  Outcome: Ongoing     Problem: Breathing Pattern - Ineffective  Goal: Ability to achieve and maintain a regular respiratory rate  7/16/2021 1252 by Asad Calhoun RN  Outcome: Ongoing  7/16/2021 0100 by Wendy Felix RN  Outcome: Ongoing     Problem:  Body Temperature -  Risk of, Imbalanced  Goal: Ability to maintain a body temperature within defined limits  7/16/2021 1252 by Asad Calhoun RN  Outcome: Ongoing  7/16/2021 0100 by Wendy Felix RN  Outcome: Ongoing  Goal: Will regain or maintain usual level of consciousness  7/16/2021 1252 by Asad Calhoun RN  Outcome: Ongoing  7/16/2021 0100 by Wendy Felix RN  Outcome: Ongoing  Goal: Complications related to the disease process, condition or treatment will be avoided or minimized  7/16/2021 1252 by Asad Calhoun RN  Outcome: Ongoing  7/16/2021 0100 by Wendy Felix RN  Outcome: Ongoing     Problem: Isolation Precautions - Risk of Spread of Infection  Goal: Prevent transmission of infection  7/16/2021 1252 by Asad Calhoun RN  Outcome: Ongoing  7/16/2021 0100 by Wendy Felix RN  Outcome: Ongoing     Problem: Nutrition Deficits  Goal: Optimize nutritional status  7/16/2021 1252 by Asad Calhoun RN  Outcome: Ongoing  7/16/2021 0100 by Wendy Felix RN  Outcome: Ongoing     Problem: Risk for Fluid Volume Deficit  Goal: Maintain normal heart rhythm  7/16/2021 1252 by Benjiman Mixer, RN  Outcome: Ongoing  7/16/2021 0100 by Wendy Felix, RN  Outcome: Ongoing  Goal: Maintain absence of muscle cramping  7/16/2021 1252 by Nicole Coronado RN  Outcome: Ongoing  7/16/2021 0100 by Maximilian Michele RN  Outcome: Ongoing  Goal: Maintain normal serum potassium, sodium, calcium, phosphorus, and pH  7/16/2021 1252 by Nicole Coronado RN  Outcome: Ongoing  7/16/2021 0100 by Maximilian Michele RN  Outcome: Ongoing     Problem: Loneliness or Risk for Loneliness  Goal: Demonstrate positive use of time alone when socialization is not possible  7/16/2021 1252 by Nicole Coronado RN  Outcome: Ongoing  7/16/2021 0100 by Maximilian Michele RN  Outcome: Ongoing     Problem: Fatigue  Goal: Verbalize increase energy and improved vitality  7/16/2021 1252 by Nicole Coronado RN  Outcome: Ongoing  7/16/2021 0100 by Maximilian Michele RN  Outcome: Ongoing     Problem: Patient Education: Go to Patient Education Activity  Goal: Patient/Family Education  7/16/2021 1252 by Nicole Coronado RN  Outcome: Ongoing  7/16/2021 0100 by Maximilian Michele RN  Outcome: Ongoing     Problem: Falls - Risk of:  Goal: Will remain free from falls  Description: Will remain free from falls  7/16/2021 1252 by Nicole Coronado RN  Outcome: Ongoing  7/16/2021 0100 by Maximilian Michele RN  Outcome: Ongoing  Goal: Absence of physical injury  Description: Absence of physical injury  7/16/2021 1252 by Nicole Coronado RN  Outcome: Ongoing  7/16/2021 0100 by Maximilian Michele RN  Outcome: Ongoing

## 2021-07-17 ENCOUNTER — APPOINTMENT (OUTPATIENT)
Dept: CT IMAGING | Age: 61
DRG: 987 | End: 2021-07-17
Payer: COMMERCIAL

## 2021-07-17 LAB
A/G RATIO: 0.9 (ref 1.1–2.2)
ALBUMIN SERPL-MCNC: 3.1 G/DL (ref 3.4–5)
ALP BLD-CCNC: 36 U/L (ref 40–129)
ALT SERPL-CCNC: 25 U/L (ref 10–40)
ANION GAP SERPL CALCULATED.3IONS-SCNC: 11 MMOL/L (ref 3–16)
APTT: 31.4 SEC (ref 26.2–38.6)
APTT: 57 SEC (ref 26.2–38.6)
AST SERPL-CCNC: 24 U/L (ref 15–37)
BILIRUB SERPL-MCNC: 0.3 MG/DL (ref 0–1)
BUN BLDV-MCNC: 23 MG/DL (ref 7–20)
CALCIUM SERPL-MCNC: 8.8 MG/DL (ref 8.3–10.6)
CHLORIDE BLD-SCNC: 106 MMOL/L (ref 99–110)
CO2: 22 MMOL/L (ref 21–32)
CREAT SERPL-MCNC: 0.8 MG/DL (ref 0.6–1.2)
GFR AFRICAN AMERICAN: >60
GFR NON-AFRICAN AMERICAN: >60
GLOBULIN: 3.3 G/DL
GLUCOSE BLD-MCNC: 257 MG/DL (ref 70–99)
GLUCOSE BLD-MCNC: 268 MG/DL (ref 70–99)
GLUCOSE BLD-MCNC: 268 MG/DL (ref 70–99)
GLUCOSE BLD-MCNC: 311 MG/DL (ref 70–99)
GLUCOSE BLD-MCNC: 340 MG/DL (ref 70–99)
HCT VFR BLD CALC: 37.6 % (ref 36–48)
HEMOGLOBIN: 13 G/DL (ref 12–16)
MCH RBC QN AUTO: 30.4 PG (ref 26–34)
MCHC RBC AUTO-ENTMCNC: 34.6 G/DL (ref 31–36)
MCV RBC AUTO: 87.9 FL (ref 80–100)
PDW BLD-RTO: 13.2 % (ref 12.4–15.4)
PERFORMED ON: ABNORMAL
PLATELET # BLD: 167 K/UL (ref 135–450)
PMV BLD AUTO: 10.3 FL (ref 5–10.5)
POTASSIUM SERPL-SCNC: 4.1 MMOL/L (ref 3.5–5.1)
RBC # BLD: 4.28 M/UL (ref 4–5.2)
SODIUM BLD-SCNC: 139 MMOL/L (ref 136–145)
TOTAL PROTEIN: 6.4 G/DL (ref 6.4–8.2)
WBC # BLD: 5.9 K/UL (ref 4–11)

## 2021-07-17 PROCEDURE — 85027 COMPLETE CBC AUTOMATED: CPT

## 2021-07-17 PROCEDURE — 99232 SBSQ HOSP IP/OBS MODERATE 35: CPT | Performed by: NURSE PRACTITIONER

## 2021-07-17 PROCEDURE — 94761 N-INVAS EAR/PLS OXIMETRY MLT: CPT

## 2021-07-17 PROCEDURE — 6370000000 HC RX 637 (ALT 250 FOR IP): Performed by: HOSPITALIST

## 2021-07-17 PROCEDURE — 6360000002 HC RX W HCPCS: Performed by: INTERNAL MEDICINE

## 2021-07-17 PROCEDURE — 2580000003 HC RX 258: Performed by: INTERNAL MEDICINE

## 2021-07-17 PROCEDURE — 1200000000 HC SEMI PRIVATE

## 2021-07-17 PROCEDURE — 36415 COLL VENOUS BLD VENIPUNCTURE: CPT

## 2021-07-17 PROCEDURE — 2700000000 HC OXYGEN THERAPY PER DAY

## 2021-07-17 PROCEDURE — 6370000000 HC RX 637 (ALT 250 FOR IP): Performed by: INTERNAL MEDICINE

## 2021-07-17 PROCEDURE — 6360000004 HC RX CONTRAST MEDICATION: Performed by: INTERNAL MEDICINE

## 2021-07-17 PROCEDURE — 2580000003 HC RX 258: Performed by: NURSE PRACTITIONER

## 2021-07-17 PROCEDURE — 6370000000 HC RX 637 (ALT 250 FOR IP): Performed by: NURSE PRACTITIONER

## 2021-07-17 PROCEDURE — 71260 CT THORAX DX C+: CPT

## 2021-07-17 PROCEDURE — 94640 AIRWAY INHALATION TREATMENT: CPT

## 2021-07-17 PROCEDURE — 80053 COMPREHEN METABOLIC PANEL: CPT

## 2021-07-17 PROCEDURE — 2500000003 HC RX 250 WO HCPCS: Performed by: INTERNAL MEDICINE

## 2021-07-17 PROCEDURE — 85730 THROMBOPLASTIN TIME PARTIAL: CPT

## 2021-07-17 RX ORDER — HEPARIN SODIUM 1000 [USP'U]/ML
60 INJECTION, SOLUTION INTRAVENOUS; SUBCUTANEOUS ONCE
Status: DISCONTINUED | OUTPATIENT
Start: 2021-07-17 | End: 2021-07-17

## 2021-07-17 RX ORDER — HEPARIN SODIUM 1000 [USP'U]/ML
2000 INJECTION, SOLUTION INTRAVENOUS; SUBCUTANEOUS ONCE
Status: COMPLETED | OUTPATIENT
Start: 2021-07-17 | End: 2021-07-17

## 2021-07-17 RX ORDER — HEPARIN SODIUM 1000 [USP'U]/ML
60 INJECTION, SOLUTION INTRAVENOUS; SUBCUTANEOUS PRN
Status: DISCONTINUED | OUTPATIENT
Start: 2021-07-17 | End: 2021-07-17

## 2021-07-17 RX ORDER — HEPARIN SODIUM 10000 [USP'U]/100ML
0-3000 INJECTION, SOLUTION INTRAVENOUS CONTINUOUS
Status: DISPENSED | OUTPATIENT
Start: 2021-07-17 | End: 2021-07-24

## 2021-07-17 RX ORDER — HEPARIN SODIUM 1000 [USP'U]/ML
30 INJECTION, SOLUTION INTRAVENOUS; SUBCUTANEOUS PRN
Status: DISCONTINUED | OUTPATIENT
Start: 2021-07-17 | End: 2021-07-17

## 2021-07-17 RX ORDER — FUROSEMIDE 10 MG/ML
20 INJECTION INTRAMUSCULAR; INTRAVENOUS ONCE
Status: COMPLETED | OUTPATIENT
Start: 2021-07-17 | End: 2021-07-17

## 2021-07-17 RX ADMIN — POTASSIUM CITRATE 10 MEQ: 10 TABLET ORAL at 09:39

## 2021-07-17 RX ADMIN — ATORVASTATIN CALCIUM 10 MG: 10 TABLET, FILM COATED ORAL at 09:40

## 2021-07-17 RX ADMIN — ZINC SULFATE 220 MG (50 MG) CAPSULE 50 MG: CAPSULE at 09:40

## 2021-07-17 RX ADMIN — Medication 2 PUFF: at 11:45

## 2021-07-17 RX ADMIN — POTASSIUM CITRATE 10 MEQ: 10 TABLET ORAL at 17:42

## 2021-07-17 RX ADMIN — INSULIN LISPRO 7 UNITS: 100 INJECTION, SOLUTION INTRAVENOUS; SUBCUTANEOUS at 12:34

## 2021-07-17 RX ADMIN — FUROSEMIDE 20 MG: 10 INJECTION, SOLUTION INTRAMUSCULAR; INTRAVENOUS at 12:46

## 2021-07-17 RX ADMIN — FAMOTIDINE 20 MG: 20 TABLET ORAL at 09:39

## 2021-07-17 RX ADMIN — INSULIN LISPRO 7 UNITS: 100 INJECTION, SOLUTION INTRAVENOUS; SUBCUTANEOUS at 17:15

## 2021-07-17 RX ADMIN — HEPARIN SODIUM 2000 UNITS: 1000 INJECTION INTRAVENOUS; SUBCUTANEOUS at 22:49

## 2021-07-17 RX ADMIN — GUAIFENESIN SYRUP AND DEXTROMETHORPHAN 5 ML: 100; 10 SYRUP ORAL at 21:21

## 2021-07-17 RX ADMIN — OXYCODONE HYDROCHLORIDE AND ACETAMINOPHEN 500 MG: 500 TABLET ORAL at 09:40

## 2021-07-17 RX ADMIN — ENOXAPARIN SODIUM 40 MG: 40 INJECTION SUBCUTANEOUS at 09:40

## 2021-07-17 RX ADMIN — Medication 1000 UNITS: at 09:39

## 2021-07-17 RX ADMIN — SODIUM CHLORIDE, PRESERVATIVE FREE 10 ML: 5 INJECTION INTRAVENOUS at 19:49

## 2021-07-17 RX ADMIN — Medication 2 PUFF: at 08:02

## 2021-07-17 RX ADMIN — INSULIN GLARGINE 18 UNITS: 100 INJECTION, SOLUTION SUBCUTANEOUS at 19:49

## 2021-07-17 RX ADMIN — Medication 2 PUFF: at 11:47

## 2021-07-17 RX ADMIN — METOPROLOL SUCCINATE 50 MG: 50 TABLET, EXTENDED RELEASE ORAL at 09:40

## 2021-07-17 RX ADMIN — INSULIN LISPRO 7 UNITS: 100 INJECTION, SOLUTION INTRAVENOUS; SUBCUTANEOUS at 09:31

## 2021-07-17 RX ADMIN — Medication 2 PUFF: at 16:25

## 2021-07-17 RX ADMIN — INSULIN LISPRO 9 UNITS: 100 INJECTION, SOLUTION INTRAVENOUS; SUBCUTANEOUS at 09:31

## 2021-07-17 RX ADMIN — REMDESIVIR 100 MG: 100 INJECTION, POWDER, LYOPHILIZED, FOR SOLUTION INTRAVENOUS at 17:42

## 2021-07-17 RX ADMIN — Medication 2 PUFF: at 20:49

## 2021-07-17 RX ADMIN — Medication 2 PUFF: at 08:00

## 2021-07-17 RX ADMIN — INSULIN LISPRO 5 UNITS: 100 INJECTION, SOLUTION INTRAVENOUS; SUBCUTANEOUS at 19:49

## 2021-07-17 RX ADMIN — IOPAMIDOL 75 ML: 755 INJECTION, SOLUTION INTRAVENOUS at 11:34

## 2021-07-17 RX ADMIN — INSULIN LISPRO 12 UNITS: 100 INJECTION, SOLUTION INTRAVENOUS; SUBCUTANEOUS at 17:15

## 2021-07-17 RX ADMIN — GUAIFENESIN SYRUP AND DEXTROMETHORPHAN 5 ML: 100; 10 SYRUP ORAL at 04:28

## 2021-07-17 RX ADMIN — INSULIN LISPRO 12 UNITS: 100 INJECTION, SOLUTION INTRAVENOUS; SUBCUTANEOUS at 12:31

## 2021-07-17 RX ADMIN — DEXAMETHASONE SODIUM PHOSPHATE 20 MG: 4 INJECTION, SOLUTION INTRA-ARTICULAR; INTRALESIONAL; INTRAMUSCULAR; INTRAVENOUS; SOFT TISSUE at 12:47

## 2021-07-17 RX ADMIN — BENZONATATE 200 MG: 100 CAPSULE ORAL at 21:21

## 2021-07-17 RX ADMIN — SODIUM CHLORIDE, PRESERVATIVE FREE 10 ML: 5 INJECTION INTRAVENOUS at 09:42

## 2021-07-17 RX ADMIN — Medication 2 PUFF: at 16:23

## 2021-07-17 RX ADMIN — HEPARIN SODIUM 900 UNITS/HR: 10000 INJECTION, SOLUTION INTRAVENOUS at 14:54

## 2021-07-17 RX ADMIN — DILTIAZEM HYDROCHLORIDE 240 MG: 240 CAPSULE, COATED, EXTENDED RELEASE ORAL at 09:40

## 2021-07-17 ASSESSMENT — PAIN DESCRIPTION - PROGRESSION
CLINICAL_PROGRESSION: NOT CHANGED

## 2021-07-17 ASSESSMENT — PAIN SCALES - GENERAL: PAINLEVEL_OUTOF10: 0

## 2021-07-17 NOTE — PROGRESS NOTES
Clinical Pharmacy Note  Heparin Dosing Consult    Leonidas Duque is a 62 y.o. female ordered heparin per low dose nomogram by Dr. Lula Lara. Lab Results   Component Value Date    APTT 31.4 07/17/2021     Lab Results   Component Value Date    HGB 13.0 07/17/2021    HCT 37.6 07/17/2021     07/17/2021       Ht Readings from Last 1 Encounters:   07/14/21 5' 2\" (1.575 m)        Wt Readings from Last 1 Encounters:   07/17/21 165 lb 9.1 oz (75.1 kg)        Assessment/Plan:  Initial bolus: No initial bolus   Initial infusion rate: 900 units/hr  Next aPTT: 7/17/21 2100    Pharmacy will continue to monitor adjust heparin based on aPTT results using nomogram below:     LOW DOSE HEPARIN PROTOCOL (ACS/STEMI/A FIB)     Initial Bolus: 60 units/kg Max Bolus: 4,000 units       Initial Rate: 12 units/kg/hr Max Initial Rate: 1,000 units/hr     aPTT < 45   Heparin 60 units/kg bolus Increase infusion by 4 units/kg/hr       (maximum 4,000 units)   aPTT 45-59.9   Heparin 30 units/kg bolus Increase infusion by 2 units/kg/hr       (maximum 2,000 units)   aPTT 60-90   No bolus   No change   aPTT 90.1-97.5 No bolus   Decrease infusion by 1 units/kg/hr   aPTT 97.6-105  No bolus     Decrease infusion by 2 units/kg/hr   aPTT > 105   Hold heparin for 1 hour Decrease infusion by 3 units/kg/hr     Obtain aPTT 6 hours after initial bolus and 6 hours after any dose change until two consecutive therapeutic aPTTs are achieved - then daily.

## 2021-07-17 NOTE — PLAN OF CARE
Problem: Airway Clearance - Ineffective  Goal: Achieve or maintain patent airway  7/17/2021 1028 by Britt Alvarado RN  Outcome: Ongoing  7/17/2021 0043 by Ishan Florez RN  Outcome: Ongoing     Problem: Gas Exchange - Impaired  Goal: Absence of hypoxia  7/17/2021 1028 by Britt Alvarado RN  Outcome: Ongoing  7/17/2021 0043 by Ishan Florez RN  Outcome: Ongoing  Goal: Promote optimal lung function  Outcome: Ongoing     Problem: Breathing Pattern - Ineffective  Goal: Ability to achieve and maintain a regular respiratory rate  7/17/2021 1028 by Britt Alvarado RN  Outcome: Ongoing  7/17/2021 0043 by Ishan Florez RN  Outcome: Ongoing     Problem:  Body Temperature -  Risk of, Imbalanced  Goal: Ability to maintain a body temperature within defined limits  Outcome: Ongoing  Goal: Will regain or maintain usual level of consciousness  Outcome: Ongoing  Goal: Complications related to the disease process, condition or treatment will be avoided or minimized  Outcome: Ongoing     Problem: Isolation Precautions - Risk of Spread of Infection  Goal: Prevent transmission of infection  Outcome: Ongoing     Problem: Nutrition Deficits  Goal: Optimize nutritional status  Outcome: Ongoing     Problem: Risk for Fluid Volume Deficit  Goal: Maintain normal heart rhythm  Outcome: Ongoing  Goal: Maintain absence of muscle cramping  Outcome: Ongoing  Goal: Maintain normal serum potassium, sodium, calcium, phosphorus, and pH  Outcome: Ongoing     Problem: Loneliness or Risk for Loneliness  Goal: Demonstrate positive use of time alone when socialization is not possible  Outcome: Ongoing     Problem: Fatigue  Goal: Verbalize increase energy and improved vitality  7/17/2021 1028 by Britt Alvarado RN  Outcome: Ongoing  7/17/2021 0043 by Ishan Florez RN  Outcome: Ongoing     Problem: Patient Education: Go to Patient Education Activity  Goal: Patient/Family Education  Outcome: Ongoing     Problem: Falls - Risk of:  Goal: Will remain free from falls  Description: Will remain free from falls  7/17/2021 1028 by Murphy Sanders RN  Outcome: Ongoing  7/17/2021 0043 by Edie White RN  Outcome: Ongoing  Goal: Absence of physical injury  Description: Absence of physical injury  7/17/2021 1028 by Murphy Sanders RN  Outcome: Ongoing  7/17/2021 0043 by Edie White RN  Outcome: Ongoing

## 2021-07-17 NOTE — PROGRESS NOTES
Hospitalist Progress Note      PCP: Tristan Cortez MD    Chief Complaint. Presented to hospital for SOB    Date of Admission: 7/13/2021    Subjective:  feels SOB, o2 requirements went up since am,  denies chest pain, nausea, vomiting, fever or chills.  mention feels overall better    Medications:  Reviewed    Infusion Medications    heparin (PORCINE) Infusion 900 Units/hr (07/17/21 1454)    sodium chloride      dextrose       Scheduled Medications    dexamethasone  20 mg Intravenous Q24H    [START ON 7/21/2021] dexamethasone  10 mg Oral Daily    insulin glargine  18 Units Subcutaneous Nightly    insulin lispro  7 Units Subcutaneous TID WC    insulin lispro  0-18 Units Subcutaneous TID WC    insulin lispro  0-9 Units Subcutaneous Nightly    remdesivir IVPB  100 mg Intravenous Q24H    atorvastatin  10 mg Oral Daily    dilTIAZem  240 mg Oral Daily    metoprolol succinate  50 mg Oral Daily    sodium chloride flush  5-40 mL Intravenous 2 times per day    albuterol sulfate HFA  2 puff Inhalation 4x daily    And    ipratropium  2 puff Inhalation 4x daily    Vitamin D  1,000 Units Oral Daily    zinc sulfate  50 mg Oral Daily    ascorbic acid  500 mg Oral Daily    famotidine  20 mg Oral Daily    potassium citrate  10 mEq Oral BID WC     PRN Meds: guaiFENesin-dextromethorphan, sodium chloride, sodium chloride flush, sodium chloride, ondansetron **OR** ondansetron, polyethylene glycol, acetaminophen **OR** acetaminophen, glucose, dextrose, glucagon (rDNA), dextrose, promethazine, benzonatate, albuterol sulfate HFA      Intake/Output Summary (Last 24 hours) at 7/17/2021 1519  Last data filed at 7/17/2021 0446  Gross per 24 hour   Intake 560 ml   Output --   Net 560 ml       Physical Exam Performed:    /74   Pulse 68   Temp 97.3 °F (36.3 °C) (Oral)   Resp 18   Ht 5' 2\" (1.575 m)   Wt 165 lb 9.1 oz (75.1 kg)   SpO2 90%   BMI 30.28 kg/m²     General appearance:NAD  HEENT:  Conjunctivae/corneas clear.  Neck: Supple, with full range of motion. Respiratory: decreased breathing sounds b/l  Cardiovascular: Regular rate and rhythm with normal S1/S2 without murmurs or rubs  Abdomen: Soft, non-tender, non-distended, normal bowel sounds. Musculoskeletal: No cyanosis or edema bilaterally  Neurologic:  without any focal sensory/motor deficits. grossly non-focal.  Psychiatric: Alert and oriented, Normal mood  Peripheral Pulses: +2 palpable, equal bilaterally       Labs:   Recent Labs     07/15/21  0545 07/17/21  1301   WBC 3.9* 5.9   HGB 12.9 13.0   HCT 36.6 37.6   PLT 91* 167     Recent Labs     07/15/21  0545 07/16/21  0539 07/17/21  0734    139 139   K 4.3 4.4 4.1    104 106   CO2 23 23 22   BUN 40* 25* 23*   CREATININE 1.1 1.0 0.8   CALCIUM 8.4 8.7 8.8     Recent Labs     07/15/21  0545 07/16/21  0539 07/17/21  0734   AST 28 23 24   ALT 34 26 25   BILITOT <0.2 <0.2 0.3   ALKPHOS 44 39* 36*     No results for input(s): INR in the last 72 hours. No results for input(s): Irena Lowers in the last 72 hours. Urinalysis:      Lab Results   Component Value Date    NITRU Negative 11/30/2020    WBCUA 621 11/30/2020    BACTERIA 4+ 11/30/2020    RBCUA 5-10 11/30/2020    BLOODU LARGE 11/30/2020    SPECGRAV 1.024 11/30/2020    GLUCOSEU Negative 11/30/2020       Radiology:  CT CHEST PULMONARY EMBOLISM W CONTRAST   Final Result   Addendum 1 of 1   ADDENDUM:   Critical results were called by Dr. Carly Alcocer MD to  Karen Bourgeois   on 7/17/2021 at 12:46. Final   Positive for pulmonary embolus. A nonocclusive embolus is noted in the right   descending pulmonary artery. The study is limited for detection of   peripheral emboli. Clot burden, nonetheless, is considered mild. No evidence of right ventricular heart strain. Ground-glass and interstitial opacities and pattern common for COVID   pneumonia. Mild hilar adenopathy, likely reactive in the setting.             XR CHEST 1 VIEW   Final Result   Increasing vascular congestion and suggestion of developing perihilar   interstitial edema. Unchanged bilateral lower lobe atelectasis. XR CHEST 1 VIEW   Final Result   New bilateral lower lobe airspace disease most likely due to atelectasis on   this low lung volume expiratory portable chest x-ray although edema is also   in the differential.  Pneumonia is unlikely but not excluded. No evidence of   pleural effusion         XR CHEST PORTABLE   Final Result   Hypoinflation with mild discoid atelectasis or scarring along the lung bases. Assessment/Plan:    Active Hospital Problems    Diagnosis     Acute respiratory failure with hypoxia (HCC) [J96.01]     Neutropenia (HCC) [D70.9]     Pneumonia due to COVID-19 virus [U07.1, J12.82]     MARCI (acute kidney injury) ([facility] [address] [N17.9]        Covid: Continue isolation precaution  Covid positive  Decadron IV, remdesvir, consulted pulmonary, low threshold for ICU, developing ARDS, consult palliative care      Acute PE   - started on IV heparin     History of hypertension: monitor     DM:   Hold Januvia and home medications  FSBS, DM and renal diet, hypoglycemia protocol, SSI     MARCI on CKD: - resolved         DVT Prophylaxis: Lovenox  Diet: ADULT DIET; Regular; 3 carb choices (45 gm/meal); Low Potassium (Less than 3000 mg/day);  Low Phosphorus (Less than 1000 mg)  Code Status: Full Code    PT/OT Eval Status: ordered    Dispo - started on remdesvir, IV heparin, continue Oxygen supplementation, low threshold for ICU, developing ARDS, consult palliative care    Prasanth Wiseman MD

## 2021-07-17 NOTE — PROGRESS NOTES
Pt O2 demand has increased to 10L. Pt O2 82-85% on 5L. Pt O2 increased to 10L and O2 now 88-90%. Secure messages sent to Dr. Gordon Morales as well as Pulmonology Dr. Ne Bradley.

## 2021-07-17 NOTE — PROGRESS NOTES
Pulmonary/Critical Care Progress Note    CC:  Follow-up:  Acute hypoxic respiratory failure  COVID 19 pneumonia  MARCI    Subjective:  Up to 10L HFNC  Feels ok  Asking about hydroxychloroquine and regeneron  Coughing up some mucus, but swallowing it    ROS  + coughing  No ROSE  No fever or chills    Intake/Output Summary (Last 24 hours) at 7/17/2021 0948  Last data filed at 7/17/2021 0446  Gross per 24 hour   Intake 860 ml   Output --   Net 860 ml         PHYSICAL EXAM:  Blood pressure 115/72, pulse 60, temperature 97.6 °F (36.4 °C), temperature source Oral, resp. rate 16, height 5' 2\" (1.575 m), weight 165 lb 9.1 oz (75.1 kg), SpO2 90 %, not currently breastfeeding.'  Gen: No distress. Well developed, well-nourished  Eyes: No scleral icterus. No conjunctival injection. ENT: External appearance of ears and nose normal.  Neck: Trachea midline. No obvious mass. No visible thyroid enlargement     Respiratory: No crackles. No wheezes. No rhonchi. Diminished in bases  Cardiovascular: Regular rate. Regular rhythm. No murmur or rub. No edema  GI: Non-tender. Non-distended. No hernia. Bowel sounds present  Skin: Warm, dry, normal texture and turgor. No abnormalities on exposed extremities. Musculoskeletal: No cyanosis. No clubbing. No joint deformity. Neuro: Moves all four extremities.    Psychiatric:  Normal mood and affect; exhibits normal insight and judgement     Medications:    Scheduled Meds:   enoxaparin  40 mg Subcutaneous Daily    dexamethasone  20 mg Intravenous Q24H    [START ON 7/21/2021] dexamethasone  10 mg Oral Daily    insulin glargine  18 Units Subcutaneous Nightly    insulin lispro  7 Units Subcutaneous TID WC    insulin lispro  0-18 Units Subcutaneous TID WC    insulin lispro  0-9 Units Subcutaneous Nightly    remdesivir IVPB  100 mg Intravenous Q24H    atorvastatin  10 mg Oral Daily    dilTIAZem  240 mg Oral Daily    metoprolol succinate  50 mg Oral Daily    sodium chloride flush  5-40 mL Intravenous 2 times per day    albuterol sulfate HFA  2 puff Inhalation 4x daily    And    ipratropium  2 puff Inhalation 4x daily    Vitamin D  1,000 Units Oral Daily    zinc sulfate  50 mg Oral Daily    ascorbic acid  500 mg Oral Daily    famotidine  20 mg Oral Daily    potassium citrate  10 mEq Oral BID WC       Continuous Infusions:   sodium chloride      dextrose         PRN Meds:  guaiFENesin-dextromethorphan, sodium chloride, sodium chloride flush, sodium chloride, ondansetron **OR** ondansetron, polyethylene glycol, acetaminophen **OR** acetaminophen, glucose, dextrose, glucagon (rDNA), dextrose, promethazine, benzonatate, albuterol sulfate HFA    Labs:  CBC:   Recent Labs     07/14/21  1127 07/15/21  0545   WBC 1.1* 3.9*   HGB 12.1 12.9   HCT 34.6* 36.6   MCV 87.8 87.5   PLT 80* 91*     BMP:   Recent Labs     07/15/21  0545 07/16/21  0539 07/17/21  0734    139 139   K 4.3 4.4 4.1    104 106   CO2 23 23 22   BUN 40* 25* 23*   CREATININE 1.1 1.0 0.8     LIVER PROFILE:   Recent Labs     07/14/21  1127 07/14/21  1127 07/15/21  0545 07/16/21  0539 07/17/21  0734   AST 34   < > 28 23 24   ALT 37   < > 34 26 25   BILIDIR <0.2  --   --   --   --    BILITOT <0.2   < > <0.2 <0.2 0.3   ALKPHOS 43   < > 44 39* 36*    < > = values in this interval not displayed. PT/INR: No results for input(s): PROTIME, INR in the last 72 hours. APTT: No results for input(s): APTT in the last 72 hours. UA:No results for input(s): NITRITE, COLORU, PHUR, LABCAST, WBCUA, RBCUA, MUCUS, TRICHOMONAS, YEAST, BACTERIA, CLARITYU, SPECGRAV, LEUKOCYTESUR, UROBILINOGEN, BILIRUBINUR, BLOODU, GLUCOSEU, AMORPHOUS in the last 72 hours. Invalid input(s): Donell Brine  No results for input(s): PH, PCO2, PO2 in the last 72 hours.     Films:  Chest imaging and associated reports were personally reviewed and showed CXR 7/15  Increasing vascular congestion and suggestion of developing perihilar   interstitial edema.       Unchanged bilateral lower lobe atelectasis. ABG:  None    Cultures:  Blood:  Urine:  Sputum:  Strep/Legionella Antigens:  MRSA probe:  Respiratory Viral Panel/Influenza: C. Diff:   COVID19: positive    ECHO  None    PFTs  None    Assessment/Plan:     Acute hypoxic respiratory failure  Maintain oxygen saturations >90% with supplemental oxygen and wean as tolerated  IS/acapella    COVID 19 pneumonia  Decadron  Remdesivir    MARCI  Resolved    Increase activity, OOB     DVT prophylaxis with lovenox    Thank you for allowing me to participate in the care of this patient. Will follow.     Tory Ogden, ACNP  [facility] Pulmonary, Sleep, [facility] Critical Care

## 2021-07-18 LAB
A/G RATIO: 0.9 (ref 1.1–2.2)
ALBUMIN SERPL-MCNC: 3.1 G/DL (ref 3.4–5)
ALP BLD-CCNC: 39 U/L (ref 40–129)
ALT SERPL-CCNC: 25 U/L (ref 10–40)
ANION GAP SERPL CALCULATED.3IONS-SCNC: 11 MMOL/L (ref 3–16)
APTT: 84.5 SEC (ref 26.2–38.6)
APTT: 88.5 SEC (ref 26.2–38.6)
APTT: 91.4 SEC (ref 26.2–38.6)
AST SERPL-CCNC: 22 U/L (ref 15–37)
BILIRUB SERPL-MCNC: <0.2 MG/DL (ref 0–1)
BUN BLDV-MCNC: 29 MG/DL (ref 7–20)
CALCIUM SERPL-MCNC: 8.3 MG/DL (ref 8.3–10.6)
CHLORIDE BLD-SCNC: 103 MMOL/L (ref 99–110)
CO2: 25 MMOL/L (ref 21–32)
CREAT SERPL-MCNC: 0.9 MG/DL (ref 0.6–1.2)
GFR AFRICAN AMERICAN: >60
GFR NON-AFRICAN AMERICAN: >60
GLOBULIN: 3.3 G/DL
GLUCOSE BLD-MCNC: 252 MG/DL (ref 70–99)
GLUCOSE BLD-MCNC: 276 MG/DL (ref 70–99)
GLUCOSE BLD-MCNC: 282 MG/DL (ref 70–99)
GLUCOSE BLD-MCNC: 306 MG/DL (ref 70–99)
GLUCOSE BLD-MCNC: 335 MG/DL (ref 70–99)
PERFORMED ON: ABNORMAL
POTASSIUM SERPL-SCNC: 4.3 MMOL/L (ref 3.5–5.1)
SODIUM BLD-SCNC: 139 MMOL/L (ref 136–145)
TOTAL PROTEIN: 6.4 G/DL (ref 6.4–8.2)

## 2021-07-18 PROCEDURE — 2500000003 HC RX 250 WO HCPCS: Performed by: INTERNAL MEDICINE

## 2021-07-18 PROCEDURE — 85730 THROMBOPLASTIN TIME PARTIAL: CPT

## 2021-07-18 PROCEDURE — 80053 COMPREHEN METABOLIC PANEL: CPT

## 2021-07-18 PROCEDURE — 2580000003 HC RX 258: Performed by: INTERNAL MEDICINE

## 2021-07-18 PROCEDURE — 6370000000 HC RX 637 (ALT 250 FOR IP): Performed by: NURSE PRACTITIONER

## 2021-07-18 PROCEDURE — 2700000000 HC OXYGEN THERAPY PER DAY

## 2021-07-18 PROCEDURE — 6370000000 HC RX 637 (ALT 250 FOR IP): Performed by: INTERNAL MEDICINE

## 2021-07-18 PROCEDURE — 2580000003 HC RX 258: Performed by: NURSE PRACTITIONER

## 2021-07-18 PROCEDURE — 94761 N-INVAS EAR/PLS OXIMETRY MLT: CPT

## 2021-07-18 PROCEDURE — 1200000000 HC SEMI PRIVATE

## 2021-07-18 PROCEDURE — 94640 AIRWAY INHALATION TREATMENT: CPT

## 2021-07-18 PROCEDURE — 6360000002 HC RX W HCPCS: Performed by: INTERNAL MEDICINE

## 2021-07-18 PROCEDURE — 6370000000 HC RX 637 (ALT 250 FOR IP): Performed by: HOSPITALIST

## 2021-07-18 PROCEDURE — 36415 COLL VENOUS BLD VENIPUNCTURE: CPT

## 2021-07-18 RX ORDER — ALBUTEROL SULFATE 90 UG/1
2 AEROSOL, METERED RESPIRATORY (INHALATION)
Status: DISCONTINUED | OUTPATIENT
Start: 2021-07-18 | End: 2021-07-25 | Stop reason: HOSPADM

## 2021-07-18 RX ORDER — GUAIFENESIN 600 MG/1
600 TABLET, EXTENDED RELEASE ORAL 2 TIMES DAILY
Status: DISCONTINUED | OUTPATIENT
Start: 2021-07-18 | End: 2021-07-25 | Stop reason: HOSPADM

## 2021-07-18 RX ADMIN — DEXAMETHASONE SODIUM PHOSPHATE 20 MG: 4 INJECTION, SOLUTION INTRA-ARTICULAR; INTRALESIONAL; INTRAMUSCULAR; INTRAVENOUS; SOFT TISSUE at 10:51

## 2021-07-18 RX ADMIN — HEPARIN SODIUM 980 UNITS/HR: 10000 INJECTION, SOLUTION INTRAVENOUS at 18:55

## 2021-07-18 RX ADMIN — ZINC SULFATE 220 MG (50 MG) CAPSULE 50 MG: CAPSULE at 08:30

## 2021-07-18 RX ADMIN — SODIUM CHLORIDE, PRESERVATIVE FREE 10 ML: 5 INJECTION INTRAVENOUS at 20:31

## 2021-07-18 RX ADMIN — INSULIN LISPRO 5 UNITS: 100 INJECTION, SOLUTION INTRAVENOUS; SUBCUTANEOUS at 20:30

## 2021-07-18 RX ADMIN — DILTIAZEM HYDROCHLORIDE 240 MG: 240 CAPSULE, COATED, EXTENDED RELEASE ORAL at 08:31

## 2021-07-18 RX ADMIN — INSULIN GLARGINE 18 UNITS: 100 INJECTION, SOLUTION SUBCUTANEOUS at 20:30

## 2021-07-18 RX ADMIN — INSULIN LISPRO 7 UNITS: 100 INJECTION, SOLUTION INTRAVENOUS; SUBCUTANEOUS at 12:45

## 2021-07-18 RX ADMIN — INSULIN LISPRO 5 UNITS: 100 INJECTION, SOLUTION INTRAVENOUS; SUBCUTANEOUS at 08:20

## 2021-07-18 RX ADMIN — OXYCODONE HYDROCHLORIDE AND ACETAMINOPHEN 500 MG: 500 TABLET ORAL at 08:31

## 2021-07-18 RX ADMIN — REMDESIVIR 100 MG: 100 INJECTION, POWDER, LYOPHILIZED, FOR SOLUTION INTRAVENOUS at 17:57

## 2021-07-18 RX ADMIN — INSULIN LISPRO 7 UNITS: 100 INJECTION, SOLUTION INTRAVENOUS; SUBCUTANEOUS at 08:15

## 2021-07-18 RX ADMIN — Medication 2 PUFF: at 07:45

## 2021-07-18 RX ADMIN — SODIUM CHLORIDE, PRESERVATIVE FREE 10 ML: 5 INJECTION INTRAVENOUS at 08:31

## 2021-07-18 RX ADMIN — POTASSIUM CITRATE 10 MEQ: 10 TABLET ORAL at 08:31

## 2021-07-18 RX ADMIN — GUAIFENESIN 600 MG: 600 TABLET ORAL at 20:31

## 2021-07-18 RX ADMIN — INSULIN LISPRO 7 UNITS: 100 INJECTION, SOLUTION INTRAVENOUS; SUBCUTANEOUS at 18:01

## 2021-07-18 RX ADMIN — GUAIFENESIN 600 MG: 600 TABLET ORAL at 10:51

## 2021-07-18 RX ADMIN — METOPROLOL SUCCINATE 50 MG: 50 TABLET, EXTENDED RELEASE ORAL at 08:31

## 2021-07-18 RX ADMIN — ATORVASTATIN CALCIUM 10 MG: 10 TABLET, FILM COATED ORAL at 08:30

## 2021-07-18 RX ADMIN — Medication 1000 UNITS: at 08:31

## 2021-07-18 RX ADMIN — INSULIN LISPRO 12 UNITS: 100 INJECTION, SOLUTION INTRAVENOUS; SUBCUTANEOUS at 18:01

## 2021-07-18 RX ADMIN — POTASSIUM CITRATE 10 MEQ: 10 TABLET ORAL at 17:57

## 2021-07-18 RX ADMIN — FAMOTIDINE 20 MG: 20 TABLET ORAL at 08:31

## 2021-07-18 RX ADMIN — INSULIN LISPRO 12 UNITS: 100 INJECTION, SOLUTION INTRAVENOUS; SUBCUTANEOUS at 12:40

## 2021-07-18 ASSESSMENT — PAIN SCALES - GENERAL: PAINLEVEL_OUTOF10: 0

## 2021-07-18 NOTE — PROGRESS NOTES
Clinical Pharmacy Note  Heparin Dosing       Lab Results   Component Value Date    APTT 57.0 07/17/2021     Lab Results   Component Value Date    HGB 13.0 07/17/2021    HCT 37.6 07/17/2021     07/17/2021       Current Infusion Rate: 900 units/hr    Plan:  Bolus: 2000 units  Rate: 1050 units/hr  Next aPTT: 0430 7/18/21    Pharmacy will continue to monitor and adjust based on aPTT results.   Helen Michel, [address] [place] [address], PharmD, 7/17/2021 10:14 PM

## 2021-07-18 NOTE — PROGRESS NOTES
Clinical Pharmacy Note  Heparin Dosing       Lab Results   Component Value Date    APTT 84.5 07/18/2021     Lab Results   Component Value Date    HGB 13.0 07/17/2021    HCT 37.6 07/17/2021     07/17/2021       Current Infusion Rate: 980 units/hr    Plan:  Continue current rate:980 units/hr    Next aPTT: 0600 on 7-19-21    Pharmacy will continue to monitor and adjust based on aPTT results.   Sumi Allison [facility]  7/18/2021  7:46 PM

## 2021-07-18 NOTE — RT PROTOCOL NOTE
RT Inhaler-Nebulizer Bronchodilator Protocol Note    There is a bronchodilator order in the chart from a provider indicating to follow the RT Bronchodilator Protocol and there is an Initiate RT Bronchodilator Protocol order as well (see protocol at bottom of note). The findings from the last RT Protocol Assessment were as follows:  Smoking: Non smoker  Surgical Status: No surgery  Xray: Multiple lobe infiltrates/atelectasis/pleural effusion/edema  Respiratory Pattern: Dyspnea with exertion  Mental Status: Alert and Oriented  Breath Sounds: Diminished and/or crackles  Cough: Strong, spontaneous, non-productive  Activity Level: Walking unassisted  Oxygen Requirement: 61% - 100%  Indication for Bronchodilator Therapy: None  Bronchodilator Assessment Score: 6     7/18 CHANGE MDI'S TO Q2 PRN, NO COPD, NO WHEEZING, TREATMENT INCREASED PATIENT'S COUGH & CAUSES DESATURATION    Aerosolized bronchodilator medication orders have been revised according to the RT Bronchodilator Protocol. RT Inhaler-Nebulizer Bronchodilator Protocol:    Respiratory Therapist to perform RT Therapy Protocol Assessment then follow the protocol. No Indications - adjust the frequency to every 6 hours PRN wheezing or bronchospasm, if no treatments needed after 48 hours then discontinue using Per Protocol order mode. If indication present, adjust the RT bronchodilator orders based on the Bronchodilator Assessment Score as follows:    0-6 - enter or revise RT bronchodilator order to Albuterol Inhaler order with frequency of every 2 hours PRN for wheezing or increased work of breathing using Per Protocol order mode. If Albuterol Inhaler not tolerated or not effective, then discontinue the Albuterol Inhaler order and enter Albuterol Nebulizer order with same frequency and PRN reasons. Repeat RT Therapy Protocol Assessment as needed.     7-10 - discontinue any other Inpatient aerosolized bronchodilator medication orders and enter or revise

## 2021-07-18 NOTE — PROGRESS NOTES
Clinical Pharmacy Note  Heparin Dosing       Lab Results   Component Value Date    APTT 91.4 07/18/2021     Lab Results   Component Value Date    HGB 13.0 07/17/2021    HCT 37.6 07/17/2021     07/17/2021       Current Infusion Rate: 1050 units/hr    Plan:  Rate: decrease to 980 units/hr  Next aPTT: 1200 7/18/21    Pharmacy will continue to monitor and adjust based on aPTT results.   Bernerd Prader, PharmD

## 2021-07-18 NOTE — PLAN OF CARE
Problem: Airway Clearance - Ineffective  Goal: Achieve or maintain patent airway  Outcome: Ongoing     Problem: Breathing Pattern - Ineffective  Goal: Ability to achieve and maintain a regular respiratory rate  Outcome: Ongoing     Problem: Gas Exchange - Impaired  Goal: Absence of hypoxia  Outcome: Ongoing  Goal: Promote optimal lung function  Outcome: Ongoing     Problem:  Body Temperature -  Risk of, Imbalanced  Goal: Ability to maintain a body temperature within defined limits  Outcome: Ongoing  Goal: Will regain or maintain usual level of consciousness  Outcome: Ongoing  Goal: Complications related to the disease process, condition or treatment will be avoided or minimized  Outcome: Ongoing     Problem: Isolation Precautions - Risk of Spread of Infection  Goal: Prevent transmission of infection  Outcome: Ongoing     Problem: Nutrition Deficits  Goal: Optimize nutritional status  Outcome: Ongoing     Problem: Risk for Fluid Volume Deficit  Goal: Maintain normal heart rhythm  Outcome: Ongoing  Goal: Maintain absence of muscle cramping  Outcome: Ongoing  Goal: Maintain normal serum potassium, sodium, calcium, phosphorus, and pH  Outcome: Ongoing     Problem: Loneliness or Risk for Loneliness  Goal: Demonstrate positive use of time alone when socialization is not possible  Outcome: Ongoing     Problem: Fatigue  Goal: Verbalize increase energy and improved vitality  Outcome: Ongoing     Problem: Patient Education: Go to Patient Education Activity  Goal: Patient/Family Education  Outcome: Ongoing     Problem: Falls - Risk of:  Goal: Will remain free from falls  Description: Will remain free from falls  Outcome: Ongoing  Goal: Absence of physical injury  Description: Absence of physical injury  Outcome: Ongoing     Problem: Falls - Risk of:  Goal: Will remain free from falls  Description: Will remain free from falls  Outcome: Ongoing  Goal: Absence of physical injury  Description: Absence of physical injury  Outcome: Ongoing

## 2021-07-18 NOTE — PROGRESS NOTES
Hospitalist Progress Note      PCP: Katja Wade MD    Chief Complaint. Presented to hospital for SOB    Date of Admission: 7/13/2021    Subjective:  feels SOB, o2 requirements stable today,  denies chest pain, nausea, vomiting, fever or chills.  mention feels overall better, proned in bed, says pronating helps    Medications:  Reviewed    Infusion Medications    heparin (PORCINE) Infusion 980 Units/hr (07/18/21 0604)    sodium chloride      dextrose       Scheduled Medications    guaiFENesin  600 mg Oral BID    dexamethasone  20 mg Intravenous Q24H    [START ON 7/21/2021] dexamethasone  10 mg Oral Daily    insulin glargine  18 Units Subcutaneous Nightly    insulin lispro  7 Units Subcutaneous TID WC    insulin lispro  0-18 Units Subcutaneous TID WC    insulin lispro  0-9 Units Subcutaneous Nightly    remdesivir IVPB  100 mg Intravenous Q24H    atorvastatin  10 mg Oral Daily    dilTIAZem  240 mg Oral Daily    metoprolol succinate  50 mg Oral Daily    sodium chloride flush  5-40 mL Intravenous 2 times per day    Vitamin D  1,000 Units Oral Daily    zinc sulfate  50 mg Oral Daily    ascorbic acid  500 mg Oral Daily    famotidine  20 mg Oral Daily    potassium citrate  10 mEq Oral BID WC     PRN Meds: albuterol sulfate HFA **AND** ipratropium, guaiFENesin-dextromethorphan, sodium chloride, sodium chloride flush, sodium chloride, ondansetron **OR** ondansetron, polyethylene glycol, acetaminophen **OR** acetaminophen, glucose, dextrose, glucagon (rDNA), dextrose, promethazine, benzonatate, albuterol sulfate HFA      Intake/Output Summary (Last 24 hours) at 7/18/2021 1429  Last data filed at 7/17/2021 1949  Gross per 24 hour   Intake 120 ml   Output --   Net 120 ml       Physical Exam Performed:    /82   Pulse 87   Temp 98.3 °F (36.8 °C) (Oral)   Resp 24   Ht 5' 2\" (1.575 m)   Wt 165 lb 9.1 oz (75.1 kg)   SpO2 94%   BMI 30.28 kg/m²     General appearance:NAD  HEENT: Conjunctivae/corneas clear. Neck: Supple, with full range of motion. Respiratory: decreased breathing sounds b/l, rattling in chest  Cardiovascular: Regular rate and rhythm with normal S1/S2 without murmurs or rubs  Abdomen: Soft, non-tender, non-distended, normal bowel sounds. Musculoskeletal: No cyanosis or edema bilaterally  Neurologic:  without any focal sensory/motor deficits. grossly non-focal.  Psychiatric: Alert and oriented, Normal mood  Peripheral Pulses: +2 palpable, equal bilaterally       Labs:   Recent Labs     07/17/21  1301   WBC 5.9   HGB 13.0   HCT 37.6        Recent Labs     07/16/21  0539 07/17/21  0734 07/18/21  0441    139 139   K 4.4 4.1 4.3    106 103   CO2 23 22 25   BUN 25* 23* 29*   CREATININE 1.0 0.8 0.9   CALCIUM 8.7 8.8 8.3     Recent Labs     07/16/21  0539 07/17/21  0734 07/18/21  0441   AST 23 24 22   ALT 26 25 25   BILITOT <0.2 0.3 <0.2   ALKPHOS 39* 36* 39*     No results for input(s): INR in the last 72 hours. No results for input(s): [place] in the last 72 hours. Urinalysis:      Lab Results   Component Value Date    NITRU Negative 11/30/2020    WBCUA 621 11/30/2020    BACTERIA 4+ 11/30/2020    RBCUA 5-10 11/30/2020    BLOODU LARGE 11/30/2020    SPECGRAV 1.024 11/30/2020    GLUCOSEU Negative 11/30/2020       Radiology:  CT CHEST PULMONARY EMBOLISM W CONTRAST   Final Result   Addendum 1 of 1   ADDENDUM:   Critical results were called by Dr. Antonio Da Silva MD to  [facility]   on 7/17/2021 at 12:46. Final   Positive for pulmonary embolus. A nonocclusive embolus is noted in the right   descending pulmonary artery. The study is limited for detection of   peripheral emboli. Clot burden, nonetheless, is considered mild. No evidence of right ventricular heart strain. Ground-glass and interstitial opacities and pattern common for COVID   pneumonia. Mild hilar adenopathy, likely reactive in the setting.             XR CHEST 1 VIEW   Final Result   Increasing vascular congestion and suggestion of developing perihilar   interstitial edema. Unchanged bilateral lower lobe atelectasis. XR CHEST 1 VIEW   Final Result   New bilateral lower lobe airspace disease most likely due to atelectasis on   this low lung volume expiratory portable chest x-ray although edema is also   in the differential.  Pneumonia is unlikely but not excluded. No evidence of   pleural effusion         XR CHEST PORTABLE   Final Result   Hypoinflation with mild discoid atelectasis or scarring along the lung bases. Assessment/Plan:    Active Hospital Problems    Diagnosis     Acute respiratory failure with hypoxia (HCC) [J96.01]     Neutropenia (HCC) [D70.9]     Pneumonia due to COVID-19 virus [U07.1, J12.82]     MARCI (acute kidney injury) ([facility] [address] [N17.9]        Covid: Continue isolation precaution  Covid positive  Decadron IV, remdesvir, consulted pulmonary, low threshold for ICU, early stage in developing ARDS, consult palliative care    Acute PE   - continue on IV heparin     History of hypertension: monitor     DM:   Hold Januvia and home medications  FSBS, DM and renal diet, hypoglycemia protocol, SSI     MARCI on CKD: - resolved       DVT Prophylaxis: Lovenox  Diet: ADULT DIET; Regular; 3 carb choices (45 gm/meal); Low Potassium (Less than 3000 mg/day);  Low Phosphorus (Less than 1000 mg)  Code Status: Full Code    PT/OT Eval Status: ordered    Dispo - continue remdesvir, IV heparin, continue Oxygen supplementation, low threshold for ICU, developing ARDS, consulted palliative care, pulmonary following    Adrián Knott MD

## 2021-07-18 NOTE — PROGRESS NOTES
Clinical Pharmacy Note  Heparin Dosing       Lab Results   Component Value Date    APTT 88.5 07/18/2021     Lab Results   Component Value Date    HGB 13.0 07/17/2021    HCT 37.6 07/17/2021     07/17/2021       Current Infusion Rate: 980 units/hr    Plan:  Continue current rate:980 units/hr    Next aPTT: 1800 on 7-18-21    Pharmacy will continue to monitor and adjust based on aPTT results.   Moisés Long, [facility]  7/18/2021  1:08 PM

## 2021-07-19 LAB
A/G RATIO: 1 (ref 1.1–2.2)
ALBUMIN SERPL-MCNC: 3.2 G/DL (ref 3.4–5)
ALP BLD-CCNC: 38 U/L (ref 40–129)
ALT SERPL-CCNC: 27 U/L (ref 10–40)
ANION GAP SERPL CALCULATED.3IONS-SCNC: 13 MMOL/L (ref 3–16)
APTT: 71.7 SEC (ref 26.2–38.6)
AST SERPL-CCNC: 24 U/L (ref 15–37)
BILIRUB SERPL-MCNC: 0.3 MG/DL (ref 0–1)
BUN BLDV-MCNC: 31 MG/DL (ref 7–20)
CALCIUM SERPL-MCNC: 8.9 MG/DL (ref 8.3–10.6)
CHLORIDE BLD-SCNC: 102 MMOL/L (ref 99–110)
CO2: 24 MMOL/L (ref 21–32)
CREAT SERPL-MCNC: 0.9 MG/DL (ref 0.6–1.2)
GFR AFRICAN AMERICAN: >60
GFR NON-AFRICAN AMERICAN: >60
GLOBULIN: 3.2 G/DL
GLUCOSE BLD-MCNC: 209 MG/DL (ref 70–99)
GLUCOSE BLD-MCNC: 227 MG/DL (ref 70–99)
GLUCOSE BLD-MCNC: 262 MG/DL (ref 70–99)
GLUCOSE BLD-MCNC: 273 MG/DL (ref 70–99)
GLUCOSE BLD-MCNC: 287 MG/DL (ref 70–99)
HCT VFR BLD CALC: 36.8 % (ref 36–48)
HEMOGLOBIN: 13 G/DL (ref 12–16)
MCH RBC QN AUTO: 30.4 PG (ref 26–34)
MCHC RBC AUTO-ENTMCNC: 35.3 G/DL (ref 31–36)
MCV RBC AUTO: 86.3 FL (ref 80–100)
PDW BLD-RTO: 12.9 % (ref 12.4–15.4)
PERFORMED ON: ABNORMAL
PLATELET # BLD: 267 K/UL (ref 135–450)
PMV BLD AUTO: 10 FL (ref 5–10.5)
POTASSIUM SERPL-SCNC: 3.9 MMOL/L (ref 3.5–5.1)
PROCALCITONIN: 0.05 NG/ML (ref 0–0.15)
RBC # BLD: 4.27 M/UL (ref 4–5.2)
SODIUM BLD-SCNC: 139 MMOL/L (ref 136–145)
TOTAL PROTEIN: 6.4 G/DL (ref 6.4–8.2)
WBC # BLD: 7.5 K/UL (ref 4–11)

## 2021-07-19 PROCEDURE — 2580000003 HC RX 258: Performed by: NURSE PRACTITIONER

## 2021-07-19 PROCEDURE — 6370000000 HC RX 637 (ALT 250 FOR IP): Performed by: HOSPITALIST

## 2021-07-19 PROCEDURE — 36415 COLL VENOUS BLD VENIPUNCTURE: CPT

## 2021-07-19 PROCEDURE — 6370000000 HC RX 637 (ALT 250 FOR IP): Performed by: INTERNAL MEDICINE

## 2021-07-19 PROCEDURE — 2500000003 HC RX 250 WO HCPCS: Performed by: INTERNAL MEDICINE

## 2021-07-19 PROCEDURE — 85730 THROMBOPLASTIN TIME PARTIAL: CPT

## 2021-07-19 PROCEDURE — 94761 N-INVAS EAR/PLS OXIMETRY MLT: CPT

## 2021-07-19 PROCEDURE — 80053 COMPREHEN METABOLIC PANEL: CPT

## 2021-07-19 PROCEDURE — 2580000003 HC RX 258: Performed by: INTERNAL MEDICINE

## 2021-07-19 PROCEDURE — 84145 PROCALCITONIN (PCT): CPT

## 2021-07-19 PROCEDURE — 99233 SBSQ HOSP IP/OBS HIGH 50: CPT | Performed by: INTERNAL MEDICINE

## 2021-07-19 PROCEDURE — 6370000000 HC RX 637 (ALT 250 FOR IP): Performed by: NURSE PRACTITIONER

## 2021-07-19 PROCEDURE — 2700000000 HC OXYGEN THERAPY PER DAY

## 2021-07-19 PROCEDURE — 1200000000 HC SEMI PRIVATE

## 2021-07-19 PROCEDURE — 85027 COMPLETE CBC AUTOMATED: CPT

## 2021-07-19 PROCEDURE — 6360000002 HC RX W HCPCS: Performed by: INTERNAL MEDICINE

## 2021-07-19 RX ORDER — INSULIN GLARGINE 100 [IU]/ML
10 INJECTION, SOLUTION SUBCUTANEOUS 2 TIMES DAILY
Status: DISCONTINUED | OUTPATIENT
Start: 2021-07-20 | End: 2021-07-21

## 2021-07-19 RX ADMIN — POTASSIUM CITRATE 10 MEQ: 10 TABLET ORAL at 09:38

## 2021-07-19 RX ADMIN — INSULIN LISPRO 9 UNITS: 100 INJECTION, SOLUTION INTRAVENOUS; SUBCUTANEOUS at 13:34

## 2021-07-19 RX ADMIN — SODIUM CHLORIDE, PRESERVATIVE FREE 10 ML: 5 INJECTION INTRAVENOUS at 20:47

## 2021-07-19 RX ADMIN — INSULIN LISPRO 7 UNITS: 100 INJECTION, SOLUTION INTRAVENOUS; SUBCUTANEOUS at 09:32

## 2021-07-19 RX ADMIN — INSULIN LISPRO 9 UNITS: 100 INJECTION, SOLUTION INTRAVENOUS; SUBCUTANEOUS at 16:25

## 2021-07-19 RX ADMIN — DILTIAZEM HYDROCHLORIDE 240 MG: 240 CAPSULE, COATED, EXTENDED RELEASE ORAL at 09:37

## 2021-07-19 RX ADMIN — REMDESIVIR 100 MG: 100 INJECTION, POWDER, LYOPHILIZED, FOR SOLUTION INTRAVENOUS at 17:36

## 2021-07-19 RX ADMIN — INSULIN LISPRO 5 UNITS: 100 INJECTION, SOLUTION INTRAVENOUS; SUBCUTANEOUS at 20:45

## 2021-07-19 RX ADMIN — INSULIN LISPRO 6 UNITS: 100 INJECTION, SOLUTION INTRAVENOUS; SUBCUTANEOUS at 09:30

## 2021-07-19 RX ADMIN — ZINC SULFATE 220 MG (50 MG) CAPSULE 50 MG: CAPSULE at 09:37

## 2021-07-19 RX ADMIN — INSULIN LISPRO 7 UNITS: 100 INJECTION, SOLUTION INTRAVENOUS; SUBCUTANEOUS at 13:34

## 2021-07-19 RX ADMIN — INSULIN GLARGINE 18 UNITS: 100 INJECTION, SOLUTION SUBCUTANEOUS at 20:45

## 2021-07-19 RX ADMIN — METOPROLOL SUCCINATE 50 MG: 50 TABLET, EXTENDED RELEASE ORAL at 09:38

## 2021-07-19 RX ADMIN — ATORVASTATIN CALCIUM 10 MG: 10 TABLET, FILM COATED ORAL at 09:37

## 2021-07-19 RX ADMIN — OXYCODONE HYDROCHLORIDE AND ACETAMINOPHEN 500 MG: 500 TABLET ORAL at 09:38

## 2021-07-19 RX ADMIN — POTASSIUM CITRATE 10 MEQ: 10 TABLET ORAL at 16:24

## 2021-07-19 RX ADMIN — Medication 1000 UNITS: at 09:37

## 2021-07-19 RX ADMIN — DEXAMETHASONE SODIUM PHOSPHATE 20 MG: 4 INJECTION, SOLUTION INTRA-ARTICULAR; INTRALESIONAL; INTRAMUSCULAR; INTRAVENOUS; SOFT TISSUE at 12:20

## 2021-07-19 RX ADMIN — HEPARIN SODIUM 980 UNITS/HR: 10000 INJECTION, SOLUTION INTRAVENOUS at 18:39

## 2021-07-19 RX ADMIN — INSULIN LISPRO 12 UNITS: 100 INJECTION, SOLUTION INTRAVENOUS; SUBCUTANEOUS at 16:40

## 2021-07-19 RX ADMIN — FAMOTIDINE 20 MG: 20 TABLET ORAL at 09:37

## 2021-07-19 ASSESSMENT — PAIN SCALES - GENERAL
PAINLEVEL_OUTOF10: 0
PAINLEVEL_OUTOF10: 0

## 2021-07-19 NOTE — CARE COORDINATION
Patient with increased O2 requirement. Tranferred to ICU. Will follow for care coordination needs.   Ubaldo Diamond RN, BSN, Case Management  Phone: [phone]  Electronically signed by Ubaldo Diamond RN on 7/19/2021 at 3:20 PM

## 2021-07-19 NOTE — PROGRESS NOTES
Pt to be transferred to ICU. Called report to ICU RN. All questions answered. Respiratory to assist in transport as pt on vapotherm. All pt belongings gathered and taken with pt to ICU. This RN transported pt to ICU.

## 2021-07-19 NOTE — PROGRESS NOTES
Physician Progress Note      Neha Tobias  CSN #:                  [id number]  :                       1960  ADMIT DATE:       2021 9:22 PM  [address] [place] DATE:  RESPONDING  PROVIDER #:        Davin Vallejo MD          QUERY TEXT:    Pt admitted with COVID-19 pneumonia and MARCI on CKD. Developed pulmonary   embolism and ARDS. WBC dropped as low as 1.1 and platelets to 10,189. If   possible, please document in the progress notes and discharge summary if you   are evaluating and/or treating: The medical record reflects the following:  Risk Factors: COVID-19 pneumonia  Clinical Indicators: on arrival T 97.9 - P 96 - R 17 - BP 96/63; MARCI,  WBC 2.6   dropping to 1.1 and platelets dropping from 93,00 down to 80,000; development   of pul emb and acute respiratory failure progressing to ARDS  Treatment: Decadron, Remdesivir, supplemental O2    Thank you,  Theresa He, RN, BSN, CCDS  [email or web address]  Options provided:  -- Sepsis due to COVID-19 pneumonia present on admission  -- Sepsis due to COVID-19 pneumonia developed subsequent to admission  -- Covid-19 pneumonia without sepsis  -- Other - I will add my own diagnosis  -- Disagree - Not applicable / Not valid  -- Disagree - Clinically unable to determine / Unknown  -- Refer to Clinical Documentation Reviewer    PROVIDER RESPONSE TEXT:    This patient has Covid-19 pneumonia without sepsis.     Query created by: Sai Luis on 2021 7:05 AM      Electronically signed by:  Davin Vallejo MD 2021 10:06 AM

## 2021-07-19 NOTE — PROGRESS NOTES
Pulmonary Progress Note    Date of Admission: 7/13/2021   LOS: 5 days     Chief Complaint   Patient presents with    Shortness of Breath     + covid. sob for 2 days.  + covid and in ICU. denies fever, + chills. Assessment/Plan:     Acute hypoxemic respiratory failure with SPO2 less than 90% on room air, due to  COVID-19 pneumonia  -Decadron  -Remdesivir  -Titrate oxygen goal saturation 90%  -Spot check pro-Keo  -May need to trial Vapotherm to help also with heated humidification    Acute pulmonary embolism  -On heparin drip      24 Hour Events/Subjective  Up to 15 L nonrebreather. Ox requirement steadily rising. More dyspneic today. ROS:   No nausea  No Vomiting  No chest pain      Intake/Output Summary (Last 24 hours) at 7/19/2021 0841  Last data filed at 7/19/2021 0518  Gross per 24 hour   Intake 240 ml   Output 2 ml   Net 238 ml         PHYSICAL EXAM:   Blood pressure 112/73, pulse 65, temperature 97.3 °F (36.3 °C), temperature source Oral, resp. rate 22, height 5' 2\" (1.575 m), weight 165 lb 2 oz (74.9 kg), SpO2 92 %, not currently breastfeeding.'  Gen:  No acute distress. Eyes: PERRL. Anicteric sclera. No conjunctival injection. ENT: No discharge. Posterior oropharynx clear. External appearance of ears and nose normal.  Neck: Trachea midline. No mass   Resp:  No crackles. No wheezes. No rhonchi. No dullness on percussion. CV: Regular rate. Regular rhythm. No murmur or rub. No edema. GI: Soft, Non-tender. Non-distended. +BS  Skin: Warm, dry, w/o erythema. Lymph: No cervical or supraclavicular LAD. M/S: No cyanosis. No clubbing. Neuro:  no focal neurologic deficit. Moves all extremities  Psych: Awake and alert, Oriented x 3. Judgement and insight appropriate. Mood stable.       Medications:    Scheduled Meds:   guaiFENesin  600 mg Oral BID    dexamethasone  20 mg Intravenous Q24H    [START ON 7/21/2021] dexamethasone  10 mg Oral Daily    insulin glargine  18 Units Subcutaneous Nightly    insulin lispro  7 Units Subcutaneous TID     insulin lispro  0-18 Units Subcutaneous TID     insulin lispro  0-9 Units Subcutaneous Nightly    remdesivir IVPB  100 mg Intravenous Q24H    atorvastatin  10 mg Oral Daily    dilTIAZem  240 mg Oral Daily    metoprolol succinate  50 mg Oral Daily    sodium chloride flush  5-40 mL Intravenous 2 times per day    Vitamin D  1,000 Units Oral Daily    zinc sulfate  50 mg Oral Daily    ascorbic acid  500 mg Oral Daily    famotidine  20 mg Oral Daily    potassium citrate  10 mEq Oral BID        Continuous Infusions:   heparin (PORCINE) Infusion 980 Units/hr (07/18/21 1855)    sodium chloride      dextrose         PRN Meds:  albuterol sulfate HFA **AND** ipratropium, guaiFENesin-dextromethorphan, sodium chloride, sodium chloride flush, sodium chloride, ondansetron **OR** ondansetron, polyethylene glycol, acetaminophen **OR** acetaminophen, glucose, dextrose, glucagon (rDNA), dextrose, promethazine, benzonatate, albuterol sulfate HFA    Labs reviewed:  CBC:   Recent Labs     07/17/21  1301 07/19/21  0535   WBC 5.9 7.5   HGB 13.0 13.0   HCT 37.6 36.8   MCV 87.9 86.3    267     BMP:   Recent Labs     07/17/21  0734 07/18/21  0441 07/19/21  0535    139 139   K 4.1 4.3 3.9    103 102   CO2 22 25 24   BUN 23* 29* 31*   CREATININE 0.8 0.9 0.9     LIVER PROFILE:   Recent Labs     07/17/21  0734 07/18/21  0441 07/19/21  0535   AST 24 22 24   ALT 25 25 27   BILITOT 0.3 <0.2 0.3   ALKPHOS 36* 39* 38*     PT/INR: No results for input(s): PROTIME, INR in the last 72 hours. APTT:   Recent Labs     07/18/21  1155 07/18/21  1902 07/19/21  0535   APTT 88.5* 84.5* 71.7*     UA:No results for input(s): NITRITE, COLORU, PHUR, LABCAST, WBCUA, RBCUA, MUCUS, TRICHOMONAS, YEAST, BACTERIA, CLARITYU, SPECGRAV, LEUKOCYTESUR, UROBILINOGEN, BILIRUBINUR, BLOODU, GLUCOSEU, AMORPHOUS in the last 72 hours.     Invalid input(s): KETONESU  No results for input(s): PH, PCO2, PO2 in the last 72 hours. Films:  Radiology Review:  Pertinent images / reports were reviewed as a part of this visit. CT CHEST PULMONARY EMBOLISM W CONTRAST  Addendum: ADDENDUM:   Critical results were called by Dr. Edimla Yuen MD to  Fahad Parrakyliesimonlupe   on 7/17/2021 at 12:46. Narrative: EXAMINATION:  CTA OF THE CHEST 7/17/2021 11:21 am    TECHNIQUE:  CTA of the chest was performed after the administration of intravenous  contrast.  Multiplanar reformatted images are provided for review. MIP  images are provided for review. Dose modulation, iterative reconstruction,  and/or weight based adjustment of the mA/kV was utilized to reduce the  radiation dose to as low as reasonably achievable. COMPARISON:  Chest x-ray, 07/15/2021    HISTORY:  ORDERING SYSTEM PROVIDED HISTORY: SOB  TECHNOLOGIST PROVIDED HISTORY:  Reason for exam:->SOB  Reason for Exam: covid+, SOB  Acuity: Acute  Type of Exam: Initial    FINDINGS:  Pulmonary Arteries: The central pulmonary arteries are normal in caliber. There is a curvilinear filling defect in the descending pulmonary artery  (axial image 48, series 2 and coronal image 53, series 603). There are  multiple transient areas of dilution of the bolus, especially the lower  lobes. No additional emboli are confidently identified. Mediastinum: The right and left ventricle volumes are symmetrical.  There is  no flattening of the intraventricular septum. There is no mediastinal  adenopathy. There bilateral hilar nodes measuring approximately 1 cm. Lungs/pleura: There are multiple bilateral ground-glass opacities. Many foci  have a peripheral distribution with geographical shape. There is relative  consolidation posteriorly in the lower lobes. Interstitial septal opacities  are a component. Upper Abdomen: Right adrenal gland is partially visualized and unremarkable. There is a question small amount of perihepatic ascites.     Soft Tissues/Bones: No acute osseous abnormality is identified. Impression: Positive for pulmonary embolus. A nonocclusive embolus is noted in the right  descending pulmonary artery. The study is limited for detection of  peripheral emboli. Clot burden, nonetheless, is considered mild. No evidence of right ventricular heart strain. Ground-glass and interstitial opacities and pattern common for COVID  pneumonia. Mild hilar adenopathy, likely reactive in the setting. This note was transcribed using [employer]. Please disregard any translational errors.     Thank you for this consult,    Christi Rivera MD  [facility] Pulmonary, Critical Care, and Sleep Medicine

## 2021-07-19 NOTE — PLAN OF CARE
Problem: Gas Exchange - Impaired  Goal: Absence of hypoxia  7/19/2021 1518 by Elizabeth Cantu RN  Outcome: Ongoing  No changes noted in orientation. Patient denies difficulties with breathing. Patient continues with independent airway. Breathing pattern remains even and symmetrical. Denies difficulty with deep breathing. SpO2 within acceptable range for this patient. Assessment findings remain free of cyanosis; cap refill remains brisk. ABG's to be ordered per protocol/physician order, if applicable. Supplemental O2 applied per protocol/physican order, as applicable. 7/19/2021 1438 by Kae Tadeo RN  Outcome: Ongoing     Problem: Nutrition Deficits  Goal: Optimize nutritional status  7/19/2021 1518 by Elizabeth Cantu RN  Outcome: Ongoing  No present signs of nutritional deficits. Patient denies oral sores/missing teeth/swallowing difficulties. Patient denies change in appetite or weight. Patient continues with PO intake. Will continue to monitor. 7/19/2021 1438 by Kae Tadeo RN  Outcome: Ongoing     Problem: Risk for Fluid Volume Deficit  Goal: Maintain normal serum potassium, sodium, calcium, phosphorus, and pH  7/19/2021 1518 by Elizabeth Cantu RN  Outcome: Ongoing  Continuing to monitor labs and vital signs. No present signs of fluid overload/depletion. Continuing to monitor I&O's per protocol. Patient denies nausea/vomiting/diarrhea. IV/INT assessments continue. Monitoring for signs/symptoms of unusual bleeding. 7/19/2021 1438 by Kae Tadeo RN  Outcome: Ongoing     Problem: Falls - Risk of:  Goal: Will remain free from falls  Description: Will remain free from falls  7/19/2021 1518 by Elizabeth Cantu RN  Outcome: Ongoing  Falling star program remains in place. Call light and personal belongings within reach. Frequent visual monitoring continues. Toileting program in place. Patient assisted in turning/repositioning at least once every 2 hours, and on a prn basis.    7/19/2021 1438 by Ciro Hester RN  Outcome: Ongoing     Electronically signed by Cristiane Hernandez RN on 7/19/2021 at 3:21 PM

## 2021-07-19 NOTE — PROGRESS NOTES
Hospitalist Progress Note      PCP: Rizwana Pino MD    Date of Admission: 7/13/2021    Chief Complaint: Generalized weakness, poor appetite, poor p.o. intake    Hospital Course: 62 y.o. female  to [facility] with PMHx: CKD, HLD, DM, HTN  no recent travel. Patient spouse is admitted to the ICU: Positive for Covid      presented to the ED with encouragement of her family due to the fact that her  was positive for Covid and she is becoming symptomatic with decreased appetite, generalized weakness. she had not really ate or drank anything x 24 to 48-hour. She is reporting some shortness of breath No fever or chills. No diarrhea. No cough or abdominal pain.     NOT PREVIOUSLY VACCINATED for Covid.     ED work-up: Mild leukopenia 2.6, mild thrombocytopenia of 93, MARCI on CKD: BUN 55, cr 2.3, GFR 22. . EKG is unremarkable. Chest x-ray no consolidation or opacities. CV-19 +.  was started on IV fluids, given an inhaler treatment and started on Decadron.     On exam patient was very sleepy, looks very tired. She was awakened and remarked that she had not really gotten much sleep and was very tired. She did not appear to be any any type of respiratory distress. Skin warm and dry. Lung fields clear. SPO2 93 to 94% on room air. Respirations were easy and regular. Developed a PE and progressed to ARDS requiring HFNC.         Subjective: in ICU moved down today but is still on med surg status (not sure if she was hoping being closer to him she could see him ?) . HFNC although only 20L 100%.  remains on heparin drip I educated her re BEREKET risk/benefit she agrees to allow us to give info about her covid status to her husbands afebrile, occasional dry cough  all there systems neg       Medications:  Reviewed    Infusion Medications    heparin (PORCINE) Infusion 980 Units/hr (07/18/21 1855)    sodium chloride      dextrose       Scheduled Medications    guaiFENesin  600 mg Oral BID    dexamethasone  20 mg Intravenous Q24H    [START ON 7/21/2021] dexamethasone  10 mg Oral Daily    insulin glargine  18 Units Subcutaneous Nightly    insulin lispro  7 Units Subcutaneous TID     insulin lispro  0-18 Units Subcutaneous TID     insulin lispro  0-9 Units Subcutaneous Nightly    remdesivir IVPB  100 mg Intravenous Q24H    atorvastatin  10 mg Oral Daily    dilTIAZem  240 mg Oral Daily    metoprolol succinate  50 mg Oral Daily    sodium chloride flush  5-40 mL Intravenous 2 times per day    Vitamin D  1,000 Units Oral Daily    zinc sulfate  50 mg Oral Daily    ascorbic acid  500 mg Oral Daily    famotidine  20 mg Oral Daily    potassium citrate  10 mEq Oral BID WC     PRN Meds: albuterol sulfate HFA **AND** ipratropium, guaiFENesin-dextromethorphan, sodium chloride, sodium chloride flush, sodium chloride, ondansetron **OR** ondansetron, polyethylene glycol, acetaminophen **OR** acetaminophen, glucose, dextrose, glucagon (rDNA), dextrose, promethazine, benzonatate, albuterol sulfate HFA      Intake/Output Summary (Last 24 hours) at 7/19/2021 1356  Last data filed at 7/19/2021 0518  Gross per 24 hour   Intake 240 ml   Output 2 ml   Net 238 ml       Physical Exam Performed:    /78   Pulse 60   Temp 97.4 °F (36.3 °C) (Axillary)   Resp 22   Ht 5' 2\" (1.575 m)   Wt 165 lb 2 oz (74.9 kg)   SpO2 93%   BMI 30.20 kg/m²     General appearance: No distress watching TV carries on conversation not winded   HEENT: PERRL EOMI conjunctivae clear glasses mucosa moist   Neck: Supple, with full range of motion. No jugular venous distention. Trachea midline. Respiratory: HFNC 20 L 100% CTA   cardiovascular: RRR S1/S2 without murmurs, rubs or gallops. Abdomen: Soft, non-tender, non-distended with normal bowel sounds. Musculoskeletal: No clubbing, cyanosis or edema bilaterally. Full range of motion without deformity. Skin: Skin color pink warm dry,  No rashes or lesions.   Neurologic: Neurovascularly intact  Cranial nerves: II-XII intact, grossly non-focal.  Psychiatric: Alert and oriented, thought content appropriate, normal insight  Peripheral Pulses: +2 palpable, equal bilaterally   : No Saxena  voids    Labs:   Recent Labs     07/17/21  1301 07/19/21  0535   WBC 5.9 7.5   HGB 13.0 13.0   HCT 37.6 36.8    267     Recent Labs     07/17/21  0734 07/18/21  0441 07/19/21  0535    139 139   K 4.1 4.3 3.9    103 102   CO2 22 25 24   BUN 23* 29* 31*   CREATININE 0.8 0.9 0.9   CALCIUM 8.8 8.3 8.9     Recent Labs     07/17/21  0734 07/18/21  0441 07/19/21  0535   AST 24 22 24   ALT 25 25 27   BILITOT 0.3 <0.2 0.3   ALKPHOS 36* 39* 38*     No results for input(s): INR in the last 72 hours. No results for input(s): [place] in the last 72 hours. Urinalysis:      Lab Results   Component Value Date    NITRU Negative 11/30/2020    WBCUA 621 11/30/2020    BACTERIA 4+ 11/30/2020    RBCUA 5-10 11/30/2020    BLOODU LARGE 11/30/2020    SPECGRAV 1.024 11/30/2020    GLUCOSEU Negative 11/30/2020       Radiology:  CT CHEST PULMONARY EMBOLISM W CONTRAST   Final Result   Addendum 1 of 1   ADDENDUM:   Critical results were called by Dr. Harpreet Austin MD to  Jill Robert   on 7/17/2021 at 12:46. Final   Positive for pulmonary embolus. A nonocclusive embolus is noted in the right   descending pulmonary artery. The study is limited for detection of   peripheral emboli. Clot burden, nonetheless, is considered mild. No evidence of right ventricular heart strain. Ground-glass and interstitial opacities and pattern common for COVID   pneumonia. Mild hilar adenopathy, likely reactive in the setting. XR CHEST 1 VIEW   Final Result   Increasing vascular congestion and suggestion of developing perihilar   interstitial edema. Unchanged bilateral lower lobe atelectasis.          XR CHEST 1 VIEW   Final Result   New bilateral lower lobe airspace

## 2021-07-19 NOTE — PROGRESS NOTES
Patient transferred via bed to room 2112. Patient able to stand and independently transfer to ICU bed, SOB/dyspnea noted on exertion. R.T. at bedside and attached patient to Vapotherm @ 20 LPM, FiO2 @ 100%. Patient oriented to room and personal belongings gathered on bedside table per patient. Call light within easy reach, side rails up x2 and bed alarm utilized. IV Heparin infusing without difficulty. Patient c/o Coban dressing at PIV site uncomfortable. Coban dressing removed and PIV dressing changed to maintain patency of PIV.   Electronically signed by Chichi Araiza RN on 7/19/2021 at 3:39 PM

## 2021-07-19 NOTE — CONSULTS
[facility]  Palliative Care   Consult Note    NAME:  Cathleen Morales 1237 RECORD NUMBER:  [id number]  AGE: 62 y.o. GENDER: female  : 1960  TODAY'S DATE:  2021    Subjective     Reason for Consult:  goals of care and code status   Visit Type: Initial Consult      Cosme Ovalle is a 62 y.o. female referred by:  [x] Physician     PAST MEDICAL HISTORY      Diagnosis Date    Chronic kidney disease     kidney stones    Hyperlipidemia     Hypertension     MDRO (multiple drug resistant organisms) resistance     MRSA infection     left lower leg    Type II or unspecified type diabetes mellitus without mention of complication, not stated as uncontrolled        PAST SURGICAL HISTORY  Past Surgical History:   Procedure Laterality Date   Andrew Cornelius    COLONOSCOPY  2018    Dr. Marlene Corona. adenoma polyp, repeat in 5 years    LITHOTRIPSY  2009       FAMILY HISTORY  Family History   Problem Relation Age of Onset    Cancer Mother         pancreatic    Cancer Father         lung    Heart Disease Father         Bypass x 5    Heart Disease Sister         Stent- CAD    Diabetes Sister        SOCIAL HISTORY  Social History     Tobacco Use    Smoking status: Never Smoker    Smokeless tobacco: Never Used   Substance Use Topics    Alcohol use: No    Drug use: No       ALLERGIES  Allergies   Allergen Reactions    Ciprofloxacin Other (See Comments)     hallucinations    Hydrocodone Itching       MEDICATIONS  No current facility-administered medications on file prior to encounter.      Current Outpatient Medications on File Prior to Encounter   Medication Sig Dispense Refill    metoprolol succinate (TOPROL XL) 50 MG extended release tablet TAKE 1 TABLET BY MOUTH EVERY DAY 90 tablet 0    citric acid-potassium citrate (POLYCITRA) 1100-334 MG/5ML solution Take 5 mLs by mouth 2 times daily      metFORMIN (GLUCOPHAGE) 1000 MG tablet Take 1 tablet by mouth 2 times daily (with meals) 180 tablet 0    hydroCHLOROthiazide (MICROZIDE) 12.5 MG capsule TAKE 1 CAPSULE BY MOUTH EVERY DAY IN THE MORNING 90 capsule 0    losartan (COZAAR) 100 MG tablet TAKE 1 TABLET BY MOUTH EVERY DAY 90 tablet 0    dilTIAZem (DILT-XR) 240 MG extended release capsule TAKE 1 CAPSULE BY MOUTH EVERY DAY 90 capsule 0    JANUVIA 100 MG tablet TAKE 1 TABLET BY MOUTH EVERY DAY 90 tablet 0    glipiZIDE (GLUCOTROL) 10 MG tablet TAKE 1 TABLET BY MOUTH 2 TIMES DAILY (BEFORE MEAL). 180 tablet 3    atorvastatin (LIPITOR) 10 MG tablet TAKE 1 TABLET BY MOUTH DAILY 90 tablet 3    ONETOUCH ULTRA strip 1 EACH BY DOES NOT APPLY ROUTE DAILY AS NEEDED. **ASK PT WHICH MONITOR SHES USING 100 strip 3    vitamin B-12 (CYANOCOBALAMIN) 100 MCG tablet Take 250 mcg by mouth daily.  Multiple Vitamin CAPS Take  by mouth daily.  ONE TOUCH LANCETS MISC by Does not apply route. 100 each 2       Objective         /78   Pulse 60   Temp 97.4 °F (36.3 °C) (Axillary)   Resp 22   Ht 5' 2\" (1.575 m)   Wt 165 lb 2 oz (74.9 kg)   SpO2 93%   BMI 30.20 kg/m²     Code Status: Full Code    Advanced Directives: not completed     Assessment        Management and Education    Persons available for education:       [x] Self     [] Caregiver       [] Spouse       [] Other Family Member   []  Other    Spiritual History:  notified: Yes,     Does the patient have a Primary Care Physician?   Yes  Level of patient/caregiver understanding able to:        [x] Verbalize Understanding   [] Demonstrate Understanding       [] Teach Back       [] Needs Reinforcement     []  Other:      Teaching Time:  1hours  0 minutes     Plan        Social Service Consult Made:  Yes  Assistance filling out Living Will/HPOA:  No      Discharge Plan:  Education/support to patient  Providing support for coping/adaptation/distress of patient  Clarification of medical condition to patient and family  Code status clarified: Full Code  Palliative care orders introduced    Discharge Environment:  [] Hospice Consult Agency:  [] Inpatient Hospice    [] Home with Hospice Care   [] ECF with Hospice  [] ECF skilled care with Hospice to follow   [] Other:    Discussion: Patient admitted for increased shortness of breath, lack of appetite and her  is positive for COVID and currently in ICU. 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 We have discussed her current treatment for COVID and reason for transfer to ICU. We have discussed code status and what that means at this time she wants to remain full code. If she is unable to make decision and her  can not make decisions she wants her oldest son to be her decision maker Marci Mckinnon [phone]    I will continue to follow Ms. Means's care as needed. Thank you for allowing me to participate in the care of Ms. Means .      Electronically signed by Elgin Lares RN, [address] [place] [address] Benji on 7/19/2021 at 2:06 PM  [address] [place] [address]  Office: [phone]

## 2021-07-20 LAB
A/G RATIO: 1 (ref 1.1–2.2)
ALBUMIN SERPL-MCNC: 3 G/DL (ref 3.4–5)
ALP BLD-CCNC: 41 U/L (ref 40–129)
ALT SERPL-CCNC: 44 U/L (ref 10–40)
ANION GAP SERPL CALCULATED.3IONS-SCNC: 9 MMOL/L (ref 3–16)
APTT: 80.8 SEC (ref 26.2–38.6)
AST SERPL-CCNC: 30 U/L (ref 15–37)
BILIRUB SERPL-MCNC: 0.4 MG/DL (ref 0–1)
BUN BLDV-MCNC: 25 MG/DL (ref 7–20)
C-REACTIVE PROTEIN: 3.1 MG/L (ref 0–5.1)
CALCIUM SERPL-MCNC: 8.8 MG/DL (ref 8.3–10.6)
CHLORIDE BLD-SCNC: 103 MMOL/L (ref 99–110)
CO2: 27 MMOL/L (ref 21–32)
CREAT SERPL-MCNC: 0.9 MG/DL (ref 0.6–1.2)
D DIMER: 345 NG/ML DDU (ref 0–229)
FERRITIN: 666.2 NG/ML (ref 15–150)
GFR AFRICAN AMERICAN: >60
GFR NON-AFRICAN AMERICAN: >60
GLOBULIN: 3.1 G/DL
GLUCOSE BLD-MCNC: 210 MG/DL (ref 70–99)
GLUCOSE BLD-MCNC: 213 MG/DL (ref 70–99)
GLUCOSE BLD-MCNC: 227 MG/DL (ref 70–99)
GLUCOSE BLD-MCNC: 234 MG/DL (ref 70–99)
GLUCOSE BLD-MCNC: 266 MG/DL (ref 70–99)
MAGNESIUM: 1.9 MG/DL (ref 1.8–2.4)
PERFORMED ON: ABNORMAL
PHOSPHORUS: 3.9 MG/DL (ref 2.5–4.9)
POTASSIUM SERPL-SCNC: 4.2 MMOL/L (ref 3.5–5.1)
SODIUM BLD-SCNC: 139 MMOL/L (ref 136–145)
TOTAL PROTEIN: 6.1 G/DL (ref 6.4–8.2)

## 2021-07-20 PROCEDURE — 6360000002 HC RX W HCPCS: Performed by: INTERNAL MEDICINE

## 2021-07-20 PROCEDURE — 82728 ASSAY OF FERRITIN: CPT

## 2021-07-20 PROCEDURE — 6370000000 HC RX 637 (ALT 250 FOR IP): Performed by: HOSPITALIST

## 2021-07-20 PROCEDURE — APPNB15 APP NON BILLABLE TIME 0-15 MINS: Performed by: NURSE PRACTITIONER

## 2021-07-20 PROCEDURE — 6370000000 HC RX 637 (ALT 250 FOR IP): Performed by: NURSE PRACTITIONER

## 2021-07-20 PROCEDURE — 83735 ASSAY OF MAGNESIUM: CPT

## 2021-07-20 PROCEDURE — 6360000002 HC RX W HCPCS: Performed by: NURSE PRACTITIONER

## 2021-07-20 PROCEDURE — 86140 C-REACTIVE PROTEIN: CPT

## 2021-07-20 PROCEDURE — 2580000003 HC RX 258: Performed by: INTERNAL MEDICINE

## 2021-07-20 PROCEDURE — 80053 COMPREHEN METABOLIC PANEL: CPT

## 2021-07-20 PROCEDURE — 84100 ASSAY OF PHOSPHORUS: CPT

## 2021-07-20 PROCEDURE — 94761 N-INVAS EAR/PLS OXIMETRY MLT: CPT

## 2021-07-20 PROCEDURE — 36415 COLL VENOUS BLD VENIPUNCTURE: CPT

## 2021-07-20 PROCEDURE — 6370000000 HC RX 637 (ALT 250 FOR IP): Performed by: INTERNAL MEDICINE

## 2021-07-20 PROCEDURE — 99233 SBSQ HOSP IP/OBS HIGH 50: CPT | Performed by: INTERNAL MEDICINE

## 2021-07-20 PROCEDURE — 85730 THROMBOPLASTIN TIME PARTIAL: CPT

## 2021-07-20 PROCEDURE — 2700000000 HC OXYGEN THERAPY PER DAY

## 2021-07-20 PROCEDURE — 1200000000 HC SEMI PRIVATE

## 2021-07-20 PROCEDURE — 85379 FIBRIN DEGRADATION QUANT: CPT

## 2021-07-20 RX ORDER — DILTIAZEM HYDROCHLORIDE 180 MG/1
180 CAPSULE, COATED, EXTENDED RELEASE ORAL DAILY
Status: DISCONTINUED | OUTPATIENT
Start: 2021-07-20 | End: 2021-07-21

## 2021-07-20 RX ORDER — MAGNESIUM SULFATE IN WATER 40 MG/ML
2000 INJECTION, SOLUTION INTRAVENOUS ONCE
Status: COMPLETED | OUTPATIENT
Start: 2021-07-20 | End: 2021-07-20

## 2021-07-20 RX ADMIN — INSULIN LISPRO 12 UNITS: 100 INJECTION, SOLUTION INTRAVENOUS; SUBCUTANEOUS at 08:57

## 2021-07-20 RX ADMIN — POTASSIUM CITRATE 10 MEQ: 10 TABLET ORAL at 16:43

## 2021-07-20 RX ADMIN — FAMOTIDINE 20 MG: 20 TABLET ORAL at 08:51

## 2021-07-20 RX ADMIN — INSULIN LISPRO 5 UNITS: 100 INJECTION, SOLUTION INTRAVENOUS; SUBCUTANEOUS at 20:20

## 2021-07-20 RX ADMIN — INSULIN LISPRO 12 UNITS: 100 INJECTION, SOLUTION INTRAVENOUS; SUBCUTANEOUS at 16:37

## 2021-07-20 RX ADMIN — INSULIN GLARGINE 10 UNITS: 100 INJECTION, SOLUTION SUBCUTANEOUS at 08:57

## 2021-07-20 RX ADMIN — MAGNESIUM SULFATE HEPTAHYDRATE 2000 MG: 40 INJECTION, SOLUTION INTRAVENOUS at 08:56

## 2021-07-20 RX ADMIN — ATORVASTATIN CALCIUM 10 MG: 10 TABLET, FILM COATED ORAL at 08:51

## 2021-07-20 RX ADMIN — INSULIN LISPRO 6 UNITS: 100 INJECTION, SOLUTION INTRAVENOUS; SUBCUTANEOUS at 12:34

## 2021-07-20 RX ADMIN — POTASSIUM CITRATE 10 MEQ: 10 TABLET ORAL at 08:56

## 2021-07-20 RX ADMIN — Medication 1000 UNITS: at 08:51

## 2021-07-20 RX ADMIN — ACETAMINOPHEN 650 MG: 325 TABLET ORAL at 12:46

## 2021-07-20 RX ADMIN — INSULIN LISPRO 3 UNITS: 100 INJECTION, SOLUTION INTRAVENOUS; SUBCUTANEOUS at 16:36

## 2021-07-20 RX ADMIN — INSULIN GLARGINE 10 UNITS: 100 INJECTION, SOLUTION SUBCUTANEOUS at 20:20

## 2021-07-20 RX ADMIN — ZINC SULFATE 220 MG (50 MG) CAPSULE 50 MG: CAPSULE at 08:51

## 2021-07-20 RX ADMIN — OXYCODONE HYDROCHLORIDE AND ACETAMINOPHEN 500 MG: 500 TABLET ORAL at 08:51

## 2021-07-20 RX ADMIN — INSULIN LISPRO 6 UNITS: 100 INJECTION, SOLUTION INTRAVENOUS; SUBCUTANEOUS at 08:57

## 2021-07-20 RX ADMIN — DEXAMETHASONE SODIUM PHOSPHATE 20 MG: 4 INJECTION, SOLUTION INTRA-ARTICULAR; INTRALESIONAL; INTRAMUSCULAR; INTRAVENOUS; SOFT TISSUE at 12:23

## 2021-07-20 ASSESSMENT — PAIN DESCRIPTION - PROGRESSION: CLINICAL_PROGRESSION: NOT CHANGED

## 2021-07-20 ASSESSMENT — PAIN DESCRIPTION - PAIN TYPE: TYPE: ACUTE PAIN

## 2021-07-20 ASSESSMENT — PAIN SCALES - GENERAL
PAINLEVEL_OUTOF10: 0
PAINLEVEL_OUTOF10: 0
PAINLEVEL_OUTOF10: 6
PAINLEVEL_OUTOF10: 0
PAINLEVEL_OUTOF10: 0

## 2021-07-20 ASSESSMENT — PAIN - FUNCTIONAL ASSESSMENT: PAIN_FUNCTIONAL_ASSESSMENT: ACTIVITIES ARE NOT PREVENTED

## 2021-07-20 ASSESSMENT — PAIN DESCRIPTION - FREQUENCY: FREQUENCY: INTERMITTENT

## 2021-07-20 ASSESSMENT — PAIN DESCRIPTION - LOCATION: LOCATION: HEAD

## 2021-07-20 ASSESSMENT — PAIN DESCRIPTION - DESCRIPTORS: DESCRIPTORS: HEADACHE

## 2021-07-20 ASSESSMENT — PAIN DESCRIPTION - ONSET: ONSET: PROGRESSIVE

## 2021-07-20 NOTE — PROGRESS NOTES
Hospitalist Progress Note      PCP: Tristan Cortez MD    Date of Admission: 7/13/2021    Chief Complaint: Generalized weakness, poor appetite, poor p.o. intake    Hospital Course: 62 y.o. female  to [facility] with PMHx: CKD, HLD, DM, HTN  no recent travel. Patient spouse is admitted to the ICU: Positive for Covid      presented to the ED with encouragement of her family due to the fact that her  was positive for Covid and she is becoming symptomatic with decreased appetite, generalized weakness. she had not really ate or drank anything x 24 to 48-hour. She is reporting some shortness of breath No fever or chills. No diarrhea. No cough or abdominal pain.     NOT PREVIOUSLY VACCINATED for Covid.     ED work-up: Mild leukopenia 2.6, mild thrombocytopenia of 93, MARCI on CKD: BUN 55, cr 2.3, GFR 22. . EKG is unremarkable. Chest x-ray no consolidation or opacities. CV-19 +.  was started on IV fluids, given an inhaler treatment and started on Decadron.     On exam patient was very sleepy, looks very tired. She was awakened and remarked that she had not really gotten much sleep and was very tired. She did not appear to be any any type of respiratory distress. Skin warm and dry. Lung fields clear. SPO2 93 to 94% on room air. Respirations were easy and regular. Developed a PE and progressed to ARDS requiring HFNC.    treated with heparin drip. Completed course remdesevir      Subjective: states feels better, sat 97% on HFNC 20L this am she gets winded when walks to BR. I discussed importance of some proning with the patient .   all other systems neg       Medications:  Reviewed    Infusion Medications    heparin (PORCINE) Infusion 980 Units/hr (07/19/21 5760)    sodium chloride      dextrose       Scheduled Medications    dilTIAZem  180 mg Oral Daily    insulin glargine  10 Units Subcutaneous BID    insulin lispro  12 Units Subcutaneous TID     guaiFENesin  600 mg Oral BID    dexamethasone  20 mg Intravenous Q24H    [START ON 7/21/2021] dexamethasone  10 mg Oral Daily    insulin lispro  0-18 Units Subcutaneous TID WC    insulin lispro  0-9 Units Subcutaneous Nightly    atorvastatin  10 mg Oral Daily    metoprolol succinate  50 mg Oral Daily    sodium chloride flush  5-40 mL Intravenous 2 times per day    Vitamin D  1,000 Units Oral Daily    zinc sulfate  50 mg Oral Daily    ascorbic acid  500 mg Oral Daily    famotidine  20 mg Oral Daily    potassium citrate  10 mEq Oral BID WC     PRN Meds: albuterol sulfate HFA **AND** ipratropium, guaiFENesin-dextromethorphan, sodium chloride, sodium chloride flush, sodium chloride, ondansetron **OR** ondansetron, polyethylene glycol, acetaminophen **OR** acetaminophen, glucose, dextrose, glucagon (rDNA), dextrose, promethazine, benzonatate, albuterol sulfate HFA      Intake/Output Summary (Last 24 hours) at 7/20/2021 0827  Last data filed at 7/20/2021 0318  Gross per 24 hour   Intake 833.38 ml   Output 475 ml   Net 358.38 ml       Physical Exam Performed:    /61   Pulse (!) 45   Temp 98.3 °F (36.8 °C) (Oral)   Resp 20   Ht 5' 2\" (1.575 m)   Wt 153 lb (69.4 kg)   SpO2 96%   BMI 27.98 kg/m²     General appearance: No distress watching TV carries on conversation not winded   HEENT: PERRL EOMI conjunctivae clear glasses mucosa moist   Neck: Supple, full range of motion. No jugular venous distention. Trachea midline. Respiratory: HFNC 20 L 100% CTA sats 97%   cardiovascular: RRR S1/S2 without murmurs, rubs or gallops. Abdomen: Soft, non-tender, non-distended with normal bowel sounds. Musculoskeletal: No clubbing, cyanosis or edema. Full range of motion without deformity.   Skin: Skin color pink warm dry,  No rash  Neurologic:  Cranial nerves: II-XII intact, grossly non-focal.  Psychiatric: Alert and oriented, fair  insight  Peripheral Pulses: +2 palpable, equal bilaterally   : No Saxena  voids    Labs:   Recent Labs 07/17/21  1301 07/19/21  0535   WBC 5.9 7.5   HGB 13.0 13.0   HCT 37.6 36.8    267     Recent Labs     07/18/21  0441 07/19/21  0535 07/20/21  0434    139 139   K 4.3 3.9 4.2    102 103   CO2 25 24 27   BUN 29* 31* 25*   CREATININE 0.9 0.9 0.9   CALCIUM 8.3 8.9 8.8     Recent Labs     07/18/21  0441 07/19/21  0535 07/20/21  0434   AST 22 24 30   ALT 25 27 44*   BILITOT <0.2 0.3 0.4   ALKPHOS 39* 38* 41     No results for input(s): INR in the last 72 hours. No results for input(s): Constance Belts in the last 72 hours. Urinalysis:      Lab Results   Component Value Date    NITRU Negative 11/30/2020    WBCUA 621 11/30/2020    BACTERIA 4+ 11/30/2020    RBCUA 5-10 11/30/2020    BLOODU LARGE 11/30/2020    SPECGRAV 1.024 11/30/2020    GLUCOSEU Negative 11/30/2020       Radiology:  CT CHEST PULMONARY EMBOLISM W CONTRAST   Final Result   Addendum 1 of 1   ADDENDUM:   Critical results were called by Dr. Pamela Atwood MD to  Low Ortiz   on 7/17/2021 at 12:46. Final   Positive for pulmonary embolus. A nonocclusive embolus is noted in the right   descending pulmonary artery. The study is limited for detection of   peripheral emboli. Clot burden, nonetheless, is considered mild. No evidence of right ventricular heart strain. Ground-glass and interstitial opacities and pattern common for COVID   pneumonia. Mild hilar adenopathy, likely reactive in the setting. XR CHEST 1 VIEW   Final Result   Increasing vascular congestion and suggestion of developing perihilar   interstitial edema. Unchanged bilateral lower lobe atelectasis. XR CHEST 1 VIEW   Final Result   New bilateral lower lobe airspace disease most likely due to atelectasis on   this low lung volume expiratory portable chest x-ray although edema is also   in the differential.  Pneumonia is unlikely but not excluded.   No evidence of   pleural effusion         XR CHEST PORTABLE   Final need ICU bed will discuss with admin this am especially if O2 weans      Dispo - goal return home     Kathe Cuenca MD

## 2021-07-20 NOTE — PROGRESS NOTES
Clinical Pharmacy Note  Heparin Dosing       Lab Results   Component Value Date    APTT 80.8 07/20/2021     Lab Results   Component Value Date    HGB 13.0 07/19/2021    HCT 36.8 07/19/2021     07/19/2021       Current Infusion Rate: 980 units/hr    Plan:  Rate: continue at 980 units/hr  Next aPTT: 0600 on 7-21-21    Pharmacy will continue to monitor and adjust based on aPTT results.   Feliberto Rouse, PharmD

## 2021-07-20 NOTE — PROGRESS NOTES
Pulmonary Progress Note    CC:  Follow up respiratory failure, COVID    Subjective:  On vapotherm 20 liters and 100% FIO2  Says she is feeling better  Dry coughing     ROS  Improved SOB      Intake/Output Summary (Last 24 hours) at 7/20/2021 0744  Last data filed at 7/20/2021 0318  Gross per 24 hour   Intake 833.38 ml   Output 475 ml   Net 358.38 ml         PHYSICAL EXAM:  Blood pressure 123/67, pulse (!) 49, temperature 97.4 °F (36.3 °C), temperature source Oral, resp. rate 24, height 5' 2\" (1.575 m), weight 153 lb (69.4 kg), SpO2 97 %, not currently breastfeeding.'  Gen: No distress. Eyes: PERRL. No sclera icterus. No conjunctival injection. ENT: No discharge. Pharynx clear. External appearance of ears and nose normal.  Neck: Trachea midline. No obvious mass. Resp: Clear but diminished   CV: Trenda Schwarz. No murmur or rub. GI: Non-tender. Non-distended. No hernia. Skin: Warm, dry, normal texture and turgor. No nodule on exposed extremities. Lymph: No cervical LAD. No supraclavicular LAD. M/S: No cyanosis. No clubbing. No joint deformity. Neuro: Moves all four extremities. CN 2-12 tested, no defect noted.   Ext:   no edema    Medications:    Scheduled Meds:   insulin glargine  10 Units Subcutaneous BID    insulin lispro  12 Units Subcutaneous TID     guaiFENesin  600 mg Oral BID    dexamethasone  20 mg Intravenous Q24H    [START ON 7/21/2021] dexamethasone  10 mg Oral Daily    insulin lispro  0-18 Units Subcutaneous TID     insulin lispro  0-9 Units Subcutaneous Nightly    atorvastatin  10 mg Oral Daily    dilTIAZem  240 mg Oral Daily    metoprolol succinate  50 mg Oral Daily    sodium chloride flush  5-40 mL Intravenous 2 times per day    Vitamin D  1,000 Units Oral Daily    zinc sulfate  50 mg Oral Daily    ascorbic acid  500 mg Oral Daily    famotidine  20 mg Oral Daily    potassium citrate  10 mEq Oral BID        Continuous Infusions:   heparin (PORCINE) Infusion 980 Units/hr (07/19/21 1839)    sodium chloride      dextrose         PRN Meds:  albuterol sulfate HFA **AND** ipratropium, guaiFENesin-dextromethorphan, sodium chloride, sodium chloride flush, sodium chloride, ondansetron **OR** ondansetron, polyethylene glycol, acetaminophen **OR** acetaminophen, glucose, dextrose, glucagon (rDNA), dextrose, promethazine, benzonatate, albuterol sulfate HFA    Labs:  CBC:   Recent Labs     07/17/21  1301 07/19/21  0535   WBC 5.9 7.5   HGB 13.0 13.0   HCT 37.6 36.8   MCV 87.9 86.3    267     BMP:   Recent Labs     07/18/21  0441 07/19/21  0535 07/20/21  0434    139 139   K 4.3 3.9 4.2    102 103   CO2 25 24 27   BUN 29* 31* 25*   CREATININE 0.9 0.9 0.9     LIVER PROFILE:   Recent Labs     07/18/21  0441 07/19/21  0535 07/20/21  0434   AST 22 24 30   ALT 25 27 44*   BILITOT <0.2 0.3 0.4   ALKPHOS 39* 38* 41     PT/INR: No results for input(s): PROTIME, INR in the last 72 hours. APTT:   Recent Labs     07/18/21  1902 07/19/21  0535 07/20/21  0434   APTT 84.5* 71.7* 80.8*     UA:No results for input(s): NITRITE, COLORU, PHUR, LABCAST, WBCUA, RBCUA, MUCUS, TRICHOMONAS, YEAST, BACTERIA, CLARITYU, SPECGRAV, LEUKOCYTESUR, UROBILINOGEN, BILIRUBINUR, BLOODU, GLUCOSEU, AMORPHOUS in the last 72 hours. Invalid input(s): Irven Guess  No results for input(s): PH, PCO2, PO2 in the last 72 hours. Films:  Chest imaging reports were reviewed and imaging was reviewed by me and showed no new films    ABG:  None    Cultures:  None    I reviewed the labs and images listed above    Assessment/Plan:    Acute Hypoxic Respiratory Failure with saturations less than 90% on room air  Titrate oxygen for saturations greater than or equal to 88%  o Vapotherm. Add on NRB if needed   COVID-19 pneumonia  o Decadron 20 mg for 5 days, then 10 mg for 5 days  o Remdesivir completed    Acute PE  o Heparin gtt.   Will need 3 months of anticoagulation    ARDS    DVT prophylaxis  Heparin     Increase activity when oxygen requirements are less         Usama Loco, DO  [facility]

## 2021-07-20 NOTE — PROGRESS NOTES
Comprehensive Nutrition Assessment    Type and Reason for Visit:  Initial    Nutrition Recommendations/Plan:   - Continue current diet  - Monitor PO intakes for possible need of ONS    Nutrition Assessment:  LOS assessment. Pt currently in droplet plus isolation for COVID. Hx CKD and diabetes. Glucose 213. Currently on carb control, low potassium, low phosphorus diet. Recorded intakes greater than 50% since admission. Pt has lost 11lbs (6.7%) over the past 2 months. Will continue to monitor intakes for possible need of ONS. Malnutrition Assessment:  Malnutrition Status: At risk for malnutrition (Comment)    Context:  Acute Illness       Estimated Daily Nutrient Needs:  Energy (kcal):  2179-9808 (20-25kcal/69kg); Weight Used for Energy Requirements:  Current     Protein (g):  60-70 (1.2-1.4g/50kg); Weight Used for Protein Requirements:  Ideal        Fluid (ml/day): 1 ml/kcal      Nutrition Related Findings:  Glucose 213. Active BS. No edema. Wounds:  None       Current Nutrition Therapies:    ADULT DIET; Regular; 3 carb choices (45 gm/meal); Low Potassium (Less than 3000 mg/day); Low Phosphorus (Less than 1000 mg)    Anthropometric Measures:  · Height: 5' 2\" (157.5 cm)  · Current Body Weight: 153 lb (69.4 kg)   · Admission Body Weight: 156 lb (70.8 kg)    · Ideal Body Weight: 110 lbs; % Ideal Body Weight 139.1 %   · BMI: 28  · BMI Categories: Overweight (BMI 25.0-29. 9)       Nutrition Diagnosis:   · Unintended weight loss related to inadequate protein-energy intake as evidenced by weight loss    Nutrition Interventions:   Food and/or Nutrient Delivery:  Continue Current Diet  Nutrition Education/Counseling:  Education not indicated   Coordination of Nutrition Care:  Continue to monitor while inpatient    Goals:  PO intake greater than 50%       Nutrition Monitoring and Evaluation:   Behavioral-Environmental Outcomes:  None Identified   Food/Nutrient Intake Outcomes:  Food and Nutrient Intake  Physical Signs/Symptoms Outcomes:  Biochemical Data, Weight     Discharge Planning:    No discharge needs at this time     Electronically signed by Ras Huerta RD, LD on 7/20/21 at 12:40 PM EDT    Contact: [phone]

## 2021-07-21 LAB
A/G RATIO: 1 (ref 1.1–2.2)
ALBUMIN SERPL-MCNC: 3.1 G/DL (ref 3.4–5)
ALP BLD-CCNC: 42 U/L (ref 40–129)
ALT SERPL-CCNC: 40 U/L (ref 10–40)
ANION GAP SERPL CALCULATED.3IONS-SCNC: 10 MMOL/L (ref 3–16)
APTT: 72 SEC (ref 26.2–38.6)
AST SERPL-CCNC: 20 U/L (ref 15–37)
BANDED NEUTROPHILS RELATIVE PERCENT: 4 % (ref 0–7)
BASOPHILS ABSOLUTE: 0 K/UL (ref 0–0.2)
BASOPHILS RELATIVE PERCENT: 0 %
BILIRUB SERPL-MCNC: 0.3 MG/DL (ref 0–1)
BUN BLDV-MCNC: 29 MG/DL (ref 7–20)
CALCIUM SERPL-MCNC: 8.9 MG/DL (ref 8.3–10.6)
CHLORIDE BLD-SCNC: 102 MMOL/L (ref 99–110)
CO2: 27 MMOL/L (ref 21–32)
CREAT SERPL-MCNC: 0.9 MG/DL (ref 0.6–1.2)
CRENATED RBC'S: ABNORMAL
EOSINOPHILS ABSOLUTE: 0 K/UL (ref 0–0.6)
EOSINOPHILS RELATIVE PERCENT: 0 %
GFR AFRICAN AMERICAN: >60
GFR NON-AFRICAN AMERICAN: >60
GLOBULIN: 3.1 G/DL
GLUCOSE BLD-MCNC: 242 MG/DL (ref 70–99)
GLUCOSE BLD-MCNC: 251 MG/DL (ref 70–99)
GLUCOSE BLD-MCNC: 273 MG/DL (ref 70–99)
GLUCOSE BLD-MCNC: 285 MG/DL (ref 70–99)
GLUCOSE BLD-MCNC: 310 MG/DL (ref 70–99)
HCT VFR BLD CALC: 39.5 % (ref 36–48)
HEMATOLOGY PATH CONSULT: YES
HEMOGLOBIN: 13.6 G/DL (ref 12–16)
HYPERSEGMENTED NEUTROPHILS: PRESENT
LYMPHOCYTES ABSOLUTE: 0.5 K/UL (ref 1–5.1)
LYMPHOCYTES RELATIVE PERCENT: 5 %
MAGNESIUM: 2.3 MG/DL (ref 1.8–2.4)
MCH RBC QN AUTO: 30.2 PG (ref 26–34)
MCHC RBC AUTO-ENTMCNC: 34.4 G/DL (ref 31–36)
MCV RBC AUTO: 87.8 FL (ref 80–100)
METAMYELOCYTES RELATIVE PERCENT: 1 %
MONOCYTES ABSOLUTE: 0.4 K/UL (ref 0–1.3)
MONOCYTES RELATIVE PERCENT: 4 %
NEUTROPHILS ABSOLUTE: 8.3 K/UL (ref 1.7–7.7)
NEUTROPHILS RELATIVE PERCENT: 86 %
OVALOCYTES: ABNORMAL
PDW BLD-RTO: 12.8 % (ref 12.4–15.4)
PERFORMED ON: ABNORMAL
PHOSPHORUS: 4 MG/DL (ref 2.5–4.9)
PLATELET # BLD: 308 K/UL (ref 135–450)
PLATELET SLIDE REVIEW: ADEQUATE
PMV BLD AUTO: 9.2 FL (ref 5–10.5)
POLYCHROMASIA: ABNORMAL
POTASSIUM SERPL-SCNC: 4.5 MMOL/L (ref 3.5–5.1)
RBC # BLD: 4.49 M/UL (ref 4–5.2)
SLIDE REVIEW: ABNORMAL
SMUDGE CELLS: PRESENT
SODIUM BLD-SCNC: 139 MMOL/L (ref 136–145)
TOTAL PROTEIN: 6.2 G/DL (ref 6.4–8.2)
TSH REFLEX FT4: 0.48 UIU/ML (ref 0.27–4.2)
WBC # BLD: 9.1 K/UL (ref 4–11)

## 2021-07-21 PROCEDURE — 6370000000 HC RX 637 (ALT 250 FOR IP): Performed by: NURSE PRACTITIONER

## 2021-07-21 PROCEDURE — 85025 COMPLETE CBC W/AUTO DIFF WBC: CPT

## 2021-07-21 PROCEDURE — 85730 THROMBOPLASTIN TIME PARTIAL: CPT

## 2021-07-21 PROCEDURE — 2580000003 HC RX 258: Performed by: NURSE PRACTITIONER

## 2021-07-21 PROCEDURE — 6370000000 HC RX 637 (ALT 250 FOR IP): Performed by: INTERNAL MEDICINE

## 2021-07-21 PROCEDURE — 6370000000 HC RX 637 (ALT 250 FOR IP): Performed by: HOSPITALIST

## 2021-07-21 PROCEDURE — 97530 THERAPEUTIC ACTIVITIES: CPT

## 2021-07-21 PROCEDURE — 2500000003 HC RX 250 WO HCPCS: Performed by: INTERNAL MEDICINE

## 2021-07-21 PROCEDURE — 99233 SBSQ HOSP IP/OBS HIGH 50: CPT | Performed by: INTERNAL MEDICINE

## 2021-07-21 PROCEDURE — 36415 COLL VENOUS BLD VENIPUNCTURE: CPT

## 2021-07-21 PROCEDURE — 83735 ASSAY OF MAGNESIUM: CPT

## 2021-07-21 PROCEDURE — 94761 N-INVAS EAR/PLS OXIMETRY MLT: CPT

## 2021-07-21 PROCEDURE — 80053 COMPREHEN METABOLIC PANEL: CPT

## 2021-07-21 PROCEDURE — APPNB15 APP NON BILLABLE TIME 0-15 MINS: Performed by: NURSE PRACTITIONER

## 2021-07-21 PROCEDURE — 1200000000 HC SEMI PRIVATE

## 2021-07-21 PROCEDURE — 97162 PT EVAL MOD COMPLEX 30 MIN: CPT

## 2021-07-21 PROCEDURE — 2700000000 HC OXYGEN THERAPY PER DAY

## 2021-07-21 PROCEDURE — 6360000002 HC RX W HCPCS: Performed by: INTERNAL MEDICINE

## 2021-07-21 PROCEDURE — 84100 ASSAY OF PHOSPHORUS: CPT

## 2021-07-21 PROCEDURE — 84443 ASSAY THYROID STIM HORMONE: CPT

## 2021-07-21 RX ORDER — INSULIN GLARGINE 100 [IU]/ML
20 INJECTION, SOLUTION SUBCUTANEOUS 2 TIMES DAILY
Status: DISCONTINUED | OUTPATIENT
Start: 2021-07-21 | End: 2021-07-22

## 2021-07-21 RX ORDER — DOCUSATE SODIUM 100 MG/1
100 CAPSULE, LIQUID FILLED ORAL DAILY
Status: DISCONTINUED | OUTPATIENT
Start: 2021-07-21 | End: 2021-07-25 | Stop reason: HOSPADM

## 2021-07-21 RX ORDER — SENNA PLUS 8.6 MG/1
1 TABLET ORAL NIGHTLY PRN
Status: DISCONTINUED | OUTPATIENT
Start: 2021-07-21 | End: 2021-07-25 | Stop reason: HOSPADM

## 2021-07-21 RX ADMIN — INSULIN LISPRO 12 UNITS: 100 INJECTION, SOLUTION INTRAVENOUS; SUBCUTANEOUS at 09:15

## 2021-07-21 RX ADMIN — INSULIN LISPRO 9 UNITS: 100 INJECTION, SOLUTION INTRAVENOUS; SUBCUTANEOUS at 09:16

## 2021-07-21 RX ADMIN — POTASSIUM CITRATE 10 MEQ: 10 TABLET ORAL at 18:28

## 2021-07-21 RX ADMIN — FAMOTIDINE 20 MG: 20 TABLET ORAL at 09:13

## 2021-07-21 RX ADMIN — INSULIN LISPRO 12 UNITS: 100 INJECTION, SOLUTION INTRAVENOUS; SUBCUTANEOUS at 13:10

## 2021-07-21 RX ADMIN — POTASSIUM CITRATE 10 MEQ: 10 TABLET ORAL at 09:49

## 2021-07-21 RX ADMIN — INSULIN GLARGINE 20 UNITS: 100 INJECTION, SOLUTION SUBCUTANEOUS at 09:16

## 2021-07-21 RX ADMIN — OXYCODONE HYDROCHLORIDE AND ACETAMINOPHEN 500 MG: 500 TABLET ORAL at 09:15

## 2021-07-21 RX ADMIN — ACETAMINOPHEN 650 MG: 325 TABLET ORAL at 22:28

## 2021-07-21 RX ADMIN — INSULIN LISPRO 6 UNITS: 100 INJECTION, SOLUTION INTRAVENOUS; SUBCUTANEOUS at 18:37

## 2021-07-21 RX ADMIN — SODIUM CHLORIDE, PRESERVATIVE FREE 10 ML: 5 INJECTION INTRAVENOUS at 09:15

## 2021-07-21 RX ADMIN — DEXAMETHASONE 10 MG: 4 TABLET ORAL at 09:13

## 2021-07-21 RX ADMIN — INSULIN LISPRO 5 UNITS: 100 INJECTION, SOLUTION INTRAVENOUS; SUBCUTANEOUS at 19:54

## 2021-07-21 RX ADMIN — ATORVASTATIN CALCIUM 10 MG: 10 TABLET, FILM COATED ORAL at 09:13

## 2021-07-21 RX ADMIN — ZINC SULFATE 220 MG (50 MG) CAPSULE 50 MG: CAPSULE at 09:13

## 2021-07-21 RX ADMIN — INSULIN LISPRO 12 UNITS: 100 INJECTION, SOLUTION INTRAVENOUS; SUBCUTANEOUS at 18:36

## 2021-07-21 RX ADMIN — INSULIN GLARGINE 20 UNITS: 100 INJECTION, SOLUTION SUBCUTANEOUS at 19:54

## 2021-07-21 RX ADMIN — Medication 1000 UNITS: at 09:14

## 2021-07-21 RX ADMIN — ONDANSETRON 4 MG: 4 TABLET, ORALLY DISINTEGRATING ORAL at 21:48

## 2021-07-21 RX ADMIN — INSULIN LISPRO 12 UNITS: 100 INJECTION, SOLUTION INTRAVENOUS; SUBCUTANEOUS at 13:08

## 2021-07-21 RX ADMIN — DOCUSATE SODIUM 100 MG: 100 CAPSULE ORAL at 14:57

## 2021-07-21 RX ADMIN — HEPARIN SODIUM 980 UNITS/HR: 10000 INJECTION, SOLUTION INTRAVENOUS at 05:06

## 2021-07-21 ASSESSMENT — PAIN DESCRIPTION - ORIENTATION: ORIENTATION: RIGHT;LEFT

## 2021-07-21 ASSESSMENT — PAIN - FUNCTIONAL ASSESSMENT: PAIN_FUNCTIONAL_ASSESSMENT: PREVENTS OR INTERFERES SOME ACTIVE ACTIVITIES AND ADLS

## 2021-07-21 ASSESSMENT — PAIN DESCRIPTION - LOCATION: LOCATION: FLANK

## 2021-07-21 ASSESSMENT — PAIN DESCRIPTION - DESCRIPTORS: DESCRIPTORS: ACHING

## 2021-07-21 ASSESSMENT — PAIN SCALES - GENERAL
PAINLEVEL_OUTOF10: 6
PAINLEVEL_OUTOF10: 0
PAINLEVEL_OUTOF10: 5
PAINLEVEL_OUTOF10: 3
PAINLEVEL_OUTOF10: 0
PAINLEVEL_OUTOF10: 0

## 2021-07-21 ASSESSMENT — PAIN DESCRIPTION - FREQUENCY: FREQUENCY: CONTINUOUS

## 2021-07-21 ASSESSMENT — PAIN DESCRIPTION - ONSET: ONSET: ON-GOING

## 2021-07-21 ASSESSMENT — PAIN DESCRIPTION - PROGRESSION: CLINICAL_PROGRESSION: GRADUALLY WORSENING

## 2021-07-21 ASSESSMENT — PAIN DESCRIPTION - PAIN TYPE: TYPE: ACUTE PAIN

## 2021-07-21 NOTE — PROGRESS NOTES
Pulmonary Progress Note    CC:  Follow up respiratory failure, COVID    Subjective:  10 liters HF  Doing better   Less SOB    ROS  Less SOB      Intake/Output Summary (Last 24 hours) at 7/21/2021 0736  Last data filed at 7/21/2021 0600  Gross per 24 hour   Intake 799.2 ml   Output 1375 ml   Net -575.8 ml         PHYSICAL EXAM:  Blood pressure 116/68, pulse (!) 46, temperature 98.3 °F (36.8 °C), temperature source Oral, resp. rate 16, height 5' 2\" (1.575 m), weight 153 lb 3.5 oz (69.5 kg), SpO2 98 %, not currently breastfeeding.'  Gen: No distress. Eyes: PERRL. No sclera icterus. No conjunctival injection. ENT: No discharge. Pharynx clear. External appearance of ears and nose normal.  Neck: Trachea midline. No obvious mass. Resp: Clear but diminished   CV: Jagdish Ripper. No murmur or rub. GI: Non-tender. Non-distended. No hernia. Skin: Warm, dry, normal texture and turgor. No nodule on exposed extremities. Lymph: No cervical LAD. No supraclavicular LAD. M/S: No cyanosis. No clubbing. No joint deformity. Neuro: Moves all four extremities. CN 2-12 tested, no defect noted.   Ext:   no edema    Medications:    Scheduled Meds:   dilTIAZem  180 mg Oral Daily    insulin glargine  10 Units Subcutaneous BID    insulin lispro  12 Units Subcutaneous TID     guaiFENesin  600 mg Oral BID    dexamethasone  10 mg Oral Daily    insulin lispro  0-18 Units Subcutaneous TID     insulin lispro  0-9 Units Subcutaneous Nightly    atorvastatin  10 mg Oral Daily    metoprolol succinate  50 mg Oral Daily    sodium chloride flush  5-40 mL Intravenous 2 times per day    Vitamin D  1,000 Units Oral Daily    zinc sulfate  50 mg Oral Daily    ascorbic acid  500 mg Oral Daily    famotidine  20 mg Oral Daily    potassium citrate  10 mEq Oral BID        Continuous Infusions:   heparin (PORCINE) Infusion 980 Units/hr (07/21/21 0506)    sodium chloride      dextrose         PRN Meds:  albuterol sulfate HFA **AND** ipratropium, guaiFENesin-dextromethorphan, sodium chloride, sodium chloride flush, sodium chloride, ondansetron **OR** ondansetron, polyethylene glycol, acetaminophen **OR** acetaminophen, glucose, dextrose, glucagon (rDNA), dextrose, promethazine, benzonatate, albuterol sulfate HFA    Labs:  CBC:   Recent Labs     07/19/21  0535 07/21/21  0441   WBC 7.5 9.1   HGB 13.0 13.6   HCT 36.8 39.5   MCV 86.3 87.8    308     BMP:   Recent Labs     07/19/21  0535 07/20/21  0434 07/21/21  0441    139 139   K 3.9 4.2 4.5    103 102   CO2 24 27 27   PHOS  --  3.9 4.0   BUN 31* 25* 29*   CREATININE 0.9 0.9 0.9     LIVER PROFILE:   Recent Labs     07/19/21  0535 07/20/21  0434 07/21/21  0441   AST 24 30 20   ALT 27 44* 40   BILITOT 0.3 0.4 0.3   ALKPHOS 38* 41 42     PT/INR: No results for input(s): PROTIME, INR in the last 72 hours. APTT:   Recent Labs     07/19/21  0535 07/20/21  0434 07/21/21 0441   APTT 71.7* 80.8* 72.0*     UA:No results for input(s): NITRITE, COLORU, PHUR, LABCAST, WBCUA, RBCUA, MUCUS, TRICHOMONAS, YEAST, BACTERIA, CLARITYU, SPECGRAV, LEUKOCYTESUR, UROBILINOGEN, BILIRUBINUR, BLOODU, GLUCOSEU, AMORPHOUS in the last 72 hours. Invalid input(s): Brandon Hinojosa  No results for input(s): PH, PCO2, PO2 in the last 72 hours. Films:  Chest imaging reports were reviewed and imaging was reviewed by me and showed no new films    ABG:  None    Cultures:  None    I reviewed the labs and images listed above    Assessment/Plan:    Acute Hypoxic Respiratory Failure with saturations less than 90% on room air  Titrate oxygen for saturations greater than or equal to 88%  o Vapotherm. Add on NRB if needed   COVID-19 pneumonia  o Decadron 20 mg for 5 days, then 10 mg for 5 days  o Remdesivir completed    Acute PE  o Heparin gtt. Will need 3 months of anticoagulation.   Transition to NOAC soon    ARDS    DVT prophylaxis  Heparin     PT/OT         Georgie Delacruz DO  [facility] Pulmonary

## 2021-07-21 NOTE — PROGRESS NOTES
PM assessment complete, VSS. Pt proned at this time. No acute S/S of distress noted at this time. Will continue to monitor.

## 2021-07-21 NOTE — PROGRESS NOTES
Clinical Pharmacy Note  Heparin Dosing       Lab Results   Component Value Date    APTT 72.0 07/21/2021     Lab Results   Component Value Date    HGB 13.6 07/21/2021    HCT 39.5 07/21/2021     07/21/2021       Current Infusion Rate: 980 units/hr    Plan:  Rate: continue at 980 units/hr  Next aPTT: 0600 on 7/22/21    Pharmacy will continue to monitor and adjust based on aPTT results.   Avery Mace, PharmD

## 2021-07-21 NOTE — PROGRESS NOTES
Hospitalist Progress Note      PCP: Cliff King MD    Date of Admission: 7/13/2021    Chief Complaint: Generalized weakness, poor appetite, poor p.o. intake    Hospital Course: 62 y.o. female  to [facility] with PMHx: CKD, HLD, DM, HTN  no recent travel. Patient spouse is admitted to the ICU: Positive for Covid      presented to the ED with encouragement of her family due to the fact that her  was positive for Covid and she is becoming symptomatic with decreased appetite, generalized weakness. she had not really ate or drank anything x 24 to 48-hour. She is reporting some shortness of breath No fever or chills. No diarrhea. No cough or abdominal pain.     NOT PREVIOUSLY VACCINATED for Covid.     ED work-up: Mild leukopenia 2.6, mild thrombocytopenia of 93, MARCI on CKD: BUN 55, cr 2.3, GFR 22. . EKG is unremarkable. Chest x-ray no consolidation or opacities. CV-19 +.  was started on IV fluids, given an inhaler treatment and started on Decadron.   On exam patient was very sleepy, looks very tired. She was awakened and remarked that she had not really gotten much sleep and was very tired. She did not appear to be any any type of respiratory distress. Skin warm and dry. Lung fields clear. SPO2 93 to 94% on room air. Respirations were easy and regular. Developed a PE treated with heparin drip and progressed to ARDS requiring HFNC. Completed course remdesevir      Subjective: remains in ICU on medsurg status. Was weaned to 10L NC yesterday, sats 93-98% but 88% this am HR 46 remains bradycardic despite holding toprol and cardizem. Will dc them today and as HR/BP improve could restart. No Bm at least 2days.  States less SOB when goes to [facility]     all other systems neg       Medications:  Reviewed    Infusion Medications    heparin (PORCINE) Infusion 980 Units/hr (07/21/21 0506)    sodium chloride      dextrose       Scheduled Medications    insulin glargine  20 Units Subcutaneous BID  dilTIAZem  180 mg Oral Daily    insulin lispro  12 Units Subcutaneous TID     guaiFENesin  600 mg Oral BID    dexamethasone  10 mg Oral Daily    insulin lispro  0-18 Units Subcutaneous TID     insulin lispro  0-9 Units Subcutaneous Nightly    atorvastatin  10 mg Oral Daily    metoprolol succinate  50 mg Oral Daily    sodium chloride flush  5-40 mL Intravenous 2 times per day    Vitamin D  1,000 Units Oral Daily    zinc sulfate  50 mg Oral Daily    ascorbic acid  500 mg Oral Daily    famotidine  20 mg Oral Daily    potassium citrate  10 mEq Oral BID      PRN Meds: albuterol sulfate HFA **AND** ipratropium, guaiFENesin-dextromethorphan, sodium chloride, sodium chloride flush, sodium chloride, ondansetron **OR** ondansetron, polyethylene glycol, acetaminophen **OR** acetaminophen, glucose, dextrose, glucagon (rDNA), dextrose, promethazine, benzonatate, albuterol sulfate HFA      Intake/Output Summary (Last 24 hours) at 7/21/2021 0834  Last data filed at 7/21/2021 0600  Gross per 24 hour   Intake 799.2 ml   Output 1375 ml   Net -575.8 ml       Physical Exam Performed:    /68   Pulse (!) 46   Temp 98.3 °F (36.8 °C) (Oral)   Resp 20   Ht 5' 2\" (1.575 m)   Wt 153 lb 3.5 oz (69.5 kg)   SpO2 93%   BMI 28.02 kg/m²     General appearance: No distress on cellphone as talking sat decraseed 85-86 then rapidly back up not winded. HEENT: PERRL EOMI conjunctivae clear glasses mucosa moist   Neck: Supple, full range of motion. No jugular venous distention. Trachea midline. Respiratory: HFNC 20 L 100% CTA sats 97%   cardiovascular: RRR S1/S2 without murmurs, rubs or gallops. Abdomen: Soft, non-tender, non-distended with normal bowel sounds. Musculoskeletal: No clubbing, cyanosis or edema. Full range of motion without deformity.   Skin: Skin color pink warm dry,  No rash  Neurologic:  Cranial nerves: II-XII intact, grossly non-focal.  Psychiatric: Alert and oriented, fair  insight  Peripheral Pneumonia is unlikely but not excluded. No evidence of   pleural effusion         XR CHEST PORTABLE   Final Result   Hypoinflation with mild discoid atelectasis or scarring along the lung bases. Assessment/Plan:    Active Hospital Problems    Diagnosis     Right pulmonary embolus ([facility]) [I26.99]     ARDS (adult respiratory distress syndrome) ([facility]) [J80]     Acute respiratory failure with hypoxia ([facility]) [J96.01]     Neutropenia ([facility]) [D70.9]     Pneumonia due to COVID-19 virus [U07.1, J12.82]     MARCI (acute kidney injury) ([facility] [address] [N17.9]         Acute hypoxemic respiratory failure with SPO2 less than 90% on room air  COVID-19 pneumonia  -Decadron 10mg po d x5   -Remdesivir 7/16 x4 days completed   -Titrate oxygen goal saturation 90%  --recommend proning   --O2 demand  HFNC 20 L 100%. Weaned to 10L   --vitamin D/cholecalciferol 1000 units daily  --zinc sulfate 220mg po daily   inflammatory markers stable   ddimer 403->345  Ferritin 553.4 ->666  CRP 31.5->3. 1    Acute pulmonary embolism  Nonocclusive embolism right descending pulmonary artery   clot burden mild    On heparin drip  Transition to [facility] discussed risk/benefit x at least 3 months      Neutropenia-resolved    Acute renal failure   cr down to 0.9 GFR >60      DM type 2  --hold januvia  --accucheck ac and HS low dose SS  --lantus 18 units HS->10 unit BID->20unit BID  -- Lispro 7 units TIDac ->12 units TID   Steroid dose decreased starting today to10mg .watch for hypoglycemia or need to wean back doses. As of this am FBS still elevated 251    Essential HTN  Sinus bradycardia  HR still 40's asymptomatic.   cardizem CD 240mg daily with toprol 50mg daily. held doses yesterday, already  decreased cardizem CD to 180mg daily   --add TSH level for completeness r/o hypothyroidism but she has been on rate controlling meds to explain     DVT Prophylaxis: heparin drip  Diet: ADULT DIET; Regular; 3 carb choices (45 gm/meal);  Low Potassium (Less than 3000 mg/day);  Low Phosphorus (Less than 1000 mg)  Code Status: Full Code    PT/OT Eval Status:   Dispo - goal return home     Ira Arriaza MD

## 2021-07-21 NOTE — CARE COORDINATION
Patient down to 10 L O2 today. Will continue to monitor for care coordination needs.   Marti Rosado RN, BSN, Case Management  Phone: [phone]  Electronically signed by Marti Rosado RN on 7/21/2021 at 2:39 PM

## 2021-07-21 NOTE — PROGRESS NOTES
0715: Handoff completed with Daly Smith RN. Patient resting in bed. VSS on monitor. On 10L high flow. Denies SOB.  0915: Assessment completed. AM medications administered. 1130: Laying in bed, self proned. 1300: Repositioned to back. Sats 88-96% on 10L high flow.

## 2021-07-22 ENCOUNTER — ANESTHESIA (OUTPATIENT)
Dept: OPERATING ROOM | Age: 61
DRG: 987 | End: 2021-07-22
Payer: COMMERCIAL

## 2021-07-22 ENCOUNTER — APPOINTMENT (OUTPATIENT)
Dept: GENERAL RADIOLOGY | Age: 61
DRG: 987 | End: 2021-07-22
Payer: COMMERCIAL

## 2021-07-22 ENCOUNTER — APPOINTMENT (OUTPATIENT)
Dept: ULTRASOUND IMAGING | Age: 61
DRG: 987 | End: 2021-07-22
Payer: COMMERCIAL

## 2021-07-22 ENCOUNTER — APPOINTMENT (OUTPATIENT)
Dept: CT IMAGING | Age: 61
DRG: 987 | End: 2021-07-22
Payer: COMMERCIAL

## 2021-07-22 ENCOUNTER — ANESTHESIA EVENT (OUTPATIENT)
Dept: OPERATING ROOM | Age: 61
DRG: 987 | End: 2021-07-22
Payer: COMMERCIAL

## 2021-07-22 VITALS
SYSTOLIC BLOOD PRESSURE: 116 MMHG | DIASTOLIC BLOOD PRESSURE: 67 MMHG | OXYGEN SATURATION: 97 % | RESPIRATION RATE: 16 BRPM

## 2021-07-22 LAB
A/G RATIO: 1.1 (ref 1.1–2.2)
ALBUMIN SERPL-MCNC: 3.1 G/DL (ref 3.4–5)
ALP BLD-CCNC: 40 U/L (ref 40–129)
ALT SERPL-CCNC: 32 U/L (ref 10–40)
ANION GAP SERPL CALCULATED.3IONS-SCNC: 6 MMOL/L (ref 3–16)
APTT: 87.6 SEC (ref 26.2–38.6)
AST SERPL-CCNC: 12 U/L (ref 15–37)
BACTERIA: ABNORMAL /HPF
BASOPHILS ABSOLUTE: 0.1 K/UL (ref 0–0.2)
BASOPHILS RELATIVE PERCENT: 0.4 %
BILIRUB SERPL-MCNC: 0.3 MG/DL (ref 0–1)
BILIRUBIN URINE: NEGATIVE
BLOOD, URINE: ABNORMAL
BUN BLDV-MCNC: 37 MG/DL (ref 7–20)
CALCIUM SERPL-MCNC: 8.9 MG/DL (ref 8.3–10.6)
CHLORIDE BLD-SCNC: 101 MMOL/L (ref 99–110)
CLARITY: ABNORMAL
CO2: 27 MMOL/L (ref 21–32)
COLOR: YELLOW
CREAT SERPL-MCNC: 0.9 MG/DL (ref 0.6–1.2)
EOSINOPHILS ABSOLUTE: 0 K/UL (ref 0–0.6)
EOSINOPHILS RELATIVE PERCENT: 0 %
EPITHELIAL CELLS, UA: 1 /HPF (ref 0–5)
GFR AFRICAN AMERICAN: >60
GFR NON-AFRICAN AMERICAN: >60
GLOBULIN: 2.8 G/DL
GLUCOSE BLD-MCNC: 118 MG/DL (ref 70–99)
GLUCOSE BLD-MCNC: 134 MG/DL (ref 70–99)
GLUCOSE BLD-MCNC: 202 MG/DL (ref 70–99)
GLUCOSE BLD-MCNC: 205 MG/DL (ref 70–99)
GLUCOSE BLD-MCNC: 205 MG/DL (ref 70–99)
GLUCOSE URINE: NEGATIVE MG/DL
HCT VFR BLD CALC: 38.9 % (ref 36–48)
HEMATOLOGY PATH CONSULT: NORMAL
HEMOGLOBIN: 13.4 G/DL (ref 12–16)
HYALINE CASTS: 3 /LPF (ref 0–8)
KETONES, URINE: NEGATIVE MG/DL
LEUKOCYTE ESTERASE, URINE: ABNORMAL
LYMPHOCYTES ABSOLUTE: 0.6 K/UL (ref 1–5.1)
LYMPHOCYTES RELATIVE PERCENT: 3.8 %
MAGNESIUM: 2.1 MG/DL (ref 1.8–2.4)
MCH RBC QN AUTO: 30.1 PG (ref 26–34)
MCHC RBC AUTO-ENTMCNC: 34.5 G/DL (ref 31–36)
MCV RBC AUTO: 87.4 FL (ref 80–100)
MICROSCOPIC EXAMINATION: YES
MONOCYTES ABSOLUTE: 0.6 K/UL (ref 0–1.3)
MONOCYTES RELATIVE PERCENT: 3.9 %
NEUTROPHILS ABSOLUTE: 15.2 K/UL (ref 1.7–7.7)
NEUTROPHILS RELATIVE PERCENT: 91.9 %
NITRITE, URINE: NEGATIVE
PDW BLD-RTO: 12.9 % (ref 12.4–15.4)
PERFORMED ON: ABNORMAL
PH UA: 5 (ref 5–8)
PHOSPHORUS: 3.9 MG/DL (ref 2.5–4.9)
PLATELET # BLD: 368 K/UL (ref 135–450)
PMV BLD AUTO: 8.9 FL (ref 5–10.5)
POTASSIUM SERPL-SCNC: 4.5 MMOL/L (ref 3.5–5.1)
PROCALCITONIN: 0.04 NG/ML (ref 0–0.15)
PROTEIN UA: 30 MG/DL
RBC # BLD: 4.45 M/UL (ref 4–5.2)
RBC UA: ABNORMAL /HPF (ref 0–4)
SODIUM BLD-SCNC: 134 MMOL/L (ref 136–145)
SPECIFIC GRAVITY UA: 1.02 (ref 1–1.03)
TOTAL PROTEIN: 5.9 G/DL (ref 6.4–8.2)
URINE TYPE: ABNORMAL
UROBILINOGEN, URINE: 0.2 E.U./DL
WBC # BLD: 16.5 K/UL (ref 4–11)
WBC UA: 31 /HPF (ref 0–5)

## 2021-07-22 PROCEDURE — 2700000000 HC OXYGEN THERAPY PER DAY

## 2021-07-22 PROCEDURE — 6360000002 HC RX W HCPCS: Performed by: NURSE ANESTHETIST, CERTIFIED REGISTERED

## 2021-07-22 PROCEDURE — 2580000003 HC RX 258: Performed by: UROLOGY

## 2021-07-22 PROCEDURE — 2709999900 HC NON-CHARGEABLE SUPPLY: Performed by: UROLOGY

## 2021-07-22 PROCEDURE — 2580000003 HC RX 258: Performed by: NURSE PRACTITIONER

## 2021-07-22 PROCEDURE — 2580000003 HC RX 258: Performed by: HOSPITALIST

## 2021-07-22 PROCEDURE — 6360000002 HC RX W HCPCS: Performed by: HOSPITALIST

## 2021-07-22 PROCEDURE — C1758 CATHETER, URETERAL: HCPCS | Performed by: UROLOGY

## 2021-07-22 PROCEDURE — 76770 US EXAM ABDO BACK WALL COMP: CPT

## 2021-07-22 PROCEDURE — 3700000001 HC ADD 15 MINUTES (ANESTHESIA): Performed by: UROLOGY

## 2021-07-22 PROCEDURE — 87077 CULTURE AEROBIC IDENTIFY: CPT

## 2021-07-22 PROCEDURE — C1769 GUIDE WIRE: HCPCS | Performed by: UROLOGY

## 2021-07-22 PROCEDURE — 6370000000 HC RX 637 (ALT 250 FOR IP): Performed by: INTERNAL MEDICINE

## 2021-07-22 PROCEDURE — 83735 ASSAY OF MAGNESIUM: CPT

## 2021-07-22 PROCEDURE — 80053 COMPREHEN METABOLIC PANEL: CPT

## 2021-07-22 PROCEDURE — 6360000002 HC RX W HCPCS: Performed by: INTERNAL MEDICINE

## 2021-07-22 PROCEDURE — 85730 THROMBOPLASTIN TIME PARTIAL: CPT

## 2021-07-22 PROCEDURE — 6360000002 HC RX W HCPCS: Performed by: NURSE PRACTITIONER

## 2021-07-22 PROCEDURE — 36415 COLL VENOUS BLD VENIPUNCTURE: CPT

## 2021-07-22 PROCEDURE — 2500000003 HC RX 250 WO HCPCS: Performed by: INTERNAL MEDICINE

## 2021-07-22 PROCEDURE — 1200000000 HC SEMI PRIVATE

## 2021-07-22 PROCEDURE — 3600000012 HC SURGERY LEVEL 2 ADDTL 15MIN: Performed by: UROLOGY

## 2021-07-22 PROCEDURE — 6370000000 HC RX 637 (ALT 250 FOR IP): Performed by: NURSE PRACTITIONER

## 2021-07-22 PROCEDURE — 99233 SBSQ HOSP IP/OBS HIGH 50: CPT | Performed by: INTERNAL MEDICINE

## 2021-07-22 PROCEDURE — 94761 N-INVAS EAR/PLS OXIMETRY MLT: CPT

## 2021-07-22 PROCEDURE — 3700000000 HC ANESTHESIA ATTENDED CARE: Performed by: UROLOGY

## 2021-07-22 PROCEDURE — 87086 URINE CULTURE/COLONY COUNT: CPT

## 2021-07-22 PROCEDURE — 87186 SC STD MICRODIL/AGAR DIL: CPT

## 2021-07-22 PROCEDURE — C2617 STENT, NON-COR, TEM W/O DEL: HCPCS | Performed by: UROLOGY

## 2021-07-22 PROCEDURE — 74176 CT ABD & PELVIS W/O CONTRAST: CPT

## 2021-07-22 PROCEDURE — 81001 URINALYSIS AUTO W/SCOPE: CPT

## 2021-07-22 PROCEDURE — 3209999900 FLUORO FOR SURGICAL PROCEDURES

## 2021-07-22 PROCEDURE — 84145 PROCALCITONIN (PCT): CPT

## 2021-07-22 PROCEDURE — 6370000000 HC RX 637 (ALT 250 FOR IP): Performed by: HOSPITALIST

## 2021-07-22 PROCEDURE — 6360000004 HC RX CONTRAST MEDICATION: Performed by: UROLOGY

## 2021-07-22 PROCEDURE — 85025 COMPLETE CBC W/AUTO DIFF WBC: CPT

## 2021-07-22 PROCEDURE — 3600000002 HC SURGERY LEVEL 2 BASE: Performed by: UROLOGY

## 2021-07-22 PROCEDURE — 2500000003 HC RX 250 WO HCPCS: Performed by: NURSE ANESTHETIST, CERTIFIED REGISTERED

## 2021-07-22 PROCEDURE — 84100 ASSAY OF PHOSPHORUS: CPT

## 2021-07-22 DEVICE — STENT URET 6FR L26CM PERCFLX HYDR+ TAPR TIP GRAD: Type: IMPLANTABLE DEVICE | Site: URETER | Status: FUNCTIONAL

## 2021-07-22 RX ORDER — MORPHINE SULFATE 2 MG/ML
2 INJECTION, SOLUTION INTRAMUSCULAR; INTRAVENOUS
Status: DISCONTINUED | OUTPATIENT
Start: 2021-07-22 | End: 2021-07-25 | Stop reason: HOSPADM

## 2021-07-22 RX ORDER — MORPHINE SULFATE 2 MG/ML
2 INJECTION, SOLUTION INTRAMUSCULAR; INTRAVENOUS EVERY 4 HOURS PRN
Status: DISCONTINUED | OUTPATIENT
Start: 2021-07-22 | End: 2021-07-22

## 2021-07-22 RX ORDER — PROPOFOL 10 MG/ML
INJECTION, EMULSION INTRAVENOUS PRN
Status: DISCONTINUED | OUTPATIENT
Start: 2021-07-22 | End: 2021-07-22 | Stop reason: SDUPTHER

## 2021-07-22 RX ORDER — DIPHENHYDRAMINE HYDROCHLORIDE 50 MG/ML
25 INJECTION INTRAMUSCULAR; INTRAVENOUS EVERY 6 HOURS PRN
Status: DISCONTINUED | OUTPATIENT
Start: 2021-07-22 | End: 2021-07-25 | Stop reason: HOSPADM

## 2021-07-22 RX ORDER — LIDOCAINE HYDROCHLORIDE 20 MG/ML
INJECTION, SOLUTION EPIDURAL; INFILTRATION; INTRACAUDAL; PERINEURAL PRN
Status: DISCONTINUED | OUTPATIENT
Start: 2021-07-22 | End: 2021-07-22 | Stop reason: SDUPTHER

## 2021-07-22 RX ORDER — INSULIN GLARGINE 100 [IU]/ML
28 INJECTION, SOLUTION SUBCUTANEOUS 2 TIMES DAILY
Status: DISCONTINUED | OUTPATIENT
Start: 2021-07-22 | End: 2021-07-25

## 2021-07-22 RX ORDER — FENTANYL CITRATE 50 UG/ML
25 INJECTION, SOLUTION INTRAMUSCULAR; INTRAVENOUS EVERY 5 MIN PRN
Status: DISCONTINUED | OUTPATIENT
Start: 2021-07-22 | End: 2021-07-22 | Stop reason: HOSPADM

## 2021-07-22 RX ORDER — OXYCODONE HYDROCHLORIDE AND ACETAMINOPHEN 5; 325 MG/1; MG/1
1 TABLET ORAL EVERY 4 HOURS PRN
Status: DISCONTINUED | OUTPATIENT
Start: 2021-07-22 | End: 2021-07-25 | Stop reason: HOSPADM

## 2021-07-22 RX ORDER — PROMETHAZINE HYDROCHLORIDE 25 MG/ML
6.25 INJECTION, SOLUTION INTRAMUSCULAR; INTRAVENOUS
Status: DISCONTINUED | OUTPATIENT
Start: 2021-07-22 | End: 2021-07-22 | Stop reason: HOSPADM

## 2021-07-22 RX ORDER — LABETALOL HYDROCHLORIDE 5 MG/ML
5 INJECTION, SOLUTION INTRAVENOUS EVERY 10 MIN PRN
Status: DISCONTINUED | OUTPATIENT
Start: 2021-07-22 | End: 2021-07-22 | Stop reason: HOSPADM

## 2021-07-22 RX ORDER — OXYCODONE AND ACETAMINOPHEN 10; 325 MG/1; MG/1
1 TABLET ORAL ONCE
Status: COMPLETED | OUTPATIENT
Start: 2021-07-22 | End: 2021-07-22

## 2021-07-22 RX ORDER — MAGNESIUM HYDROXIDE 1200 MG/15ML
LIQUID ORAL
Status: COMPLETED | OUTPATIENT
Start: 2021-07-22 | End: 2021-07-22

## 2021-07-22 RX ORDER — PROPOFOL 10 MG/ML
INJECTION, EMULSION INTRAVENOUS CONTINUOUS PRN
Status: DISCONTINUED | OUTPATIENT
Start: 2021-07-22 | End: 2021-07-22 | Stop reason: SDUPTHER

## 2021-07-22 RX ADMIN — DOCUSATE SODIUM 100 MG: 100 CAPSULE ORAL at 09:25

## 2021-07-22 RX ADMIN — ATORVASTATIN CALCIUM 10 MG: 10 TABLET, FILM COATED ORAL at 09:25

## 2021-07-22 RX ADMIN — Medication 1000 UNITS: at 09:25

## 2021-07-22 RX ADMIN — ZINC SULFATE 220 MG (50 MG) CAPSULE 50 MG: CAPSULE at 09:25

## 2021-07-22 RX ADMIN — FAMOTIDINE 20 MG: 20 TABLET ORAL at 09:25

## 2021-07-22 RX ADMIN — SODIUM CHLORIDE, PRESERVATIVE FREE 10 ML: 5 INJECTION INTRAVENOUS at 08:34

## 2021-07-22 RX ADMIN — LIDOCAINE HYDROCHLORIDE 100 MG: 20 INJECTION, SOLUTION EPIDURAL; INFILTRATION; INTRACAUDAL; PERINEURAL at 18:16

## 2021-07-22 RX ADMIN — MORPHINE SULFATE 2 MG: 2 INJECTION, SOLUTION INTRAMUSCULAR; INTRAVENOUS at 16:01

## 2021-07-22 RX ADMIN — INSULIN GLARGINE 28 UNITS: 100 INJECTION, SOLUTION SUBCUTANEOUS at 09:30

## 2021-07-22 RX ADMIN — MORPHINE SULFATE 2 MG: 2 INJECTION, SOLUTION INTRAMUSCULAR; INTRAVENOUS at 09:22

## 2021-07-22 RX ADMIN — MORPHINE SULFATE 2 MG: 2 INJECTION, SOLUTION INTRAMUSCULAR; INTRAVENOUS at 20:26

## 2021-07-22 RX ADMIN — ONDANSETRON 4 MG: 2 INJECTION INTRAMUSCULAR; INTRAVENOUS at 11:51

## 2021-07-22 RX ADMIN — HEPARIN SODIUM 980 UNITS/HR: 10000 INJECTION, SOLUTION INTRAVENOUS at 09:30

## 2021-07-22 RX ADMIN — INSULIN GLARGINE 28 UNITS: 100 INJECTION, SOLUTION SUBCUTANEOUS at 20:37

## 2021-07-22 RX ADMIN — POTASSIUM CITRATE 10 MEQ: 10 TABLET ORAL at 09:43

## 2021-07-22 RX ADMIN — CEFTRIAXONE 1000 MG: 1 INJECTION, POWDER, FOR SOLUTION INTRAMUSCULAR; INTRAVENOUS at 11:00

## 2021-07-22 RX ADMIN — ACETAMINOPHEN 650 MG: 325 TABLET ORAL at 07:07

## 2021-07-22 RX ADMIN — OXYCODONE AND ACETAMINOPHEN 1 TABLET: 325; 10 TABLET ORAL at 11:00

## 2021-07-22 RX ADMIN — SODIUM CHLORIDE, PRESERVATIVE FREE 10 ML: 5 INJECTION INTRAVENOUS at 20:25

## 2021-07-22 RX ADMIN — SODIUM CHLORIDE: 9 INJECTION, SOLUTION INTRAVENOUS at 17:35

## 2021-07-22 RX ADMIN — POLYETHYLENE GLYCOL 3350 17 G: 17 POWDER, FOR SOLUTION ORAL at 09:25

## 2021-07-22 RX ADMIN — ONDANSETRON 4 MG: 4 TABLET, ORALLY DISINTEGRATING ORAL at 06:41

## 2021-07-22 RX ADMIN — INSULIN LISPRO 6 UNITS: 100 INJECTION, SOLUTION INTRAVENOUS; SUBCUTANEOUS at 11:58

## 2021-07-22 RX ADMIN — INSULIN LISPRO 6 UNITS: 100 INJECTION, SOLUTION INTRAVENOUS; SUBCUTANEOUS at 09:29

## 2021-07-22 RX ADMIN — OXYCODONE HYDROCHLORIDE AND ACETAMINOPHEN 500 MG: 500 TABLET ORAL at 09:25

## 2021-07-22 RX ADMIN — DEXAMETHASONE 10 MG: 4 TABLET ORAL at 09:24

## 2021-07-22 RX ADMIN — PROPOFOL 50 MG: 10 INJECTION, EMULSION INTRAVENOUS at 18:16

## 2021-07-22 RX ADMIN — PROPOFOL 140 MCG/KG/MIN: 10 INJECTION, EMULSION INTRAVENOUS at 18:16

## 2021-07-22 RX ADMIN — MORPHINE SULFATE 2 MG: 2 INJECTION, SOLUTION INTRAMUSCULAR; INTRAVENOUS at 11:52

## 2021-07-22 ASSESSMENT — PAIN DESCRIPTION - ORIENTATION
ORIENTATION: RIGHT

## 2021-07-22 ASSESSMENT — PULMONARY FUNCTION TESTS
PIF_VALUE: 1
PIF_VALUE: 1
PIF_VALUE: 0
PIF_VALUE: 1
PIF_VALUE: 0
PIF_VALUE: 1
PIF_VALUE: 0
PIF_VALUE: 0
PIF_VALUE: 1
PIF_VALUE: 1
PIF_VALUE: 0
PIF_VALUE: 1
PIF_VALUE: 0
PIF_VALUE: 1
PIF_VALUE: 0
PIF_VALUE: 1
PIF_VALUE: 1
PIF_VALUE: 0
PIF_VALUE: 0
PIF_VALUE: 1
PIF_VALUE: 0
PIF_VALUE: 0
PIF_VALUE: 1
PIF_VALUE: 0
PIF_VALUE: 0
PIF_VALUE: 1
PIF_VALUE: 0
PIF_VALUE: 1
PIF_VALUE: 0
PIF_VALUE: 1
PIF_VALUE: 0
PIF_VALUE: 1

## 2021-07-22 ASSESSMENT — PAIN DESCRIPTION - PROGRESSION
CLINICAL_PROGRESSION: GRADUALLY IMPROVING
CLINICAL_PROGRESSION: GRADUALLY WORSENING
CLINICAL_PROGRESSION: NOT CHANGED
CLINICAL_PROGRESSION: GRADUALLY IMPROVING
CLINICAL_PROGRESSION: NOT CHANGED
CLINICAL_PROGRESSION: GRADUALLY IMPROVING
CLINICAL_PROGRESSION: NOT CHANGED

## 2021-07-22 ASSESSMENT — PAIN SCALES - GENERAL
PAINLEVEL_OUTOF10: 10
PAINLEVEL_OUTOF10: 0
PAINLEVEL_OUTOF10: 8
PAINLEVEL_OUTOF10: 10
PAINLEVEL_OUTOF10: 8
PAINLEVEL_OUTOF10: 8
PAINLEVEL_OUTOF10: 10
PAINLEVEL_OUTOF10: 0
PAINLEVEL_OUTOF10: 0
PAINLEVEL_OUTOF10: 3
PAINLEVEL_OUTOF10: 5
PAINLEVEL_OUTOF10: 5
PAINLEVEL_OUTOF10: 0

## 2021-07-22 ASSESSMENT — PAIN DESCRIPTION - DIRECTION
RADIATING_TOWARDS: RIGHT
RADIATING_TOWARDS: RIGHT

## 2021-07-22 ASSESSMENT — PAIN DESCRIPTION - PAIN TYPE
TYPE: ACUTE PAIN

## 2021-07-22 ASSESSMENT — PAIN DESCRIPTION - FREQUENCY
FREQUENCY: CONTINUOUS

## 2021-07-22 ASSESSMENT — PAIN DESCRIPTION - DESCRIPTORS
DESCRIPTORS: CONSTANT;NAGGING
DESCRIPTORS: SHARP;SHOOTING
DESCRIPTORS: SHARP;SHOOTING
DESCRIPTORS: CONSTANT;NAGGING
DESCRIPTORS: SHARP;SHOOTING
DESCRIPTORS: CONSTANT

## 2021-07-22 ASSESSMENT — PAIN DESCRIPTION - LOCATION
LOCATION: FLANK

## 2021-07-22 ASSESSMENT — PAIN DESCRIPTION - ONSET
ONSET: ON-GOING

## 2021-07-22 NOTE — PLAN OF CARE
Problem: Airway Clearance - Ineffective  Goal: Achieve or maintain patent airway  Outcome: Ongoing     Problem: Gas Exchange - Impaired  Goal: Absence of hypoxia  Outcome: Ongoing  Goal: Promote optimal lung function  Outcome: Ongoing     Problem: Breathing Pattern - Ineffective  Goal: Ability to achieve and maintain a regular respiratory rate  Outcome: Ongoing     Problem:  Body Temperature -  Risk of, Imbalanced  Goal: Ability to maintain a body temperature within defined limits  Outcome: Ongoing  Goal: Will regain or maintain usual level of consciousness  Outcome: Ongoing  Goal: Complications related to the disease process, condition or treatment will be avoided or minimized  Outcome: Ongoing     Problem: Isolation Precautions - Risk of Spread of Infection  Goal: Prevent transmission of infection  Outcome: Ongoing     Problem: Nutrition Deficits  Goal: Optimize nutritional status  Outcome: Ongoing     Problem: Risk for Fluid Volume Deficit  Goal: Maintain normal heart rhythm  Outcome: Ongoing  Goal: Maintain absence of muscle cramping  Outcome: Ongoing  Goal: Maintain normal serum potassium, sodium, calcium, phosphorus, and pH  Outcome: Ongoing     Problem: Loneliness or Risk for Loneliness  Goal: Demonstrate positive use of time alone when socialization is not possible  Outcome: Ongoing     Problem: Fatigue  Goal: Verbalize increase energy and improved vitality  Outcome: Ongoing     Problem: Patient Education: Go to Patient Education Activity  Goal: Patient/Family Education  Outcome: Ongoing     Problem: Falls - Risk of:  Goal: Will remain free from falls  Description: Will remain free from falls  Outcome: Ongoing  Goal: Absence of physical injury  Description: Absence of physical injury  Outcome: Ongoing     Problem: Nutrition  Goal: Optimal nutrition therapy  Outcome: Ongoing     Problem: Pain:  Goal: Pain level will decrease  Description: Pain level will decrease  Outcome: Ongoing  Goal: Control of acute pain  Description: Control of acute pain  Outcome: Ongoing  Goal: Control of chronic pain  Description: Control of chronic pain  Outcome: Ongoing     Problem: Airway Clearance - Ineffective  Goal: Achieve or maintain patent airway  Outcome: Ongoing     Problem: Gas Exchange - Impaired  Goal: Absence of hypoxia  Outcome: Ongoing  Goal: Promote optimal lung function  Outcome: Ongoing     Problem: Breathing Pattern - Ineffective  Goal: Ability to achieve and maintain a regular respiratory rate  Outcome: Ongoing     Problem:  Body Temperature -  Risk of, Imbalanced  Goal: Ability to maintain a body temperature within defined limits  Outcome: Ongoing  Goal: Will regain or maintain usual level of consciousness  Outcome: Ongoing  Goal: Complications related to the disease process, condition or treatment will be avoided or minimized  Outcome: Ongoing     Problem: Isolation Precautions - Risk of Spread of Infection  Goal: Prevent transmission of infection  Outcome: Ongoing     Problem: Nutrition Deficits  Goal: Optimize nutritional status  Outcome: Ongoing     Problem: Risk for Fluid Volume Deficit  Goal: Maintain normal heart rhythm  Outcome: Ongoing  Goal: Maintain absence of muscle cramping  Outcome: Ongoing  Goal: Maintain normal serum potassium, sodium, calcium, phosphorus, and pH  Outcome: Ongoing     Problem: Loneliness or Risk for Loneliness  Goal: Demonstrate positive use of time alone when socialization is not possible  Outcome: Ongoing     Problem: Fatigue  Goal: Verbalize increase energy and improved vitality  Outcome: Ongoing     Problem: Patient Education: Go to Patient Education Activity  Goal: Patient/Family Education  Outcome: Ongoing     Problem: Falls - Risk of:  Goal: Will remain free from falls  Description: Will remain free from falls  Outcome: Ongoing  Goal: Absence of physical injury  Description: Absence of physical injury  Outcome: Ongoing     Problem: Nutrition  Goal: Optimal nutrition therapy  Outcome: Ongoing     Problem: Pain:  Goal: Pain level will decrease  Description: Pain level will decrease  Outcome: Ongoing  Goal: Control of acute pain  Description: Control of acute pain  Outcome: Ongoing  Goal: Control of chronic pain  Description: Control of chronic pain  Outcome: Ongoing

## 2021-07-22 NOTE — CONSULTS
Consulting Physician: Dr Rhina Patel     Reason for Consult: right hydronephrosis    History of Present Illness: Carlito Hurst is a 62 y.o. female admitted with covid pneumonia. She has had severe right flank and abdominal pain for 2 days. She has a history of stones and feels that this pain is similar. No fever. No gross hematuria. ANNA today showed mild/ moderate right hydro. CT shows 6 mm distal ureteral stone with moderate hydro. She continues with severe pain. She is also on a heparin drip for PE    Past Medical History:   Past Medical History:   Diagnosis Date    Chronic kidney disease 2009    kidney stones    Hyperlipidemia     Hypertension     MDRO (multiple drug resistant organisms) resistance     MRSA infection 2013    left lower leg    Type II or unspecified type diabetes mellitus without mention of complication, not stated as uncontrolled        Past Surgical History:  Past Surgical History:   Procedure Laterality Date   [address]    COLONOSCOPY  06/07/2018    Dr. Jarrod Leigh.  adenoma polyp, repeat in 5 years    LITHOTRIPSY  2009       Social History:  Social History     Socioeconomic History    Marital status:      Spouse name: Not on file    Number of children: Not on file    Years of education: Not on file    Highest education level: Not on file   Occupational History    Not on file   Tobacco Use    Smoking status: Never Smoker    Smokeless tobacco: Never Used   Substance and Sexual Activity    Alcohol use: No    Drug use: No    Sexual activity: Yes     Partners: Male   Other Topics Concern    Not on file   Social History Narrative    Not on file     Social Determinants of Health     Financial Resource Strain: Low Risk     Difficulty of Paying Living Expenses: Not hard at all   Food Insecurity: No Food Insecurity    Worried About [address] in the Last Year: Never true    [facility] in the Last Year: Never true Transportation Needs:     Lack of Transportation (Medical):      Lack of Transportation (Non-Medical):    Physical Activity:     Days of Exercise per Week:     Minutes of Exercise per Session:    Stress:     Feeling of Stress :    Social Connections:     Frequency of Communication with Friends and Family:     Frequency of Social Gatherings with Friends and Family:     Attends Sabianist Services:     Active Member of Clubs or Organizations:     Attends Club or Organization Meetings:     Marital Status:    Intimate Partner Violence:     Fear of Current or Ex-Partner:     Emotionally Abused:     Physically Abused:     Sexually Abused:        Family History:  Family History   Problem Relation Age of Onset    Cancer Mother         pancreatic    Cancer Father         lung    Heart Disease Father         Bypass x 5    Heart Disease Sister         Stent- CAD    Diabetes Sister        Meds:   Current Facility-Administered Medications: diphenhydrAMINE (BENADRYL) injection 25 mg, 25 mg, Intravenous, Q6H PRN  insulin glargine (LANTUS) injection vial 28 Units, 28 Units, Subcutaneous, BID  cefTRIAXone (ROCEPHIN) 1000 mg IVPB in 50 mL D5W minibag, 1,000 mg, Intravenous, Daily  morphine (PF) injection 2 mg, 2 mg, Intravenous, Q2H PRN  oxyCODONE-acetaminophen (PERCOCET) 5-325 MG per tablet 1 tablet, 1 tablet, Oral, Q4H PRN  fentaNYL (SUBLIMAZE) injection 25 mcg, 25 mcg, Intravenous, Q5 Min PRN  HYDROmorphone (DILAUDID) injection 0.5 mg, 0.5 mg, Intravenous, Q5 Min PRN  promethazine (PHENERGAN) injection 6.25 mg, 6.25 mg, Intravenous, Once PRN  labetalol (NORMODYNE;TRANDATE) injection 5 mg, 5 mg, Intravenous, Q10 Min PRN  docusate sodium (COLACE) capsule 100 mg, 100 mg, Oral, Daily  senna (SENOKOT) tablet 8.6 mg, 1 tablet, Oral, Nightly PRN  insulin lispro (HUMALOG) injection vial 12 Units, 12 Units, Subcutaneous, TID WC  albuterol sulfate  (90 Base) MCG/ACT inhaler 2 puff, 2 puff, Inhalation, Q2H PRN capsule 200 mg, 200 mg, Oral, TID PRN  albuterol sulfate  (90 Base) MCG/ACT inhaler 2 puff, 2 puff, Inhalation, Q6H PRN    Vitals:  /71   Pulse 66   Temp 96.6 °F (35.9 °C) (Oral)   Resp 16   Ht 5' 2\" (1.575 m)   Wt 153 lb 10.6 oz (69.7 kg)   SpO2 91%   BMI 28.10 kg/m²     Intake/Output Summary (Last 24 hours) at 7/22/2021 1800  Last data filed at 7/22/2021 1619  Gross per 24 hour   Intake 1268 ml   Output 750 ml   Net 518 ml       Review of Systems:  10 Systems were reviewed and negative except as in HPI      Physical Exam:  General Appearance: Alert and oriented, cooperative, moderate  distress, appears ill  Head: Normocephalic, without obvious abnormality, atraumatic  Back: right CVA tenderness  Lungs: respirations unlabore  Heart: Regular rate and rhythm  Abdomen: Soft, non-tender, non-distended, no masses  Skin: Skin color, texture, turgor normal, no rashes or lesions  Neurologic: no gross deficits  Female :    Bladder is non tender  Right  CVA tenderness      Pelvic Exam Not Indicated    Labs:  CBC   Lab Results   Component Value Date    WBC 16.5 07/22/2021    RBC 4.45 07/22/2021    HGB 13.4 07/22/2021    HCT 38.9 07/22/2021    MCV 87.4 07/22/2021    MCH 30.1 07/22/2021    MCHC 34.5 07/22/2021    RDW 12.9 07/22/2021     07/22/2021    MPV 8.9 07/22/2021     BMP   Lab Results   Component Value Date     07/22/2021    K 4.5 07/22/2021    K 4.3 07/15/2021     07/22/2021    CO2 27 07/22/2021    BUN 37 07/22/2021    CREATININE 0.9 07/22/2021    GLUCOSE 202 07/22/2021    CALCIUM 8.9 07/22/2021       Urinalysis:   Lab Results   Component Value Date    COLORU YELLOW 07/22/2021    GLUCOSEU Negative 07/22/2021    BLOODU LARGE 07/22/2021    NITRU Negative 07/22/2021    LEUKOCYTESUR MODERATE 07/22/2021       Imaging: NPertinent images and radiologist's report were reviewed independently  6 x 4 x 6 mm obstructing calculus at the right ureterovesicular junction   causing moderate

## 2021-07-22 NOTE — PROGRESS NOTES
Clinical Pharmacy Note  Heparin Dosing       Lab Results   Component Value Date    APTT 87.6 07/22/2021     Lab Results   Component Value Date    HGB 13.4 07/22/2021    HCT 38.9 07/22/2021     07/22/2021       Current Infusion Rate: 980 units/hr    Plan:  Rate: Continue  980 units/hr  Next aPTT: 0600  7/23/21    Pharmacy will continue to monitor and adjust based on aPTT results.     HEATHER Lieberman [facility]  7/22/2021 7:01 AM

## 2021-07-22 NOTE — ANESTHESIA PRE PROCEDURE
[facility] Department of Anesthesiology  Pre-Anesthesia Evaluation/Consultation       Name:  Vernell Grant  : 1960  Age:  62 y.o. MRN:  [id number]  Date: 2021           Surgeon: Riana Costello):  Mary Lakhani MD    Procedure: Procedure(s):  CYSTOSCOPY, RIGHT URETERAL STENT INSERTION     Allergies   Allergen Reactions    Ciprofloxacin Other (See Comments)     hallucinations    Hydrocodone Itching     Patient Active Problem List   Diagnosis    Obesity    HTN (hypertension)    Diabetes mellitus, type 2 ([address]    Pneumonia due to COVID-19 virus    MARCI (acute kidney injury) ([address]    Acute respiratory failure with hypoxia ([address]    Neutropenia ([address]    Right pulmonary embolus ([address]    ARDS (adult respiratory distress syndrome) ([address]     Past Medical History:   Diagnosis Date    Chronic kidney disease     kidney stones    Hyperlipidemia     Hypertension     MDRO (multiple drug resistant organisms) resistance     MRSA infection     left lower leg    Type II or unspecified type diabetes mellitus without mention of complication, not stated as uncontrolled      Past Surgical History:   Procedure Laterality Date   [address] 87    COLONOSCOPY  2018    Dr. Jono Patterson. adenoma polyp, repeat in 5 years    LITHOTRIPSY       Social History     Tobacco Use    Smoking status: Never Smoker    Smokeless tobacco: Never Used   Substance Use Topics    Alcohol use: No    Drug use: No     Medications  No current facility-administered medications on file prior to encounter.      Current Outpatient Medications on File Prior to Encounter   Medication Sig Dispense Refill    metoprolol succinate (TOPROL XL) 50 MG extended release tablet TAKE 1 TABLET BY MOUTH EVERY DAY 90 tablet 0    citric acid-potassium citrate (POLYCITRA) 1100-334 MG/5ML solution Take 5 mLs by mouth 2 times daily      metFORMIN (GLUCOPHAGE) 1000 MG tablet Take 1 tablet by mouth 2 times daily (with meals) 180 tablet 0    hydroCHLOROthiazide (MICROZIDE) 12.5 MG capsule TAKE 1 CAPSULE BY MOUTH EVERY DAY IN THE MORNING 90 capsule 0    losartan (COZAAR) 100 MG tablet TAKE 1 TABLET BY MOUTH EVERY DAY 90 tablet 0    dilTIAZem (DILT-XR) 240 MG extended release capsule TAKE 1 CAPSULE BY MOUTH EVERY DAY 90 capsule 0    JANUVIA 100 MG tablet TAKE 1 TABLET BY MOUTH EVERY DAY 90 tablet 0    glipiZIDE (GLUCOTROL) 10 MG tablet TAKE 1 TABLET BY MOUTH 2 TIMES DAILY (BEFORE MEAL). 180 tablet 3    atorvastatin (LIPITOR) 10 MG tablet TAKE 1 TABLET BY MOUTH DAILY 90 tablet 3    ONETOUCH ULTRA strip 1 EACH BY DOES NOT APPLY ROUTE DAILY AS NEEDED. **ASK PT WHICH MONITOR SHES USING 100 strip 3    vitamin B-12 (CYANOCOBALAMIN) 100 MCG tablet Take 250 mcg by mouth daily.  Multiple Vitamin CAPS Take  by mouth daily.  ONE TOUCH LANCETS MISC by Does not apply route.  100 each 2     Current Facility-Administered Medications   Medication Dose Route Frequency Provider Last Rate Last Admin    diphenhydrAMINE (BENADRYL) injection 25 mg  25 mg Intravenous Q6H PRN Ira Arriaza MD        insulin glargine (LANTUS) injection vial 28 Units  28 Units Subcutaneous BID Ira Arriaza MD   28 Units at 07/22/21 0930    cefTRIAXone (ROCEPHIN) 1000 mg IVPB in 50 mL D5W minibag  1,000 mg Intravenous Daily Ira Arriaza MD   Stopped at 07/22/21 1139    morphine (PF) injection 2 mg  2 mg Intravenous Q2H PRN Ira Arriaza MD   2 mg at 07/22/21 1601    oxyCODONE-acetaminophen (PERCOCET) 5-325 MG per tablet 1 tablet  1 tablet Oral Q4H PRN Ira Arriaza MD        docusate sodium (COLACE) capsule 100 mg  100 mg Oral Daily Ira Arriaza MD   100 mg at 07/22/21 0925    senna (SENOKOT) tablet 8.6 mg  1 tablet Oral Nightly PRN Ira Arriaza MD        insulin lispro (HUMALOG) injection vial 12 Units  12 Units Subcutaneous TID WC Ira Arriaza MD   12 Units at 07/21/21 1836  albuterol sulfate  (90 Base) MCG/ACT inhaler 2 puff  2 puff Inhalation Q2H PRN Mani Cure, DO        And    ipratropium (ATROVENT HFA) 17 MCG/ACT inhaler 2 puff  2 puff Inhalation Q4H PRN Mani Cure, DO        guaiFENesin [facility]) extended release tablet 600 mg  600 mg Oral BID Miley Palacio MD   600 mg at 07/18/21 2031    heparin 25,000 unit in sodium chloride 0.45% 250 mL (premix) infusion  0-3,000 Units/hr Intravenous Continuous Dhiraj Benson MD 9.8 mL/hr at 07/22/21 0930 980 Units/hr at 07/22/21 0930    guaiFENesin-dextromethorphan (ROBITUSSIN DM) 100-10 MG/5ML syrup 5 mL  5 mL Oral Q4H PRN Max Castellano MD   5 mL at 07/17/21 2121    dexamethasone (DECADRON) tablet 10 mg  10 mg Oral Daily Mani Cure, DO   10 mg at 07/22/21 0924    insulin lispro (HUMALOG) injection vial 0-18 Units  0-18 Units Subcutaneous TID WC Miley Palacio MD   6 Units at 07/22/21 1158    insulin lispro (HUMALOG) injection vial 0-9 Units  0-9 Units Subcutaneous Nightly Miley Palacio MD   5 Units at 07/21/21 1954    0.9 % sodium chloride bolus  30 mL Intravenous PRN Miley Palacio MD        atorvastatin (LIPITOR) tablet 10 mg  10 mg Oral Daily Joseline S Puthoff, APRN - CNP   10 mg at 07/22/21 0925    sodium chloride flush 0.9 % injection 5-40 mL  5-40 mL Intravenous 2 times per day Silvestre Jeffries APRN - CNP   10 mL at 07/22/21 0834    sodium chloride flush 0.9 % injection 5-40 mL  5-40 mL Intravenous PRN Joseline S Puthoff, APRN - CNP        0.9 % sodium chloride infusion  25 mL Intravenous PRN Joseline S Puthoff, APRN - CNP        ondansetron (ZOFRAN-ODT) disintegrating tablet 4 mg  4 mg Oral Q8H PRN Joseline S Puthoff, APRN - CNP   4 mg at 07/22/21 0641    Or    ondansetron (ZOFRAN) injection 4 mg  4 mg Intravenous Q6H PRN JEANNIE Ragsdale CNP   4 mg at 07/22/21 1151    polyethylene glycol (GLYCOLAX) packet 17 g  17 g Oral Daily PRN Silvestre Jeffries, JEANNIE - CNP   17 g at 21 0925    acetaminophen (TYLENOL) tablet 650 mg  650 mg Oral Q6H PRN Aziza Bouche, APRN - CNP   650 mg at 21 0707    Or    acetaminophen (TYLENOL) suppository 650 mg  650 mg Rectal Q6H PRN Joseline S Puthoff, APRN - CNP        glucose (GLUTOSE) 40 % oral gel 15 g  15 g Oral PRN Joseline S Puthoff, APRN - CNP        dextrose 50 % IV solution  12.5 g Intravenous PRN Joseline S Puthoff, APRN - CNP        glucagon (rDNA) injection 1 mg  1 mg Intramuscular PRN Joseline S Puthoff, APRN - CNP        dextrose 5 % solution  100 mL/hr Intravenous PRN Joseline S Puthoff, APRN - CNP        promethazine (PHENERGAN) in sodium chloride 0.9% 50 mL IVPB 25 mg  25 mg Intravenous Q6H PRN Joseline S Puthoff, APRN - CNP        Vitamin D (CHOLECALCIFEROL) tablet 1,000 Units  1,000 Units Oral Daily Joseline S Puthoff, APRN - CNP   1,000 Units at 21 0925    zinc sulfate (ZINCATE) capsule 50 mg  50 mg Oral Daily Joseline S Puthoff, APRN - CNP   50 mg at 21 0925    ascorbic acid (VITAMIN C) tablet 500 mg  500 mg Oral Daily Joseline S Puthoff, APRN - CNP   500 mg at 21 0925    famotidine (PEPCID) tablet 20 mg  20 mg Oral Daily Joseline S Puthoff, APRN - CNP   20 mg at 21 0925    potassium citrate (UROCIT-K) extended release tablet 10 mEq  10 mEq Oral BID WC Adina Menchaca MD   10 mEq at 21 0943    benzonatate (TESSALON) capsule 200 mg  200 mg Oral TID PRN Adriana Bliss MD   200 mg at 21 2121    albuterol sulfate  (90 Base) MCG/ACT inhaler 2 puff  2 puff Inhalation Q6H PRN Mckenna Fajardo MD   2 puff at 21 0755     Vital Signs (Current)   Vitals:    21 1624   BP:    Pulse:    Resp: 16   Temp:    SpO2: 91%     Vital Signs Statistics (for past 48 hrs)     Temp  Av.8 °F (36.6 °C)  Min: 96.6 °F (35.9 °C)   Min taken time: 21 1545  Max: 98.3 °F (36.8 °C)   Max taken time: 21  Pulse  Av.3  Min: 44   Min taken time: 21 0400  Max: 80   Max taken time: 21  Resp  Av.2  Min: 10   Min taken time: 21  Max: 44   Max taken time: 21 1940  BP  Min: 114/57   Min taken time: 21 1535  Max: 155/77   Max taken time: 21 1145  MAP (mmHg)  Av.5  Min: 70   Min taken time: 21 1535  Max: 103   Max taken time: 21 0815  SpO2  Av.1 %  Min: 65 %   Min taken time: 21  Max: 100 %   Max taken time: 21 2300    BP Readings from Last 3 Encounters:   21 123/71   21 130/82   20 116/62     BMI  Body mass index is 28.1 kg/m². Estimated body mass index is 28.1 kg/m² as calculated from the following:    Height as of this encounter: 5' 2\" (1.575 m). Weight as of this encounter: 153 lb 10.6 oz (69.7 kg).     CBC   Lab Results   Component Value Date    WBC 16.5 2021    RBC 4.45 2021    HGB 13.4 2021    HCT 38.9 2021    MCV 87.4 2021    RDW 12.9 2021     2021     CMP    Lab Results   Component Value Date     2021    K 4.5 2021    K 4.3 07/15/2021     2021    CO2 27 2021    BUN 37 2021    CREATININE 0.9 2021    GFRAA >60 2021    GFRAA >60 2012    AGRATIO 1.1 2021    LABGLOM >60 2021    GLUCOSE 202 2021    PROT 5.9 2021    PROT 6.9 2012    CALCIUM 8.9 2021    BILITOT 0.3 2021    ALKPHOS 40 2021    AST 12 2021    ALT 32 2021     BMP    Lab Results   Component Value Date     2021    K 4.5 2021    K 4.3 07/15/2021     2021    CO2 27 2021    BUN 37 2021    CREATININE 0.9 2021    CALCIUM 8.9 2021    GFRAA >60 2021    GFRAA >60 2012    LABGLOM >60 2021    GLUCOSE 202 2021     POCGlucose  Recent Labs     21  0434 21  0441 21  0448   GLUCOSE 227* 251* 202*      Coags    Lab Results   Component Value Date    APTT 87.6 2021 HCG (If Applicable) No results found for: PREGTESTUR, PREGSERUM, HCG, HCGQUANT   ABGs No results found for: PHART, PO2ART, LWV5TSX, POS9SIX, BEART, L0CRWPIB   Type & Screen (If Applicable)  No results found for: LABABO, LABRH                         BMI: Wt Readings from Last 3 Encounters:       NPO Status:    no food 8 hours                      Anesthesia Evaluation  Patient summary reviewed no history of anesthetic complications:   Airway: Mallampati: III  TM distance: >3 FB   Neck ROM: full   Dental:          Pulmonary:   (+) pneumonia (covid):  wheezes,                             Cardiovascular:  Exercise tolerance: good (>4 METS),   (+) hypertension:,       ECG reviewed  Rhythm: regular  Rate: normal           Beta Blocker:  Dose within 24 Hrs         Neuro/Psych:   Negative Neuro/Psych ROS              GI/Hepatic/Renal:   (+) renal disease: kidney stones and CRI,           Endo/Other:    (+) DiabetesType II DM, , .                 Abdominal:             Vascular:   + PE (heparin gtt). Other Findings: Poor dentition throughout              Anesthesia Plan      MAC     ASA 4 - emergent       Induction: intravenous. MIPS: Postoperative opioids intended and Prophylactic antiemetics administered. Anesthetic plan and risks discussed with patient. Plan discussed with CRNA. This pre-anesthesia assessment may be used as a history and physical.    DOS STAFF ADDENDUM:    Pt seen and examined, chart reviewed (including anesthesia, drug and allergy history). No interval changes to history and physical examination. Anesthetic plan, risks, benefits, alternatives, and personnel involved discussed with patient. Questions and concerns addressed. Patient(family) verbalized an understanding and agrees to proceed.       Cristiane Rodriguez MD  July 22, 2021  5:38 PM

## 2021-07-22 NOTE — PROGRESS NOTES
Physical Therapy    Facility/Department: [facility] 2 ICU  Initial Assessment    NAME: Claudene Larry  : 1960  MRN: [id number]    Date of Service: 2021    Discharge Recommendations:  Continue to assess pending progress   PT Equipment Recommendations  Other: Will monitor for potential equipt needs. Assessment   Body structures, Functions, Activity limitations: Decreased functional mobility ; Decreased endurance  Assessment: 63 y/o female admit 2021 with Acute Respiratory, COVID +.  2021 CT Chest + PE.  PMH as noted including CKD, HTN. PTA pt living with  in home with few steps to enter and 1st floor bed/bath.  also in acute care with COVID. Pt currently requiring O2 10L; brief destat low/mid 80's immed following oob activities. Does recover quickly with seated rest.  Will monitor pt's progess for potental cont PT Services (3-5) upon d/c. Prognosis: Good  Decision Making: Medium Complexity  History: 63 y/o female admit 2021 with Acute Respiratory, COVID +.  2021 CT Chest + PE.  PMH as noted including CKD, HTN. Exam: See above. Clinical Presentation: See above. Patient Education: Role of PT, POC, Need to call for assist.  Barriers to Learning: None. REQUIRES PT FOLLOW UP: Yes  Activity Tolerance  Activity Tolerance: Patient limited by endurance  Activity Tolerance: Pt currently requiring O2 10L; brief destat low/mid 80's immed following oob activities. Does recover quickly with seated rest.       Patient Diagnosis(es): The encounter diagnosis was MARCI (acute kidney injury) ([facility] has a past medical history of Chronic kidney disease, Hyperlipidemia, Hypertension, MDRO (multiple drug resistant organisms) resistance, MRSA infection, and Type II or unspecified type diabetes mellitus without mention of complication, not stated as uncontrolled. has a past surgical history that includes Lithotripsy ();   section; Colonoscopy (2018); and Cholecystectomy (1988). Restrictions  Restrictions/Precautions  Restrictions/Precautions: Cardiac, Isolation  Position Activity Restriction  Other position/activity restrictions: Droplet Plus Precautions : COVID +. O2 10 L via High Flow. Heparin IV. Vision/Hearing  Vision: Within Functional Limits (Wears Glasses.)  Hearing: Within functional limits     Subjective  General  Chart Reviewed: Yes  Patient assessed for rehabilitation services?: Yes  Additional Pertinent Hx: 61 y/o female admit 7/13/2021 with Acute Respiratory, COVID +.  7/17/2021 CT Chest + PE.  PMH as noted including CKD, HTN. Family / Caregiver Present: No  Referring Practitioner: Dr Madhu Leon  Diagnosis: Acute Respiratory, COVID +; PE.  Follows Commands: Within Functional Limits  Subjective  Subjective: Pt agreeable to PT Eval/Rx. Pain Screening  Patient Currently in Pain: Denies          Orientation  Orientation  Overall Orientation Status: Within Functional Limits  Social/Functional History  Social/Functional History  Lives With: Spouse ( Rony Barron) also in hospital with COVID.)  Type of Home: House  Home Layout: Two level, Able to Live on Main level with bedroom/bathroom, Laundry in basement (1 story with basement.)  Home Access: Stairs to enter without rails (3 steps to enter.)  Bathroom Shower/Tub: Tub/Shower unit  Bathroom Toilet: Standard  Bathroom Accessibility: Accessible  Home Equipment:  (No DME pta.)  ADL Assistance: Independent  Homemaking Assistance:  (Shared with family.)  Ambulation Assistance: Independent (Without assist device pta.)  Transfer Assistance: Independent  Active : Yes  Type of occupation: Works [employer]. Additional Comments:  Rony Barron) also in hospital with [place].   Cognition   Cognition  Overall Cognitive Status: WFL    Objective          AROM RLE (degrees)  RLE AROM: WFL  AROM LLE (degrees)  LLE AROM : WFL  AROM RUE (degrees)  RUE AROM : WFL  AROM LUE (degrees)  LUE AROM : WFL  Strength RLE  Strength RLE: WFL  Strength LLE  Strength LLE: WFL  Strength RUE  Strength RUE: WFL  Strength LUE  Strength LUE: WFL     Sensation  Overall Sensation Status: WFL  Bed mobility  Supine to Sit: Supervision (HOB elevated. Use of bedrail.)  Transfers  Sit to Stand: Contact guard assistance  Stand to sit: Contact guard assistance  Ambulation  Ambulation?: Yes  Ambulation 1  Device: No Device  Distance: 5-6 steps bed to chair without assist device CGA. No LE buckling/giving way; alittle guarded. Limited endurance. Balance  Sitting - Static: Good  Sitting - Dynamic: Good  Standing - Static: Good  Standing - Dynamic: Good;-  Comments: EOB Supervision, no LOB. Static Stand SBA. Bed to chair CGA. Plan   Safety Devices  Type of devices: Call light within reach, Left in chair, Nurse notified                     AM-PAC Score  AM-PAC Inpatient Mobility Raw Score : 17 (07/22/21 0603)  AM-PAC Inpatient T-Scale Score : 42.13 (07/22/21 0603)  Mobility Inpatient CMS 0-100% Score: 50.57 (07/22/21 0603)  Mobility Inpatient CMS G-Code Modifier : CK (07/22/21 0603)          Goals  Short term goals  Time Frame for Short term goals: Upon d/c acute care setting. Short term goal 1: Bed Mob Independent. Short term goal 2: Transfers with/without assist device Supervision. Short term goal 3: Amb with/without assist device, within hospital setting 25-50' SBA. Patient Goals   Patient goals : Return home.        Therapy Time   Individual Concurrent Group Co-treatment   Time In [address] [place] [address]         Time Out 1415         Minutes [address] [facility] Lottie Garcia

## 2021-07-22 NOTE — PROGRESS NOTES
Hospitalist Progress Note      PCP: Jennie Wyatt MD    Date of Admission: 7/13/2021    Chief Complaint: Generalized weakness, poor appetite, poor p.o. intake    Hospital Course: 62 y.o. female  to [facility] with PMHx: CKD, HLD, DM, HTN  no recent travel. Patient spouse is admitted to the ICU: Positive for Covid      presented to the ED with encouragement of her family due to the fact that her  was positive for Covid and she is becoming symptomatic with decreased appetite, generalized weakness. she had not really ate or drank anything x 24 to 48-hour. She is reporting some shortness of breath No fever or chills. No diarrhea. No cough or abdominal pain.     NOT PREVIOUSLY VACCINATED for Covid.     ED work-up: Mild leukopenia 2.6, mild thrombocytopenia of 93, MARCI on CKD: BUN 55, cr 2.3, GFR 22. . EKG is unremarkable. Chest x-ray no consolidation or opacities. CV-19 +.  was started on IV fluids, given an inhaler treatment and started on Decadron.   On exam patient was very sleepy, looks very tired. She was awakened and remarked that she had not really gotten much sleep and was very tired. She did not appear to be any any type of respiratory distress. Skin warm and dry. Lung fields clear. SPO2 93 to 94% on room air. Respirations were easy and regular. Developed a PE treated with heparin drip and progressed to ARDS requiring HFNC. Completed course remdesevir      Subjective: remains in ICU on medsurg status. C/o flank pain hx kidney stones thinks she has another asking for stronger pain meds only has tylenol ordered. Reported intolerance or \"allergy\" to hydrocodone. I reviewed our old records to see if has tolerated other narcotic before 8/2020 was on norcos and prescribed norcos and given IV morphine on that ER visit-don't see mention of adverse reaction. desat episode recorded last night on the 10L o2 but recovered and now weaned to 8L O2 .  Despite increased lasntus 20 unit BID yesterday BG still  250-300. Will increase to 28 units this am      all other systems neg       Medications:  Reviewed    Infusion Medications    heparin (PORCINE) Infusion 980 Units/hr (07/21/21 0506)    sodium chloride      dextrose       Scheduled Medications    insulin glargine  20 Units Subcutaneous BID    docusate sodium  100 mg Oral Daily    insulin lispro  12 Units Subcutaneous TID WC    guaiFENesin  600 mg Oral BID    dexamethasone  10 mg Oral Daily    insulin lispro  0-18 Units Subcutaneous TID WC    insulin lispro  0-9 Units Subcutaneous Nightly    atorvastatin  10 mg Oral Daily    sodium chloride flush  5-40 mL Intravenous 2 times per day    Vitamin D  1,000 Units Oral Daily    zinc sulfate  50 mg Oral Daily    ascorbic acid  500 mg Oral Daily    famotidine  20 mg Oral Daily    potassium citrate  10 mEq Oral BID WC     PRN Meds: morphine, diphenhydrAMINE, senna, albuterol sulfate HFA **AND** ipratropium, guaiFENesin-dextromethorphan, sodium chloride, sodium chloride flush, sodium chloride, ondansetron **OR** ondansetron, polyethylene glycol, acetaminophen **OR** acetaminophen, glucose, dextrose, glucagon (rDNA), dextrose, promethazine, benzonatate, albuterol sulfate HFA      Intake/Output Summary (Last 24 hours) at 7/22/2021 0856  Last data filed at 7/22/2021 0646  Gross per 24 hour   Intake 908 ml   Output 750 ml   Net 158 ml       Physical Exam Performed:    BP (!) 154/89   Pulse 73   Temp 97.4 °F (36.3 °C) (Axillary)   Resp 21   Ht 5' 2\" (1.575 m)   Wt 153 lb 10.6 oz (69.7 kg)   SpO2 92%   BMI 28.10 kg/m²     General appearance: Grabbing her right flank complaining of severe pain  HEENT: PERRL EOMI conjunctivae clear glasses mucosa moist   Neck: Supple, full range of motion. No jugular venous distention. Trachea midline. Respiratory: O2 8 L nasal cannula CTA  cardiovascular: RRR S1/S2 without murmurs, rubs or gallops.   Abdomen: Soft, non-tender, non-distended with normal bowel sounds. Musculoskeletal: No clubbing, cyanosis or edema. Full range of motion without deformity. Skin: Skin color pink warm dry,  No rash  Neurologic:  Cranial nerves: II-XII intact, grossly non-focal.  Psychiatric: Alert and oriented,  Peripheral Pulses: +2 palpable, equal bilaterally   : No Saxena  voids BSC    Labs:   Recent Labs     07/21/21  0441 07/22/21 0448   WBC 9.1 16.5*   HGB 13.6 13.4   HCT 39.5 38.9    368     Recent Labs     07/20/21  0434 07/21/21  0441 07/22/21  0448    139 134*   K 4.2 4.5 4.5    102 101   CO2 27 27 27   BUN 25* 29* 37*   CREATININE 0.9 0.9 0.9   CALCIUM 8.8 8.9 8.9   PHOS 3.9 4.0 3.9     Recent Labs     07/20/21  0434 07/21/21 0441 07/22/21 0448   AST 30 20 12*   ALT 44* 40 32   BILITOT 0.4 0.3 0.3   ALKPHOS 41 42 40     No results for input(s): INR in the last 72 hours. No results for input(s): [place] Dec in the last 72 hours. Urinalysis:      Lab Results   Component Value Date    NITRU Negative 11/30/2020    WBCUA 621 11/30/2020    BACTERIA 4+ 11/30/2020    RBCUA 5-10 11/30/2020    BLOODU LARGE 11/30/2020    SPECGRAV 1.024 11/30/2020    GLUCOSEU Negative 11/30/2020       Radiology:  CT CHEST PULMONARY EMBOLISM W CONTRAST   Final Result   Addendum 1 of 1   ADDENDUM:   Critical results were called by Dr. Anuradha Enciso MD to  [place]   on 7/17/2021 at 12:46. Final   Positive for pulmonary embolus. A nonocclusive embolus is noted in the right   descending pulmonary artery. The study is limited for detection of   peripheral emboli. Clot burden, nonetheless, is considered mild. No evidence of right ventricular heart strain. Ground-glass and interstitial opacities and pattern common for COVID   pneumonia. Mild hilar adenopathy, likely reactive in the setting. XR CHEST 1 VIEW   Final Result   Increasing vascular congestion and suggestion of developing perihilar   interstitial edema.       Unchanged bilateral lower lobe atelectasis. XR CHEST 1 VIEW   Final Result   New bilateral lower lobe airspace disease most likely due to atelectasis on   this low lung volume expiratory portable chest x-ray although edema is also   in the differential.  Pneumonia is unlikely but not excluded. No evidence of   pleural effusion         XR CHEST PORTABLE   Final Result   Hypoinflation with mild discoid atelectasis or scarring along the lung bases. Assessment/Plan:    Active Hospital Problems    Diagnosis     Right pulmonary embolus ([facility]) [I26.99]     ARDS (adult respiratory distress syndrome) ([facility]) [J80]     Acute respiratory failure with hypoxia ([facility]) [J96.01]     Neutropenia ([facility]) [D70.9]     Pneumonia due to COVID-19 virus [U07.1, J12.82]     MARCI (acute kidney injury) ([facility] [address] [N17.9]         Acute hypoxemic respiratory failure with SPO2 less than 90% on room air  COVID-19 pneumonia  -Decadron 10mg po d x5   -Remdesivir 7/16 x4 days completed   -Titrate oxygen goal saturation 90%  --recommend proning   --O2 demand  HFNC 20 L weaned to O2 8 L NC, continues improving slowly  --vitamin D/cholecalciferol 1000 units daily  --zinc sulfate 220mg po daily   inflammatory markers stable   ddimer 403->345  Ferritin 553.4 ->666  CRP 31.5->3. 1    Acute pulmonary embolism  Nonocclusive embolism right descending pulmonary artery   clot burden mild    On heparin drip  Transition to [facility] discussed risk/benefit x at least 3 months   Now with no kidney stone we will continue the heparin drip as it is more anxiety discontinued if bleeding developed or if stone extraction needed     Neutropenia-resolved    Acute renal failure -resolved     DM type 2  --hold januvia  --accucheck ac and HS low dose SS  --lantus 18 units HS->10 unit BID->20unit BID->28 BID this a.m. as blood sugars were still 250-285.   BG improved to 205  -- Lispro 7 units TIDac ->12 units TID   --Diabetic 3 carb choice 45 g per meal diet    Essential HTN  Sinus bradycardia  cardizem CD 240mg daily with toprol 50mg daily held heart rate now 70s  -TSH 0.48 low side of normal    Right flank pain  Mild right hydronephrosis  Renal colic  History kidney stones  --Consult urology  --IV morphine 2 mg every 2 hours as needed moderate to severe pain  --Given Percocet 10 mg p.o. x1 followed Percocet 5/325 mg p.o. every 4 hours as needed mild to moderate pain    DVT Prophylaxis: heparin drip  Diet: ADULT DIET; Regular; 3 carb choices (45 gm/meal); Low Potassium (Less than 3000 mg/day);  Low Phosphorus (Less than 1000 mg)  Code Status: Full Code    PT/OT Eval Status:   Dispo - goal return home     Tia Deleon MD

## 2021-07-22 NOTE — ANESTHESIA POSTPROCEDURE EVALUATION
[facility] Department of Anesthesiology  Post-Anesthesia Note       Name:  Cosme Ovalle                                  Age:  62 y.o. MRN:  [id number]     Last Vitals & Oxygen Saturation: /61   Pulse 52   Temp 98 °F (36.7 °C) (Axillary)   Resp 12   Ht 5' 2\" (1.575 m)   Wt 153 lb 10.6 oz (69.7 kg)   SpO2 (!) 89%   BMI 28.10 kg/m²   Patient Vitals for the past 4 hrs:   BP Temp Temp src Pulse Resp SpO2   07/22/21 1915 109/61   52 12 (!) 89 %   07/22/21 1900 107/64 98 °F (36.7 °C) Axillary 52 13 91 %   07/22/21 1624     16 91 %   07/22/21 1545 123/71 96.6 °F (35.9 °C) Oral 66 16 91 %       Level of consciousness:  Awake, alert    Respiratory: Respirations easy, no distress. Stable. Cardiovascular: Hemodynamically stable. Hydration: Adequate. PONV: Adequately managed. Post-op pain: Adequately controlled. Post-op assessment: Tolerated anesthetic well without complication. Complications:  None. Patient transferred to ICU on propac and NC 02. No issues. Report given to ICU staff.     Lois Yip MD  July 22, 2021   7:28 PM

## 2021-07-22 NOTE — PROGRESS NOTES
1018 patient still having complaints of 8/10-10/10 flank pain. Urine results sent to MD [facility] and informed her of patient still in a lot of pain. Frequency of morphine to q2. Message sent about adding oral pain medication on as well. Waiting response. Electronically signed by Jacobo Cortés RN on 7/22/2021 at 10:47 AM      1100 oral pain medication given for 10/10 right sided flank pain.     Electronically signed by Jacobo Cortés RN on 7/22/2021 at 11:26 AM

## 2021-07-22 NOTE — PROGRESS NOTES
Call out to urology group about CT results.     Electronically signed by Jo Laird RN on 7/22/2021 at 4:37 PM

## 2021-07-22 NOTE — PROGRESS NOTES
Upon entering patient's room this morning patient was tearful with complaints of right sided flank pain. Patient stated she has had kidney stones in the past and this is the exact pain. Patient received tylenol and zofran this morning with no relief by night shift nurse. Spoke with MD Lazaro Masters about patients complaints of pain and burning while peeing, with urgency. MD Lazaro Masters to order pain medication and renal US, and urine analysis with culture.      Electronically signed by Dino Zaragoza RN on 7/22/2021 at 9:55 AM

## 2021-07-22 NOTE — PROGRESS NOTES
Occupational Therapy Attempt  Benita Borja    OT orders received. Per PT, RN requests to defer therapy at this time d/t pt w/ possible kidney stone. Will re-attempt as schedule allows.     Electronically signed by Bruno Campos OT on 7/22/21 at 10:05 AM EDT

## 2021-07-23 LAB
A/G RATIO: 1 (ref 1.1–2.2)
ALBUMIN SERPL-MCNC: 2.7 G/DL (ref 3.4–5)
ALP BLD-CCNC: 42 U/L (ref 40–129)
ALT SERPL-CCNC: 33 U/L (ref 10–40)
ANION GAP SERPL CALCULATED.3IONS-SCNC: 9 MMOL/L (ref 3–16)
APTT: 105.4 SEC (ref 26.2–38.6)
APTT: 183.5 SEC (ref 26.2–38.6)
AST SERPL-CCNC: 18 U/L (ref 15–37)
BASOPHILS ABSOLUTE: 0.1 K/UL (ref 0–0.2)
BASOPHILS RELATIVE PERCENT: 0.3 %
BILIRUB SERPL-MCNC: 0.5 MG/DL (ref 0–1)
BUN BLDV-MCNC: 33 MG/DL (ref 7–20)
CALCIUM SERPL-MCNC: 8.5 MG/DL (ref 8.3–10.6)
CHLORIDE BLD-SCNC: 101 MMOL/L (ref 99–110)
CO2: 28 MMOL/L (ref 21–32)
CREAT SERPL-MCNC: 1.1 MG/DL (ref 0.6–1.2)
EOSINOPHILS ABSOLUTE: 0 K/UL (ref 0–0.6)
EOSINOPHILS RELATIVE PERCENT: 0 %
GFR AFRICAN AMERICAN: >60
GFR NON-AFRICAN AMERICAN: 51
GLOBULIN: 2.8 G/DL
GLUCOSE BLD-MCNC: 135 MG/DL (ref 70–99)
GLUCOSE BLD-MCNC: 157 MG/DL (ref 70–99)
GLUCOSE BLD-MCNC: 180 MG/DL (ref 70–99)
GLUCOSE BLD-MCNC: 202 MG/DL (ref 70–99)
GLUCOSE BLD-MCNC: 226 MG/DL (ref 70–99)
HCT VFR BLD CALC: 36.1 % (ref 36–48)
HEMOGLOBIN: 12.4 G/DL (ref 12–16)
LYMPHOCYTES ABSOLUTE: 0.4 K/UL (ref 1–5.1)
LYMPHOCYTES RELATIVE PERCENT: 1.5 %
MAGNESIUM: 2.1 MG/DL (ref 1.8–2.4)
MCH RBC QN AUTO: 30.1 PG (ref 26–34)
MCHC RBC AUTO-ENTMCNC: 34.2 G/DL (ref 31–36)
MCV RBC AUTO: 88.1 FL (ref 80–100)
MONOCYTES ABSOLUTE: 1.1 K/UL (ref 0–1.3)
MONOCYTES RELATIVE PERCENT: 3.8 %
NEUTROPHILS ABSOLUTE: 27.6 K/UL (ref 1.7–7.7)
NEUTROPHILS RELATIVE PERCENT: 94.4 %
PDW BLD-RTO: 13.1 % (ref 12.4–15.4)
PERFORMED ON: ABNORMAL
PHOSPHORUS: 4.5 MG/DL (ref 2.5–4.9)
PLATELET # BLD: 294 K/UL (ref 135–450)
PMV BLD AUTO: 8.8 FL (ref 5–10.5)
POTASSIUM SERPL-SCNC: 4.7 MMOL/L (ref 3.5–5.1)
RBC # BLD: 4.1 M/UL (ref 4–5.2)
REASON FOR REJECTION: NORMAL
REJECTED TEST: NORMAL
SODIUM BLD-SCNC: 138 MMOL/L (ref 136–145)
TOTAL PROTEIN: 5.5 G/DL (ref 6.4–8.2)
WBC # BLD: 29.2 K/UL (ref 4–11)

## 2021-07-23 PROCEDURE — 84100 ASSAY OF PHOSPHORUS: CPT

## 2021-07-23 PROCEDURE — 97530 THERAPEUTIC ACTIVITIES: CPT

## 2021-07-23 PROCEDURE — 97116 GAIT TRAINING THERAPY: CPT

## 2021-07-23 PROCEDURE — 80053 COMPREHEN METABOLIC PANEL: CPT

## 2021-07-23 PROCEDURE — 2500000003 HC RX 250 WO HCPCS: Performed by: HOSPITALIST

## 2021-07-23 PROCEDURE — 6370000000 HC RX 637 (ALT 250 FOR IP): Performed by: INTERNAL MEDICINE

## 2021-07-23 PROCEDURE — 6360000002 HC RX W HCPCS: Performed by: INTERNAL MEDICINE

## 2021-07-23 PROCEDURE — 6360000002 HC RX W HCPCS: Performed by: HOSPITALIST

## 2021-07-23 PROCEDURE — 6370000000 HC RX 637 (ALT 250 FOR IP): Performed by: HOSPITALIST

## 2021-07-23 PROCEDURE — 36415 COLL VENOUS BLD VENIPUNCTURE: CPT

## 2021-07-23 PROCEDURE — 2580000003 HC RX 258: Performed by: HOSPITALIST

## 2021-07-23 PROCEDURE — 97166 OT EVAL MOD COMPLEX 45 MIN: CPT

## 2021-07-23 PROCEDURE — 99232 SBSQ HOSP IP/OBS MODERATE 35: CPT | Performed by: INTERNAL MEDICINE

## 2021-07-23 PROCEDURE — 97110 THERAPEUTIC EXERCISES: CPT

## 2021-07-23 PROCEDURE — 2580000003 HC RX 258: Performed by: NURSE PRACTITIONER

## 2021-07-23 PROCEDURE — 6370000000 HC RX 637 (ALT 250 FOR IP): Performed by: NURSE PRACTITIONER

## 2021-07-23 PROCEDURE — 83735 ASSAY OF MAGNESIUM: CPT

## 2021-07-23 PROCEDURE — 85025 COMPLETE CBC W/AUTO DIFF WBC: CPT

## 2021-07-23 PROCEDURE — 85730 THROMBOPLASTIN TIME PARTIAL: CPT

## 2021-07-23 PROCEDURE — 2700000000 HC OXYGEN THERAPY PER DAY

## 2021-07-23 PROCEDURE — 94761 N-INVAS EAR/PLS OXIMETRY MLT: CPT

## 2021-07-23 PROCEDURE — 1200000000 HC SEMI PRIVATE

## 2021-07-23 RX ADMIN — Medication 1000 UNITS: at 08:51

## 2021-07-23 RX ADMIN — SODIUM CHLORIDE, PRESERVATIVE FREE 10 ML: 5 INJECTION INTRAVENOUS at 21:25

## 2021-07-23 RX ADMIN — POTASSIUM CITRATE 10 MEQ: 10 TABLET ORAL at 08:56

## 2021-07-23 RX ADMIN — SODIUM CHLORIDE, PRESERVATIVE FREE 10 ML: 5 INJECTION INTRAVENOUS at 08:53

## 2021-07-23 RX ADMIN — INSULIN LISPRO 6 UNITS: 100 INJECTION, SOLUTION INTRAVENOUS; SUBCUTANEOUS at 13:21

## 2021-07-23 RX ADMIN — INSULIN GLARGINE 28 UNITS: 100 INJECTION, SOLUTION SUBCUTANEOUS at 09:07

## 2021-07-23 RX ADMIN — MORPHINE SULFATE 2 MG: 2 INJECTION, SOLUTION INTRAMUSCULAR; INTRAVENOUS at 08:57

## 2021-07-23 RX ADMIN — INSULIN LISPRO 12 UNITS: 100 INJECTION, SOLUTION INTRAVENOUS; SUBCUTANEOUS at 13:21

## 2021-07-23 RX ADMIN — POTASSIUM CITRATE 10 MEQ: 10 TABLET ORAL at 17:40

## 2021-07-23 RX ADMIN — INSULIN LISPRO 12 UNITS: 100 INJECTION, SOLUTION INTRAVENOUS; SUBCUTANEOUS at 09:07

## 2021-07-23 RX ADMIN — DEXAMETHASONE 10 MG: 4 TABLET ORAL at 08:51

## 2021-07-23 RX ADMIN — CEFTRIAXONE 1000 MG: 1 INJECTION, POWDER, FOR SOLUTION INTRAMUSCULAR; INTRAVENOUS at 08:51

## 2021-07-23 RX ADMIN — INSULIN LISPRO 6 UNITS: 100 INJECTION, SOLUTION INTRAVENOUS; SUBCUTANEOUS at 17:45

## 2021-07-23 RX ADMIN — INSULIN LISPRO 2 UNITS: 100 INJECTION, SOLUTION INTRAVENOUS; SUBCUTANEOUS at 21:21

## 2021-07-23 RX ADMIN — DOCUSATE SODIUM 100 MG: 100 CAPSULE ORAL at 08:51

## 2021-07-23 RX ADMIN — MORPHINE SULFATE 2 MG: 2 INJECTION, SOLUTION INTRAMUSCULAR; INTRAVENOUS at 15:02

## 2021-07-23 RX ADMIN — INSULIN GLARGINE 28 UNITS: 100 INJECTION, SOLUTION SUBCUTANEOUS at 21:22

## 2021-07-23 RX ADMIN — FAMOTIDINE 20 MG: 20 TABLET ORAL at 08:51

## 2021-07-23 RX ADMIN — OXYCODONE HYDROCHLORIDE AND ACETAMINOPHEN 500 MG: 500 TABLET ORAL at 08:51

## 2021-07-23 RX ADMIN — MORPHINE SULFATE 2 MG: 2 INJECTION, SOLUTION INTRAMUSCULAR; INTRAVENOUS at 00:24

## 2021-07-23 RX ADMIN — ZINC SULFATE 220 MG (50 MG) CAPSULE 50 MG: CAPSULE at 08:51

## 2021-07-23 RX ADMIN — INSULIN LISPRO 12 UNITS: 100 INJECTION, SOLUTION INTRAVENOUS; SUBCUTANEOUS at 17:45

## 2021-07-23 RX ADMIN — HEPARIN SODIUM 755 UNITS/HR: 10000 INJECTION, SOLUTION INTRAVENOUS at 12:07

## 2021-07-23 RX ADMIN — ATORVASTATIN CALCIUM 10 MG: 10 TABLET, FILM COATED ORAL at 08:52

## 2021-07-23 RX ADMIN — HEPARIN SODIUM 530 UNITS/HR: 10000 INJECTION, SOLUTION INTRAVENOUS at 19:49

## 2021-07-23 RX ADMIN — MORPHINE SULFATE 2 MG: 2 INJECTION, SOLUTION INTRAMUSCULAR; INTRAVENOUS at 21:25

## 2021-07-23 ASSESSMENT — PAIN DESCRIPTION - ORIENTATION
ORIENTATION: RIGHT

## 2021-07-23 ASSESSMENT — PAIN SCALES - GENERAL
PAINLEVEL_OUTOF10: 5
PAINLEVEL_OUTOF10: 5
PAINLEVEL_OUTOF10: 0
PAINLEVEL_OUTOF10: 5
PAINLEVEL_OUTOF10: 5
PAINLEVEL_OUTOF10: 0
PAINLEVEL_OUTOF10: 5

## 2021-07-23 ASSESSMENT — PAIN DESCRIPTION - LOCATION
LOCATION: FLANK

## 2021-07-23 ASSESSMENT — PAIN DESCRIPTION - PROGRESSION
CLINICAL_PROGRESSION: NOT CHANGED
CLINICAL_PROGRESSION: GRADUALLY IMPROVING
CLINICAL_PROGRESSION: GRADUALLY WORSENING
CLINICAL_PROGRESSION: GRADUALLY IMPROVING

## 2021-07-23 ASSESSMENT — PAIN DESCRIPTION - FREQUENCY
FREQUENCY: CONTINUOUS

## 2021-07-23 ASSESSMENT — PAIN DESCRIPTION - ONSET
ONSET: ON-GOING

## 2021-07-23 ASSESSMENT — PAIN - FUNCTIONAL ASSESSMENT
PAIN_FUNCTIONAL_ASSESSMENT: PREVENTS OR INTERFERES SOME ACTIVE ACTIVITIES AND ADLS

## 2021-07-23 ASSESSMENT — PAIN DESCRIPTION - DESCRIPTORS
DESCRIPTORS: ACHING;DISCOMFORT
DESCRIPTORS: ACHING;DISCOMFORT
DESCRIPTORS: NAGGING
DESCRIPTORS: ACHING;DISCOMFORT
DESCRIPTORS: ACHING;DISCOMFORT
DESCRIPTORS: DISCOMFORT;ACHING
DESCRIPTORS: DISCOMFORT;ACHING

## 2021-07-23 ASSESSMENT — PAIN DESCRIPTION - PAIN TYPE
TYPE: ACUTE PAIN
TYPE: ACUTE PAIN;SURGICAL PAIN
TYPE: ACUTE PAIN

## 2021-07-23 ASSESSMENT — PAIN DESCRIPTION - DIRECTION: RADIATING_TOWARDS: RIGHT

## 2021-07-23 NOTE — PROGRESS NOTES
Patient with critical aPTT of 105.4. This RN called pharmacy and spoke with Emmy Carvajal. Heparin gtt paused for 1 hour and new rate to be 530 units/hr.  Recheck aPTT at 0130 per Emmy Carvajal, pharmacist.

## 2021-07-23 NOTE — PROGRESS NOTES
Patient assisted into a wheelchair and wheeled down to see  who is currently in room 2106. Patient left in room for some time to visit with him. Dr. [employer] into room to discuss with both patient and . VSS on patient during visit. Patient uses husbands call light to let this nurse know they are ready to head back to room 2112. VSS. Patient assisted from wheelchair to chair and set up for lunch. Patient denies any needs.

## 2021-07-23 NOTE — OP NOTE
[address] [place] [address] Ladi Faustin                                 OPERATIVE REPORT    PATIENT NAME: Bhavik Lo                  :        1960  MED REC NO:   [id number]                          ROOM:       2112  ACCOUNT NO:   [de-identified]                           ADMIT DATE: 2021  PROVIDER:     Chino Rey MD      DATE OF PROCEDURE:  2021    PREOPERATIVE DIAGNOSIS:  Right ureteral calculus. POSTOPERATIVE DIAGNOSIS:  Right ureteral calculus. PROCEDURE PERFORMED:  Cystoscopy, right ureteral stent insertion, right  retrograde pyelogram.    SURGEON:  Chino Rey MD    ANESTHESIA:  TIVA    COMPLICATIONS:  None. BLOOD LOSS:  Minimal.    INDICATIONS:  A 72-year-old female currently admitted with COVID  pneumonia. She has also been found to have a pulmonary embolus for  which she is on a heparin drip. She has been complaining of severe  right-sided flank pain. A CT scan today shows a 6-mm distal ureteral  stone with proximal hydronephrosis. She is here for urgent stent  insertion    PROCEDURE:  She was already on Rocephin. She was taken to the  cystoscopy suite. She moved onto the table. Anesthesia was induced. Her position was changed to the lithotomy position. Her genitalia were  prepped and draped. She was noted to have grade 3 to 4 anterior vaginal  wall prolapse. The 21-Canadian cystoscope advanced easily through the  urethra into the bladder. Both ureteral orifices were normal.  A 0.035  ZIPwire was advanced into the right ureteral orifice up to the kidney  under fluoroscopic guidance. I initially tried to advance a 6-Canadian x  24 cm double-J stent. This was not long enough to reach the kidney. Then I was not clear on the anatomy, so I decided to perform a  retrograde pyelogram.  The stent was removed and a [place] catheter was  advanced over the wire.   A retrograde pyelogram was performed that  showed moderate hydronephrosis. The wire was then re-advanced through  the [facility] catheter up to the renal pelvis were it curled. The [place]  catheter was withdrawn. A 6-[place] x 26-cm double-J stent was advanced  over the wire in the typical fashion. The wire was withdrawn. Fluoroscopy showed the stent was in good position in the right renal  pelvis. The bladder was drained and the cystoscope was withdrawn. She  was awakened and transferred back to the intensive care unit having  tolerated the procedure well. We will plan definitive ureteroscopy in  approximately 1 month when her other medical conditions have stabilized.         Drew Trejo MD    D: 07/22/2021 18:53:46       T: 07/22/2021 19:02:37     [email or web address]  Job#: [id number]     Doc#: [id number]    CC:

## 2021-07-23 NOTE — PROGRESS NOTES
Pulmonary Progress Note    CC:  Follow up respiratory failure, COVID    Subjective:  4 liters HF  Went to the OR yesterday due to hydronephrosis  Had ureteral stent placed   Doing better  Breathing has improved     ROS  Less pain  Breathing improving       Intake/Output Summary (Last 24 hours) at 7/23/2021 0756  Last data filed at 7/23/2021 0600  Gross per 24 hour   Intake 1588.2 ml   Output 300 ml   Net 1288.2 ml         PHYSICAL EXAM:  Blood pressure 107/61, pulse (!) 41, temperature 98.5 °F (36.9 °C), temperature source Axillary, resp. rate 12, height 5' 2\" (1.575 m), weight 151 lb 14.4 oz (68.9 kg), SpO2 97 %, not currently breastfeeding.'  Gen: Looks uncomfortable    Eyes: PERRL. No sclera icterus. No conjunctival injection. ENT: No discharge. Pharynx clear. External appearance of ears and nose normal.  Neck: Trachea midline. No obvious mass. Resp: Diminished but clear  CV: Pato Angela. No murmur or rub. GI: Non-tender. Non-distended. No hernia. Skin: Warm, dry, normal texture and turgor. No nodule on exposed extremities. Lymph: No cervical LAD. No supraclavicular LAD. M/S: No cyanosis. No clubbing. No joint deformity. Neuro: Moves all four extremities. CN 2-12 tested, no defect noted.   Ext:   no edema    Medications:    Scheduled Meds:   insulin glargine  28 Units Subcutaneous BID    cefTRIAXone (ROCEPHIN) IV  1,000 mg Intravenous Daily    docusate sodium  100 mg Oral Daily    insulin lispro  12 Units Subcutaneous TID WC    guaiFENesin  600 mg Oral BID    dexamethasone  10 mg Oral Daily    insulin lispro  0-18 Units Subcutaneous TID WC    insulin lispro  0-9 Units Subcutaneous Nightly    atorvastatin  10 mg Oral Daily    sodium chloride flush  5-40 mL Intravenous 2 times per day    Vitamin D  1,000 Units Oral Daily    zinc sulfate  50 mg Oral Daily    ascorbic acid  500 mg Oral Daily    famotidine  20 mg Oral Daily    potassium citrate  10 mEq Oral BID WC       Continuous Infusions:   heparin (PORCINE) Infusion 980 Units/hr (07/22/21 0930)    sodium chloride Stopped (07/22/21 1850)    dextrose         PRN Meds:  diphenhydrAMINE, morphine, oxyCODONE-acetaminophen, senna, albuterol sulfate HFA **AND** ipratropium, guaiFENesin-dextromethorphan, sodium chloride, sodium chloride flush, sodium chloride, ondansetron **OR** ondansetron, polyethylene glycol, acetaminophen **OR** acetaminophen, glucose, dextrose, glucagon (rDNA), dextrose, promethazine, benzonatate, albuterol sulfate HFA    Labs:  CBC:   Recent Labs     07/21/21 0441 07/22/21 0448 07/23/21 0448   WBC 9.1 16.5* 29.2*   HGB 13.6 13.4 12.4   HCT 39.5 38.9 36.1   MCV 87.8 87.4 88.1    368 294     BMP:   Recent Labs     07/21/21 0441 07/22/21 0448 07/23/21 0448    134* 138   K 4.5 4.5 4.7    101 101   CO2 27 27 28   PHOS 4.0 3.9 4.5   BUN 29* 37* 33*   CREATININE 0.9 0.9 1.1     LIVER PROFILE:   Recent Labs     07/21/21 0441 07/22/21 0448 07/23/21 0448   AST 20 12* 18   ALT 40 32 33   BILITOT 0.3 0.3 0.5   ALKPHOS 42 40 42     PT/INR: No results for input(s): PROTIME, INR in the last 72 hours. APTT:   Recent Labs     07/21/21 0441 07/22/21 0448   APTT 72.0* 87.6*     UA:  Recent Labs     07/22/21  0940   COLORU YELLOW   PHUR 5.0   WBCUA 31*   RBCUA 11-20*   BACTERIA 4+*   CLARITYU CLOUDY*   SPECGRAV 1.023   LEUKOCYTESUR MODERATE*   UROBILINOGEN 0.2   BILIRUBINUR Negative   BLOODU LARGE*   GLUCOSEU Negative     No results for input(s): PH, PCO2, PO2 in the last 72 hours.         Films:  Chest imaging reports were reviewed and imaging was reviewed by me and showed no new films    ABG:  None    Cultures:  None    I reviewed the labs and images listed above    Assessment/Plan:    Acute Hypoxic Respiratory Failure with saturations less than 90% on room air  Titrate oxygen for saturations greater than or equal to 88%   COVID-19 pneumonia  o Competed decadron 20 mg for 5 days, and now on 10 mg for 5 days  o Remdesivir completed    Acute PE  o Heparin gtt.  Recommend NOAC when ok with hospitalist.  Needs 3 months   ARDS   Right ureteral calculus s/p stenting    MARCI    DVT prophylaxis  Heparin     PT/OT     Will sign off       Osiel Oglesby, DO [facility]

## 2021-07-23 NOTE — PROGRESS NOTES
Hospitalist Progress Note      PCP: Tristan Cortez MD    Date of Admission: 7/13/2021    Chief Complaint: Generalized weakness, poor appetite, poor p.o. intake    Hospital Course: 62 y.o. female  to [facility] with PMHx: CKD, HLD, DM, HTN  no recent travel. Patient spouse is admitted to the ICU: Positive for Covid      presented to the ED with encouragement of her family due to the fact that her  was positive for Covid and she is becoming symptomatic with decreased appetite, generalized weakness. she had not really ate or drank anything x 24 to 48-hour. She is reporting some shortness of breath No fever or chills. No diarrhea. No cough or abdominal pain.     NOT PREVIOUSLY VACCINATED for Covid.     ED work-up: Mild leukopenia 2.6, mild thrombocytopenia of 93, MARCI on CKD: BUN 55, cr 2.3, GFR 22. . EKG is unremarkable. Chest x-ray no consolidation or opacities. CV-19 +.  was started on IV fluids, given an inhaler treatment and started on Decadron.   On exam patient was very sleepy, looks very tired. She was awakened and remarked that she had not really gotten much sleep and was very tired. She did not appear to be any any type of respiratory distress. Skin warm and dry. Lung fields clear. SPO2 93 to 94% on room air. Respirations were easy and regular. Developed a PE treated with heparin drip and progressed to ARDS requiring HFNC 20L. Completed course remdesevir  Then slowly weaning O2      Subjective: remains in ICU on medsurg status. Wife having trouble stating her  has her all messed up-stressing her out  bc he has bipolar is very depressed  still in [address] she claims dr talking to him about ventilator   Daily she claims he wants to be DNR her o2 has weaned to 6L NC and maintained her O2 sat when walked from bed to chair with PT and marched in room. Notified nursing staff clinically she can tolerate transport in [facility] 23 on O2 to his room if this is allowed.  It may help the mentation/rcovery of them both to see other . Her flank pain is improved after the stent yesterday    all other systems neg       Medications:  Reviewed    Infusion Medications    heparin (PORCINE) Infusion 755 Units/hr (07/23/21 1038)    sodium chloride Stopped (07/22/21 1850)    dextrose       Scheduled Medications    [START ON 7/24/2021] cefTRIAXone (ROCEPHIN) IV  2,000 mg Intravenous Daily    insulin glargine  28 Units Subcutaneous BID    docusate sodium  100 mg Oral Daily    insulin lispro  12 Units Subcutaneous TID WC    guaiFENesin  600 mg Oral BID    dexamethasone  10 mg Oral Daily    insulin lispro  0-18 Units Subcutaneous TID WC    insulin lispro  0-9 Units Subcutaneous Nightly    atorvastatin  10 mg Oral Daily    sodium chloride flush  5-40 mL Intravenous 2 times per day    Vitamin D  1,000 Units Oral Daily    zinc sulfate  50 mg Oral Daily    ascorbic acid  500 mg Oral Daily    famotidine  20 mg Oral Daily    potassium citrate  10 mEq Oral BID WC     PRN Meds: diphenhydrAMINE, morphine, oxyCODONE-acetaminophen, senna, albuterol sulfate HFA **AND** ipratropium, guaiFENesin-dextromethorphan, sodium chloride, sodium chloride flush, sodium chloride, ondansetron **OR** ondansetron, polyethylene glycol, acetaminophen **OR** acetaminophen, glucose, dextrose, glucagon (rDNA), dextrose, promethazine, benzonatate, albuterol sulfate HFA      Intake/Output Summary (Last 24 hours) at 7/23/2021 1134  Last data filed at 7/23/2021 0835  Gross per 24 hour   Intake 1588.2 ml   Output 650 ml   Net 938.2 ml       Physical Exam Performed:    BP (!) 113/59   Pulse (!) 46   Temp 97.7 °F (36.5 °C) (Axillary)   Resp 20   Ht 5' 2\" (1.575 m)   Wt 151 lb 14.4 oz (68.9 kg)   SpO2 99%   BMI 27.78 kg/m²     General appearance: was on cell with  speaks full sentences   HEENT: PERRL EOMI conjunctivae clear glasses mucosa moist   Neck: Supple, full range of motion. No JVD. Trachea midline.   Respiratory: O2 6 L nasal cannula CTA decreased at bases   cardiovascular: RRR S1/S2 without murmurs, rubs or gallops. Abdomen: Soft, non-tender, non-distended with normal bowel sounds. Musculoskeletal: No c/c/e  Full range of motion nodeformity. Skin: Skin color pink warm dry  Neurologic:  Cranial nerves: II-XII intact, grossly non-focal.  Psychiatric: Alert and oriented  Peripheral Pulses: +2 palpable, equal bilaterally   : voids BSC    Labs:   Recent Labs     07/21/21 0441 07/22/21 0448 07/23/21 0448   WBC 9.1 16.5* 29.2*   HGB 13.6 13.4 12.4   HCT 39.5 38.9 36.1    368 294     Recent Labs     07/21/21 0441 07/22/21 0448 07/23/21 0448    134* 138   K 4.5 4.5 4.7    101 101   CO2 27 27 28   BUN 29* 37* 33*   CREATININE 0.9 0.9 1.1   CALCIUM 8.9 8.9 8.5   PHOS 4.0 3.9 4.5     Recent Labs     07/21/21 0441 07/22/21 0448 07/23/21 0448   AST 20 12* 18   ALT 40 32 33   BILITOT 0.3 0.3 0.5   ALKPHOS 42 40 42     No results for input(s): INR in the last 72 hours. No results for input(s): Reyne Azerbaijani in the last 72 hours. Urinalysis:      Lab Results   Component Value Date    NITRU Negative 07/22/2021    WBCUA 31 07/22/2021    BACTERIA 4+ 07/22/2021    RBCUA 11-20 07/22/2021    BLOODU LARGE 07/22/2021    SPECGRAV 1.023 07/22/2021    GLUCOSEU Negative 07/22/2021       Radiology:  [facility] FOR SURGICAL PROCEDURES   Final Result      CT ABDOMEN PELVIS WO CONTRAST Additional Contrast? None   Final Result   6 x 4 x 6 mm obstructing calculus at the right ureterovesicular junction   causing moderate right hydroureteronephrosis. 8 x 6 x 5 mm nonobstructing calculus in the left renal pelvis. Bibasilar consolidations and diffuse ground-glass and interstitial opacities   in a more peripheral distribution consistent with multifocal pneumonia. This   pattern is common for COVID pneumonia. US RENAL COMPLETE   Final Result   Mild right hydronephrosis.   Obstructive uropathy considered in this patient   with reported history of stone disease         CT CHEST PULMONARY EMBOLISM W CONTRAST   Final Result   Addendum 1 of 1   ADDENDUM:   Critical results were called by Dr. Kelsey Cantu MD to  Gabino Ali   on 7/17/2021 at 12:46. Final   Positive for pulmonary embolus. A nonocclusive embolus is noted in the right   descending pulmonary artery. The study is limited for detection of   peripheral emboli. Clot burden, nonetheless, is considered mild. No evidence of right ventricular heart strain. Ground-glass and interstitial opacities and pattern common for COVID   pneumonia. Mild hilar adenopathy, likely reactive in the setting. XR CHEST 1 VIEW   Final Result   Increasing vascular congestion and suggestion of developing perihilar   interstitial edema. Unchanged bilateral lower lobe atelectasis. XR CHEST 1 VIEW   Final Result   New bilateral lower lobe airspace disease most likely due to atelectasis on   this low lung volume expiratory portable chest x-ray although edema is also   in the differential.  Pneumonia is unlikely but not excluded. No evidence of   pleural effusion         XR CHEST PORTABLE   Final Result   Hypoinflation with mild discoid atelectasis or scarring along the lung bases.              Assessment/Plan:    Active Hospital Problems    Diagnosis     Right pulmonary embolus (HCC) [I26.99]     ARDS (adult respiratory distress syndrome) ([facility]) [J80]     Acute respiratory failure with hypoxia ([facility]) [J96.01]     Neutropenia ([facility]) [D70.9]     Pneumonia due to COVID-19 virus [U07.1, J12.82]     MARCI (acute kidney injury) ([facility] [address] [N17.9]        Leukocytosis  -WBC rising last few days now 29  Afebrile possibly steroid induced    On decadron 10mg po daily x5 7/21-7/25  Is on rocephin for probable UTI      Acute hypoxemic respiratory failure   COVID-19 pneumonia  -Decadron 10mg po d x5   -Remdesivir 7/16 x4 days completed   -Titrate oxygen goal saturation 90%  --recommend proning   --O2 demand weaned to6 L NC, continues improving slowly  --vitamin D/cholecalciferol 1000 units daily  --zinc sulfate 220mg po daily   inflammatory markers   ddimer 403->345  Ferritin 553.4 ->666  CRP 31.5->3. 1    Acute pulmonary embolism  Nonocclusive embolism right descending pulmonary artery   clot burden mild    On heparin drip. Will change to eliquis  But wait until tomorrow am  since we just had kidney stone and cysto stent   At least x3 months      Neutropenia-resolved    Acute renal failure -resolved     DM type 2  --hold januvia  --accucheck ac and HS low dose SS  --lantus 28 BID   -- Lispro 12 units TID   --Diabetic 3 carb choice 45 g per meal diet  Wean once steroid decreases     Essential HTN  Sinus bradycardia  cardizem CD 240mg daily with toprol 50mg daily on hold but HR again still 40's asymptomatic . Could discuss with cardiology she was narendra when came in as well   -TSH 0.48 low side of normal    Right flank pain  Mild right hydronephrosis  Renal colic   6mm right distal ureteral stone   UTI  --urology  S/p urgent cysto rt ureteral stent right retrograde pyelogram   --IV morphine 2 mg every 2 hours prn severe  --Percocet 5/325 mg p.o. every 4 hours prn    Urology found grade 3-4 vaginal walll prolapse    Remains in ICU although by O2 saturation and demand could go back up to floor defer to admin. Courtney Dye sent down bc  in ICU as well with covid     DVT Prophylaxis: heparin drip change to NOAC tomorrow am   Diet: ADULT DIET; Regular; 3 carb choices (45 gm/meal); Low Potassium (Less than 3000 mg/day);  Low Phosphorus (Less than 1000 mg)  Code Status: Full Code    PT/OT Eval Status: doing well with PT maintaining sat with exert now making it to chair   Dispo - goal return home     Kourtney Guy MD

## 2021-07-23 NOTE — PROGRESS NOTES
Physical Therapy  Facility/Department: [address] ICU  Daily Treatment Note  NAME: Gm Bowman  : 1960  MRN: [id number]    Date of Service: 2021    Discharge Recommendations:  Continue to assess pending progress   PT Equipment Recommendations  Other: Will monitor for potential equipt needs. Assessment   Body structures, Functions, Activity limitations: Decreased functional mobility ; Decreased endurance  Assessment: 61 y/o female admit 2021 with Acute Respiratory, COVID +.  2021 CT Chest + PE.    2021 R Ureteral Calculus : Cystoscopy, R Ureteral Stent Insertion, R Retrograde Pyelogram.       PMH as noted including CKD, HTN. PTA pt living with  in home with few steps to enter and 1st floor bed/bath.  also in acute care with COVID. Pt currently requiring O2 3L; brief destat mid 80's immed following oob/amb short distance (20'). Does recover quickly with seated rest.  Pt progressing well with oob/short distances amb. If pt has adequate assist/support, may be able to consider Home  ( cont acute care also, + COVID); otherwise will cont plan for 3-5 level of care. Will cont to monitor pt's progress. Prognosis: Good  Decision Making: Medium Complexity  History: 61 y/o female admit 2021 with Acute Respiratory, COVID +.  2021 CT Chest + PE.  PMH as noted including CKD, HTN. Exam: See above. Clinical Presentation: See above. Patient Education: Role of PT, POC, Need to call for assist.  Barriers to Learning: None. REQUIRES PT FOLLOW UP: Yes  Activity Tolerance  Activity Tolerance: Patient limited by cognitive status  Activity Tolerance: Pt currently requiring O2 3L via NC  brief destat mid 80's immed following oob activities. Does recover quickly with seated rest.     Patient Diagnosis(es): The encounter diagnosis was MARCI (acute kidney injury) ([facility] [address]      has a past medical history of Chronic kidney disease, Hyperlipidemia, Hypertension, MDRO (multiple drug resistant organisms) resistance, MRSA infection, and Type II or unspecified type diabetes mellitus without mention of complication, not stated as uncontrolled. has a past surgical history that includes Lithotripsy ();  section; Colonoscopy (2018); Cholecystectomy (); and Cystoscopy (Right, 2021). Restrictions  Restrictions/Precautions  Restrictions/Precautions: Isolation, Up as Tolerated  Position Activity Restriction  Other position/activity restrictions: Droplet Plus Precautions : COVID +. O2 3 L  Subjective   General  Chart Reviewed: Yes  Additional Pertinent Hx: 63 y/o female admit 2021 with Acute Respiratory, COVID +.  2021 CT Chest + PE.   2021 R Ureteral Calculus : Cystoscopy, R Ureteral Stent Insertion, R Retrograde Pyelogram.    PMH as noted including CKD, HTN. Response To Previous Treatment: Patient with no complaints from previous session. Family / Caregiver Present: No  Referring Practitioner: Dr Dominique Boston  Subjective  Subjective: Pt agreeable to PT Rx. Orientation  Orientation  Overall Orientation Status: Within Functional Limits  Cognition   Cognition  Overall Cognitive Status: WFL  Objective   Bed mobility  Supine to Sit: Supervision (HOB elevated.)  Transfers  Sit to Stand: Stand by assistance  Stand to sit: Stand by assistance  Ambulation  Ambulation?: Yes  Ambulation 1  Device: No Device  Distance: Pt amb bed around to chair ~20' with Handheld assist.  Slight guarded although no LE buckling/giving way. C/O Mild dyspnea following short distance amb.         Exercises  Comments: Stand (with handheld assist) : Marching 10x, Toe/Heel Raises 10x      Strength RLE  Strength RLE: WFL  Strength LLE  Strength LLE: WFL  Strength RUE  Strength RUE: WFL  Strength LUE  Strength LUE: WFL                                 AM-PAC Score  AM-PAC Inpatient Mobility Raw Score : 17 (21 1407)  -PAC Inpatient T-Scale Score : 42.13 (21 1407)  Mobility Inpatient CMS 0-100% Score: 50.57 (07/23/21 1407)  Mobility Inpatient CMS G-Code Modifier : CK (07/23/21 1407)          Goals  Short term goals  Time Frame for Short term goals: Upon d/c acute care setting. Short term goal 1: Bed Mob Independent. Short term goal 2: Transfers with/without assist device Supervision. Short term goal 3: Amb with/without assist device, within hospital setting 25-50' SBA. Patient Goals   Patient goals : Return home. Plan    Plan  Times per week: 3-5x week while in acute care setting.   Current Treatment Recommendations: Strengthening, Functional Mobility Training, Transfer Training, Gait Training, Safety Education & Training, Patient/Caregiver Education & Training  Safety Devices  Type of devices: Call light within reach, Left in chair, Nurse notified     Therapy Time   Individual Concurrent Group Co-treatment   Time In 1130         Time Out 1200         Minutes 8108 [facility], PT

## 2021-07-23 NOTE — PROGRESS NOTES
1935: Report received from [address] [place] [address]. Pt in bed, alert, VSS on 4L HFNC. Bed in lowest position, wheels locked, 3/4 siderails up, call light and bedside table within reach. Heparin gtt infusing per PIV as ordered, see handoff in [facility]     2025: PRN morphine given for pain 5/10 that was not responsive to rest or repositioning. Pt refused mucinex, stating it makes her mouth dry. Pt educated that mucinex aids in clearing secretions, however, pt still does not wish to take this medication. Pt denies further needs at this time and wishes to rest. This RN gave updates to patient's 's nurse, Janine Radford, as patient is also admitted to ICU. Jolie RN states she will notify pt's  that pt has returned from surgery and is stable. 0000: Assisted pt to bedside commode to void. 0025: PRN morphine given for pain 5/10 that was not responsive to rest or repositioning.

## 2021-07-23 NOTE — PLAN OF CARE
Problem: Airway Clearance - Ineffective  Goal: Achieve or maintain patent airway  7/22/2021 2201 by Matt Mirza RN  Outcome: Ongoing  7/22/2021 0843 by Gino Tse RN  Outcome: Ongoing     Problem: Gas Exchange - Impaired  Goal: Absence of hypoxia  7/22/2021 2201 by Matt Mirza RN  Outcome: Ongoing  7/22/2021 0843 by Gino Tse RN  Outcome: Ongoing  Goal: Promote optimal lung function  7/22/2021 2201 by Matt Mirza RN  Outcome: Ongoing  7/22/2021 0843 by Gino Tse RN  Outcome: Ongoing     Problem: Breathing Pattern - Ineffective  Goal: Ability to achieve and maintain a regular respiratory rate  7/22/2021 2201 by Matt Mirza RN  Outcome: Ongoing  7/22/2021 0843 by Gino Tse RN  Outcome: Ongoing     Problem:  Body Temperature -  Risk of, Imbalanced  Goal: Ability to maintain a body temperature within defined limits  7/22/2021 2201 by Matt Mirza RN  Outcome: Ongoing  7/22/2021 0843 by Gino Tse RN  Outcome: Ongoing  Goal: Will regain or maintain usual level of consciousness  7/22/2021 2201 by Matt Mirza RN  Outcome: Ongoing  7/22/2021 0843 by Gino Tse RN  Outcome: Ongoing  Goal: Complications related to the disease process, condition or treatment will be avoided or minimized  7/22/2021 2201 by Matt Mirza RN  Outcome: Ongoing  7/22/2021 0843 by Gino Tse RN  Outcome: Ongoing     Problem: Isolation Precautions - Risk of Spread of Infection  Goal: Prevent transmission of infection  7/22/2021 2201 by Matt Mirza RN  Outcome: Ongoing  7/22/2021 0843 by Gino Tse RN  Outcome: Ongoing     Problem: Nutrition Deficits  Goal: Optimize nutritional status  7/22/2021 2201 by Matt Mirza RN  Outcome: Ongoing  7/22/2021 0843 by Gino Tse RN  Outcome: Ongoing     Problem: Risk for Fluid Volume Deficit  Goal: Maintain normal heart rhythm  7/22/2021 2201 by Matt Mirza RN  Outcome: Ongoing  7/22/2021 0843 by Bk Quigley 2051 by Ac Murillo RN  Outcome: Ongoing  Goal: Control of chronic pain  Description: Control of chronic pain  7/22/2021 2201 by Chely Bates RN  Outcome: Ongoing  7/22/2021 0843 by Ac Murillo RN  Outcome: Ongoing

## 2021-07-23 NOTE — PROGRESS NOTES
Occupational Therapy   Occupational Therapy Initial Assessment  Date: 2021   Patient Name: Joseline Barros  MRN: [id number]     : 1960    Date of Service: 2021    Discharge Recommendations:  Home with assist PRN       Assessment   Performance deficits / Impairments: Decreased functional mobility ; Decreased ADL status; Decreased endurance;Decreased high-level IADLs;Decreased balance  Assessment: Per Georgie Espinoza MD's op note 922: \"A 81-year-old female currently admitted with COVID pneumonia. She has also been found to have a pulmonary embolus for which she is on a heparin drip. She has been complaining of severe right-sided flank pain. A CT scan today shows a 6-mm distal ureteral stone with proximal hydronephrosis. \" Pt s/p Cystoscopy, right ureteral stent insertion, rightretrograde pyelogram . At baseline, pt lives with  (also hospitalized with COVID) and independent ADLs and fxl mobility. Pt currently functioning below baseline d/t the above deficits, today requiring CGA fxl transfers/mobility with no AD, SBA LB dressing, and anticipate pt would require overall CGA/min A for ADLs. SpO2 on 3 L O2 83% with exertion. Will cont to see on acute to address the above limitations and maximize pt's safety and independence. Anticipate pt will progress to be safe to return home with assist prn at d/c. Prognosis: Good  Decision Making: Medium Complexity  OT Education: OT Role;Plan of Care;Transfer Training;Home Exercise Program;Energy Conservation  Barriers to Learning: none  REQUIRES OT FOLLOW UP: Yes  Activity Tolerance  Activity Tolerance: Patient limited by fatigue  Activity Tolerance: SpO2  on 3 L O2 83% with activity, 96% at rest  Safety Devices  Safety Devices in place: Yes  Type of devices: Call light within reach; Chair alarm in place; Left in chair;Nurse notified           Patient Diagnosis(es): The encounter diagnosis was MARCI (acute kidney injury) ([facility] [address]      has a past medical history of Chronic kidney disease, Hyperlipidemia, Hypertension, MDRO (multiple drug resistant organisms) resistance, MRSA infection, and Type II or unspecified type diabetes mellitus without mention of complication, not stated as uncontrolled. has a past surgical history that includes Lithotripsy ();  section; Colonoscopy (2018); Cholecystectomy (); and Cystoscopy (Right, 2021). Restrictions  Restrictions/Precautions  Restrictions/Precautions: Isolation, Up as Tolerated  Position Activity Restriction  Other position/activity restrictions: Droplet Plus Precautions : COVID +. O2 3 L    Subjective   General  Chart Reviewed: Yes  Patient assessed for rehabilitation services?: Yes  Additional Pertinent Hx: Per Ashli Riggs MD's op note 922: \"A 78-year-old female currently admitted with COVID pneumonia. She has also been found to have a pulmonary embolus for which she is on a heparin drip. She has been complaining of severe right-sided flank pain. A CT scan today shows a 6-mm distal ureteral stone with proximal hydronephrosis. She is here for urgent stent insertion\" Pt s/p Cystoscopy, right ureteral stent insertion, rightretrograde pyelogram . Family / Caregiver Present: No  Referring Practitioner: Natty Travis DO  Diagnosis: COVID-19 pneumonia, acute PE, Acute Hypoxic Respiratory Failure, Right ureteral calculus, right hydronephrosis  Subjective  Subjective: Pt met b/s for OT eval/tx. Pt in bed on arrival, agreeable to participate in therapy. Pt denies pain.     Social/Functional History  Social/Functional History  Lives With: Spouse ( Skyla Thomas) also in hospital with COVID.)  Type of Home: House  Home Layout: Two level, Able to Live on Main level with bedroom/bathroom, Laundry in basement (1 story with basement.)  Home Access: Stairs to enter without rails (3 steps to enter.)  Bathroom Shower/Tub: Tub/Shower unit  Bathroom Toilet: Equipment Evaluation, Education, & procurement, Balance Training      AM-PAC Score        AM-PAC Inpatient Daily Activity Raw Score: 19 (07/23/21 1409)  AM-PAC Inpatient ADL T-Scale Score : 40.22 (07/23/21 1409)  ADL Inpatient CMS 0-100% Score: 42.8 (07/23/21 1409)  ADL Inpatient CMS G-Code Modifier : CK (07/23/21 1409)    Goals  Short term goals  Time Frame for Short term goals: Prior to d/c:  Short term goal 1: Pt will bathe with mod I. Short term goal 2: Pt will dress with mod I. Short term goal 3: Pt will toilet with mod I. Short term goal 4: Pt will complete fxl mobility and fxl transfers to/from ADL surfaces with mod I . Short term goal 5: Pt will tolerate standing >5 minutes for functional task with mod I while maintaining O2 sats >90%. Long term goals  Time Frame for Long term goals : STGs=LTGs  Patient Goals   Patient goals : to return home. Therapy Time   Individual Concurrent Group Co-treatment   Time In 1130         Time Out 1200         Minutes 30         Timed Code Treatment Minutes: 15 Minutes     This note to serve as OT d/c summary if pt is d/c-ed prior to next therapy session.     Dennis Givens, OTR/L [id number]

## 2021-07-24 LAB
A/G RATIO: 0.8 (ref 1.1–2.2)
ALBUMIN SERPL-MCNC: 2.4 G/DL (ref 3.4–5)
ALP BLD-CCNC: 43 U/L (ref 40–129)
ALT SERPL-CCNC: 28 U/L (ref 10–40)
ANION GAP SERPL CALCULATED.3IONS-SCNC: 8 MMOL/L (ref 3–16)
APTT: 47.5 SEC (ref 26.2–38.6)
AST SERPL-CCNC: 13 U/L (ref 15–37)
BASOPHILS ABSOLUTE: 0 K/UL (ref 0–0.2)
BASOPHILS RELATIVE PERCENT: 0.1 %
BILIRUB SERPL-MCNC: 0.3 MG/DL (ref 0–1)
BUN BLDV-MCNC: 30 MG/DL (ref 7–20)
CALCIUM SERPL-MCNC: 8.7 MG/DL (ref 8.3–10.6)
CHLORIDE BLD-SCNC: 102 MMOL/L (ref 99–110)
CO2: 26 MMOL/L (ref 21–32)
CREAT SERPL-MCNC: 0.8 MG/DL (ref 0.6–1.2)
EOSINOPHILS ABSOLUTE: 0 K/UL (ref 0–0.6)
EOSINOPHILS RELATIVE PERCENT: 0 %
GFR AFRICAN AMERICAN: >60
GFR NON-AFRICAN AMERICAN: >60
GLOBULIN: 3.1 G/DL
GLUCOSE BLD-MCNC: 120 MG/DL (ref 70–99)
GLUCOSE BLD-MCNC: 145 MG/DL (ref 70–99)
GLUCOSE BLD-MCNC: 265 MG/DL (ref 70–99)
GLUCOSE BLD-MCNC: 304 MG/DL (ref 70–99)
GLUCOSE BLD-MCNC: 334 MG/DL (ref 70–99)
HCT VFR BLD CALC: 36.7 % (ref 36–48)
HEMOGLOBIN: 12.7 G/DL (ref 12–16)
LYMPHOCYTES ABSOLUTE: 0.3 K/UL (ref 1–5.1)
LYMPHOCYTES RELATIVE PERCENT: 1.8 %
MAGNESIUM: 2.1 MG/DL (ref 1.8–2.4)
MCH RBC QN AUTO: 30.8 PG (ref 26–34)
MCHC RBC AUTO-ENTMCNC: 34.5 G/DL (ref 31–36)
MCV RBC AUTO: 89.3 FL (ref 80–100)
MONOCYTES ABSOLUTE: 0.5 K/UL (ref 0–1.3)
MONOCYTES RELATIVE PERCENT: 3.3 %
NEUTROPHILS ABSOLUTE: 14.2 K/UL (ref 1.7–7.7)
NEUTROPHILS RELATIVE PERCENT: 94.8 %
ORGANISM: ABNORMAL
PDW BLD-RTO: 13.4 % (ref 12.4–15.4)
PERFORMED ON: ABNORMAL
PHOSPHORUS: 3.1 MG/DL (ref 2.5–4.9)
PLATELET # BLD: 238 K/UL (ref 135–450)
PMV BLD AUTO: 9.6 FL (ref 5–10.5)
POTASSIUM SERPL-SCNC: 4.9 MMOL/L (ref 3.5–5.1)
RBC # BLD: 4.11 M/UL (ref 4–5.2)
SODIUM BLD-SCNC: 136 MMOL/L (ref 136–145)
TOTAL PROTEIN: 5.5 G/DL (ref 6.4–8.2)
URINE CULTURE, ROUTINE: ABNORMAL
WBC # BLD: 15 K/UL (ref 4–11)

## 2021-07-24 PROCEDURE — 6370000000 HC RX 637 (ALT 250 FOR IP): Performed by: HOSPITALIST

## 2021-07-24 PROCEDURE — 2700000000 HC OXYGEN THERAPY PER DAY

## 2021-07-24 PROCEDURE — 1200000000 HC SEMI PRIVATE

## 2021-07-24 PROCEDURE — 6370000000 HC RX 637 (ALT 250 FOR IP): Performed by: INTERNAL MEDICINE

## 2021-07-24 PROCEDURE — 94761 N-INVAS EAR/PLS OXIMETRY MLT: CPT

## 2021-07-24 PROCEDURE — 83735 ASSAY OF MAGNESIUM: CPT

## 2021-07-24 PROCEDURE — 85730 THROMBOPLASTIN TIME PARTIAL: CPT

## 2021-07-24 PROCEDURE — 6360000002 HC RX W HCPCS: Performed by: INTERNAL MEDICINE

## 2021-07-24 PROCEDURE — 6360000002 HC RX W HCPCS: Performed by: HOSPITALIST

## 2021-07-24 PROCEDURE — 2580000003 HC RX 258: Performed by: NURSE PRACTITIONER

## 2021-07-24 PROCEDURE — 84100 ASSAY OF PHOSPHORUS: CPT

## 2021-07-24 PROCEDURE — 2500000003 HC RX 250 WO HCPCS: Performed by: HOSPITALIST

## 2021-07-24 PROCEDURE — 80053 COMPREHEN METABOLIC PANEL: CPT

## 2021-07-24 PROCEDURE — 2580000003 HC RX 258: Performed by: INTERNAL MEDICINE

## 2021-07-24 PROCEDURE — 6370000000 HC RX 637 (ALT 250 FOR IP): Performed by: NURSE PRACTITIONER

## 2021-07-24 PROCEDURE — 85025 COMPLETE CBC W/AUTO DIFF WBC: CPT

## 2021-07-24 PROCEDURE — 36415 COLL VENOUS BLD VENIPUNCTURE: CPT

## 2021-07-24 RX ORDER — HEPARIN SODIUM 1000 [USP'U]/ML
2000 INJECTION, SOLUTION INTRAVENOUS; SUBCUTANEOUS ONCE
Status: COMPLETED | OUTPATIENT
Start: 2021-07-24 | End: 2021-07-24

## 2021-07-24 RX ADMIN — POTASSIUM CITRATE 10 MEQ: 10 TABLET ORAL at 09:31

## 2021-07-24 RX ADMIN — INSULIN LISPRO 6 UNITS: 100 INJECTION, SOLUTION INTRAVENOUS; SUBCUTANEOUS at 20:40

## 2021-07-24 RX ADMIN — INSULIN LISPRO 12 UNITS: 100 INJECTION, SOLUTION INTRAVENOUS; SUBCUTANEOUS at 10:25

## 2021-07-24 RX ADMIN — DOCUSATE SODIUM 100 MG: 100 CAPSULE ORAL at 09:31

## 2021-07-24 RX ADMIN — APIXABAN 10 MG: 5 TABLET, FILM COATED ORAL at 09:33

## 2021-07-24 RX ADMIN — ATORVASTATIN CALCIUM 10 MG: 10 TABLET, FILM COATED ORAL at 09:33

## 2021-07-24 RX ADMIN — SODIUM CHLORIDE, PRESERVATIVE FREE 10 ML: 5 INJECTION INTRAVENOUS at 09:45

## 2021-07-24 RX ADMIN — POTASSIUM CITRATE 10 MEQ: 10 TABLET ORAL at 18:08

## 2021-07-24 RX ADMIN — APIXABAN 10 MG: 5 TABLET, FILM COATED ORAL at 20:35

## 2021-07-24 RX ADMIN — MORPHINE SULFATE 2 MG: 2 INJECTION, SOLUTION INTRAMUSCULAR; INTRAVENOUS at 12:51

## 2021-07-24 RX ADMIN — MORPHINE SULFATE 2 MG: 2 INJECTION, SOLUTION INTRAMUSCULAR; INTRAVENOUS at 20:36

## 2021-07-24 RX ADMIN — HEPARIN SODIUM 2000 UNITS: 1000 INJECTION INTRAVENOUS; SUBCUTANEOUS at 02:58

## 2021-07-24 RX ADMIN — Medication 1000 UNITS: at 09:33

## 2021-07-24 RX ADMIN — ZINC SULFATE 220 MG (50 MG) CAPSULE 50 MG: CAPSULE at 09:33

## 2021-07-24 RX ADMIN — OXYCODONE HYDROCHLORIDE AND ACETAMINOPHEN 500 MG: 500 TABLET ORAL at 09:33

## 2021-07-24 RX ADMIN — INSULIN LISPRO 9 UNITS: 100 INJECTION, SOLUTION INTRAVENOUS; SUBCUTANEOUS at 12:48

## 2021-07-24 RX ADMIN — INSULIN GLARGINE 28 UNITS: 100 INJECTION, SOLUTION SUBCUTANEOUS at 10:25

## 2021-07-24 RX ADMIN — SODIUM CHLORIDE, PRESERVATIVE FREE 10 ML: 5 INJECTION INTRAVENOUS at 20:36

## 2021-07-24 RX ADMIN — INSULIN LISPRO 12 UNITS: 100 INJECTION, SOLUTION INTRAVENOUS; SUBCUTANEOUS at 18:08

## 2021-07-24 RX ADMIN — HEPARIN SODIUM 680 UNITS/HR: 10000 INJECTION, SOLUTION INTRAVENOUS at 02:57

## 2021-07-24 RX ADMIN — INSULIN LISPRO 12 UNITS: 100 INJECTION, SOLUTION INTRAVENOUS; SUBCUTANEOUS at 18:29

## 2021-07-24 RX ADMIN — INSULIN LISPRO 12 UNITS: 100 INJECTION, SOLUTION INTRAVENOUS; SUBCUTANEOUS at 12:50

## 2021-07-24 RX ADMIN — DEXAMETHASONE 10 MG: 4 TABLET ORAL at 09:31

## 2021-07-24 RX ADMIN — CEFTRIAXONE 2000 MG: 2 INJECTION, POWDER, FOR SOLUTION INTRAMUSCULAR; INTRAVENOUS at 08:11

## 2021-07-24 RX ADMIN — FAMOTIDINE 20 MG: 20 TABLET ORAL at 09:33

## 2021-07-24 ASSESSMENT — PAIN SCALES - GENERAL
PAINLEVEL_OUTOF10: 0
PAINLEVEL_OUTOF10: 5
PAINLEVEL_OUTOF10: 0
PAINLEVEL_OUTOF10: 0
PAINLEVEL_OUTOF10: 5
PAINLEVEL_OUTOF10: 0

## 2021-07-24 ASSESSMENT — PAIN DESCRIPTION - DESCRIPTORS
DESCRIPTORS: ACHING;DISCOMFORT
DESCRIPTORS: DISCOMFORT;ACHING

## 2021-07-24 ASSESSMENT — PAIN DESCRIPTION - PAIN TYPE
TYPE: ACUTE PAIN
TYPE: ACUTE PAIN;SURGICAL PAIN

## 2021-07-24 ASSESSMENT — PAIN DESCRIPTION - ORIENTATION
ORIENTATION: RIGHT
ORIENTATION: RIGHT

## 2021-07-24 ASSESSMENT — PAIN DESCRIPTION - LOCATION
LOCATION: FLANK
LOCATION: FLANK

## 2021-07-24 ASSESSMENT — PAIN DESCRIPTION - ONSET
ONSET: ON-GOING
ONSET: ON-GOING

## 2021-07-24 ASSESSMENT — PAIN DESCRIPTION - PROGRESSION
CLINICAL_PROGRESSION: NOT CHANGED
CLINICAL_PROGRESSION: GRADUALLY WORSENING

## 2021-07-24 ASSESSMENT — PAIN DESCRIPTION - FREQUENCY
FREQUENCY: INTERMITTENT
FREQUENCY: CONTINUOUS

## 2021-07-24 ASSESSMENT — PAIN - FUNCTIONAL ASSESSMENT
PAIN_FUNCTIONAL_ASSESSMENT: ACTIVITIES ARE NOT PREVENTED
PAIN_FUNCTIONAL_ASSESSMENT: ACTIVITIES ARE NOT PREVENTED

## 2021-07-24 ASSESSMENT — PAIN DESCRIPTION - DIRECTION
RADIATING_TOWARDS: RIGHT
RADIATING_TOWARDS: RI

## 2021-07-24 NOTE — PROGRESS NOTES
0700 - Shift report received from [facility]. Patient resting in bed, call light within reach. 200 - Dr. Clay Mathur to bedside. No new orders received. 7513 - Patient up to commode, then to chair, then to wheel chair to go visit  in another in patient room on the unit. 9417 - Returned to room and transferred back to chair. Patient tolerated transfer on 2 L NC.    1240 - Patient up to commode, had small BM and returned to chair. Patient complaining of right flank pain where the stent is. Requesting IV morphine. 1251 - PRN morphine given for c/o flank pain, 5 out of 10. Will continue to assess. 1616 - SpO2 95%. O2 decreased to 1 L NC.    1630 - Report called to Jace Gonzales nurse receiving patient in [phone].    1700 - Patient transported via wheelchair to [phone]. All belongings sent with patient.     Electronically signed by Mason Moreland RN on 7/24/2021 at 5:17 PM

## 2021-07-24 NOTE — PROGRESS NOTES
Clinical Pharmacy Note  Heparin Dosing       Lab Results   Component Value Date    APTT 47.5 07/24/2021     Lab Results   Component Value Date    HGB 12.4 07/23/2021    HCT 36.1 07/23/2021     07/23/2021       Current Infusion Rate: 530 units/hr    Plan:    Bolus: 2000 units  Rate: Increase to 680 units/hr  Next aPTT: N/A    Heparin is to be discontinued on 07/24/21 at 0900 with initiation of Apixaban    Pharmacy will continue to monitor and adjust based on aPTT results.     Sergey Jovel, [address] [place] [address]  7/24/2021 2:53 AM

## 2021-07-24 NOTE — PLAN OF CARE
Problem: Airway Clearance - Ineffective  Goal: Achieve or maintain patent airway  Outcome: Ongoing     Problem: Gas Exchange - Impaired  Goal: Absence of hypoxia  Outcome: Ongoing  Goal: Promote optimal lung function  Outcome: Ongoing     Problem: Breathing Pattern - Ineffective  Goal: Ability to achieve and maintain a regular respiratory rate  Outcome: Ongoing     Problem:  Body Temperature -  Risk of, Imbalanced  Goal: Ability to maintain a body temperature within defined limits  Outcome: Ongoing  Goal: Will regain or maintain usual level of consciousness  Outcome: Ongoing  Goal: Complications related to the disease process, condition or treatment will be avoided or minimized  Outcome: Ongoing     Problem: Isolation Precautions - Risk of Spread of Infection  Goal: Prevent transmission of infection  Outcome: Ongoing     Problem: Nutrition Deficits  Goal: Optimize nutritional status  Outcome: Ongoing     Problem: Risk for Fluid Volume Deficit  Goal: Maintain normal heart rhythm  Outcome: Ongoing  Goal: Maintain absence of muscle cramping  Outcome: Ongoing  Goal: Maintain normal serum potassium, sodium, calcium, phosphorus, and pH  Outcome: Ongoing     Problem: Loneliness or Risk for Loneliness  Goal: Demonstrate positive use of time alone when socialization is not possible  Outcome: Ongoing     Problem: Fatigue  Goal: Verbalize increase energy and improved vitality  Outcome: Ongoing     Problem: Patient Education: Go to Patient Education Activity  Goal: Patient/Family Education  Outcome: Ongoing     Problem: Falls - Risk of:  Goal: Will remain free from falls  Description: Will remain free from falls  Outcome: Ongoing  Goal: Absence of physical injury  Description: Absence of physical injury  Outcome: Ongoing     Problem: Nutrition  Goal: Optimal nutrition therapy  Outcome: Ongoing     Problem: Pain:  Goal: Pain level will decrease  Description: Pain level will decrease  Outcome: Ongoing  Goal: Control of acute pain  Description: Control of acute pain  Outcome: Ongoing  Goal: Control of chronic pain  Description: Control of chronic pain  Outcome: Ongoing     Electronically signed by Yeimy Ruth RN on 7/24/2021 at 12:12 PM

## 2021-07-24 NOTE — PROGRESS NOTES
Hospitalist Progress Note      PCP: Rizwana Pino MD    Date of Admission: 7/13/2021    Chief Complaint: Generalized weakness, poor appetite, poor p.o. intake    Hospital Course: 62 y.o. female  to [facility] with PMHx: CKD, HLD, DM, HTN  no recent travel. Patient spouse is admitted to the ICU: Positive for Covid      presented to the ED with encouragement of her family due to the fact that her  was positive for Covid and she is becoming symptomatic with decreased appetite, generalized weakness. she had not really ate or drank anything x 24 to 48-hour. She is reporting some shortness of breath No fever or chills. No diarrhea. No cough or abdominal pain.     NOT PREVIOUSLY VACCINATED for Covid.     ED work-up: Mild leukopenia 2.6, mild thrombocytopenia of 93, MARCI on CKD: BUN 55, cr 2.3, GFR 22. . EKG is unremarkable. Chest x-ray no consolidation or opacities. CV-19 +.  was started on IV fluids, given an inhaler treatment and started on Decadron.   On exam patient was very sleepy, looks very tired. She was awakened and remarked that she had not really gotten much sleep and was very tired. She did not appear to be any any type of respiratory distress. Skin warm and dry. Lung fields clear. SPO2 93 to 94% on room air. Respirations were easy and regular. Developed a small PE treated with heparin drip and progressed to ARDS requiring HFNC 20L. Completed course remdesevir. Then slowly able to wean O2 . change to eliquis 7/24. On BP meds at home cardizem and  toprol BP was controlled and significantly bradycardic HR 40's held meds but HR still slow asymptomatic no chronotropic incompetence. TSH 0.48. History kidney stones. C/o sudden right flank pain 7/22/21 renal US confirmed 6mm   Distal ureteral stone with proximal hydronephrosis. Seen by urology underwent cystoscopy with right ureteral stent 7/22/21 by Dr Gerber Ag.  Started rocephin for UTI WBC mandy to 29(some may be steroid maintained sats on 2L O2 now   HEENT: PERRL EOMI conjunctivae clear glasses mucosa moist   Neck: Supple, full range of motion. No JVD. Trachea midline. Respiratory: O2 2 L nasal cannula CTA decreased at bases but air entry improving   cardiovascular: RRR S1/S2 without murmurs, rubs or gallops. Abdomen: Soft, non-tender, non-distended with normal bowel sounds. Musculoskeletal: No c/c/e  Full range of motion nodeformity. Skin: Skin color pink warm dry  Neurologic:  Cranial nerves: II-XII intact, grossly non-focal.walked slowly standby assist held onto bedside table from commode around the bed to chair   Psychiatric: Alert and oriented  Peripheral Pulses: +2 palpable, equal bilaterally   : voids BSC    Labs:   Recent Labs     07/22/21 0448 07/23/21 0448 07/24/21  0515   WBC 16.5* 29.2* 15.0*   HGB 13.4 12.4 12.7   HCT 38.9 36.1 36.7    294 238     Recent Labs     07/22/21 0448 07/23/21 0448 07/24/21  0515   * 138 136   K 4.5 4.7 4.9    101 102   CO2 27 28 26   BUN 37* 33* 30*   CREATININE 0.9 1.1 0.8   CALCIUM 8.9 8.5 8.7   PHOS 3.9 4.5 3.1     Recent Labs     07/22/21 0448 07/23/21 0448 07/24/21  0515   AST 12* 18 13*   ALT 32 33 28   BILITOT 0.3 0.5 0.3   ALKPHOS 40 42 43     No results for input(s): INR in the last 72 hours. No results for input(s): Chris Luis in the last 72 hours. Urinalysis:      Lab Results   Component Value Date    NITRU Negative 07/22/2021    WBCUA 31 07/22/2021    BACTERIA 4+ 07/22/2021    RBCUA 11-20 07/22/2021    BLOODU LARGE 07/22/2021    SPECGRAV 1.023 07/22/2021    GLUCOSEU Negative 07/22/2021       Radiology:  [facility] FOR SURGICAL PROCEDURES   Final Result      CT ABDOMEN PELVIS WO CONTRAST Additional Contrast? None   Final Result   6 x 4 x 6 mm obstructing calculus at the right ureterovesicular junction   causing moderate right hydroureteronephrosis. 8 x 6 x 5 mm nonobstructing calculus in the left renal pelvis.       Bibasilar consolidations and diffuse ground-glass and interstitial opacities   in a more peripheral distribution consistent with multifocal pneumonia. This   pattern is common for COVID pneumonia. US RENAL COMPLETE   Final Result   Mild right hydronephrosis. Obstructive uropathy considered in this patient   with reported history of stone disease         CT CHEST PULMONARY EMBOLISM W CONTRAST   Final Result   Addendum 1 of 1   ADDENDUM:   Critical results were called by Dr. Anuradha Enciso MD to  [place]   on 7/17/2021 at 12:46. Final   Positive for pulmonary embolus. A nonocclusive embolus is noted in the right   descending pulmonary artery. The study is limited for detection of   peripheral emboli. Clot burden, nonetheless, is considered mild. No evidence of right ventricular heart strain. Ground-glass and interstitial opacities and pattern common for COVID   pneumonia. Mild hilar adenopathy, likely reactive in the setting. XR CHEST 1 VIEW   Final Result   Increasing vascular congestion and suggestion of developing perihilar   interstitial edema. Unchanged bilateral lower lobe atelectasis. XR CHEST 1 VIEW   Final Result   New bilateral lower lobe airspace disease most likely due to atelectasis on   this low lung volume expiratory portable chest x-ray although edema is also   in the differential.  Pneumonia is unlikely but not excluded. No evidence of   pleural effusion         XR CHEST PORTABLE   Final Result   Hypoinflation with mild discoid atelectasis or scarring along the lung bases.              Assessment/Plan:    Active Hospital Problems    Diagnosis     Right pulmonary embolus (HCC) [I26.99]     ARDS (adult respiratory distress syndrome) ([facility]) [J80]     Acute respiratory failure with hypoxia ([facility]) [J96.01]     Neutropenia ([facility]) [D70.9]     Pneumonia due to COVID-19 virus [U07.1, J12.82]     MARCI (acute kidney injury) ([facility] [address] [N17.9] Leukocytosis  -WBC mandy to  29  Afebrile possibly steroid induced, started rocephin for possible UTI when c/o urinary s/s/ and found to have stone, WBC now falling to 15 today      decadron 10mg po daily x5 7/21-7/25       Acute hypoxemic respiratory failure   COVID-19 pneumonia  -Decadron 10mg po d x5   -Remdesivir 7/16 x4 days completed   -Titrate oxygen goal saturation 90%  --recommend proning   --O2 demand weaned to2 L NC, continues improving slowly, possible wean to RA next 1-2 days and dc plan   --vitamin D/cholecalciferol 1000 units daily  --zinc sulfate 220mg po daily   inflammatory markers   ddimer 403->345  Ferritin 553.4 ->666  CRP 31.5->3. 1    Acute pulmonary embolism  Nonocclusive embolism right descending pulmonary artery , clot burden mild    heparin drip. -- change to eliquis today now that urologic procedure completed . At least x3 months      Neutropenia-resolved    Acute renal failure -resolved     DM type 2  --hold januvia  --accucheck ac and HS low dose SS  --lantus 28 BID   -- Lispro 12 units TID   --Diabetic 3 carb choice 45 g per meal diet  Wean once steroid decreases   BG  today     Essential HTN  Persistent Sinus bradycardia  cardizem CD 240mg daily with toprol 50mg daily on hold but HR still 40's and even to 30's asymptomatic   Appears to be narendra prior as well  -TSH 0.48 low side of normal not c/w hypothyroid as cause   No chronotropic incompetence    Right flank pain  Mild right hydronephrosis  Renal colic   6mm right distal ureteral stone   UTI  --urology  S/p urgent cysto rt ureteral stent right retrograde pyelogram   --IV morphine 2 mg every 2 hours prn severe  --Percocet 5/325 mg p.o. every 4 hours prn     grade 3-4 vaginal walll prolapse    DVT Prophylaxis: heparin drip change to NOAC tomorrow am   Diet: ADULT DIET; Regular; 3 carb choices (45 gm/meal); Low Potassium (Less than 3000 mg/day);  Low Phosphorus (Less than 1000 mg)  Code Status: Full Code    PT/OT Eval Status: doing well with PT     1. Her  [place] [facility] [place]   2. 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 FU urology for stent removal  4. Significant bradycardia at rest but rises appropriately with activity   5.  This am convert to eliquis dc heparin drip   6, possible dc home 1-2 days when weans to RA    Dispo - goal return home     Wai Salinas MD

## 2021-07-25 VITALS
WEIGHT: 155.2 LBS | SYSTOLIC BLOOD PRESSURE: 120 MMHG | HEART RATE: 63 BPM | OXYGEN SATURATION: 93 % | DIASTOLIC BLOOD PRESSURE: 75 MMHG | TEMPERATURE: 97.6 F | HEIGHT: 62 IN | BODY MASS INDEX: 28.56 KG/M2 | RESPIRATION RATE: 20 BRPM

## 2021-07-25 LAB
A/G RATIO: 0.9 (ref 1.1–2.2)
ALBUMIN SERPL-MCNC: 2.6 G/DL (ref 3.4–5)
ALP BLD-CCNC: 61 U/L (ref 40–129)
ALT SERPL-CCNC: 24 U/L (ref 10–40)
ANION GAP SERPL CALCULATED.3IONS-SCNC: 8 MMOL/L (ref 3–16)
AST SERPL-CCNC: 11 U/L (ref 15–37)
BASOPHILS ABSOLUTE: 0 K/UL (ref 0–0.2)
BASOPHILS RELATIVE PERCENT: 0 %
BILIRUB SERPL-MCNC: 0.4 MG/DL (ref 0–1)
BUN BLDV-MCNC: 27 MG/DL (ref 7–20)
CALCIUM SERPL-MCNC: 8.8 MG/DL (ref 8.3–10.6)
CHLORIDE BLD-SCNC: 105 MMOL/L (ref 99–110)
CO2: 28 MMOL/L (ref 21–32)
CREAT SERPL-MCNC: 0.8 MG/DL (ref 0.6–1.2)
EOSINOPHILS ABSOLUTE: 0 K/UL (ref 0–0.6)
EOSINOPHILS RELATIVE PERCENT: 0 %
GFR AFRICAN AMERICAN: >60
GFR NON-AFRICAN AMERICAN: >60
GLOBULIN: 2.9 G/DL
GLUCOSE BLD-MCNC: 108 MG/DL (ref 70–99)
GLUCOSE BLD-MCNC: 201 MG/DL (ref 70–99)
GLUCOSE BLD-MCNC: 66 MG/DL (ref 70–99)
GLUCOSE BLD-MCNC: 67 MG/DL (ref 70–99)
HCT VFR BLD CALC: 36.7 % (ref 36–48)
HEMOGLOBIN: 12.4 G/DL (ref 12–16)
LYMPHOCYTES ABSOLUTE: 0.3 K/UL (ref 1–5.1)
LYMPHOCYTES RELATIVE PERCENT: 2.2 %
MAGNESIUM: 2 MG/DL (ref 1.8–2.4)
MCH RBC QN AUTO: 30.1 PG (ref 26–34)
MCHC RBC AUTO-ENTMCNC: 33.7 G/DL (ref 31–36)
MCV RBC AUTO: 89.1 FL (ref 80–100)
MONOCYTES ABSOLUTE: 0.5 K/UL (ref 0–1.3)
MONOCYTES RELATIVE PERCENT: 3.7 %
NEUTROPHILS ABSOLUTE: 13.3 K/UL (ref 1.7–7.7)
NEUTROPHILS RELATIVE PERCENT: 94.1 %
PDW BLD-RTO: 13.1 % (ref 12.4–15.4)
PERFORMED ON: ABNORMAL
PHOSPHORUS: 3.3 MG/DL (ref 2.5–4.9)
PLATELET # BLD: 224 K/UL (ref 135–450)
PMV BLD AUTO: 9 FL (ref 5–10.5)
POTASSIUM SERPL-SCNC: 4.3 MMOL/L (ref 3.5–5.1)
RBC # BLD: 4.12 M/UL (ref 4–5.2)
SODIUM BLD-SCNC: 141 MMOL/L (ref 136–145)
TOTAL PROTEIN: 5.5 G/DL (ref 6.4–8.2)
WBC # BLD: 14.1 K/UL (ref 4–11)

## 2021-07-25 PROCEDURE — 2580000003 HC RX 258: Performed by: INTERNAL MEDICINE

## 2021-07-25 PROCEDURE — 2700000000 HC OXYGEN THERAPY PER DAY

## 2021-07-25 PROCEDURE — 6370000000 HC RX 637 (ALT 250 FOR IP): Performed by: NURSE PRACTITIONER

## 2021-07-25 PROCEDURE — 80053 COMPREHEN METABOLIC PANEL: CPT

## 2021-07-25 PROCEDURE — 6370000000 HC RX 637 (ALT 250 FOR IP): Performed by: INTERNAL MEDICINE

## 2021-07-25 PROCEDURE — 36415 COLL VENOUS BLD VENIPUNCTURE: CPT

## 2021-07-25 PROCEDURE — 2580000003 HC RX 258: Performed by: NURSE PRACTITIONER

## 2021-07-25 PROCEDURE — 6370000000 HC RX 637 (ALT 250 FOR IP): Performed by: HOSPITALIST

## 2021-07-25 PROCEDURE — 94761 N-INVAS EAR/PLS OXIMETRY MLT: CPT

## 2021-07-25 PROCEDURE — 6360000002 HC RX W HCPCS: Performed by: INTERNAL MEDICINE

## 2021-07-25 PROCEDURE — 83735 ASSAY OF MAGNESIUM: CPT

## 2021-07-25 PROCEDURE — 84100 ASSAY OF PHOSPHORUS: CPT

## 2021-07-25 PROCEDURE — 85025 COMPLETE CBC W/AUTO DIFF WBC: CPT

## 2021-07-25 RX ORDER — ALBUTEROL SULFATE 90 UG/1
2 AEROSOL, METERED RESPIRATORY (INHALATION) EVERY 6 HOURS PRN
Qty: 1 INHALER | Refills: 3 | Status: CANCELLED | OUTPATIENT
Start: 2021-07-25

## 2021-07-25 RX ORDER — INSULIN GLARGINE 100 [IU]/ML
28 INJECTION, SOLUTION SUBCUTANEOUS DAILY
Status: DISCONTINUED | OUTPATIENT
Start: 2021-07-25 | End: 2021-07-25 | Stop reason: HOSPADM

## 2021-07-25 RX ORDER — INSULIN GLARGINE 100 [IU]/ML
18 INJECTION, SOLUTION SUBCUTANEOUS NIGHTLY
Status: DISCONTINUED | OUTPATIENT
Start: 2021-07-25 | End: 2021-07-25 | Stop reason: HOSPADM

## 2021-07-25 RX ORDER — CEFUROXIME AXETIL 500 MG/1
500 TABLET ORAL 2 TIMES DAILY
Qty: 14 TABLET | Refills: 0 | Status: SHIPPED | OUTPATIENT
Start: 2021-07-25 | End: 2021-08-01

## 2021-07-25 RX ADMIN — OXYCODONE HYDROCHLORIDE AND ACETAMINOPHEN 500 MG: 500 TABLET ORAL at 10:23

## 2021-07-25 RX ADMIN — DOCUSATE SODIUM 100 MG: 100 CAPSULE ORAL at 10:23

## 2021-07-25 RX ADMIN — DEXAMETHASONE 10 MG: 4 TABLET ORAL at 10:23

## 2021-07-25 RX ADMIN — ATORVASTATIN CALCIUM 10 MG: 10 TABLET, FILM COATED ORAL at 10:23

## 2021-07-25 RX ADMIN — POTASSIUM CITRATE 10 MEQ: 10 TABLET ORAL at 10:22

## 2021-07-25 RX ADMIN — INSULIN LISPRO 12 UNITS: 100 INJECTION, SOLUTION INTRAVENOUS; SUBCUTANEOUS at 12:27

## 2021-07-25 RX ADMIN — SODIUM CHLORIDE, PRESERVATIVE FREE 10 ML: 5 INJECTION INTRAVENOUS at 10:27

## 2021-07-25 RX ADMIN — ZINC SULFATE 220 MG (50 MG) CAPSULE 50 MG: CAPSULE at 10:23

## 2021-07-25 RX ADMIN — Medication 1000 UNITS: at 10:23

## 2021-07-25 RX ADMIN — FAMOTIDINE 20 MG: 20 TABLET ORAL at 10:23

## 2021-07-25 RX ADMIN — CEFTRIAXONE 2000 MG: 2 INJECTION, POWDER, FOR SOLUTION INTRAMUSCULAR; INTRAVENOUS at 10:23

## 2021-07-25 RX ADMIN — INSULIN GLARGINE 28 UNITS: 100 INJECTION, SOLUTION SUBCUTANEOUS at 12:28

## 2021-07-25 RX ADMIN — INSULIN LISPRO 6 UNITS: 100 INJECTION, SOLUTION INTRAVENOUS; SUBCUTANEOUS at 12:27

## 2021-07-25 RX ADMIN — APIXABAN 10 MG: 5 TABLET, FILM COATED ORAL at 10:23

## 2021-07-25 ASSESSMENT — PAIN SCALES - GENERAL: PAINLEVEL_OUTOF10: 0

## 2021-07-25 NOTE — DISCHARGE SUMMARY
Hospital Medicine Discharge Summary    Patient ID: Opal Garcia      Patient's PCP: Vaishali Jo MD    Admit Date: 7/13/2021     Discharge Date:  7/25/21    Admitting Physician: Surinder Ratliff MD     Discharge Physician: Dre Mooney MD     Discharge Diagnoses: Active Hospital Problems    Diagnosis     Pneumonia due to COVID-19 virus [U07.1, J12.82]      Priority: High    Right pulmonary embolus (HCC) [I26.99]     ARDS (adult respiratory distress syndrome) (HCC) [J80]     Acute respiratory failure with hypoxia (HCC) [J96.01]     Neutropenia (HCC) [D70.9]     MARCI (acute kidney injury) ([facility] [address] [N17.9]     Diabetes mellitus, type 2 ([facility] [address] [E11.9]     HTN (hypertension) [I10]    obesity BMI 28  obstructing Distal Right ureteral stone 6mm  Cystoscopy right ureteral stent insertion right retrograde pyelogram 7/22/21  nonobstructing left renal pelvis calculus  sinus bradycardia     The patient was seen and examined on day of discharge and this discharge summary is in conjunction with any daily progress note from day of discharge. Hospital Course:   62 y.o. female  to [facility] with PMHx: CKD, HLD, DM, HTN  no recent travel. Patient spouse is admitted to the ICU: Positive for Covid      presented to the ED with encouragement of her family due to the fact that her  was positive for Covid and she is becoming symptomatic with decreased appetite, generalized weakness. she had not really ate or drank anything x 24 to 48-hour.  She is reporting some shortness of breath No fever or chills.  No diarrhea.  No cough or abdominal pain. NOT PREVIOUSLY VACCINATED for Covid. ED work-up: Mild leukopenia 2.6, mild thrombocytopenia of 93, MARCI on CKD: BUN 55, cr 2.3, GFR 22.  .  EKG is unremarkable.  Chest x-ray no consolidation or opacities.  CV-19 +.  was started on IV fluids, given an inhaler treatment and started on Decadron.   On exam was very sleepy, looks very tired. Brionna Higgins was awakened and remarked that she had not really gotten much sleep and was very tired. Glenny Long did not appear to be any any type of respiratory distress.  Skin warm and dry.  Lung fields clear.  SPO2 93 to 94% on room air.  Respirations were easy and regular. Developed a small PE treated with heparin drip and progressed to ARDS requiring HFNC 20L. Completed course remdesevir. Then slowly able to wean O2 . change to eliquis 7/24. On BP meds at home cardizem and  toprol BP was controlled and significantly bradycardic HR 40's held meds but HR still slow asymptomatic no chronotropic incompetence. TSH 0.48. History kidney stones. C/o sudden right flank pain 7/22/21 renal US confirmed obstructing 6mm Distal ureteral stone with proximal hydronephrosis. Seen by urology underwent cystoscopy with right ureteral stent 7/22/21 by Dr Kerrie Egan. Started rocephin for UTI WBC mandy to 29(some may be steroid effect as well) but with antibiotics and stent insert WBC trended down to 14 by dc. Urine cx grew [employer]. Dc with 7 more days cefuroxime. Weaned to RA 7/25 has been OOB tolerates with out desaturation. And has completed her 10 day steroid   course. Cleared for dc home recommend limited activity   Diabetic required up to 28 units lantus BID while on steroids. Will dc back on her po DM meds at dc     For blood pressure can resume her losartan, if BP rises restart Hctz. If 's and HR >60's can add back either the metoprolol or the cardizem         Physical Exam Performed:     /75   Pulse 63   Temp 97.6 °F (36.4 °C) (Oral)   Resp 20   Ht 5' 2\" (1.575 m)   Wt 155 lb 3.3 oz (70.4 kg)   SpO2 93%   BMI 28.39 kg/m²       General appearance:  No distress  HEENT:  Normocephalic,PERRL EOMI anicteric glasses mucosa moist   Neck: Supple,  full range of motion. NoJVD  Respiratory:  Normal  effort. CTA, weaned to RA  Cardiovascular:  S1/S2 RRR no murmurs, rubs or gallops.   Abdomen: Soft, non-tender, non-distended + bowel sounds. Musculoskeletal:  No clubbing, cyanosis or edema   Skin: Skin color, texture pink warm dry   Neurologic: Cranial nerves: II-XII intact, grossly non-focal.  Psychiatric:  Alert and oriented, thought content appropriate, normal insight  Peripheral Pulses: +2 palpable, equal bilaterally       Labs: For convenience and continuity at follow-up the following most recent labs are provided:      CBC:    Lab Results   Component Value Date    WBC 14.1 07/25/2021    HGB 12.4 07/25/2021    HCT 36.7 07/25/2021     07/25/2021       Renal:    Lab Results   Component Value Date     07/25/2021    K 4.3 07/25/2021    K 4.3 07/15/2021     07/25/2021    CO2 28 07/25/2021    BUN 27 07/25/2021    CREATININE 0.8 07/25/2021    CALCIUM 8.8 07/25/2021    PHOS 3.3 07/25/2021         Significant Diagnostic Studies    Radiology:   [facility] FOR SURGICAL PROCEDURES   Final Result      CT ABDOMEN PELVIS WO CONTRAST Additional Contrast? None   Final Result   6 x 4 x 6 mm obstructing calculus at the right ureterovesicular junction   causing moderate right hydroureteronephrosis. 8 x 6 x 5 mm nonobstructing calculus in the left renal pelvis. Bibasilar consolidations and diffuse ground-glass and interstitial opacities   in a more peripheral distribution consistent with multifocal pneumonia. This   pattern is common for COVID pneumonia. US RENAL COMPLETE   Final Result   Mild right hydronephrosis. Obstructive uropathy considered in this patient   with reported history of stone disease         CT CHEST PULMONARY EMBOLISM W CONTRAST   Final Result   Addendum 1 of 1   ADDENDUM:   Critical results were called by Dr. Edilma Yuen MD to  Yoanna Choi   on 7/17/2021 at 12:46. Final   Positive for pulmonary embolus. A nonocclusive embolus is noted in the right   descending pulmonary artery. The study is limited for detection of   peripheral emboli.   Clot burden, nonetheless, is considered mild.      No evidence of right ventricular heart strain. Ground-glass and interstitial opacities and pattern common for COVID   pneumonia. Mild hilar adenopathy, likely reactive in the setting. XR CHEST 1 VIEW   Final Result   Increasing vascular congestion and suggestion of developing perihilar   interstitial edema. Unchanged bilateral lower lobe atelectasis. XR CHEST 1 VIEW   Final Result   New bilateral lower lobe airspace disease most likely due to atelectasis on   this low lung volume expiratory portable chest x-ray although edema is also   in the differential.  Pneumonia is unlikely but not excluded. No evidence of   pleural effusion         XR CHEST PORTABLE   Final Result   Hypoinflation with mild discoid atelectasis or scarring along the lung bases.                 Consults:     IP CONSULT TO DIABETES EDUCATOR  IP CONSULT TO PHARMACY  IP CONSULT TO PULMONOLOGY  IP CONSULT TO PALLIATIVE CARE  IP CONSULT TO UROLOGY    Disposition:  Home      Condition at Discharge: Stable    Discharge Instructions/Follow-up:  Dr Shaheed Strong urology one month   Pace activity until fully recovered form covid   If increased pain fever/chills NV and pain to suggest stent problem or worse urine infection notify Dr Shaheed Strong  if increased Sob notify MD or return to ER  Add back BP meds slowly starting with the cozaar then Hctz  If 's and HR >60's restart either the cardizem or the metoprolol     Code Status:  Full Code     Activity: activity as tolerated    Diet: diabetic diet      Discharge Medications:     Current Discharge Medication List           Details   apixaban (ELIQUIS) 5 MG TABS tablet Take 1 tablet by mouth 2 times daily  Qty: 60 tablet, Refills: 1      cefUROXime (CEFTIN) 500 MG tablet Take 1 tablet by mouth 2 times daily for 7 days  Qty: 14 tablet, Refills: 0              Details   metoprolol succinate (TOPROL XL) 50 MG extended release tablet TAKE 1 TABLET BY MOUTH EVERY DAY  Qty: 90 tablet, Refills: 0    Associated Diagnoses: Hypertension, unspecified type      citric acid-potassium citrate (POLYCITRA) 1100-334 MG/5ML solution Take 5 mLs by mouth 2 times daily      metFORMIN (GLUCOPHAGE) 1000 MG tablet Take 1 tablet by mouth 2 times daily (with meals)  Qty: 180 tablet, Refills: 0      hydroCHLOROthiazide (MICROZIDE) 12.5 MG capsule TAKE 1 CAPSULE BY MOUTH EVERY DAY IN THE MORNING  Qty: 90 capsule, Refills: 0      losartan (COZAAR) 100 MG tablet TAKE 1 TABLET BY MOUTH EVERY DAY  Qty: 90 tablet, Refills: 0      dilTIAZem (DILT-XR) 240 MG extended release capsule TAKE 1 CAPSULE BY MOUTH EVERY DAY  Qty: 90 capsule, Refills: 0      JANUVIA 100 MG tablet TAKE 1 TABLET BY MOUTH EVERY DAY  Qty: 90 tablet, Refills: 0      glipiZIDE (GLUCOTROL) 10 MG tablet TAKE 1 TABLET BY MOUTH 2 TIMES DAILY (BEFORE MEAL). Qty: 180 tablet, Refills: 3      atorvastatin (LIPITOR) 10 MG tablet TAKE 1 TABLET BY MOUTH DAILY  Qty: 90 tablet, Refills: 3      ONETOUCH ULTRA strip 1 EACH BY DOES NOT APPLY ROUTE DAILY AS NEEDED. **ASK PT WHICH MONITOR SHES USING  Qty: 100 strip, Refills: 3    Associated Diagnoses: Type 2 diabetes mellitus with complication (HCC)      vitamin B-12 (CYANOCOBALAMIN) 100 MCG tablet Take 250 mcg by mouth daily. Multiple Vitamin CAPS Take  by mouth daily. ONE TOUCH LANCETS MISC by Does not apply route. Qty: 100 each, Refills: 2             Time Spent on discharge is more than 1 hour in the examination, evaluation, counseling and review of medications and discharge plan. Signed:    Dre Mooney MD   7/25/2021      Thank you Vaishali Jo MD for the opportunity to be involved in this patient's care. If you have any questions or concerns please feel free to contact me at [phone].

## 2021-07-25 NOTE — PLAN OF CARE
RN  Outcome: Ongoing  Goal: Maintain absence of muscle cramping  7/25/2021 1128 by Monique Shah RN  Outcome: Ongoing  7/24/2021 2228 by Robb Vasquez RN  Outcome: Ongoing  Goal: Maintain normal serum potassium, sodium, calcium, phosphorus, and pH  7/25/2021 1128 by Monique Shah RN  Outcome: Ongoing  7/24/2021 2228 by Robb Vasquez RN  Outcome: Ongoing     Problem: Loneliness or Risk for Loneliness  Goal: Demonstrate positive use of time alone when socialization is not possible  7/25/2021 1128 by Monique Shah RN  Outcome: Ongoing  7/24/2021 2228 by Robb Vasquez RN  Outcome: Ongoing     Problem: Fatigue  Goal: Verbalize increase energy and improved vitality  7/25/2021 1128 by Monique Shah RN  Outcome: Ongoing  7/24/2021 2228 by Robb Vasquez RN  Outcome: Ongoing     Problem: Patient Education: Go to Patient Education Activity  Goal: Patient/Family Education  7/25/2021 1128 by Monique Shah RN  Outcome: Ongoing  7/24/2021 2228 by Robb Vasquez RN  Outcome: Ongoing     Problem: Falls - Risk of:  Goal: Will remain free from falls  Description: Will remain free from falls  7/25/2021 1128 by Monique Shah RN  Outcome: Ongoing  7/24/2021 2228 by Robb Vasquez RN  Outcome: Ongoing  Goal: Absence of physical injury  Description: Absence of physical injury  7/25/2021 1128 by Monique Shah RN  Outcome: Ongoing  7/24/2021 2228 by Robb Vasquez RN  Outcome: Ongoing     Problem: Nutrition  Goal: Optimal nutrition therapy  7/25/2021 1128 by Monique Shah RN  Outcome: Ongoing  7/24/2021 2228 by Robb Vasquez RN  Outcome: Ongoing     Problem: Pain:  Goal: Pain level will decrease  Description: Pain level will decrease  7/25/2021 1128 by Monique Shah RN  Outcome: Ongoing  7/24/2021 2228 by Robb Vasquez RN  Outcome: Ongoing  Goal: Control of acute pain  Description: Control of acute pain  7/25/2021 1128 by Monique Shah RN  Outcome: Ongoing  7/24/2021 2228 by Robb Vasquez,

## 2021-07-25 NOTE — PROGRESS NOTES
Written and verbal DC instructions reviewed with pt. Pt states understanding, all questions answered. IV removed without complications, dressing clean dry and intact. Pt awaiting wheelchair to vehicle. info sheet given/Pre-printed instructions given for other (specify)

## 2021-07-25 NOTE — PLAN OF CARE
Problem: Airway Clearance - Ineffective  Goal: Achieve or maintain patent airway  7/24/2021 2228 by Peggie Oppenheim, RN  Outcome: Ongoing  7/24/2021 1211 by Eri Eastman RN  Outcome: Ongoing     Problem: Gas Exchange - Impaired  Goal: Absence of hypoxia  7/24/2021 2228 by Peggie Oppenheim, RN  Outcome: Ongoing  7/24/2021 1211 by Eri Eastman RN  Outcome: Ongoing  Goal: Promote optimal lung function  7/24/2021 2228 by Peggie Oppenheim, RN  Outcome: Ongoing  7/24/2021 1211 by Eri Eastman RN  Outcome: Ongoing     Problem: Breathing Pattern - Ineffective  Goal: Ability to achieve and maintain a regular respiratory rate  7/24/2021 2228 by Peggie Oppenheim, RN  Outcome: Ongoing  7/24/2021 1211 by Eri Eastman RN  Outcome: Ongoing     Problem:  Body Temperature -  Risk of, Imbalanced  Goal: Ability to maintain a body temperature within defined limits  7/24/2021 2228 by Peggie Oppenheim, RN  Outcome: Ongoing  7/24/2021 1211 by Eri Eastman RN  Outcome: Ongoing  Goal: Will regain or maintain usual level of consciousness  7/24/2021 2228 by Peggie Oppenheim, RN  Outcome: Ongoing  7/24/2021 1211 by Eri Eastman RN  Outcome: Ongoing  Goal: Complications related to the disease process, condition or treatment will be avoided or minimized  7/24/2021 2228 by Peggie Oppenheim, RN  Outcome: Ongoing  7/24/2021 1211 by Eri Eastman RN  Outcome: Ongoing     Problem: Isolation Precautions - Risk of Spread of Infection  Goal: Prevent transmission of infection  7/24/2021 2228 by Peggie Oppenheim, RN  Outcome: Ongoing  7/24/2021 1211 by Eri Eastman RN  Outcome: Ongoing     Problem: Nutrition Deficits  Goal: Optimize nutritional status  7/24/2021 2228 by Peggie Oppenheim, RN  Outcome: Ongoing  7/24/2021 1211 by Eri Eastman RN  Outcome: Ongoing     Problem: Risk for Fluid Volume Deficit  Goal: Maintain normal heart rhythm  7/24/2021 2228 by Peggie Oppenheim, RN  Outcome: Ongoing  7/24/2021 1211 by Rosamaria Jaquez decrease  7/24/2021 2228 by Kayleen Sexton RN  Outcome: Ongoing  7/24/2021 1211 by Mary Carlson RN  Outcome: Ongoing  Goal: Control of acute pain  Description: Control of acute pain  7/24/2021 2228 by Kayleen Sexton RN  Outcome: Ongoing  7/24/2021 1211 by Mary Carlson RN  Outcome: Ongoing  Goal: Control of chronic pain  Description: Control of chronic pain  7/24/2021 2228 by Kayleen Sexton RN  Outcome: Ongoing  7/24/2021 1211 by Mary Carlson RN  Outcome: Ongoing   Pt able to express presence/absence of pain and rate pain appropriately using numerical scale. Pain/discomfort being managed with PRN analgesics per MD orders (see MAR). Pain assessed every shift and after interventions.

## 2021-07-25 NOTE — CARE COORDINATION
Reviewed chart and spoke with bedside RN. Patient will dc home independently; no home O2 needs at this time.      Electronically signed by EMILY Altman, NICOLE, Case Management on 7/25/2021 at 2:52 PM  [facility] [phone]

## 2021-07-26 ENCOUNTER — TELEPHONE (OUTPATIENT)
Dept: FAMILY MEDICINE CLINIC | Age: 61
End: 2021-07-26

## 2021-07-26 ENCOUNTER — CARE COORDINATION (OUTPATIENT)
Dept: CASE MANAGEMENT | Age: 61
End: 2021-07-26

## 2021-07-26 DIAGNOSIS — J80 ARDS (ADULT RESPIRATORY DISTRESS SYNDROME) (HCC): Primary | ICD-10-CM

## 2021-07-26 NOTE — TELEPHONE ENCOUNTER
----- Message from [place] sent at 7/26/2021 10:06 AM EDT -----  Subject: Hospital Follow Up    QUESTIONS  What hospital was the Patient Discharged from? [facility]  Date of Discharge? 2021-07-25  Discharge Location? Home  Reason for hospitalization as patient stated? Pneumonia with covid 19  What question does the patient have, if applicable?   ---------------------------------------------------------------------------  --------------  CALL BACK INFO  What is the best way for the office to contact you? OK to leave message on   voicemail  Preferred Call Back Phone Number? [phone]  ---------------------------------------------------------------------------  --------------  SCRIPT ANSWERS  Relationship to Patient? Third Party  Representative Name? Myrtle  (Is the patient requesting to be seen urgently for their symptoms?)? No  Is this follow up request related to routine Diabetes Management? No  Are you having any new concerns about your existing condition? No  (If the patient has Medicare as their primary insurance coverage ask this   question) Are you requesting a Medicare Annual Wellness Visit? No  (Is the patient requesting a pap smear with their physical exam?)? No  (Is the patient requesting their annual physical and does not need PAP or   AWV per above?)? Yes   (Patient requests to see provider urgently. )? No  (Has the patient been discharged from the hospital within 2 business days   AND does not have a Telephone Encounter  Follow Up From [address] [place] [address]   documented in [address]?)? Yes  Do you have any questions for your primary care provider that need to be   answered prior to your appointment? (Use RN Triage if question pertains to   anything on the red flag list)?  No  (Patient needs follow up visit after hospital discharge) Book first   available appointment within 7 days OF DISCHARGE, if no appt, proceed to   book the next available time slot within 14 days OF DISCHARGE AND Send   Message to Provider. [address] Follow Up appointment cannot be booked   beyond 14 Days and should result in a Message to Provider. ?  Yes

## 2021-07-26 NOTE — CARE COORDINATION
[facility] Transitions Initial Follow Up Call    Call within 2 business days of discharge: Yes    Patient: Oneida Main Patient : 1960   MRN: <[id number]>  Reason for Admission: PNA d/t COVID+, ureteral calculus  Discharge Date: 21 RARS: Readmission Risk Score: 18      Last Discharge [address] [address]       Complaint Diagnosis Description Type Department Provider    21 Shortness of Breath MARCI (acute kidney injury) [facility]) ED to Hosp-Admission (Discharged) (ADMITTED) MELI Cortez MD; GRACE Vanessa. Spoke with: [address]: [facility]    Non-face-to-face services provided:  Obtained and reviewed discharge summary and/or continuity of care documents     Transitions of Care Initial Call    Was this an external facility discharge? No Discharge Facility: NA     Challenges to be reviewed by the provider   Additional needs identified to be addressed with provider: No  none             Method of communication with provider : phone      Advance Care Planning:   Does patient have an Advance Directive: reviewed and current. Advance Care Planning   Healthcare Decision Maker:    Primary Decision Maker: Ira Snow Spouse - [phone]    Secondary Decision Maker: Juana Davis - Child - [phone]    Was this a readmission? No  Patient stated reason for admission: SOB  Patients top risk factors for readmission: medical condition-PNA d/t COVID+, ureteral calculus    Care Transition Nurse (CTN) contacted the patient by telephone to perform post hospital discharge assessment. Verified name and  with patient as identifiers. Provided introduction to self, and explanation of the CTN role. CTN reviewed discharge instructions, medical action plan and red flags with patient who verbalized understanding. Patient given an opportunity to ask questions and does not have any further questions or concerns at this time.  Were discharge instructions available to patient? Yes. Reviewed appropriate site of care based on symptoms and resources available to patient including: PCP and Specialist. The patient agrees to contact the PCP office for questions related to their healthcare. Medication reconciliation was performed with patient, who verbalizes understanding of administration of home medications. Advised obtaining a 90-day supply of all daily and as-needed medications. Covid Risk Education     Educated patient about risk for severe COVID-19 due to risk factors according to CDC guidelines. LPN CC reviewed discharge instructions, medical action plan and red flag symptoms with the patient who verbalized understanding. Discussed COVID vaccination status: No. Education provided on COVID-19 vaccination as appropriate. Discussed exposure protocols and quarantine with CDC Guidelines. Patient was given an opportunity to verbalize any questions and concerns and agrees to contact LPN CC or health care provider for questions related to their healthcare. Reviewed and educated patient on any new and changed medications related to discharge diagnosis. Was patient discharged with a pulse oximeter? No Discussed and confirmed pulse oximeter discharge instructions and when to notify provider or seek emergency care. Patient verified  and was pleasant and agreeable to transition calls. States she's doing fine. Denies SOB, wheezing, cough. Independent at home with ADLs. Complains of some balance issues upon standing up from sitting or lying. LPN CC educated patient to allow legs to dangle and take her time standing up. T97.5, /67, O2 94% RA, Glucose 273 this AM. Denies dysuria, experiencing some flank pain, but states it is tolerable. Tested positive for Covid 21. Quarantine ends 21. Patient verbalized understanding of quarantine period. Patient waiting for urology to call for appt. Patient has had ureter stent before.  LPN CC offered to help make PCP f/u appt. Patient accepted. LPN CC made 3 way call to PCP to schedule post hospital f/u. PCP office, spoke with [facility], states they will return call to patient to schedule appt. Patient  is hospitalized at [facility] for Covid complications. Full medication reconciliation and 1111f completed. HCDM updated. Denies any acute needs at present time. Agreeable to f/u calls. Educated on the use of urgent care or physicians 24 hr access line if assistance is needed after hours. LPN CC provided contact information. Plan for follow-up call in 5-7 days based on severity of symptoms and risk factors. Patricio Ritchie  [facility]  Care Transitions  [phone]      Care Transitions 24 Hour Call    Care Transitions Interventions         Follow Up  No future appointments.     Patricio Ritchie LPN

## 2021-07-26 NOTE — TELEPHONE ENCOUNTER
----- Message from [place] sent at 7/26/2021 10:06 AM EDT -----  Subject: Hospital Follow Up    QUESTIONS  What hospital was the Patient Discharged from? [facility]  Date of Discharge? 2021-07-25  Discharge Location? Home  Reason for hospitalization as patient stated? Pneumonia with covid 19  What question does the patient have, if applicable?   ---------------------------------------------------------------------------  --------------  CALL BACK INFO  What is the best way for the office to contact you? OK to leave message on   voicemail  Preferred Call Back Phone Number? [phone]  ---------------------------------------------------------------------------  --------------  SCRIPT ANSWERS  Relationship to Patient? Third Party  Representative Name? Myrtle  (Is the patient requesting to be seen urgently for their symptoms?)? No  Is this follow up request related to routine Diabetes Management? No  Are you having any new concerns about your existing condition? No  (If the patient has Medicare as their primary insurance coverage ask this   question) Are you requesting a Medicare Annual Wellness Visit? No  (Is the patient requesting a pap smear with their physical exam?)? No  (Is the patient requesting their annual physical and does not need PAP or   AWV per above?)? Yes   (Patient requests to see provider urgently. )? No  (Has the patient been discharged from the hospital within 2 business days   AND does not have a Telephone Encounter  Follow Up From [address] [place] [address]   documented in [place]?)? Yes  Do you have any questions for your primary care provider that need to be   answered prior to your appointment? (Use RN Triage if question pertains to   anything on the red flag list)?  No  (Patient needs follow up visit after hospital discharge) Book first   available appointment within 7 days OF DISCHARGE, if no appt, proceed to   book the next available time slot within 14 days OF DISCHARGE AND Send   Message to Provider. [address] Follow Up appointment cannot be booked   beyond 14 Days and should result in a Message to Provider. ?  Yes

## 2021-07-27 ENCOUNTER — APPOINTMENT (OUTPATIENT)
Dept: CT IMAGING | Age: 61
End: 2021-07-27
Payer: COMMERCIAL

## 2021-07-27 ENCOUNTER — TELEPHONE (OUTPATIENT)
Dept: FAMILY MEDICINE CLINIC | Age: 61
End: 2021-07-27

## 2021-07-27 ENCOUNTER — NURSE TRIAGE (OUTPATIENT)
Dept: OTHER | Facility: CLINIC | Age: 61
End: 2021-07-27

## 2021-07-27 ENCOUNTER — HOSPITAL ENCOUNTER (EMERGENCY)
Age: 61
Discharge: HOME | End: 2021-07-27
Attending: EMERGENCY MEDICINE
Payer: COMMERCIAL

## 2021-07-27 VITALS
HEART RATE: 46 BPM | WEIGHT: 155.2 LBS | SYSTOLIC BLOOD PRESSURE: 90 MMHG | DIASTOLIC BLOOD PRESSURE: 56 MMHG | TEMPERATURE: 98.1 F | BODY MASS INDEX: 28.56 KG/M2 | RESPIRATION RATE: 18 BRPM | HEIGHT: 62 IN | OXYGEN SATURATION: 97 %

## 2021-07-27 DIAGNOSIS — M79.2 PERIPHERAL NEUROPATHY DUE TO INFLAMMATION: Primary | ICD-10-CM

## 2021-07-27 DIAGNOSIS — G62.9 PERIPHERAL NEUROPATHY DUE TO INFLAMMATION: Primary | ICD-10-CM

## 2021-07-27 LAB
ANION GAP SERPL CALCULATED.3IONS-SCNC: 14 MMOL/L (ref 3–16)
BASOPHILS ABSOLUTE: 0 K/UL (ref 0–0.2)
BASOPHILS RELATIVE PERCENT: 0.1 %
BUN BLDV-MCNC: 43 MG/DL (ref 7–20)
CALCIUM SERPL-MCNC: 9.3 MG/DL (ref 8.3–10.6)
CHLORIDE BLD-SCNC: 99 MMOL/L (ref 99–110)
CO2: 25 MMOL/L (ref 21–32)
CREAT SERPL-MCNC: 1.2 MG/DL (ref 0.6–1.2)
D DIMER: 416 NG/ML DDU (ref 0–229)
EKG ATRIAL RATE: 64 BPM
EKG DIAGNOSIS: NORMAL
EKG P AXIS: 12 DEGREES
EKG P-R INTERVAL: 168 MS
EKG Q-T INTERVAL: 400 MS
EKG QRS DURATION: 86 MS
EKG QTC CALCULATION (BAZETT): 412 MS
EKG R AXIS: -16 DEGREES
EKG T AXIS: 16 DEGREES
EKG VENTRICULAR RATE: 64 BPM
EOSINOPHILS ABSOLUTE: 0 K/UL (ref 0–0.6)
EOSINOPHILS RELATIVE PERCENT: 0 %
GFR AFRICAN AMERICAN: 55
GFR NON-AFRICAN AMERICAN: 46
GLUCOSE BLD-MCNC: 203 MG/DL (ref 70–99)
GLUCOSE BLD-MCNC: 214 MG/DL (ref 70–99)
HCT VFR BLD CALC: 39.5 % (ref 36–48)
HEMOGLOBIN: 13.4 G/DL (ref 12–16)
LYMPHOCYTES ABSOLUTE: 0.5 K/UL (ref 1–5.1)
LYMPHOCYTES RELATIVE PERCENT: 3.2 %
MCH RBC QN AUTO: 30.4 PG (ref 26–34)
MCHC RBC AUTO-ENTMCNC: 33.9 G/DL (ref 31–36)
MCV RBC AUTO: 89.5 FL (ref 80–100)
MONOCYTES ABSOLUTE: 0.8 K/UL (ref 0–1.3)
MONOCYTES RELATIVE PERCENT: 5.1 %
NEUTROPHILS ABSOLUTE: 14 K/UL (ref 1.7–7.7)
NEUTROPHILS RELATIVE PERCENT: 91.6 %
PDW BLD-RTO: 13.3 % (ref 12.4–15.4)
PERFORMED ON: ABNORMAL
PLATELET # BLD: 218 K/UL (ref 135–450)
PMV BLD AUTO: 9.3 FL (ref 5–10.5)
POTASSIUM REFLEX MAGNESIUM: 4 MMOL/L (ref 3.5–5.1)
RBC # BLD: 4.42 M/UL (ref 4–5.2)
SODIUM BLD-SCNC: 138 MMOL/L (ref 136–145)
WBC # BLD: 15.3 K/UL (ref 4–11)

## 2021-07-27 PROCEDURE — 93005 ELECTROCARDIOGRAM TRACING: CPT | Performed by: EMERGENCY MEDICINE

## 2021-07-27 PROCEDURE — 70450 CT HEAD/BRAIN W/O DYE: CPT

## 2021-07-27 PROCEDURE — 97530 THERAPEUTIC ACTIVITIES: CPT

## 2021-07-27 PROCEDURE — 99283 EMERGENCY DEPT VISIT LOW MDM: CPT

## 2021-07-27 PROCEDURE — 6360000004 HC RX CONTRAST MEDICATION: Performed by: EMERGENCY MEDICINE

## 2021-07-27 PROCEDURE — 36415 COLL VENOUS BLD VENIPUNCTURE: CPT

## 2021-07-27 PROCEDURE — 93010 ELECTROCARDIOGRAM REPORT: CPT | Performed by: INTERNAL MEDICINE

## 2021-07-27 PROCEDURE — 80048 BASIC METABOLIC PNL TOTAL CA: CPT

## 2021-07-27 PROCEDURE — 97162 PT EVAL MOD COMPLEX 30 MIN: CPT

## 2021-07-27 PROCEDURE — 70498 CT ANGIOGRAPHY NECK: CPT

## 2021-07-27 PROCEDURE — 85379 FIBRIN DEGRADATION QUANT: CPT

## 2021-07-27 PROCEDURE — 85025 COMPLETE CBC W/AUTO DIFF WBC: CPT

## 2021-07-27 RX ADMIN — IOPAMIDOL 75 ML: 755 INJECTION, SOLUTION INTRAVENOUS at 10:37

## 2021-07-27 ASSESSMENT — PAIN SCALES - GENERAL
PAINLEVEL_OUTOF10: 0
PAINLEVEL_OUTOF10: 0

## 2021-07-27 NOTE — PROGRESS NOTES
Medication Reconciliation    List of medications patient is currently taking is complete. Source of information: 1. Conversation with patient at bedside                                      2. EPIC records      Allergies  Ciprofloxacin and Hydrocodone     Notes regarding home medications:   1. Patient received all of her morning home medications prior to arrival to the emergency department today.     Tanya Almaguer [facility], PharmD, BCPS  7/27/2021 1:08 PM

## 2021-07-27 NOTE — ED NOTES
Physical Therapy assessed the patient and are planning to send the patient home for outpatient therapy. Dr. Saba Escalera is aware per Karen Patton.      Michael Burch RN  07/27/21 8312

## 2021-07-27 NOTE — TELEPHONE ENCOUNTER
Received call from Sarita Kimball at [facility] with Red Flag Complaint. Brief description of triage: numbness in right leg, dragging leg when walking, started last night    *patient was just in the hospital for [address] indicates for patient to call 911 now, patient asked if her sister could take her. Writer explained that I recommend that she call 911 but I want her to do whatever is the fastest route to the hospital     Care advice provided, patient verbalizes understanding; denies any other questions or concerns; instructed to call back for any new or worsening symptoms. Attention Provider: Thank you for allowing me to participate in the care of your patient. The patient was connected to triage in response to information provided to the [facility]. Please do not respond through this encounter as the response is not directed to a shared pool. Reason for Disposition   New neurologic deficit that is present NOW, sudden onset of ANY of the following: * Weakness of the face, arm, or leg on one side of the body* Numbness of the face, arm, or leg on one side of the body* Loss of speech or garbled speech    Answer Assessment - Initial Assessment Questions  1. SYMPTOM: \"What is the main symptom you are concerned about? \" (e.g., weakness, numbness)      Numbness in right leg- my right leg drags    2. ONSET: \"When did this start? \" (minutes, hours, days; while sleeping)      Yesterday    3. LAST NORMAL: \"When was the last time you were normal (no symptoms)? \"      Yesterday    4. PATTERN \"Does this come and go, or has it been constant since it started? \"  \"Is it present now? \"      Constant    5. CARDIAC SYMPTOMS: \"Have you had any of the following symptoms: chest pain, difficulty breathing, palpitations? \"      Denies    6. NEUROLOGIC SYMPTOMS: \"Have you had any of the following symptoms: headache, dizziness, vision loss, double vision, changes in speech, unsteady on your feet? \"      Denies    7.  OTHER SYMPTOMS: \"Do you have any other symptoms? \"      Denies    8. PREGNANCY: \"Is there any chance you are pregnant? \" \"When was your last menstrual period? \"      NA    Protocols used: NEUROLOGIC DEFICIT-ADULT-[place]

## 2021-07-27 NOTE — ED PROVIDER NOTES
eMERGENCY dEPARTMENT eNCOUnter      Pt Name: Cosme Ovalle  MRN: [id number]  [place] 1960  Date of evaluation: 7/27/2021  Provider: Jeniffer Gage MD     [address] [place] [address]       Chief Complaint   Patient presents with    Numbness     right leg foot dropped started yesterday she isnt sure what time it started decrease sensation to right leg below the knee , swelling noted to the right foot          HISTORY OF PRESENT ILLNESS   (Location/Symptom, Timing/Onset,Context/Setting, Quality, Duration, Modifying Factors, Severity) Note limiting factors. HPI    Cosme Ovalle is a 62 y.o. female who presents to the emergency department with right foot drop and weakness and difficulty walking for 1 day. Patient denies any injury. Patient was hospitalized for 2 weeks with COVID-19 infection. Patient just recently released and was seen by family physician today and was sent back to the ER for decreased sensation and some weakness of the right foot. Patient is able to walk there was no injury. Patient denies any cough. No fever. Patient is on blood pressure has been on up-and-down. Nursing Notes were reviewed. REVIEW OFSYSTEMS    (2+ for level 4; 10+ for level 5)   Review of Systems    General: No fevers, chills or night sweats, No weight loss    Head:  No Sore throat,  No Ear Pain    Chest:  Nontender. No Cough, No SOB,  Chest Pain    GI: No abdominal pain or vomiting    : No dysuria or hematuria    Musculoskeletal: No unrelenting pain or night pain    Neurologic: No bowel or bladder incontinence, No saddle anesthesia, No leg weakness    All other systems reviewed and are negative.         PAST MEDICAL HISTORY     Past Medical History:   Diagnosis Date    Chronic kidney disease 2009    kidney stones    Hyperlipidemia     Hypertension     MDRO (multiple drug resistant organisms) resistance     MRSA infection 2013    left lower leg    Type II or unspecified type diabetes mellitus without mention of complication, not stated as uncontrolled        SURGICAL HISTORY       Past Surgical History:   Procedure Laterality Date   Andrew Cornelius    COLONOSCOPY  06/07/2018    Dr. Sourav Rizo. adenoma polyp, repeat in 5 years    CYSTOSCOPY Right 7/22/2021    CYSTOSCOPY, RIGHT URETERAL STENT INSERTION,RIGHT RETROGRADE PYLEOGRAM performed by Sofía Kowalski MD at [address] [place] [address] LITHOTRIPSY  2009       CURRENT MEDICATIONS       Discharge Medication List as of 7/27/2021  2:51 PM      CONTINUE these medications which have NOT CHANGED    Details   apixaban (ELIQUIS) 5 MG TABS tablet Take 1 tablet by mouth 2 times daily, Disp-60 tablet, R-1Normal      cefUROXime (CEFTIN) 500 MG tablet Take 1 tablet by mouth 2 times daily for 7 days, Disp-14 tablet, R-0Normal      metoprolol succinate (TOPROL XL) 50 MG extended release tablet TAKE 1 TABLET BY MOUTH EVERY DAY, Disp-90 tablet, R-0Normal      citric acid-potassium citrate (POLYCITRA) 1100-334 MG/5ML solution Take 5 mLs by mouth 2 times dailyHistorical Med      metFORMIN (GLUCOPHAGE) 1000 MG tablet Take 1 tablet by mouth 2 times daily (with meals), Disp-180 tablet, R-0Normal      hydroCHLOROthiazide (MICROZIDE) 12.5 MG capsule TAKE 1 CAPSULE BY MOUTH EVERY DAY IN THE MORNING, Disp-90 capsule, R-0Normal      losartan (COZAAR) 100 MG tablet TAKE 1 TABLET BY MOUTH EVERY DAY, Disp-90 tablet, R-0Normal      dilTIAZem (DILT-XR) 240 MG extended release capsule TAKE 1 CAPSULE BY MOUTH EVERY DAY, Disp-90 capsule, R-0Normal      JANUVIA 100 MG tablet TAKE 1 TABLET BY MOUTH EVERY DAY, Disp-90 tablet, R-0Normal      glipiZIDE (GLUCOTROL) 10 MG tablet TAKE 1 TABLET BY MOUTH 2 TIMES DAILY (BEFORE MEAL). , Disp-180 tablet, R-3Normal      atorvastatin (LIPITOR) 10 MG tablet TAKE 1 TABLET BY MOUTH DAILY, Disp-90 tablet, R-3Normal      vitamin B-12 (CYANOCOBALAMIN) 100 MCG tablet Take 250 mcg by mouth daily.       Multiple Vitamin CAPS Take 1 capsule by mouth daily Historical Med             ALLERGIES     Ciprofloxacin and Hydrocodone    FAMILY HISTORY       Family History   Problem Relation Age of Onset   Tanner Sole Cancer Mother         pancreatic    Cancer Father         lung    Heart Disease Father         Bypass x 5    Heart Disease Sister         Stent- CAD    Diabetes Sister         SOCIAL HISTORY       Social History     Socioeconomic History    Marital status:      Spouse name: None    Number of children: None    Years of education: None    Highest education level: None   Occupational History    None   Tobacco Use    Smoking status: Never Smoker    Smokeless tobacco: Never Used   Substance and Sexual Activity    Alcohol use: No    Drug use: No    Sexual activity: Yes     Partners: Male   Other Topics Concern    None   Social History Narrative    None     Social Determinants of Health     Financial Resource Strain: Low Risk     Difficulty of Paying Living Expenses: Not hard at all   Food Insecurity: No Food Insecurity    Worried About Running Out of Food in the Last Year: Never true    Zina of Food in the Last Year: Never true   Transportation Needs:     Lack of Transportation (Medical):  Lack of Transportation (Non-Medical):    Physical Activity:     Days of Exercise per Week:     Minutes of Exercise per Session:    Stress:     Feeling of Stress :    Social Connections:     Frequency of Communication with Friends and Family:     Frequency of Social Gatherings with Friends and Family:     Attends [employer]ism Services:     Active Member of Clubs or Organizations:     Attends Club or Organization Meetings:     Marital Status:    Intimate Partner Violence:     Fear of Current or Ex-Partner:     Emotionally Abused:     Physically Abused:     Sexually Abused:        SCREENINGS   NIH Stroke Scale  Interval: Baseline  Level of Consciousness (1a. ): Alert  LOC Questions (1b. ):  Answers both correctly  Best Gaze (2. ): Normal  Visual (3. ): No visual loss  Motor Arm, Left (5a. ): No drift  Motor Arm, Right (5b. ): No drift  Motor Leg, Left (6a. ): No drift  Motor Leg, Right (6b. ): No drift  Limb Ataxia (7. ): Absent  Sensory (8. ): (!) Mild to Moderate  Best Language (9. ): No aphasia  Dysarthria (10. ): Normal  Extinction and Inattention (11): No abnormalityGlasgow Coma Scale  Eye Opening: Spontaneous  Best Verbal Response: Oriented  Best Motor Response: Obeys commands  [place] Coma Scale Score: 15      PHYSICAL EXAM    (up to 7 for level 4, 8 or more for level 5)     ED Triage Vitals   BP Temp Temp Source Pulse Resp SpO2 Height Weight   07/27/21 0925 07/27/21 0925 07/27/21 0925 07/27/21 0925 07/27/21 0925 07/27/21 0925 07/27/21 0925 07/27/21 0943   101/62 98.1 °F (36.7 °C) Oral 77 16 95 % 5' 2\" (1.575 m) 155 lb 3.3 oz (70.4 kg)       Physical Exam    General: Alert and awake ×3. Nontoxic appearance. Well-developed well-nourished 29-year-old female pleasant in no acute distress  HEENT: Normocephalic atraumatic. Neck is supple. Airway intact. No adenopathy  Cardiac: Slow rate and rhythm with no murmurs rubs or gallops  Pulmonary: Lungs are clear in all lung fields. No wheezing. No Rales. Abdomen: Soft and nontender. Negative hepatosplenomegaly. Bowel sounds are active  Extremities: Moving all extremities. No calf tenderness. Peripheral pulses all intact. Patient is able to ambulate but has a foot drop. Patient able to plantar flex but unable to dorsiflex. There is some sensory loss at the peripheral area. Neurovascular exam was normal.  Cap refills less than 2 seconds. There is no upper extremity deficits noted. Skin: No skin lesions. No rashes  Neurologic: Cranial nerves II through XII was grossly intact. Nonfocal neurological exam  Psychiatric: Patient is pleasant. Mood is appropriate.         DIAGNOSTIC RESULTS     EKG (Per Emergency Physician):     Normal sinus rhythm at a ventricular of 64 minimal voltage criteria for LVH otherwise normal variant    RADIOLOGY (Per Emergency Physician): Interpretation per the Radiologist below, if available at the time of this note:  CT Head WO Contrast    Result Date: 7/27/2021  EXAMINATION: CT OF THE HEAD WITHOUT CONTRAST  7/27/2021 10:35 am TECHNIQUE: CT of the head was performed without the administration of intravenous contrast. Dose modulation, iterative reconstruction, and/or weight based adjustment of the mA/kV was utilized to reduce the radiation dose to as low as reasonably achievable. COMPARISON: None. HISTORY: ORDERING SYSTEM PROVIDED HISTORY: Right leg weakness TECHNOLOGIST PROVIDED HISTORY: Reason for exam:->Right leg weakness Has a \"code stroke\" or \"stroke alert\" been called? ->No Decision Support Exception - unselect if not a suspected or confirmed emergency medical condition->Emergency Medical Condition (MA) Reason for Exam: right leg weakness Acuity: Acute Type of Exam: Initial FINDINGS: BRAIN/VENTRICLES: There is no acute intracranial hemorrhage, mass effect or midline shift. No abnormal extra-axial fluid collection. The gray-white differentiation is maintained without evidence of an acute infarct. There is no evidence of hydrocephalus. ORBITS: The visualized portion of the orbits demonstrate no acute abnormality. SINUSES: The visualized paranasal sinuses and mastoid air cells demonstrate no acute abnormality. SOFT TISSUES/SKULL:  No acute abnormality of the visualized skull or soft tissues. No acute intracranial abnormality. CTA HEAD NECK W CONTRAST    Result Date: 7/27/2021  EXAMINATION: CTA OF THE HEAD AND NECK WITH CONTRAST 7/27/2021 10:35 am: TECHNIQUE: CTA of the head and neck was performed with the administration of intravenous contrast. Multiplanar reformatted images are provided for review. MIP images are provided for review. 3D reconstructed images were performed on a separate workstation and provided for review.    Stenosis of the internal carotid arteries measured using NASCET criteria. Dose modulation, iterative reconstruction, and/or weight based adjustment of the mA/kV was utilized to reduce the radiation dose to as low as reasonably achievable. COMPARISON: None. HISTORY: ORDERING SYSTEM PROVIDED HISTORY: Right leg weakness TECHNOLOGIST PROVIDED HISTORY: Reason for exam:->Right leg weakness Decision Support Exception - unselect if not a suspected or confirmed emergency medical condition->Emergency Medical Condition (MA) Reason for Exam: Right leg weakness Acuity: Acute Type of Exam: Initial FINDINGS: CTA NECK: AORTIC ARCH/ARCH VESSELS: No dissection or arterial injury. No significant stenosis of the brachiocephalic or subclavian arteries. CAROTID ARTERIES: No dissection, arterial injury, or hemodynamically significant stenosis by NASCET criteria. VERTEBRAL ARTERIES: No dissection, arterial injury, or significant stenosis. SOFT TISSUES: The lung apices are clear. No cervical or superior mediastinal lymphadenopathy. The larynx and pharynx are unremarkable. No acute abnormality of the salivary and thyroid glands. BONES: No acute osseous abnormality. CTA HEAD: ANTERIOR CIRCULATION: No significant stenosis of the intracranial internal carotid, anterior cerebral, or middle cerebral arteries. No aneurysm. POSTERIOR CIRCULATION: No significant stenosis of the vertebral, basilar, or posterior cerebral arteries. No aneurysm. OTHER: No dural venous sinus thrombosis on this non-dedicated study. BRAIN: No mass effect or midline shift. No extra-axial fluid collection. The gray-white differentiation is maintained. Unremarkable CTA of the head and neck.        ED BEDSIDE ULTRASOUND:   Performed by ED Physician - none    LABS:  Labs Reviewed   CBC WITH AUTO DIFFERENTIAL - Abnormal; Notable for the following components:       Result Value    WBC 15.3 (*)     Neutrophils Absolute 14.0 (*)     Lymphocytes Absolute 0.5 (*)     All other components within normal limits Narrative:     Performed at:  [facility]  [address] [facility] [place] [address]   Phone [phone]   BASIC METABOLIC PANEL W/ REFLEX TO MG FOR LOW K - Abnormal; Notable for the following components:    Glucose 203 (*)     BUN 43 (*)     GFR Non- 46 (*)     GFR  55 (*)     All other components within normal limits    Narrative:     Performed at:  [facility]  [address] [facility] [place] [address]   Phone [phone]   D-DIMER, QUANTITATIVE - Abnormal; Notable for the following components:    D-Dimer, Quant 416 (*)     All other components within normal limits    Narrative:     Performed at:  [facility]  [address] [place] [address]   Phone [phone]   POCT GLUCOSE - Abnormal; Notable for the following components:    POC Glucose 214 (*)     All other components within normal limits    Narrative:     Performed at:  [facility]  [address] [place] [address]   Phone [phone]        All other labs were within normal range or not returned as of this dictation. Procedures      EMERGENCY DEPARTMENT COURSE and DIFFERENTIAL DIAGNOSIS/MDM:   Vitals:    Vitals:    07/27/21 1404 07/27/21 1418 07/27/21 1432 07/27/21 1444   BP: (!) 85/52 (!) 97/56 (!) 89/59 (!) 90/56   Pulse: 60 50 50 (!) 46   Resp: 23 22 14 18   Temp:       TempSrc:       SpO2:  96% 97% 97%   Weight:       Height:           Medications   iopamidol (ISOVUE-370) 76 % injection 75 mL (75 mLs Intravenous Not Given 7/27/21 1037)   iopamidol (ISOVUE-370) 76 % injection 75 mL (75 mLs Intravenous Given 7/27/21 1037)       MDM. Patient is a 73-year-old female with history of COVID-19 had a PE currently on blood thinners presents with some neuropathy on the right leg. There is a foot drop. Patient is no other symptoms at this time.   CAT scan of the head and neck was negative. There is no bleeding no hemorrhage. Pulse has been on the lower side but it is a sinus. Blood pressure has been marginal at best.  Patient otherwise pretty alert awake answering questions appropriately neurologically is nonfocal.  PT OT consult was obtained to make sure she is safe at home patient was ambulated well with some assistance. Patient will follow up with family physician. Would recommend getting outpatient therapy at this time suspect may be some peripheral nerve neuropathy secondary to infection. No evidence of clots at this time causing her neuropathy. There is no evidence of a blood clot in the lower extremities. Patient discharged in good condition      REVAL:         CRITICAL CARE TIME   Total CriticalCare time was 0 minutes, excluding separately reportable procedures. There was a high probability of clinically significant/life threatening deterioration in the patient's condition which required my urgent intervention. CONSULTS:  None    PROCEDURES:  Unless otherwise noted below, none     [unfilled]    FINAL IMPRESSION      1. Peripheral neuropathy due to inflammation          DISPOSITION/PLAN   DISPOSITION        PATIENT REFERRED TO:  Tevin Ascencio MD  [address]  [phone]    Schedule an appointment as soon as possible for a visit in 3 days  If symptoms worsen      DISCHARGE MEDICATIONS:  Discharge Medication List as of 7/27/2021  2:51 PM             (Please note:  Portions of this note were completed with a voice recognition program.Efforts were made to edit the dictations but occasionally words and phrases are mis-transcribed.)  Form v2016. J.[id number]    Simi PATINO MD (electronically signed)  Emergency Medicine Provider        Arnoldo Avendano MD  07/27/21 0723

## 2021-07-27 NOTE — TELEPHONE ENCOUNTER
----- Message from Elfego Yao sent at 7/27/2021  4:25 PM EDT -----  Subject: Appointment Request    Reason for Call: Routine ED Follow Up Visit    QUESTIONS  Type of Appointment? Established Patient  Reason for appointment request? Other - pt already has appt coming up   didnt want to schedule another one  Additional Information for Provider? was in emergency room 7/27/21 [address] for possible mini stroke on the right side. it was not a   stroke it was diagnosed foot drop but they did advise her to follow up   with PCP, she has an appt coming up the 6th already for a different f/u   does pt need to schedule a different appt or can she keep the one she has   an be seen for both follow ups.   ---------------------------------------------------------------------------  --------------  CALL BACK INFO  What is the best way for the office to contact you? OK to leave message on   voicemail  Preferred Call Back Phone Number? [phone]  ---------------------------------------------------------------------------  --------------  SCRIPT ANSWERS  Relationship to Patient? Self  (Patient requests to see provider urgently. )? No  Do you have any questions for your primary care provider that need to be   answered prior to your appointment? No  Have you been diagnosed with, awaiting test results for, or told that you   are suspected of having COVID-19 (Coronavirus)? (If patient has tested   negative or was tested as a requirement for work, school, or travel and   not based on symptoms, answer no)? No  Do you currently have flu-like symptoms including fever or chills, cough,   shortness of breath, difficulty breathing, or new loss of taste or smell? No  Have you had close contact with someone with COVID-19 in the last 14 days? No  (Service Expert  click yes below to proceed with Medlert As Usual   Scheduling)?  Yes

## 2021-07-27 NOTE — PROGRESS NOTES
independent level for all ADLs and IADLs, ambulating without AD. Pt currently functioning below baseline due to R foot drop. Anticipate the pt will be able to return home with initial 24hr supv from sister (can only provide 24hr assist through the first week of August) and level 1 HHPT. Pt would likely benefit from AFO. Treatment Diagnosis: impaired mobility  Prognosis: Fair  Decision Making: Medium Complexity  History: see below  Exam: see below  Clinical Presentation: evolving  PT Education: PT Role;Plan of Care;General Safety;Gait Training; Adaptive Device Training;Transfer Training;Functional Mobility Training  REQUIRES PT FOLLOW UP: Yes  Activity Tolerance  Activity Tolerance: Patient Tolerated treatment well       Patient Diagnosis(es): The encounter diagnosis was Peripheral neuropathy due to inflammation. has a past medical history of Chronic kidney disease, Hyperlipidemia, Hypertension, MDRO (multiple drug resistant organisms) resistance, MRSA infection, and Type II or unspecified type diabetes mellitus without mention of complication, not stated as uncontrolled. has a past surgical history that includes Lithotripsy ();  section; Colonoscopy (2018); Cholecystectomy (); and Cystoscopy (Right, 2021). Restrictions  Restrictions/Precautions  Restrictions/Precautions: Fall Risk     Vision/Hearing  Vision: Impaired  Vision Exceptions: Wears glasses at all times  Hearing: Within functional limits       Subjective  General  Chart Reviewed: Yes  Patient assessed for rehabilitation services?: Yes  Additional Pertinent Hx: Pt is a 62 y.o. female who presented to the ED on 21 with R foot drop. Pt with recent discharge after lengthy admission with COVID.   Response To Previous Treatment: Not applicable  Family / Caregiver Present: Yes (sister)  Referring Practitioner: China Bailey MD  Referral Date : 21  Diagnosis: R foot drop  Follows Commands: Within Functional Limits  Subjective  Subjective: Pt is agreeable to PT. Orientation  Orientation  Overall Orientation Status: Within Functional Limits     Social/Functional History  Social/Functional History  Lives With: Spouse ( Yannick ) - in ICU at [facility])  Type of Home: House  Home Layout: Two level, Able to Live on Main level with bedroom/bathroom, Laundry in basement (1 story with basement)  Home Access: Stairs to enter without rails  Entrance Stairs - Number of Steps: 3  Bathroom Shower/Tub: Tub/Shower unit  Bathroom Toilet: Standard  Bathroom Accessibility: Accessible  Home Equipment: Standard walker  ADL Assistance: Independent  Homemaking Assistance:  (Shared with family)  Ambulation Assistance: Independent (without walker, however just started using std walker this week)  Transfer Assistance: Independent  Active : Yes  Type of occupation: [employer].      Cognition   Cognition  Overall Cognitive Status: WFL    Objective  PROM RLE (degrees)  RLE PROM: WFL  AROM RLE (degrees)  RLE AROM: Exceptions  RLE General AROM: no AROM to right DF; eversion to neutral  Strength RLE  Strength RLE: Exception  Comment: no trace contraction to DF; eversion 2/5  Strength LLE  Strength LLE: WFL  Motor Control  Gross Motor?: WFL  Sensation  Overall Sensation Status: Impaired  Light Touch: Partial deficits in the RLE  Bed mobility  Supine to Sit: Modified independent  Sit to Supine: Modified independent  Transfers  Sit to Stand: Stand by assistance  Stand to sit: Stand by assistance  Ambulation  Ambulation?: Yes  Ambulation 1  Surface: level tile  Device: Rolling Walker  Assistance: Stand by assistance  Quality of Gait: steppage gait  Gait Deviations: Slow Candis  Distance: 60'  Comments: Pt does well taking her time  Stairs/Curb  Stairs?: No     Balance  Posture: Good  Sitting - Static: Good  Sitting - Dynamic: Good  Standing - Static: Good (@RW)  Standing - Dynamic: Good (@RW)        Plan   Plan  Times per week: 3-5x/week  Current Treatment Recommendations: Strengthening, Functional Mobility Training, ROM, Transfer Training, Balance Training, Gait Training, Stair training, Patient/Caregiver Education & Training, Safety Education & Training, Neuromuscular Re-education, Equipment Evaluation, Education, & procurement  Safety Devices  Type of devices: Call light within reach, Gait belt, Patient at risk for falls, Left in bed, Nurse notified (charge nurse notified)    AM-PAC Score  AM-PAC Inpatient Mobility Raw Score : 23 (07/27/21 1431)  AM-PAC Inpatient T-Scale Score : 56.93 (07/27/21 1431)  Mobility Inpatient CMS 0-100% Score: 11.2 (07/27/21 1431)  Mobility Inpatient CMS G-Code Modifier : CI (07/27/21 1431)        Goals  Short term goals  Time Frame for Short term goals: by acute discharge  Short term goal 1: sit<>Stand independently  Short term goal 2: ambulate >50' mod I with RW  Short term goal 3: ascend/descend flight with rail and CGA  Patient Goals   Patient goals : none stated       Therapy Time   Individual Concurrent Group Co-treatment   Time In 1350         Time Out 1430         Minutes 40         Timed Code Treatment Minutes: 25 Minutes       Tika Baca PT

## 2021-07-28 ENCOUNTER — TELEPHONE (OUTPATIENT)
Dept: SOCIAL WORK | Age: 61
End: 2021-07-28

## 2021-07-28 ENCOUNTER — CARE COORDINATION (OUTPATIENT)
Dept: CASE MANAGEMENT | Age: 61
End: 2021-07-28

## 2021-07-28 NOTE — CARE COORDINATION
[facility]    2021    Patient: Marisol Darby  Patient : 1960   MRN: [id number]  Reason for Admission: MARCI/ peripheral neuropathy  Discharge Date: 21 RARS: Readmission Risk Score: 18         Spoke with: Love Janae - patient, [facility] ED staff, [address] [place] [address] Transitions Subsequent and Final Call    Subsequent and Final Calls  Care Transitions Interventions  Other Interventions:         Spoke with Renaldo Law. She states her right leg remains numb. She states she is using a walker and making a conscious effort to move her right leg first - trying to decrease the amount of \"dragging\" she does. Renaldo Law is agreeable to [facility] for PT. She states She does not have a preference for agency - will try [phone] or [phone] placed call to [facility] ED SW. Left message that Renaldo Law is interested in [facility] [address] and would like to try [phone] first then [facility] if needed. Renaldo Law states her  remains intubated at [facility].    CTN will follow.         Follow Up  Future Appointments   Date Time Provider Bette Andrea   2021 10:45 AM Beatriz Magana MD [facility] [address], RN

## 2021-07-28 NOTE — TELEPHONE ENCOUNTER
Follow up with pt for Home care needs-  SW called patient and discussed Home care- FAISAL was able to have Dr. Gayle Barrera to write order for home care for patient that was seen by Dr. Analilia Bush yesterday. Referral was made to [facility] in [place] and they will start services on Friday.

## 2021-07-28 NOTE — DISCHARGE INSTR - COC
Continuity of Care Form    Patient Name: Maame Brenner   :  1960  MRN:  [id number]    Admit date:  2021  Discharge date:  ***    Code Status Order: Prior   Advance Directives:     Admitting Physician:  No admitting provider for patient encounter. PCP: Meggan Montiel MD    Discharging Nurse: [facility] Unit/Room#: 039/D-39  Discharging Unit Phone Number: ***    Emergency Contact:   Extended Emergency Contact Information  Primary Emergency Contact: Stephanie Maciel  Address: [address] [place] [address]           [address] [place] [address],[address], [address] Phone: [phone]  Mobile Phone: [phone]  Relation: Spouse  Secondary Emergency Contact: Pily King  [place] Phone: [phone]  Relation: Brother/Sister    Past Surgical History:  Past Surgical History:   Procedure Laterality Date   [address] COLONOSCOPY  2018    Dr. Elizabeth Smith.  adenoma polyp, repeat in 5 years    CYSTOSCOPY Right 2021    CYSTOSCOPY, RIGHT URETERAL STENT INSERTION,RIGHT RETROGRADE PYLEOGRAM performed by Demetrice Waddell MD at [address] [place] [address] LITHOTRIPSY         Immunization History:   Immunization History   Administered Date(s) Administered    Influenza Virus Vaccine 10/10/2016, 2017    Tdap (Boostrix, Adacel) 2015       Active Problems:  Patient Active Problem List   Diagnosis Code    Obesity E66.9    HTN (hypertension) I10    Diabetes mellitus, type 2 ([facility] E11.9    Pneumonia due to COVID-19 virus U07.1, J12.82    MARCI (acute kidney injury) ([facility] N17.9    Acute respiratory failure with hypoxia ([address] J96.01    Neutropenia ([address] D70.9    Right pulmonary embolus (HCC) I26.99    ARDS (adult respiratory distress syndrome) ([facility] J80       Isolation/Infection:   Isolation          No Isolation        Patient Infection Status     Infection Onset Added Last Indicated Last Indicated By Review Planned Expiration Resolved Resolved By    None active    Resolved    COVID-19 21 COVID-19, Rapid   21     COVID-19 Rule Out 21 COVID-19, Rapid (Ordered)   21 Rule-Out Test Resulted          Nurse Assessment:  Last Vital Signs: BP (!) 90/56   Pulse (!) 46   Temp 98.1 °F (36.7 °C) (Oral)   Resp 18   Ht 5' 2\" (1.575 m)   Wt 155 lb 3.3 oz (70.4 kg)   SpO2 97%   BMI 28.39 kg/m²     Last documented pain score (0-10 scale): Pain Level: 0  Last Weight:   Wt Readings from Last 1 Encounters:   21 155 lb 3.3 oz (70.4 kg)     Mental Status:  {IP PT MENTAL STATUS:}    IV Access:  { GUZMAN IV ACCESS:821431285}    Nursing Mobility/ADLs:  Walking   {CHP DME EVCC:338036671}  Transfer  {CHP DME RDBC:352053098}  Bathing  {CHP DME TLUZ:254760823}  Dressing  {CHP DME QBPE:229713826}  Toileting  {CHP DME GPTN:732226621}  Feeding  {CHP DME NZGT:520867020}  Med Admin  {CHP DME DNKN:276908398}  Med Delivery   { GUZMAN MED Delivery:901814970}    Wound Care Documentation and Therapy:        Elimination:  Continence:   · Bowel: {YES / FW:79825}  · Bladder: {YES / WT:35469}  Urinary Catheter: {Urinary Catheter:079481434}   Colostomy/Ileostomy/Ileal Conduit: {YES / COOPER:96020}       Date of Last BM: ***  No intake or output data in the 24 hours ending 21 1426  No intake/output data recorded.     Safety Concerns:     [address] Safety Concerns:648809476}    Impairments/Disabilities:      [address] Impairments/Disabilities:572611684}    Nutrition Therapy:  Current Nutrition Therapy:   [address] Diet List:215088097}    Routes of Feeding: {P DME Other Feedings:529515606}  Liquids: {Slp liquid thickness:65615}  Daily Fluid Restriction: {CHP DME Yes amt example:538061252}  Last Modified Barium Swallow with Video (Video Swallowing Test): {Done Not Done [facility]}    Treatments at the Time of Hospital Discharge:   Respiratory Treatments: ***  Oxygen Therapy:  {Therapy; copd oxygen:90840}  Ventilator:    { CC Vent FDJA:706948899}    Rehab Therapies: Physical Therapy, Occupational Therapy and Nurse  Weight Bearing Status/Restrictions: [address] CC Weight Bearin}  Other Medical Equipment (for information only, NOT a DME order):  {EQUIPMENT:185776046}  Other Treatments: [facility]: LEVEL 1 STANDARD    Home health agency to establish plan of care for patient over 60 day period   [address] Initial home SN evaluation visit to occur within 24-48 hours for:  1)  medication management  2)  VS and clinical assessment  3)  S&S chronic disease exacerbation education + when to contact MD/NP  4)  care coordination   Medication Reconciliation during 1st SN visit     PT/OT    Evaluate with goal of regaining prior level of functioning    OT to evaluate if patient has [address] needs for personal care     PCP Visit scheduled within 7 days of hospital discharge    Telehealth-Homecare Vitals(If patient is agreeable and meets guidelines)      Patient's personal belongings (please select all that are sent with patient):  {CHP DME Belongings:977599457}    RN SIGNATURE:  {Esignature:876641272}    CASE MANAGEMENT/SOCIAL WORK SECTION    Inpatient Status Date: ***    Readmission Risk Assessment Score:  0    Discharging to Facility/ Agency   · Name:  [facility]    · Address: [address] [place] [address]., [address] [place] [address]., [place] [address]  · Phone: [phone]  · Fax: [phone]    / signature: {Esignature:971195086}    PHYSICIAN SECTION    Prognosis: {Prognosis:7190807152}    Condition at Discharge: [address] Patient Condition:235066506}    Rehab Potential (if transferring to Rehab): {Prognosis:1493116257}    Recommended Labs or Other Treatments After Discharge: ***    Physician Certification: I certify the above information and transfer of Keanu Verduzco  is necessary for the continuing treatment of the diagnosis listed and that she requires {Admit to Appropriate Level of Care:75493} for {GREATER/LESS:215482847} 30 days.      Update Admission H&P: {CHP DME Changes in UEETE:157139389}    PHYSICIAN SIGNATURE:  {Esignature:843834031}

## 2021-07-28 NOTE — TELEPHONE ENCOUNTER
Home Care Referral:  SW received call back from Calvin with [facility]- she was advised by [facility] that they are not a provider for patient's insurance. SW called [facility]- they are able to accept patient and they are a provider for her insurance. Faxed referral to [facility].  SW called patient back and advised that [facility] is unable to provide care due to not being a provider for her insurance. [facility] is able to accept referral and they will call her to set up home visit on Friday.   Bon العلي,MSW  [phone]

## 2021-07-29 ENCOUNTER — CARE COORDINATION (OUTPATIENT)
Dept: CASE MANAGEMENT | Age: 61
End: 2021-07-29

## 2021-07-29 RX ORDER — DILTIAZEM HYDROCHLORIDE 240 MG/1
CAPSULE, EXTENDED RELEASE ORAL
Qty: 90 CAPSULE | Refills: 0 | Status: SHIPPED | OUTPATIENT
Start: 2021-07-29 | End: 2021-08-23 | Stop reason: SDUPTHER

## 2021-07-29 NOTE — CARE COORDINATION
[facility] Transitions Follow Up Call    2021    Patient: Mario Meraz  Patient : 1960   MRN: [id number]  Reason for Admission: MARCI/Peripheral neuropathy  Discharge Date: 21 RARS: Readmission Risk Score: 18         Spoke with: Ashley Sumner - patient & [facility] staff    Care Transitions Subsequent and Final Call    Subsequent and Final Calls  Care Transitions Interventions  Other Interventions:         [facility] states she has not heard from [address] [place] [address]. Call placed to [address] [place] [address]. They have received the patient referral and have her scheduled for a start of care tomorrow.     Follow Up  Future Appointments   Date Time Provider Bette Andrea   2021 10:45 AM Tristan Cortez MD [place] [address], RN

## 2021-07-30 ENCOUNTER — TELEPHONE (OUTPATIENT)
Dept: FAMILY MEDICINE CLINIC | Age: 61
End: 2021-07-30

## 2021-07-30 ENCOUNTER — CARE COORDINATION (OUTPATIENT)
Dept: CASE MANAGEMENT | Age: 61
End: 2021-07-30

## 2021-07-30 NOTE — CARE COORDINATION
Spoke with Alex. She states the [facility] nurse is due at her home @ 10am and the therapist is due there this afternoon. Encouraged Alex to reach out to her [facility] or PCP with any non life threatening medical issues or concerns over the weekend. She verbalized understanding.

## 2021-07-30 NOTE — TELEPHONE ENCOUNTER
Raquel Rizo w/ [facility] stated that pt was d/c from hospital with [address] stated that she is going to do PT   3 times a week starting next week and then 2 times a week     [facility] is going to be faxing over paperwork.

## 2021-08-05 ENCOUNTER — CARE COORDINATION (OUTPATIENT)
Dept: CASE MANAGEMENT | Age: 61
End: 2021-08-05

## 2021-08-05 NOTE — CARE COORDINATION
[facility] 45 Transitions Follow Up Call    2021    Patient: Benita Borja  Patient : 1960   MRN: [id number]  Reason for Admission: COVID PNA, pulmonary embolus, ARDS, back to ED 21 for numbness in right leg & foot drop  Discharge Date: 21 RARS: Readmission Risk Score: 18         Spoke with: [address] Transitions Subsequent and Final Call    Subsequent and Final Calls  Do you have any ongoing symptoms?: No  Have your medications changed?: No  Do you have any questions related to your medications?: No  Do you currently have any active services?: Yes  Are you currently active with any services?: Home Health  Do you have any needs or concerns that I can assist you with?: No  Identified Barriers: None  Care Transitions Interventions  Other Interventions: Follow Up: Patient reports that she is doing very well, afebrile, no SOB, no cough, no chest pain. Home health remains active, treating foot drop with PT. She is scheduled to see PCP tomorrow for follow up. She is very concerned about  \"Sanjay\" admitted with COVID, on vent, and will get a trach tomorrow. CTN reviewed vaccine information and encouraged her to discuss with Dr. Edwige Mercer tomorrow about when she would be eligible for the vaccine. CTN will transition to Jc Donald for continued follow up.         Future Appointments   Date Time Provider Bette Andrea   2021 10:45 AM Ryan Lloyd MD [facility] [address]       Abdoul Fallon RN

## 2021-08-06 ENCOUNTER — OFFICE VISIT (OUTPATIENT)
Dept: FAMILY MEDICINE CLINIC | Age: 61
End: 2021-08-06
Payer: COMMERCIAL

## 2021-08-06 ENCOUNTER — TELEPHONE (OUTPATIENT)
Dept: FAMILY MEDICINE CLINIC | Age: 61
End: 2021-08-06

## 2021-08-06 VITALS
DIASTOLIC BLOOD PRESSURE: 78 MMHG | HEIGHT: 62 IN | BODY MASS INDEX: 27.6 KG/M2 | WEIGHT: 150 LBS | SYSTOLIC BLOOD PRESSURE: 116 MMHG | TEMPERATURE: 99.1 F

## 2021-08-06 DIAGNOSIS — R80.9 TYPE 2 DIABETES MELLITUS WITH MICROALBUMINURIA, WITHOUT LONG-TERM CURRENT USE OF INSULIN (HCC): ICD-10-CM

## 2021-08-06 DIAGNOSIS — U07.1 COVID-19: ICD-10-CM

## 2021-08-06 DIAGNOSIS — M21.371 RIGHT FOOT DROP: ICD-10-CM

## 2021-08-06 DIAGNOSIS — E11.29 TYPE 2 DIABETES MELLITUS WITH MICROALBUMINURIA, WITHOUT LONG-TERM CURRENT USE OF INSULIN (HCC): ICD-10-CM

## 2021-08-06 DIAGNOSIS — I10 ESSENTIAL HYPERTENSION: ICD-10-CM

## 2021-08-06 DIAGNOSIS — Z09 HOSPITAL DISCHARGE FOLLOW-UP: Primary | ICD-10-CM

## 2021-08-06 PROCEDURE — 99214 OFFICE O/P EST MOD 30 MIN: CPT | Performed by: FAMILY MEDICINE

## 2021-08-06 PROCEDURE — 3017F COLORECTAL CA SCREEN DOC REV: CPT | Performed by: FAMILY MEDICINE

## 2021-08-06 PROCEDURE — 2022F DILAT RTA XM EVC RTNOPTHY: CPT | Performed by: FAMILY MEDICINE

## 2021-08-06 PROCEDURE — 1111F DSCHRG MED/CURRENT MED MERGE: CPT | Performed by: FAMILY MEDICINE

## 2021-08-06 PROCEDURE — G8417 CALC BMI ABV UP PARAM F/U: HCPCS | Performed by: FAMILY MEDICINE

## 2021-08-06 PROCEDURE — 1036F TOBACCO NON-USER: CPT | Performed by: FAMILY MEDICINE

## 2021-08-06 PROCEDURE — 3046F HEMOGLOBIN A1C LEVEL >9.0%: CPT | Performed by: FAMILY MEDICINE

## 2021-08-06 PROCEDURE — G8427 DOCREV CUR MEDS BY ELIG CLIN: HCPCS | Performed by: FAMILY MEDICINE

## 2021-08-06 ASSESSMENT — ENCOUNTER SYMPTOMS
NAUSEA: 0
BLOOD IN STOOL: 0
SHORTNESS OF BREATH: 0
ABDOMINAL PAIN: 0
COUGH: 0
CONSTIPATION: 0
DIARRHEA: 0
VOMITING: 0
BACK PAIN: 0

## 2021-08-06 NOTE — PROGRESS NOTES
Subjective:      Patient ID: Marisol Darby is a 62 y.o. female. Chief Complaint   Patient presents with    Follow-Up from [facility] 7/13/2021 - 7/25/2021 \"COVID\"        Patient presents with: Follow-Up from Hospital: [facility] 7/13/2021 - 7/25/2021 \"COVID\"    Here for a recheck. at home glucose is  139 today. Most of numbers in 140s'   But range 70's-215     YOB: 1960    Date of Visit:  8/6/2021     -- Ciprofloxacin -- Other (See Comments)    --  hallucinations   -- Hydrocodone -- Itching    Current Outpatient Medications:  losartan (COZAAR) 100 MG tablet, TAKE 1 TABLET BY MOUTH EVERY DAY, Disp: 90 tablet, Rfl: 0  hydroCHLOROthiazide (MICROZIDE) 12.5 MG capsule, TAKE 1 CAPSULE BY MOUTH EVERY DAY IN THE MORNING, Disp: 90 capsule, Rfl: 0  dilTIAZem (DILT-XR) 240 MG extended release capsule, TAKE 1 CAPSULE BY MOUTH EVERY DAY, Disp: 90 capsule, Rfl: 0  apixaban (ELIQUIS) 5 MG TABS tablet, Take 1 tablet by mouth 2 times daily, Disp: 60 tablet, Rfl: 1  metoprolol succinate (TOPROL XL) 50 MG extended release tablet, TAKE 1 TABLET BY MOUTH EVERY DAY, Disp: 90 tablet, Rfl: 0  citric acid-potassium citrate (POLYCITRA) 1100-334 MG/5ML solution, Take 5 mLs by mouth 2 times daily, Disp: , Rfl:   metFORMIN (GLUCOPHAGE) 1000 MG tablet, Take 1 tablet by mouth 2 times daily (with meals), Disp: 180 tablet, Rfl: 0  JANUVIA 100 MG tablet, TAKE 1 TABLET BY MOUTH EVERY DAY, Disp: 90 tablet, Rfl: 0  glipiZIDE (GLUCOTROL) 10 MG tablet, TAKE 1 TABLET BY MOUTH 2 TIMES DAILY (BEFORE MEAL). , Disp: 180 tablet, Rfl: 3  atorvastatin (LIPITOR) 10 MG tablet, TAKE 1 TABLET BY MOUTH DAILY, Disp: 90 tablet, Rfl: 3  vitamin B-12 (CYANOCOBALAMIN) 100 MCG tablet, Take 250 mcg by mouth daily. , Disp: , Rfl:   Multiple Vitamin CAPS, Take 1 capsule by mouth daily , Disp: , Rfl:     No current facility-administered medications for this visit.      ---------------------------               08/06/21 1037         ---------------------------   BP:          116/78         Site:    Left Upper Arm     Position:     Sitting        Cuff Size:  Medium Adult      Temp:   99.1 °F (37.3 °C)   TempSrc:    Temporal        Weight:  150 lb (68 kg)     Height:  5' 2\" (1.575 m)   ---------------------------  Body mass index is 27.44 kg/m². Wt Readings from Last 3 Encounters:  08/06/21 : 150 lb (68 kg)  07/27/21 : 155 lb 3.3 oz (70.4 kg)  07/24/21 : 155 lb 3.3 oz (70.4 kg)    BP Readings from Last 3 Encounters:  08/06/21 : 116/78  07/27/21 : Blanco Cough 90/56  07/25/21 : 120/75        Review of Systems   Constitutional: Negative for appetite change, chills, fever and unexpected weight change. Respiratory: Negative for cough and shortness of breath. Cardiovascular: Negative for chest pain and palpitations. Gastrointestinal: Negative for abdominal pain, blood in stool, constipation, diarrhea, nausea and vomiting. Genitourinary: Negative for difficulty urinating, dysuria and hematuria. Incontinence   Musculoskeletal: Negative for back pain. Neurological: Negative for headaches. Foot numb only on the right. No leg numb no groin numb       Objective:   Physical Exam  Constitutional:       General: She is not in acute distress. Appearance: Normal appearance. She is well-developed. She is not ill-appearing or diaphoretic. Neck:      Thyroid: No thyroid mass or thyromegaly. Cardiovascular:      Rate and Rhythm: Normal rate and regular rhythm. Heart sounds: Normal heart sounds. No murmur heard. No friction rub. No gallop. Comments:     Pulmonary:      Effort: Pulmonary effort is normal. No tachypnea, accessory muscle usage or respiratory distress. Breath sounds: Normal breath sounds. No decreased breath sounds, wheezing, rhonchi or rales. Abdominal:      General: Abdomen is flat. Bowel sounds are normal. There is no distension or abdominal bruit. renal medicine dr Tisha Peng and he is ok with metformin use down to gfr of 30 cc/min on her       Plan:       For now I am going to say that you will need to be off for the next 4 weeks to manage your current illness  See me back in about 3 weeks         Leif Carrasco MD

## 2021-08-06 NOTE — PATIENT INSTRUCTIONS
For now I am going to say that you will need to be off for the next 4 weeks to manage your current illness  See me back in about 3 weeks

## 2021-08-11 ENCOUNTER — CARE COORDINATION (OUTPATIENT)
Dept: CASE MANAGEMENT | Age: 61
End: 2021-08-11

## 2021-08-11 ENCOUNTER — OFFICE VISIT (OUTPATIENT)
Dept: NEUROLOGY | Age: 61
End: 2021-08-11
Payer: COMMERCIAL

## 2021-08-11 VITALS
DIASTOLIC BLOOD PRESSURE: 77 MMHG | HEART RATE: 82 BPM | BODY MASS INDEX: 27.47 KG/M2 | WEIGHT: 150.2 LBS | SYSTOLIC BLOOD PRESSURE: 113 MMHG

## 2021-08-11 DIAGNOSIS — M21.371 BILATERAL FOOT-DROP: Primary | ICD-10-CM

## 2021-08-11 DIAGNOSIS — M21.372 BILATERAL FOOT-DROP: Primary | ICD-10-CM

## 2021-08-11 DIAGNOSIS — G57.30 PERONEAL MONONEUROPATHY, UNSPECIFIED LATERALITY: ICD-10-CM

## 2021-08-11 PROCEDURE — G8427 DOCREV CUR MEDS BY ELIG CLIN: HCPCS | Performed by: PSYCHIATRY & NEUROLOGY

## 2021-08-11 PROCEDURE — G8417 CALC BMI ABV UP PARAM F/U: HCPCS | Performed by: PSYCHIATRY & NEUROLOGY

## 2021-08-11 PROCEDURE — 99204 OFFICE O/P NEW MOD 45 MIN: CPT | Performed by: PSYCHIATRY & NEUROLOGY

## 2021-08-11 NOTE — CARE COORDINATION
Unable to reach patient for COVID 19 monitoring follow up phone call. Left message. CTN will follow.

## 2021-08-11 NOTE — PROGRESS NOTES
NEUROLOGY CONSULTATION     Chief Complaint   Patient presents with    New Patient     foot drop        HISTORY OF PRESENT ILLNESS :    Iain Marie is a 62 y.o. female who is referred by Dr. Jenny Wills   History was obtained from patient  Patient was referred for evaluation of right-sided leg weakness as well as right foot drop. Patient was admitted to [facility] in early July 2021 with severe pneumonia due to COVID-19 infection. Patient says spent some time in the intensive care unit. Patient has no diabetes. She was discharged from the hospital and was slowly recovering. A week later she noticed that she had some right leg weakness. In fact her family noticed that she was driving the right leg. She went back to the emergency room and they diagnosed her with a right foot drop. She was seen by her primary care physician and referred for neurological evaluation. Patient is getting physical therapy. She states that she is able to walk better but still has a lot of numbness in the leg. Her diabetes is usually been well controlled but recently the numbers are high because of steroids that she received while in the hospital with COVID-19 infection.     REVIEW OF SYSTEMS    Constitutional:  []   Chills   []  Fatigue   []  Fevers   []  Malaise   []  Weight loss     [x] Denies all of the above    Eyes:  []  Double vision   []  Blurry vision     [x] Denies all of the above    Ears, nose, mouth, throat, and face:   [] Hearing loss    []   Hoarseness      []  Snoring    []  Tinnitus       [x] Denies all of the above     Respiratory:   []  Cough    []  Shortness of breath         [x] Denies all of the above     Cardiovascular:   []  Chest pain    []  Exertional chest pressure/discomfort           [] Palpitations    []  Syncope     [x] Denies all of the above    Gastrointestinal:   []  Abdominal pain   []  Constipation    []  Diarrhea    [] Dysphagia                      [x] Denies all of the above    Genitourinary:      []  Frequency   []  Hematuria     []  Urinary incontinence           [x] Denies all of the above     Hematologic/lymphatic:  []  Bleeding    []  Easy bruising   []  Anemia  [x] Denies all of the above     Musculoskeletal:   [] Back pain       []  Myalgias    []  Neck pain           [x] Denies all of the above    Neurological: As noted in HPI    Behavioral/Psych:   [x] Anxiety    []  Depression     []  Mood swings     [] Denies all of the above     Endocrine:   []  Temperature intolerance     [] Fatigue      [x] Denies all of the above     Allergic/Immunologic:   [] Hay fever    [x] Denies all of the above     Past Medical History:   Diagnosis Date    Chronic kidney disease 2009    kidney stones    Hyperlipidemia     Hypertension     MDRO (multiple drug resistant organisms) resistance     MRSA infection 2013    left lower leg    Type II or unspecified type diabetes mellitus without mention of complication, not stated as uncontrolled      Family History   Problem Relation Age of Onset    Cancer Mother         pancreatic    Cancer Father         lung    Heart Disease Father         Bypass x 5    Heart Disease Sister         Stent- CAD    Diabetes Sister      Social History     Socioeconomic History    Marital status:      Spouse name: Not on file    Number of children: Not on file    Years of education: Not on file    Highest education level: Not on file   Occupational History    Not on file   Tobacco Use    Smoking status: Never Smoker    Smokeless tobacco: Never Used   Substance and Sexual Activity    Alcohol use: No    Drug use: No    Sexual activity: Yes     Partners: Male   Other Topics Concern    Not on file   Social History Narrative    Not on file     Social Determinants of Health     Financial Resource Strain: Low Risk     Difficulty of Paying Living Expenses: Not hard at all   Food Insecurity: No extremities  Coordination:  Normal  Finger to Nose and Heel to Shin bilaterally    . Reflexes:  DTR 1 and symmetric bilaterally but slightly diminished in the ankles  Plantar response: Flexor bilaterally  Gait: Gait is impaired because of the foot drop  Romberg: negative  Vascular: No carotid bruit bilaterally        DATA:  LABS:  General Labs:    CBC:   Lab Results   Component Value Date    WBC 15.3 07/27/2021    RBC 4.42 07/27/2021    HGB 13.4 07/27/2021    HCT 39.5 07/27/2021    MCV 89.5 07/27/2021    MCH 30.4 07/27/2021    MCHC 33.9 07/27/2021    RDW 13.3 07/27/2021     07/27/2021    MPV 9.3 07/27/2021     BMP:    Lab Results   Component Value Date     08/06/2021    K 3.6 08/06/2021    K 4.0 07/27/2021     08/06/2021    CO2 24 08/06/2021    BUN 21 08/06/2021    LABALBU 2.6 07/25/2021    CREATININE 1.1 08/06/2021    CALCIUM 9.2 08/06/2021    GFRAA >60 08/06/2021    GFRAA >60 06/18/2012    LABGLOM 51 08/06/2021    GLUCOSE 129 08/06/2021     RADIOLOGY REVIEW:  I have reviewed radiology image(s) and reports(s) of: CT scan of the head    IMPRESSION :  Bilateral foot drop, right worse than left  This is most likely due to peroneal mononeuropathy at the fibular head bilaterally. Patient is a diabetic and may have some underlying diabetic polyneuropathy as well. Patient spent some time in the hospital especially in the ICU setting and could have developed some pressure palsies at that point. Recent diagnosis of COVID-19 infection and pneumonia  Patient also had a right pulmonary embolus and is on anticoagulation. RECOMMENDATIONS :  Discussed with patient  Explained my findings  We discussed about an AFO splint  Patient states that she is working with a physical therapist who was trying to use Ace bandage to help the foot drop. If that does not help we can go back to the AFO splint.   Strict control of diabetes was discussed  I will see her back in a month and do EMG nerve conduction studies of both lower extremities  Thank you for this consultation        Please note a portion of this chart was generated using dragon dictation software. Although every effort was made to ensure the accuracy of this automated transcription, some errors in transcription may have occurred.

## 2021-08-18 ENCOUNTER — CARE COORDINATION (OUTPATIENT)
Dept: CASE MANAGEMENT | Age: 61
End: 2021-08-18

## 2021-08-18 NOTE — CARE COORDINATION
Care Transitions Follow Up Call    Needs to be reviewed by the provider   Additional needs identified to be addressed with provider: No               Method of communication with provider : none      Care Transition Nurse (CTN) contacted the patient by telephone to follow up after admission on 2021. Verified name and  with patient as identifiers. CTN provided contact information for future needs. Spoke briefly with Jailyn. She states she is \"fine\". She states she is still in therapy and struggling with foot drop. She states it is not getting any better and they are discussing the possibility of a brace. She is using a cane for ambulation. She states she is to have her kidney stone removed next week. She denies any SOB. Jailyn had to cut the call short. Her  remains hospitalized and the physician just walked into the room to have a discussion. CTN will follow.

## 2021-08-21 ENCOUNTER — HOSPITAL ENCOUNTER (EMERGENCY)
Age: 61
Discharge: HOME | End: 2021-08-21
Attending: EMERGENCY MEDICINE
Payer: COMMERCIAL

## 2021-08-21 VITALS
SYSTOLIC BLOOD PRESSURE: 114 MMHG | HEART RATE: 84 BPM | DIASTOLIC BLOOD PRESSURE: 73 MMHG | TEMPERATURE: 98.3 F | HEIGHT: 62 IN | BODY MASS INDEX: 28.93 KG/M2 | WEIGHT: 157.19 LBS | RESPIRATION RATE: 18 BRPM | OXYGEN SATURATION: 94 %

## 2021-08-21 DIAGNOSIS — S39.012A STRAIN OF LUMBAR REGION, INITIAL ENCOUNTER: Primary | ICD-10-CM

## 2021-08-21 DIAGNOSIS — N30.00 ACUTE CYSTITIS WITHOUT HEMATURIA: ICD-10-CM

## 2021-08-21 LAB
AMORPHOUS: ABNORMAL /HPF
BACTERIA: ABNORMAL /HPF
BILIRUBIN URINE: NEGATIVE
BLOOD, URINE: ABNORMAL
CLARITY: ABNORMAL
COLOR: YELLOW
EPITHELIAL CELLS, UA: ABNORMAL /HPF (ref 0–5)
GLUCOSE URINE: NEGATIVE MG/DL
KETONES, URINE: NEGATIVE MG/DL
LEUKOCYTE ESTERASE, URINE: ABNORMAL
MICROSCOPIC EXAMINATION: YES
MUCUS: ABNORMAL /LPF
NITRITE, URINE: NEGATIVE
PH UA: 5.5 (ref 5–8)
PROTEIN UA: >=300 MG/DL
RBC UA: >100 /HPF (ref 0–4)
RENAL EPITHELIAL, UA: ABNORMAL /HPF (ref 0–1)
SPECIFIC GRAVITY UA: 1.02 (ref 1–1.03)
URINE REFLEX TO CULTURE: YES
URINE TYPE: ABNORMAL
UROBILINOGEN, URINE: 0.2 E.U./DL
WBC UA: ABNORMAL /HPF (ref 0–5)

## 2021-08-21 PROCEDURE — 6370000000 HC RX 637 (ALT 250 FOR IP): Performed by: EMERGENCY MEDICINE

## 2021-08-21 PROCEDURE — 87086 URINE CULTURE/COLONY COUNT: CPT

## 2021-08-21 PROCEDURE — 81001 URINALYSIS AUTO W/SCOPE: CPT

## 2021-08-21 PROCEDURE — 99284 EMERGENCY DEPT VISIT MOD MDM: CPT

## 2021-08-21 RX ORDER — CEFUROXIME AXETIL 500 MG/1
500 TABLET ORAL 2 TIMES DAILY
Qty: 14 TABLET | Refills: 0 | Status: SHIPPED | OUTPATIENT
Start: 2021-08-21 | End: 2021-08-28

## 2021-08-21 RX ORDER — CYCLOBENZAPRINE HCL 5 MG
5 TABLET ORAL 3 TIMES DAILY PRN
Qty: 30 TABLET | Refills: 0 | Status: SHIPPED | OUTPATIENT
Start: 2021-08-21 | End: 2021-08-31

## 2021-08-21 RX ORDER — CYCLOBENZAPRINE HCL 10 MG
10 TABLET ORAL ONCE
Status: COMPLETED | OUTPATIENT
Start: 2021-08-21 | End: 2021-08-21

## 2021-08-21 RX ORDER — CEFUROXIME AXETIL 250 MG/1
500 TABLET ORAL ONCE
Status: COMPLETED | OUTPATIENT
Start: 2021-08-21 | End: 2021-08-21

## 2021-08-21 RX ADMIN — CEFUROXIME AXETIL 500 MG: 250 TABLET ORAL at 12:57

## 2021-08-21 RX ADMIN — CYCLOBENZAPRINE 10 MG: 10 TABLET, FILM COATED ORAL at 12:16

## 2021-08-21 ASSESSMENT — PAIN DESCRIPTION - LOCATION: LOCATION: BACK

## 2021-08-21 ASSESSMENT — PAIN DESCRIPTION - ORIENTATION: ORIENTATION: RIGHT;LOWER

## 2021-08-21 ASSESSMENT — PAIN DESCRIPTION - PAIN TYPE: TYPE: ACUTE PAIN

## 2021-08-21 ASSESSMENT — PAIN SCALES - GENERAL: PAINLEVEL_OUTOF10: 6

## 2021-08-21 NOTE — ED PROVIDER NOTES
CHIEF COMPLAINT  Chief Complaint   Patient presents with    Back Pain     fall yesterday caused by her foot drop. Pain worse on right side, she has a stent and known kidney stone       HISTORY OF PRESENT ILLNESS  Yareli Mayo is a 62 y.o. female who presents to the ED complaining of having fallen yesterday, stumbling backwards secondary her to her chronic foot drop and scraping her left arm and landing on her buttocks. Over the last 24 hours patient has developed pain and stiffness of her right lumbar paraspinal muscles without radiation. Patient denies any midline pain in the back. No pelvis pain. No hip pain. No radicular symptoms of pain that radiates down the legs and no leg weakness or paresthesia. No saddle paresthesia. No new dysuria or hematuria. No bowel or bladder incontinence or retention. Of note, patient does have a kidney stone with stent that were placed on July 22 and is scheduled to have her stent removed this week as well as stone removal.  No fevers or chills. No neck or head pain. No history of facial injury. No loss of consciousness. Patient denies pain in the knees, ankles or feet    No other complaints, modifying factors or associated symptoms. Nursing notes reviewed. Past Medical History:   Diagnosis Date    Chronic kidney disease 2009    kidney stones    Hyperlipidemia     Hypertension     MDRO (multiple drug resistant organisms) resistance     MRSA infection 2013    left lower leg    Type II or unspecified type diabetes mellitus without mention of complication, not stated as uncontrolled      Past Surgical History:   Procedure Laterality Date   [address]    COLONOSCOPY  06/07/2018    Dr. Romeo Tabares.  adenoma polyp, repeat in 5 years    CYSTOSCOPY Right 7/22/2021    CYSTOSCOPY, RIGHT URETERAL STENT INSERTION,RIGHT RETROGRADE PYLEOGRAM performed by Kathryn Parnell MD at [address] [place] [address] LITHOTRIPSY  2009     Family History Problem Relation Age of Onset   [employer] Cancer Mother         pancreatic    Cancer Father         lung    Heart Disease Father         Bypass x 5    Heart Disease Sister         Stent- CAD    Diabetes Sister      Social History     Socioeconomic History    Marital status:      Spouse name: Not on file    Number of children: Not on file    Years of education: Not on file    Highest education level: Not on file   Occupational History    Not on file   Tobacco Use    Smoking status: Never Smoker    Smokeless tobacco: Never Used   Substance and Sexual Activity    Alcohol use: No    Drug use: No    Sexual activity: Yes     Partners: Male   Other Topics Concern    Not on file   Social History Narrative    Not on file     Social Determinants of Health     Financial Resource Strain: Low Risk     Difficulty of Paying Living Expenses: Not hard at all   Food Insecurity: No Food Insecurity    Worried About Running Out of Food in the Last Year: Never true    Zina of Food in the Last Year: Never true   Transportation Needs:     Lack of Transportation (Medical):      Lack of Transportation (Non-Medical):    Physical Activity:     Days of Exercise per Week:     Minutes of Exercise per Session:    Stress:     Feeling of Stress :    Social Connections:     Frequency of Communication with Friends and Family:     Frequency of Social Gatherings with Friends and Family:     Attends [employer] Services:     Active Member of Clubs or Organizations:     Attends Club or Organization Meetings:     Marital Status:    Intimate Partner Violence:     Fear of Current or Ex-Partner:     Emotionally Abused:     Physically Abused:     Sexually Abused:      Current Facility-Administered Medications   Medication Dose Route Frequency Provider Last Rate Last Admin    cefUROXime (CEFTIN) tablet 500 mg  500 mg Oral Once Katrinka Boas, MD         Current Outpatient Medications   Medication Sig Dispense Refill    cyclobenzaprine (FLEXERIL) 5 MG tablet Take 1 tablet by mouth 3 times daily as needed for Muscle spasms 30 tablet 0    cefUROXime (CEFTIN) 500 MG tablet Take 1 tablet by mouth 2 times daily for 7 days 14 tablet 0    losartan (COZAAR) 100 MG tablet TAKE 1 TABLET BY MOUTH EVERY DAY 90 tablet 0    hydroCHLOROthiazide (MICROZIDE) 12.5 MG capsule TAKE 1 CAPSULE BY MOUTH EVERY DAY IN THE MORNING 90 capsule 0    dilTIAZem (DILT-XR) 240 MG extended release capsule TAKE 1 CAPSULE BY MOUTH EVERY DAY 90 capsule 0    apixaban (ELIQUIS) 5 MG TABS tablet Take 1 tablet by mouth 2 times daily 60 tablet 1    metoprolol succinate (TOPROL XL) 50 MG extended release tablet TAKE 1 TABLET BY MOUTH EVERY DAY 90 tablet 0    citric acid-potassium citrate (POLYCITRA) 1100-334 MG/5ML solution Take 5 mLs by mouth 2 times daily      metFORMIN (GLUCOPHAGE) 1000 MG tablet Take 1 tablet by mouth 2 times daily (with meals) 180 tablet 0    JANUVIA 100 MG tablet TAKE 1 TABLET BY MOUTH EVERY DAY 90 tablet 0    glipiZIDE (GLUCOTROL) 10 MG tablet TAKE 1 TABLET BY MOUTH 2 TIMES DAILY (BEFORE MEAL). 180 tablet 3    atorvastatin (LIPITOR) 10 MG tablet TAKE 1 TABLET BY MOUTH DAILY 90 tablet 3    vitamin B-12 (CYANOCOBALAMIN) 100 MCG tablet Take 250 mcg by mouth daily.  Multiple Vitamin CAPS Take 1 capsule by mouth daily        Allergies   Allergen Reactions    Ciprofloxacin Other (See Comments)     hallucinations    Hydrocodone Itching       REVIEW OF SYSTEMS  Positives and pertinent negatives as per HPI. Six other systems were reviewed and are negative. Nursing notes pertaining to ROS were reviewed. PHYSICAL EXAM   /73   Pulse 84   Temp 98.3 °F (36.8 °C) (Oral)   Resp 18   Ht 5' 2\" (1.575 m)   Wt 157 lb 3 oz (71.3 kg)   SpO2 94%   BMI 28.75 kg/m²   General: Alert and oriented x 3, NAD. No increased work of breathing or accessory muscle use. Non-ill appearing.   Appropriate and interactive  Eyes: PERRL, no scleral icterus, injection or exudate. EOMI. HENT: Atraumatic. Oral pharynx is clear, moist, no enanthem. No tonsillar hypertropy or exudate. Nasal mucous membranes are clear. Neck:  No Lymphadenopathy. Non-tender to palpation. Normal ROM. No JVD. No thyromegaly. No Mass. PULMONARY: Lungs clear bilaterally without wheezes, rales or rhonchi. Good air movement bilaterally. CV: Regular rate and rhythm without murmurs, rubs or gallops. ABD: Soft, non-tender, non-distended, normal bowel sounds, no hepatosplenomegaly, no masses. No peritoneal signs, rebound or guarding. Back:  No CVAT, no rash. No tenderness to palpation of the thoracic or lumbar spinous processes or area over the transverse processes. No sacroiliac joint tenderness. Pelvis is stable and nontender to pelvic rock. Patient has point tenderness along the lumbar paraspinal muscles on the right that reproduces her pain. EXT: No cyanosis or clubbing. No rash. CR < 2 seconds. No tenderness to palpation. No lower extremity edema. +2 pulses in upper/lower extremities bilaterally. Skin is warm and dry. PSYCH: normal affect  Lumbar spine:  Normal sensation of the inner thigh bilaterally  Normal thigh adduction strength bilaterally  Normal knee extension bilaterally  Normal ankle and great toe dorsiflexion on the left with chronic foot drop on the right  Normal Patellar 2/4 reflexes bilaterally  Normal plantarflexion of the toes bilaterally      ED COURSE/MDM  Lumbar strain without radicular symptoms or evidence of nerve impingement. Patient has no midline pain and no other physical exam findings to suggest a vertebral compression fracture transverse process fracture.   Patient will be given cyclobenzaprine to use with Tylenol with instructions to follow-up with her primary care physician within 1 week to consider physical therapy if her symptoms persist.  Patient does have an underlying kidney stone with stent placement and urinalysis was performed revealing pyuria and leukocyte esterase and since the patient does have an indwelling stent, she will be started on cefuroxime twice daily until she follows up with urology within the next several days for stent removal.  Patient does not have fever, nausea, vomiting, tachycardia. Patient was advised to continue prescribed medications and follow-up with urology for her scheduled stent removal and kidney stone removal    Patient was given scripts for the following medications. I counseled patient how to take these medications. New Prescriptions    CEFUROXIME (CEFTIN) 500 MG TABLET    Take 1 tablet by mouth 2 times daily for 7 days    CYCLOBENZAPRINE (FLEXERIL) 5 MG TABLET    Take 1 tablet by mouth 3 times daily as needed for Muscle spasms         CLINICAL IMPRESSION  1. Strain of lumbar region, initial encounter    2. Acute cystitis without hematuria        Blood pressure 114/73, pulse 84, temperature 98.3 °F (36.8 °C), temperature source Oral, resp. rate 18, height 5' 2\" (1.575 m), weight 157 lb 3 oz (71.3 kg), SpO2 94 %, not currently breastfeeding.       Follow-up with:  Jennie Wyatt MD  [facility] [address] [place] [address]  [phone]    In 1 week  If symptoms worsen    Urologist  Your urologist for scheduled appointment this week for stent removal  In 3 days            Ezra Santiago MD  08/21/21 6439

## 2021-08-22 LAB — URINE CULTURE, ROUTINE: NORMAL

## 2021-08-23 ENCOUNTER — TELEPHONE (OUTPATIENT)
Dept: FAMILY MEDICINE CLINIC | Age: 61
End: 2021-08-23

## 2021-08-23 RX ORDER — ATORVASTATIN CALCIUM 10 MG/1
TABLET, FILM COATED ORAL
Qty: 90 TABLET | Refills: 2 | Status: SHIPPED | OUTPATIENT
Start: 2021-08-23 | End: 2022-04-11

## 2021-08-23 RX ORDER — DILTIAZEM HYDROCHLORIDE 240 MG/1
CAPSULE, EXTENDED RELEASE ORAL
Qty: 90 CAPSULE | Refills: 2 | Status: SHIPPED | OUTPATIENT
Start: 2021-08-23 | End: 2022-06-06

## 2021-08-23 NOTE — TELEPHONE ENCOUNTER
Patient is requesting a refill on    dilTIAZem (DILT-XR) 240 MG extended release capsule     and    atorvastatin (LIPITOR) 10 MG tablet       [phone]      LOV 8/6/21

## 2021-08-26 ENCOUNTER — TELEPHONE (OUTPATIENT)
Dept: FAMILY MEDICINE CLINIC | Age: 61
End: 2021-08-26

## 2021-08-26 ENCOUNTER — OFFICE VISIT (OUTPATIENT)
Dept: FAMILY MEDICINE CLINIC | Age: 61
End: 2021-08-26
Payer: COMMERCIAL

## 2021-08-26 ENCOUNTER — HOSPITAL ENCOUNTER (OUTPATIENT)
Dept: GENERAL RADIOLOGY | Age: 61
Discharge: HOME | End: 2021-08-26
Payer: COMMERCIAL

## 2021-08-26 ENCOUNTER — HOSPITAL ENCOUNTER (OUTPATIENT)
Age: 61
Discharge: HOME | End: 2021-08-26
Payer: COMMERCIAL

## 2021-08-26 VITALS
WEIGHT: 154 LBS | BODY MASS INDEX: 28.34 KG/M2 | DIASTOLIC BLOOD PRESSURE: 82 MMHG | SYSTOLIC BLOOD PRESSURE: 126 MMHG | TEMPERATURE: 98.1 F | HEIGHT: 62 IN

## 2021-08-26 DIAGNOSIS — R29.898 WEAKNESS OF LEFT FOOT: ICD-10-CM

## 2021-08-26 DIAGNOSIS — U07.1 COVID-19: Primary | ICD-10-CM

## 2021-08-26 DIAGNOSIS — M21.371 RIGHT FOOT DROP: Primary | ICD-10-CM

## 2021-08-26 DIAGNOSIS — M21.371 RIGHT FOOT DROP: ICD-10-CM

## 2021-08-26 PROCEDURE — 99212 OFFICE O/P EST SF 10 MIN: CPT | Performed by: FAMILY MEDICINE

## 2021-08-26 PROCEDURE — G8427 DOCREV CUR MEDS BY ELIG CLIN: HCPCS | Performed by: FAMILY MEDICINE

## 2021-08-26 PROCEDURE — 72100 X-RAY EXAM L-S SPINE 2/3 VWS: CPT

## 2021-08-26 PROCEDURE — 1036F TOBACCO NON-USER: CPT | Performed by: FAMILY MEDICINE

## 2021-08-26 PROCEDURE — 3017F COLORECTAL CA SCREEN DOC REV: CPT | Performed by: FAMILY MEDICINE

## 2021-08-26 PROCEDURE — G0180 MD CERTIFICATION HHA PATIENT: HCPCS | Performed by: FAMILY MEDICINE

## 2021-08-26 PROCEDURE — G8417 CALC BMI ABV UP PARAM F/U: HCPCS | Performed by: FAMILY MEDICINE

## 2021-08-26 NOTE — PATIENT INSTRUCTIONS
Do contact dr Dasha Scherer about the foot drop and the need for an Ankle Foot Orthotic   I will give additional 3 weeks for time off

## 2021-08-26 NOTE — PROGRESS NOTES
Subjective:      Patient ID: Sp Ontiveros is a 62 y.o. female. Chief Complaint   Patient presents with    Follow-up     hypertension, diabetes, lipids        Patient presents with:   Follow-up: hypertension, diabetes, lipids    She is here to follow up  She is still with the right foot drop   She is not using an orthotic     She works in [employer] and is in motion a lot  She carries food and trays etc  She tells me no sit down job     The ace wrap did not work well for her    Doing fine with the urine and stool no leaking on self  No groin numbness     YOB: 1960    Date of Visit:  8/26/2021     -- Ciprofloxacin -- Other (See Comments)    --  hallucinations   -- Hydrocodone -- Itching    Current Outpatient Medications:  dilTIAZem (DILT-XR) 240 MG extended release capsule, TAKE 1 CAPSULE BY MOUTH EVERY DAY, Disp: 90 capsule, Rfl: 2  atorvastatin (LIPITOR) 10 MG tablet, TAKE 1 TABLET BY MOUTH DAILY, Disp: 90 tablet, Rfl: 2  cyclobenzaprine (FLEXERIL) 5 MG tablet, Take 1 tablet by mouth 3 times daily as needed for Muscle spasms, Disp: 30 tablet, Rfl: 0  cefUROXime (CEFTIN) 500 MG tablet, Take 1 tablet by mouth 2 times daily for 7 days, Disp: 14 tablet, Rfl: 0  losartan (COZAAR) 100 MG tablet, TAKE 1 TABLET BY MOUTH EVERY DAY, Disp: 90 tablet, Rfl: 0  hydroCHLOROthiazide (MICROZIDE) 12.5 MG capsule, TAKE 1 CAPSULE BY MOUTH EVERY DAY IN THE MORNING, Disp: 90 capsule, Rfl: 0  apixaban (ELIQUIS) 5 MG TABS tablet, Take 1 tablet by mouth 2 times daily, Disp: 60 tablet, Rfl: 1  metoprolol succinate (TOPROL XL) 50 MG extended release tablet, TAKE 1 TABLET BY MOUTH EVERY DAY, Disp: 90 tablet, Rfl: 0  citric acid-potassium citrate (POLYCITRA) 1100-334 MG/5ML solution, Take 5 mLs by mouth 2 times daily, Disp: , Rfl:   metFORMIN (GLUCOPHAGE) 1000 MG tablet, Take 1 tablet by mouth 2 times daily (with meals), Disp: 180 tablet, Rfl: 0  JANUVIA 100 MG tablet, TAKE 1 TABLET BY MOUTH EVERY DAY, Disp: 90

## 2021-08-27 ENCOUNTER — CARE COORDINATION (OUTPATIENT)
Dept: CASE MANAGEMENT | Age: 61
End: 2021-08-27

## 2021-08-27 NOTE — CARE COORDINATION
Patient resolved from the Care Transitions episode on 08/27/2021  Discussed COVID-19 related testing which was available at this time. Test results were positive. Patient informed of results, if available? Yes    Patient/family has been provided the following resources and education related to COVID-19:                         Signs, symptoms and red flags related to COVID-19            CDC exposure and quarantine guidelines            [employer] exposure contact - [phone]            Contact for their local Department of Health                 Patient currently reports that the following symptoms have improved:  no new or worsening symptoms. Emden states she is \"better\". She denies any breathing issues at this time. She states she continues to have issues with the foot drop. She had a fall and states she hurt her back. She states her  ia now at [facility]. No further outreach scheduled with this CTN/ACM. Episode of Care resolved. Patient has this CTN/ACM contact information if future needs arise.

## 2021-08-30 ENCOUNTER — TELEPHONE (OUTPATIENT)
Dept: FAMILY MEDICINE CLINIC | Age: 61
End: 2021-08-30

## 2021-08-30 ENCOUNTER — TELEPHONE (OUTPATIENT)
Dept: NEUROLOGY | Age: 61
End: 2021-08-30

## 2021-08-30 DIAGNOSIS — M21.372 BILATERAL FOOT-DROP: Primary | ICD-10-CM

## 2021-08-30 DIAGNOSIS — M21.371 BILATERAL FOOT-DROP: Primary | ICD-10-CM

## 2021-08-30 RX ORDER — PREDNISONE 10 MG/1
TABLET ORAL
Qty: 16 TABLET | Refills: 0 | Status: SHIPPED | OUTPATIENT
Start: 2021-08-30 | End: 2021-11-29 | Stop reason: ALTCHOICE

## 2021-08-30 NOTE — TELEPHONE ENCOUNTER
She may have a letter extending the day of return  To dr Penni Jeans for the back   I will send in a steroid for her     Orders Placed This Encounter   Medications    predniSONE (DELTASONE) 10 MG tablet     Sig: Prednisone 10 mg. #16. Take 2 po bid for 2 days, then 1 po bid for 3 days,  then 1 po daily for 2 days.  Then stop     Dispense:  16 tablet     Refill:  0

## 2021-08-30 NOTE — LETTER
[email or web address] [facility]  [address] [facility] [address]  Phone: [phone]  Fax: [phone]    Cliff King MD        August 30, 2021     Patient: Carlito Hurst   YOB: 1960   Date of Visit: 8/30/2021       To Whom It May Concern: It is my medical opinion that Nolan David may have an extension on her time off. She may return to work on 9-. If you have any questions or concerns, please don't hesitate to call.     Sincerely,        Cliff King MD

## 2021-08-30 NOTE — TELEPHONE ENCOUNTER
Spoke to pt. She states she needs AFO for right foot. Order will be sent to [address] [place] [address] Drive, [address] [place] [address] Road  Ph: [phone]  Fax: [phone]. Note: Pt's  passed away last night.

## 2021-08-30 NOTE — TELEPHONE ENCOUNTER
Pt phoned she seen her PCP and he is telling her to call and get a AFO for foot drop. Please call pt back.

## 2021-08-30 NOTE — TELEPHONE ENCOUNTER
Patient is requesting an extension on her FMLA. Her place of employment told her that she just needs a letter from her provider for the extension. Her date would be 3 weeks from September 3rd. Patient would also like a referral for insurance to an orthopedic for her back.  Please advise    [phone]    LOV 8/26/21

## 2021-08-30 NOTE — TELEPHONE ENCOUNTER
Patient was in Ed for back and they gave her muscle relaxers.  Patient stated that the muscle relaxers don't work and was wondering if she could get something for pain    [phone]

## 2021-09-14 ENCOUNTER — OFFICE VISIT (OUTPATIENT)
Dept: ORTHOPEDIC SURGERY | Age: 61
End: 2021-09-14
Payer: COMMERCIAL

## 2021-09-14 VITALS — HEIGHT: 62 IN | WEIGHT: 154 LBS | BODY MASS INDEX: 28.34 KG/M2

## 2021-09-14 DIAGNOSIS — M51.36 LUMBAR DEGENERATIVE DISC DISEASE: Primary | ICD-10-CM

## 2021-09-14 PROCEDURE — 1036F TOBACCO NON-USER: CPT | Performed by: ORTHOPAEDIC SURGERY

## 2021-09-14 PROCEDURE — 3017F COLORECTAL CA SCREEN DOC REV: CPT | Performed by: ORTHOPAEDIC SURGERY

## 2021-09-14 PROCEDURE — G8417 CALC BMI ABV UP PARAM F/U: HCPCS | Performed by: ORTHOPAEDIC SURGERY

## 2021-09-14 PROCEDURE — G8427 DOCREV CUR MEDS BY ELIG CLIN: HCPCS | Performed by: ORTHOPAEDIC SURGERY

## 2021-09-14 PROCEDURE — 99204 OFFICE O/P NEW MOD 45 MIN: CPT | Performed by: ORTHOPAEDIC SURGERY

## 2021-09-14 NOTE — PROGRESS NOTES
New Patient: LUMBAR SPINE    Referring Provider: Dr. Courtney Dickerson:    Chief Complaint   Patient presents with    Back Pain     Patient states that she fell on concrete on 8/1. Patient complains of low back pain and no radicular symptoms. Took oral steroids and muscle relaxer and feels much better. HISTORY OF PRESENT ILLNESS:    Ms. Oscar Reardon  is a pleasant 62 y.o. female currently referred by Dr. Nolan Conroy for the evaluation of low back pain. She notes her symptoms began after a fall to her buttocks about 6 weeks ago. Her pain has improved since then. He currently rates her pain 1/10. She denies radicular symptoms, saddle anesthesia and bowel bladder abnormality. However she has been diagnosed with bilateral foot drop secondary to having Covid 19 in July. Current/Past Treatment:   · Physical Therapy: None  · Chiropractic: None  · Injection: None  · Medications: Oral steroids and muscle relaxers    Past Medical History:   Past Medical History:   Diagnosis Date    Chronic kidney disease 2009    kidney stones    Hyperlipidemia     Hypertension     MDRO (multiple drug resistant organisms) resistance     MRSA infection 2013    left lower leg    Type II or unspecified type diabetes mellitus without mention of complication, not stated as uncontrolled       Past Surgical History:     Past Surgical History:   Procedure Laterality Date   [address]    COLONOSCOPY  06/07/2018    Dr. Leila Dakins. adenoma polyp, repeat in 5 years    CYSTOSCOPY Right 7/22/2021    CYSTOSCOPY, RIGHT URETERAL STENT INSERTION,RIGHT RETROGRADE PYLEOGRAM performed by Myra Claude, MD at [address] [place] [address] LITHOTRIPSY  2009     Current Medications:     Current Outpatient Medications:     predniSONE (DELTASONE) 10 MG tablet, Prednisone 10 mg. #16. Take 2 po bid for 2 days, then 1 po bid for 3 days,  then 1 po daily for 2 days.  Then stop, Disp: 16 tablet, Rfl: 0   redness  PSYCHOLOGICAL: Denies anxiety, depression   SKIN: Denies skin changes, delayed healing, rash, itching   HEMATOLOGIC: Denies easy bleeding or bruising  ENDOCRINE: Denies excessive thirst, urination, heat/cold  RESPIRATORY: Denies current dyspnea, cough  GI: Denies nausea, vomiting, diarrhea   : Denies bowel or bladder issues      PHYSICAL EXAM:    Vitals: Height 5' 2\" (1.575 m), weight 154 lb (69.9 kg), not currently breastfeeding. GENERAL EXAM:  · General Apparence: Patient is adequately groomed with no evidence of malnutrition. · Orientation: The patient is oriented to time, place and person. · Mood & Affect:The patient's mood and affect are appropriate. · Vascular: Examination reveals no swelling tenderness in upper or lower extremities. Good capillary refill. · Lymphatic: The lymphatic examination bilaterally reveals all areas to be without enlargement or induration  · Sensation: Sensation is intact without deficit  · Coordination/Balance: Good coordination     LUMBAR/SACRAL EXAMINATION:  · Inspection: Local inspection shows no step-off or bruising. Lumbar alignment is normal.  Sagittal and Coronal balance is neutral.      · Palpation:   No evidence of tenderness at the midline. No tenderness bilaterally at the paraspinal or trochanters. There is no step-off or paraspinal spasm. · Range of Motion: Lumbar flexion, extension and rotation are mildly limited due to pain. · Strength:   3/5 right ankle dorsiflexor 4/5 right ankle dorsiflexor otherwise strength testing is 5/5 in all muscle groups tested. · Special Tests:   Straight leg raise and crossed SLR negative. Leg length and pelvis level. · Skin: There are no rashes, ulcerations or lesions. · Reflexes: Reflexes are symmetrically 2+ at the patellar and ankle tendons. Clonus absent bilaterally at the feet.   · Gait & station: normal, patient ambulates without assistance    · Additional Examinations:   · RIGHT LOWER EXTREMITY: Inspection/examination of the right lower extremity does not show any tenderness, deformity or injury. Range of motion is unremarkable. There is no gross instability. There are no rashes, ulcerations or lesions. Strength and tone are normal.    · LEFT LOWER EXTREMITY:  Inspection/examination of the left lower extremity does not show any tenderness, deformity or injury. Range of motion is unremarkable. There is no gross instability. There are no rashes, ulcerations or lesions. Strength and tone are normal.    Diagnostic Testing:    I reviewed x-rays of her lumbar spine from 8/26/2021 in the office today. Those show degenerative disc disease with mild scoliosis    I reviewed CT images of her lumbar spine from July 2021 in the office today. Those show degenerative disc disease with mild scoliosis    Impression:   Lumbar degenerative disc disease with mild scoliosis    Plan:    Discussed treatment options including observation, physical therapy, epidural injection, and additional imaging. She would like to proceed with observation. She will call if she wishes to schedule a lumbar MRI.

## 2021-09-16 ENCOUNTER — OFFICE VISIT (OUTPATIENT)
Dept: FAMILY MEDICINE CLINIC | Age: 61
End: 2021-09-16
Payer: COMMERCIAL

## 2021-09-16 VITALS
TEMPERATURE: 97.9 F | WEIGHT: 153 LBS | DIASTOLIC BLOOD PRESSURE: 80 MMHG | SYSTOLIC BLOOD PRESSURE: 122 MMHG | HEIGHT: 62 IN | BODY MASS INDEX: 28.16 KG/M2

## 2021-09-16 DIAGNOSIS — I26.99 PE (PULMONARY THROMBOEMBOLISM) (HCC): ICD-10-CM

## 2021-09-16 DIAGNOSIS — M21.371 RIGHT FOOT DROP: Primary | ICD-10-CM

## 2021-09-16 PROCEDURE — 99212 OFFICE O/P EST SF 10 MIN: CPT | Performed by: FAMILY MEDICINE

## 2021-09-16 PROCEDURE — 1036F TOBACCO NON-USER: CPT | Performed by: FAMILY MEDICINE

## 2021-09-16 PROCEDURE — 3017F COLORECTAL CA SCREEN DOC REV: CPT | Performed by: FAMILY MEDICINE

## 2021-09-16 PROCEDURE — G8427 DOCREV CUR MEDS BY ELIG CLIN: HCPCS | Performed by: FAMILY MEDICINE

## 2021-09-16 PROCEDURE — G8417 CALC BMI ABV UP PARAM F/U: HCPCS | Performed by: FAMILY MEDICINE

## 2021-09-16 ASSESSMENT — ENCOUNTER SYMPTOMS
ANAL BLEEDING: 0
SHORTNESS OF BREATH: 0

## 2021-09-16 NOTE — PROGRESS NOTES
Subjective:      Patient ID: Daryl Olszewski is a 62 y.o. female. Chief Complaint   Patient presents with    Follow-up        Patient presents with: Follow-up    She is here for the above  She is overall well  She is to have the orthotic made and available about the 29th or so  She did see ortho  She will be seeing neuro again     Glucose doing ok. 120 range  Her  passed a few week back     Dad cad and she has 2 sisters one younger than her with CAD as well     YOB: 1960    Date of Visit:  9/16/2021     -- Ciprofloxacin -- Other (See Comments)    --  hallucinations   -- Hydrocodone -- Itching    Current Outpatient Medications:  predniSONE (DELTASONE) 10 MG tablet, Prednisone 10 mg. #16. Take 2 po bid for 2 days, then 1 po bid for 3 days,  then 1 po daily for 2 days. Then stop, Disp: 16 tablet, Rfl: 0  dilTIAZem (DILT-XR) 240 MG extended release capsule, TAKE 1 CAPSULE BY MOUTH EVERY DAY, Disp: 90 capsule, Rfl: 2  atorvastatin (LIPITOR) 10 MG tablet, TAKE 1 TABLET BY MOUTH DAILY, Disp: 90 tablet, Rfl: 2  losartan (COZAAR) 100 MG tablet, TAKE 1 TABLET BY MOUTH EVERY DAY, Disp: 90 tablet, Rfl: 0  hydroCHLOROthiazide (MICROZIDE) 12.5 MG capsule, TAKE 1 CAPSULE BY MOUTH EVERY DAY IN THE MORNING, Disp: 90 capsule, Rfl: 0  apixaban (ELIQUIS) 5 MG TABS tablet, Take 1 tablet by mouth 2 times daily, Disp: 60 tablet, Rfl: 1  metoprolol succinate (TOPROL XL) 50 MG extended release tablet, TAKE 1 TABLET BY MOUTH EVERY DAY, Disp: 90 tablet, Rfl: 0  citric acid-potassium citrate (POLYCITRA) 1100-334 MG/5ML solution, Take 5 mLs by mouth 2 times daily, Disp: , Rfl:   metFORMIN (GLUCOPHAGE) 1000 MG tablet, Take 1 tablet by mouth 2 times daily (with meals), Disp: 180 tablet, Rfl: 0  JANUVIA 100 MG tablet, TAKE 1 TABLET BY MOUTH EVERY DAY, Disp: 90 tablet, Rfl: 0  glipiZIDE (GLUCOTROL) 10 MG tablet, TAKE 1 TABLET BY MOUTH 2 TIMES DAILY (BEFORE MEAL). , Disp: 180 tablet, Rfl: 3  vitamin B-12 (CYANOCOBALAMIN) 100 MCG tablet, Take 250 mcg by mouth daily. , Disp: , Rfl:   Multiple Vitamin CAPS, Take 1 capsule by mouth daily , Disp: , Rfl:     No current facility-administered medications for this visit.      ---------------------------               09/16/21                      1051         ---------------------------   BP:          122/80         Site:    Left Upper Arm     Position:     Sitting        Cuff Size:  Medium Adult      Temp:   97.9 °F (36.6 °C)   TempSrc:    Temporal        Weight: 153 lb (69.4 kg)    Height:  5' 2\" (1.575 m)   ---------------------------  Body mass index is 27.98 kg/m². Wt Readings from Last 3 Encounters:  09/16/21 : 153 lb (69.4 kg)  09/14/21 : 154 lb (69.9 kg)  08/26/21 : 154 lb (69.9 kg)    BP Readings from Last 3 Encounters:  09/16/21 : 122/80  08/26/21 : 126/82  08/21/21 : 114/73        Review of Systems   Constitutional: Negative for appetite change, chills, fever and unexpected weight change. Respiratory: Negative for shortness of breath. Cardiovascular: Negative for chest pain. Gastrointestinal: Negative for anal bleeding. Hematological: Does not bruise/bleed easily. Objective:   Physical Exam  Constitutional:       General: She is not in acute distress. Appearance: Normal appearance. She is not ill-appearing. Musculoskeletal:      Comments: She has a soft velcro ankle brace on the right    Neurological:      Mental Status: She is alert. Assessment:        Diagnosis Orders   1. Right foot drop     2. Multiple subsegmental pulmonary emboli without acute cor pulmonale (HCC)         She has been trying to lose weight last year  Now when she had covid and hospital stay she lost more   She has been on eliquis since pe associated with covid in July 17 of this year.  She had a right side pe   No hx of blood clot in past  No family hx of blood clot       Plan:      Plan on going back October first unless the orthotic is not available or dr Sandy Watters states differently   See me for check up on first week of November     You will need to be on the medicine for the lung clot until at least January   See dr Tessa Dey MD

## 2021-09-16 NOTE — PATIENT INSTRUCTIONS
Plan on going back October first unless the orthotic is not available or dr Peñaloza Overall states differently   See me for check up on first week of November     You will need to be on the medicine for the lung clot until at least January   See dr Sae Hooper

## 2021-09-17 ENCOUNTER — TELEPHONE (OUTPATIENT)
Dept: NEUROLOGY | Age: 61
End: 2021-09-17

## 2021-09-17 ENCOUNTER — TELEPHONE (OUTPATIENT)
Dept: ORTHOPEDIC SURGERY | Age: 61
End: 2021-09-17

## 2021-09-17 NOTE — TELEPHONE ENCOUNTER
Called the pt to inquire some information about her AFO     The pt was not available at the time of me calling

## 2021-09-20 NOTE — TELEPHONE ENCOUNTER
This is not a patient of [facility]. Please send to patient's providing physician.

## 2021-09-23 ENCOUNTER — OFFICE VISIT (OUTPATIENT)
Dept: NEUROLOGY | Age: 61
End: 2021-09-23
Payer: COMMERCIAL

## 2021-09-23 ENCOUNTER — TELEPHONE (OUTPATIENT)
Dept: FAMILY MEDICINE CLINIC | Age: 61
End: 2021-09-23

## 2021-09-23 ENCOUNTER — PROCEDURE VISIT (OUTPATIENT)
Dept: NEUROLOGY | Age: 61
End: 2021-09-23
Payer: COMMERCIAL

## 2021-09-23 VITALS
SYSTOLIC BLOOD PRESSURE: 137 MMHG | WEIGHT: 153 LBS | HEIGHT: 62 IN | BODY MASS INDEX: 28.16 KG/M2 | DIASTOLIC BLOOD PRESSURE: 85 MMHG | HEART RATE: 89 BPM

## 2021-09-23 DIAGNOSIS — M21.372 BILATERAL FOOT-DROP: Primary | ICD-10-CM

## 2021-09-23 DIAGNOSIS — G57.30 PERONEAL MONONEUROPATHY, UNSPECIFIED LATERALITY: ICD-10-CM

## 2021-09-23 DIAGNOSIS — R94.131 ABNORMAL EMG: ICD-10-CM

## 2021-09-23 DIAGNOSIS — M21.371 BILATERAL FOOT-DROP: Primary | ICD-10-CM

## 2021-09-23 DIAGNOSIS — M54.16 LUMBAR RADICULOPATHY: Primary | ICD-10-CM

## 2021-09-23 PROCEDURE — 95886 MUSC TEST DONE W/N TEST COMP: CPT | Performed by: PSYCHIATRY & NEUROLOGY

## 2021-09-23 PROCEDURE — 99213 OFFICE O/P EST LOW 20 MIN: CPT | Performed by: PSYCHIATRY & NEUROLOGY

## 2021-09-23 PROCEDURE — 95909 NRV CNDJ TST 5-6 STUDIES: CPT | Performed by: PSYCHIATRY & NEUROLOGY

## 2021-09-23 NOTE — TELEPHONE ENCOUNTER
Patient is wanting to know if you would extend her letter to be off work until the 7th of October. Patient is getting  orthotics & wants to wear them before going back to work and patient has 2 more dr dobbs that week as well. Patient stated that she won't be able to take off once she goes back. Patient thought she had fall break at this time but she doesn't.      Please advise    [phone]

## 2021-09-23 NOTE — PROGRESS NOTES
Vernell Grant   Neurology followup    Subjective:   CC/HP  History was obtained from the patient. Patient is here for follow-up visit and EMG studies of her lower extremities. She continues to have bilateral foot drop right worse than left. Detailed history:  Patient was admitted to [facility] in early July 2021 with severe pneumonia due to COVID-19 infection. Patient says spent some time in the intensive care unit. Patient has no diabetes. She was discharged from the hospital and was slowly recovering. A week later she noticed that she had some right leg weakness. In fact her family noticed that she was driving the right leg. She went back to the emergency room and they diagnosed her with a right foot drop. She was seen by her primary care physician and referred for neurological evaluation. Patient is getting physical therapy. She states that she is able to walk better but still has a lot of numbness in the leg.   Her diabetes is usually been well controlled but recently the numbers are high because of steroids that she received while in the hospital with COVID-19 infection    REVIEW OF SYSTEMS    Constitutional:  []   Chills   []  Fatigue   []  Fevers   []  Malaise   []  Weight loss     [x] Denies all of the above    Respiratory:   []  Cough    []  Shortness of breath         [x] Denies all of the above     Cardiovascular:   []  Chest pain    []  Exertional chest pressure/discomfort           [] Palpitations    []  Syncope     [x] Denies all of the above        Past Medical History:   Diagnosis Date    Chronic kidney disease 2009    kidney stones    Hyperlipidemia     Hypertension     MDRO (multiple drug resistant organisms) resistance     MRSA infection 2013    left lower leg    Type II or unspecified type diabetes mellitus without mention of complication, not stated as uncontrolled      Family History   Problem Relation Age of Onset    Cancer Mother         pancreatic    Cancer Father lung    Heart Disease Father         Bypass x 5    Heart Disease Sister         Stent- CAD    Diabetes Sister      Social History     Socioeconomic History    Marital status:      Spouse name: Not on file    Number of children: Not on file    Years of education: Not on file    Highest education level: Not on file   Occupational History    Not on file   Tobacco Use    Smoking status: Never Smoker    Smokeless tobacco: Never Used   Substance and Sexual Activity    Alcohol use: No    Drug use: No    Sexual activity: Yes     Partners: Male   Other Topics Concern    Not on file   Social History Narrative    Not on file     Social Determinants of Health     Financial Resource Strain: Low Risk     Difficulty of Paying Living Expenses: Not hard at all   Food Insecurity: No Food Insecurity    Worried About [address] in the Last Year: Never true    [address] in the Last Year: Never true   Transportation Needs:     Lack of Transportation (Medical):  Lack of Transportation (Non-Medical):    Physical Activity:     Days of Exercise per Week:     Minutes of Exercise per Session:    Stress:     Feeling of Stress :    Social Connections:     Frequency of Communication with Friends and Family:     Frequency of Social Gatherings with Friends and Family:     Attends Congregation Services:     Active Member of Clubs or Organizations:     Attends Club or Organization Meetings:     Marital Status:    Intimate Partner Violence:     Fear of Current or Ex-Partner:     Emotionally Abused:     Physically Abused:     Sexually Abused:         Objective:  Exam:  /85   Pulse 89   Ht 5' 2\" (1.575 m)   Wt 153 lb (69.4 kg)   BMI 27.98 kg/m²   This is a well-nourished patient in no acute distress  Patient is awake, alert and oriented x3. Speech is normal.  Pupils are equal round reacting to light. Extraocular movements intact. Face symmetrical. Tongue midline.   Motor:  Muscle tone and bulk are normal.   Strength is symmetrical 5/5 in all four extremities except the ankle dorsiflexion which is 3/5 on the right side and 4/5 on the left side. Sensory: Decreased light touch in the distal lower extremities  Coordination:  Normal  Finger to Nose and Heel to Shin bilaterally    . Reflexes:  DTR 1 and symmetric bilaterally but slightly diminished in the ankles  Plantar response: Flexor bilaterally  Gait: Gait is impaired because of the foot drop  Romberg: negative  Vascular: No carotid bruit bilaterally      Data :  LABS:  General Labs:   CBC:   Lab Results   Component Value Date    WBC 15.3 2021    RBC 4.42 2021    HGB 13.4 2021    HCT 39.5 2021    MCV 89.5 2021    MCH 30.4 2021    MCHC 33.9 2021    RDW 13.3 2021     2021    MPV 9.3 2021     BMP:    Lab Results   Component Value Date     2021    K 3.6 2021    K 4.0 2021     2021    CO2 24 2021    BUN 21 2021    LABALBU 2.6 2021    CREATININE 1.1 2021    CALCIUM 9.2 2021    GFRAA >60 2021    GFRAA >60 2012    LABGLOM 51 2021    GLUCOSE 129 2021       Impression :  EMG and nerve conduction studies revealed the followin. Moderately severe bilateral peroneal neuropathy at the fibular head  2. Lumbar radiculopathy, probably involving the L5 nerve root  3. Generalized mild sensorimotor polyneuropathy probably due to diabetes    Plan :  Discussed with patient  Explained EMG results  Explained the fact that we cannot differentiate between diabetic inflammatory radiculopathy versus compressive radiculopathy by EMG studies  For that reason, I will get an MRI of the lumbar spine  Patient will be getting an AFO splint next week  She will call me to get the MRI results. I will see her back in 3 months for follow-up.         Please note a portion of  this chart was generated using dragon dictation

## 2021-09-23 NOTE — LETTER
[facility] [employer] [facility] AND WOMEN'S \[facility]\"" Physicians  [address]  [address]  Phone: [phone]  Fax: [phone]    Vaishali Jo MD        September 23, 2021     Patient: Opal Garcia   YOB: 1960   Date of Visit: 9/23/2021       To Whom It May Concern: It is my medical opinion that Zuly Stevenson should plan to return to work on 10-7-2021. If you have any questions or concerns, please don't hesitate to call.     Sincerely,        Vaishali Jo MD

## 2021-09-23 NOTE — PROGRESS NOTES
Reina Oglesby M.D. 800   Physicians/[facility]  Board Certified in [address], [address] [place] [address]    EMG / NERVE CONDUCTION STUDY      PATIENT:  Maddy Egan       DATE OF EM21     YOB: 1960       REASON FOR EMG:   Bilateral foot drop, right worse than left      REFERRING PHYSICIAN:  Reina Oglesby MD    SUMMARY:   The left peroneal motor nerve study was not recordable. The right peroneal motor nerve study had a total conduction block between the knee and the ankle. The left posterior tibial motor nerve study was normal.  The right posterior tibial motor nerve study had a slightly slow conduction velocity. Bilateral superficial peroneal sensory nerve studies were not recordable. Needle EMG of several muscles in both lower extremities showed evidence of denervation in the tibialis anterior and peroneus longus muscles bilaterally as well as in the lumbar paraspinal muscles. CLINICAL DIAGNOSIS:  Peroneal mononeuropathy        EMG RESULTS:   1. This patient has moderately severe bilateral peroneal nerve lesions at the fibular head. 2. This patient also has bilateral L5 nerve root lesions/radiculopathy. 3. There is some mild generalized peripheral neuropathy as well        ---------------------------------------------  Reina Oglesby M.D.   Electromyographer / Neurologist

## 2021-09-27 ENCOUNTER — TELEPHONE (OUTPATIENT)
Dept: NEUROLOGY | Age: 61
End: 2021-09-27

## 2021-09-27 NOTE — TELEPHONE ENCOUNTER
Humaira from [facility] needs afo order signed and faxed back. Per yue this needs to be signed by ortho. Yanira Collado called and spoke with Gm Bowman about who this should be sent to. Dr Mckenna Wells will be signing this and sending back to [facility] clinic.

## 2021-09-28 ENCOUNTER — TELEPHONE (OUTPATIENT)
Dept: FAMILY MEDICINE CLINIC | Age: 61
End: 2021-09-28

## 2021-09-28 NOTE — TELEPHONE ENCOUNTER
Patient is not going to get her orthotics until 10/12/21, she is off work that week.   She is asking for a letter stating she can return to work on 10/18/21/      Please advise, [phone]

## 2021-09-28 NOTE — LETTER
[facility] AND WOMEN'S [facility] Physicians  [address] [facility] [address]  Phone: [phone]  Fax: [phone]    Meggan Montiel MD        September 28, 2021     Patient: Maame Brenner   YOB: 1960   Date of Visit: 9/28/2021       To Whom It May Concern: It is my medical opinion that Sophia Verduzco may return to work on 10-. If you have any questions or concerns, please don't hesitate to call.     Sincerely,        Meggan Montiel MD

## 2021-10-05 ENCOUNTER — HOSPITAL ENCOUNTER (OUTPATIENT)
Dept: ULTRASOUND IMAGING | Age: 61
Discharge: HOME | End: 2021-10-05
Payer: COMMERCIAL

## 2021-10-05 DIAGNOSIS — N20.1 URETERAL CALCULUS: ICD-10-CM

## 2021-10-05 PROCEDURE — 76770 US EXAM ABDO BACK WALL COMP: CPT

## 2021-10-06 ENCOUNTER — HOSPITAL ENCOUNTER (OUTPATIENT)
Age: 61
Discharge: HOME | End: 2021-10-06
Payer: COMMERCIAL

## 2021-10-06 ENCOUNTER — HOSPITAL ENCOUNTER (OUTPATIENT)
Dept: GENERAL RADIOLOGY | Age: 61
Discharge: HOME | End: 2021-10-06
Payer: COMMERCIAL

## 2021-10-06 DIAGNOSIS — N20.1 CALCULUS OF URETER: ICD-10-CM

## 2021-10-06 PROCEDURE — 74018 RADEX ABDOMEN 1 VIEW: CPT

## 2021-10-07 ENCOUNTER — TELEPHONE (OUTPATIENT)
Dept: FAMILY MEDICINE CLINIC | Age: 61
End: 2021-10-07

## 2021-10-07 NOTE — TELEPHONE ENCOUNTER
Jessica Hauser advised that letter is ready for .    Also advised Jessica Hauser to reach out to her Neurologist.

## 2021-10-07 NOTE — TELEPHONE ENCOUNTER
----- Message from Jarrell Monday sent at 10/7/2021 11:30 AM EDT -----  Subject: Message to Provider    QUESTIONS  Information for Provider? Patient is calling to find out if a return to   work note is ready to be picked up. Should be to return to work on   10.18.21. Patient has right foot drop, wants to know if she needs another   appt.  ---------------------------------------------------------------------------  --------------  CALL BACK INFO  What is the best way for the office to contact you? Do not leave any   message, patient will call back for answer  Preferred Call Back Phone Number? [phone]  ---------------------------------------------------------------------------  --------------  SCRIPT ANSWERS  Relationship to Patient?  Self

## 2021-11-01 ENCOUNTER — HOSPITAL ENCOUNTER (OUTPATIENT)
Dept: MRI IMAGING | Age: 61
Discharge: HOME | End: 2021-11-01
Payer: COMMERCIAL

## 2021-11-01 DIAGNOSIS — R94.131 ABNORMAL EMG: ICD-10-CM

## 2021-11-01 DIAGNOSIS — M54.16 LUMBAR RADICULOPATHY: ICD-10-CM

## 2021-11-01 PROCEDURE — 72148 MRI LUMBAR SPINE W/O DYE: CPT

## 2021-11-02 ENCOUNTER — TELEPHONE (OUTPATIENT)
Dept: NEUROLOGY | Age: 61
End: 2021-11-02

## 2021-11-02 NOTE — TELEPHONE ENCOUNTER
----- Message from Nabeel Carlson MD sent at 11/2/2021  9:33 AM EDT -----  Please call patient. MRI lumbar spine showed a subacute compression fracture of L4 which is new since July. Many times these heal by themselves. However if she has significant pain she needs to see neurosurgery .   I will see her during the regular scheduled appointment

## 2021-11-03 NOTE — TELEPHONE ENCOUNTER
Elza Alvarado called back, I relayed message, she states she not really in much pain and she would just talk to [facility] at next appointment in December.

## 2021-11-12 DIAGNOSIS — E11.29 TYPE 2 DIABETES MELLITUS WITH MICROALBUMINURIA, WITHOUT LONG-TERM CURRENT USE OF INSULIN (HCC): ICD-10-CM

## 2021-11-12 DIAGNOSIS — R80.9 TYPE 2 DIABETES MELLITUS WITH MICROALBUMINURIA, WITHOUT LONG-TERM CURRENT USE OF INSULIN (HCC): ICD-10-CM

## 2021-11-12 DIAGNOSIS — Z11.59 NEED FOR HEPATITIS C SCREENING TEST: ICD-10-CM

## 2021-11-12 LAB
GLUCOSE BLD-MCNC: 124 MG/DL (ref 70–99)
HEPATITIS C ANTIBODY INTERPRETATION: NORMAL

## 2021-11-29 ENCOUNTER — OFFICE VISIT (OUTPATIENT)
Dept: FAMILY MEDICINE CLINIC | Age: 61
End: 2021-11-29
Payer: COMMERCIAL

## 2021-11-29 VITALS
WEIGHT: 158 LBS | HEART RATE: 67 BPM | HEIGHT: 62 IN | RESPIRATION RATE: 16 BRPM | BODY MASS INDEX: 29.08 KG/M2 | SYSTOLIC BLOOD PRESSURE: 118 MMHG | OXYGEN SATURATION: 98 % | DIASTOLIC BLOOD PRESSURE: 70 MMHG

## 2021-11-29 DIAGNOSIS — E11.29 TYPE 2 DIABETES MELLITUS WITH MICROALBUMINURIA, WITHOUT LONG-TERM CURRENT USE OF INSULIN (HCC): ICD-10-CM

## 2021-11-29 DIAGNOSIS — R80.9 TYPE 2 DIABETES MELLITUS WITH MICROALBUMINURIA, WITHOUT LONG-TERM CURRENT USE OF INSULIN (HCC): ICD-10-CM

## 2021-11-29 DIAGNOSIS — I10 PRIMARY HYPERTENSION: Primary | ICD-10-CM

## 2021-11-29 LAB — GLUCOSE BLD-MCNC: 88 MG/DL (ref 70–99)

## 2021-11-29 PROCEDURE — 2022F DILAT RTA XM EVC RTNOPTHY: CPT | Performed by: FAMILY MEDICINE

## 2021-11-29 PROCEDURE — 3017F COLORECTAL CA SCREEN DOC REV: CPT | Performed by: FAMILY MEDICINE

## 2021-11-29 PROCEDURE — G8427 DOCREV CUR MEDS BY ELIG CLIN: HCPCS | Performed by: FAMILY MEDICINE

## 2021-11-29 PROCEDURE — G8484 FLU IMMUNIZE NO ADMIN: HCPCS | Performed by: FAMILY MEDICINE

## 2021-11-29 PROCEDURE — 1036F TOBACCO NON-USER: CPT | Performed by: FAMILY MEDICINE

## 2021-11-29 PROCEDURE — G8417 CALC BMI ABV UP PARAM F/U: HCPCS | Performed by: FAMILY MEDICINE

## 2021-11-29 PROCEDURE — 99214 OFFICE O/P EST MOD 30 MIN: CPT | Performed by: FAMILY MEDICINE

## 2021-11-29 PROCEDURE — 3051F HG A1C>EQUAL 7.0%<8.0%: CPT | Performed by: FAMILY MEDICINE

## 2021-11-29 ASSESSMENT — ENCOUNTER SYMPTOMS
DIARRHEA: 0
NAUSEA: 0
ABDOMINAL DISTENTION: 0
CONSTIPATION: 0
SHORTNESS OF BREATH: 0
CHEST TIGHTNESS: 0
BLOOD IN STOOL: 0
ABDOMINAL PAIN: 0
VOMITING: 0

## 2021-11-29 NOTE — PROGRESS NOTES
Subjective:      Patient ID: Kizzy Bauman is a 62 y.o. female. Chief Complaint   Patient presents with    Diabetes        Patient presents with:  Diabetes    She is well   She had recovery of the right foot drop   Happened about 2 week ago  Seems to be normal now  She is walking well  Her glucose is overall well and typical number 120     YOB: 1960    Date of Visit:  11/29/2021     -- Ciprofloxacin -- Other (See Comments)    --  hallucinations   -- Hydrocodone -- Itching    Current Outpatient Medications:  losartan (COZAAR) 100 MG tablet, TAKE 1 TABLET BY MOUTH EVERY DAY, Disp: 90 tablet, Rfl: 0  hydroCHLOROthiazide (MICROZIDE) 12.5 MG capsule, TAKE 1 CAPSULE BY MOUTH EVERY DAY IN THE MORNING, Disp: 90 capsule, Rfl: 0  metoprolol succinate (TOPROL XL) 50 MG extended release tablet, TAKE 1 TABLET BY MOUTH EVERY DAY, Disp: 90 tablet, Rfl: 1  SITagliptin (JANUVIA) 100 MG tablet, Take 1 tablet by mouth daily, Disp: 90 tablet, Rfl: 1  apixaban (ELIQUIS) 5 MG TABS tablet, Take 1 tablet by mouth 2 times daily, Disp: 60 tablet, Rfl: 3  dilTIAZem (DILT-XR) 240 MG extended release capsule, TAKE 1 CAPSULE BY MOUTH EVERY DAY, Disp: 90 capsule, Rfl: 2  atorvastatin (LIPITOR) 10 MG tablet, TAKE 1 TABLET BY MOUTH DAILY, Disp: 90 tablet, Rfl: 2  citric acid-potassium citrate (POLYCITRA) 1100-334 MG/5ML solution, Take 5 mLs by mouth 2 times daily, Disp: , Rfl:   metFORMIN (GLUCOPHAGE) 1000 MG tablet, Take 1 tablet by mouth 2 times daily (with meals), Disp: 180 tablet, Rfl: 0  glipiZIDE (GLUCOTROL) 10 MG tablet, TAKE 1 TABLET BY MOUTH 2 TIMES DAILY (BEFORE MEAL). , Disp: 180 tablet, Rfl: 3  vitamin B-12 (CYANOCOBALAMIN) 100 MCG tablet, Take 250 mcg by mouth daily. , Disp: , Rfl:   Multiple Vitamin CAPS, Take 1 capsule by mouth daily , Disp: , Rfl:     No current facility-administered medications for this visit.      --------------------------               11/29/21                     1402 --------------------------   BP:          118/70        Site:    Left Upper Arm    Position:    Sitting        Cuff Size:  Medium Adult     Pulse:         67          Resp:          16          SpO2:         98%          Weight: 158 lb (71.7 kg)   Height: 5' 2\" (1.575 m)   --------------------------  Body mass index is 28.9 kg/m². Wt Readings from Last 3 Encounters:  11/29/21 : 158 lb (71.7 kg)  09/23/21 : 153 lb (69.4 kg)  09/16/21 : 153 lb (69.4 kg)    BP Readings from Last 3 Encounters:  11/29/21 : 118/70  09/23/21 : 137/85  09/16/21 : 122/80        Review of Systems   Constitutional: Negative for appetite change, chills, fever and unexpected weight change. Respiratory: Negative for chest tightness and shortness of breath. Cardiovascular: Negative for chest pain, palpitations and leg swelling. Gastrointestinal: Negative for abdominal distention, abdominal pain, blood in stool, constipation, diarrhea, nausea and vomiting. Genitourinary: Negative for difficulty urinating, dysuria and hematuria. Musculoskeletal:        No sores on the feet   Neurological: Negative for headaches. Objective:   Physical Exam  Constitutional:       General: She is not in acute distress. Appearance: Normal appearance. She is well-developed. She is not ill-appearing or diaphoretic. Neck:      Thyroid: No thyroid mass or thyromegaly. Cardiovascular:      Rate and Rhythm: Normal rate and regular rhythm. Heart sounds: Normal heart sounds. No murmur heard. No friction rub. No gallop. Pulmonary:      Effort: Pulmonary effort is normal. No tachypnea, accessory muscle usage or respiratory distress. Breath sounds: Normal breath sounds. No decreased breath sounds, wheezing, rhonchi or rales. Chest:   Breasts:      Right: No supraclavicular adenopathy. Left: No supraclavicular adenopathy. Musculoskeletal:      Cervical back: Neck supple.    Lymphadenopathy: Cervical: No cervical adenopathy. Upper Body:      Right upper body: No supraclavicular adenopathy. Left upper body: No supraclavicular adenopathy. Skin:     General: Skin is warm and dry. Coloration: Skin is not pale. Neurological:      Mental Status: She is alert. Comments: Toe and heel walking is fine here in office          Assessment:        Diagnosis Orders   1. Primary hypertension     2. Type 2 diabetes mellitus with microalbuminuria, without long-term current use of insulin ([facility])  Hemoglobin A1C    Glucose       Stable    Lab Results   Component Value Date    LABA1C 7.4 08/06/2021     Lab Results   Component Value Date    .7 08/06/2021       telemedicine visit with renal medicine done on 11/17/21 reviewed and stable   She went back to work about first part of october      Plan: You may stop the eliquis on January 17th.  That will be 6 months of treatment   Continue the diet  Stay with the medicines  See me in 4 months         Chandra Green MD

## 2021-11-30 LAB
ESTIMATED AVERAGE GLUCOSE: 125.5 MG/DL
HBA1C MFR BLD: 6 %

## 2021-11-30 RX ORDER — GLIPIZIDE 10 MG/1
5 TABLET ORAL
Qty: 180 TABLET | Refills: 3
Start: 2021-11-30 | End: 2022-02-24 | Stop reason: SDUPTHER

## 2021-12-16 RX ORDER — APIXABAN 5 MG/1
TABLET, FILM COATED ORAL
Qty: 180 TABLET | Refills: 1 | Status: SHIPPED | OUTPATIENT
Start: 2021-12-16 | End: 2022-06-27 | Stop reason: ALTCHOICE

## 2021-12-30 ENCOUNTER — VIRTUAL VISIT (OUTPATIENT)
Dept: FAMILY MEDICINE CLINIC | Age: 61
End: 2021-12-30
Payer: COMMERCIAL

## 2021-12-30 DIAGNOSIS — J01.90 ACUTE BACTERIAL SINUSITIS: Primary | ICD-10-CM

## 2021-12-30 DIAGNOSIS — B96.89 ACUTE BACTERIAL SINUSITIS: Primary | ICD-10-CM

## 2021-12-30 PROCEDURE — G8427 DOCREV CUR MEDS BY ELIG CLIN: HCPCS | Performed by: FAMILY MEDICINE

## 2021-12-30 PROCEDURE — 99213 OFFICE O/P EST LOW 20 MIN: CPT | Performed by: FAMILY MEDICINE

## 2021-12-30 PROCEDURE — 3017F COLORECTAL CA SCREEN DOC REV: CPT | Performed by: FAMILY MEDICINE

## 2021-12-30 RX ORDER — AMOXICILLIN AND CLAVULANATE POTASSIUM 875; 125 MG/1; MG/1
1 TABLET, FILM COATED ORAL 2 TIMES DAILY
Qty: 20 TABLET | Refills: 0 | Status: SHIPPED | OUTPATIENT
Start: 2021-12-30 | End: 2022-01-09

## 2021-12-30 NOTE — PROGRESS NOTES
Subjective:      Patient ID: Negrita Trang is a 62 y.o. female. Chief Complaint   Patient presents with    Congestion     cough- phlegm, head congestion x 3 day        Patient presents with:  Congestion: cough- phlegm, head congestion x 3 day    Head congestion and temp to 100  Some cough  Dry  No sore throat  She did not get immunized  She denies sob no cp   No v no d  Most her sx are all in the head she indicates  She had a covid test done and is pending    YOB: 1960    Date of Visit:  12/30/2021     -- Ciprofloxacin -- Other (See Comments)    --  hallucinations   -- Hydrocodone -- Itching    Current Outpatient Medications:  ELIQUIS 5 MG TABS tablet, TAKE 1 TABLET BY MOUTH TWICE A DAY, Disp: 180 tablet, Rfl: 1  glipiZIDE (GLUCOTROL) 10 MG tablet, Take 0.5 tablets by mouth 2 times daily (before meals), Disp: 180 tablet, Rfl: 3  losartan (COZAAR) 100 MG tablet, TAKE 1 TABLET BY MOUTH EVERY DAY, Disp: 90 tablet, Rfl: 0  hydroCHLOROthiazide (MICROZIDE) 12.5 MG capsule, TAKE 1 CAPSULE BY MOUTH EVERY DAY IN THE MORNING, Disp: 90 capsule, Rfl: 0  metoprolol succinate (TOPROL XL) 50 MG extended release tablet, TAKE 1 TABLET BY MOUTH EVERY DAY, Disp: 90 tablet, Rfl: 1  SITagliptin (JANUVIA) 100 MG tablet, Take 1 tablet by mouth daily, Disp: 90 tablet, Rfl: 1  dilTIAZem (DILT-XR) 240 MG extended release capsule, TAKE 1 CAPSULE BY MOUTH EVERY DAY, Disp: 90 capsule, Rfl: 2  atorvastatin (LIPITOR) 10 MG tablet, TAKE 1 TABLET BY MOUTH DAILY, Disp: 90 tablet, Rfl: 2  citric acid-potassium citrate (POLYCITRA) 1100-334 MG/5ML solution, Take 5 mLs by mouth 2 times daily, Disp: , Rfl:   metFORMIN (GLUCOPHAGE) 1000 MG tablet, Take 1 tablet by mouth 2 times daily (with meals), Disp: 180 tablet, Rfl: 0  vitamin B-12 (CYANOCOBALAMIN) 100 MCG tablet, Take 250 mcg by mouth daily. , Disp: , Rfl:   Multiple Vitamin CAPS, Take 1 capsule by mouth daily , Disp: , Rfl:     No current facility-administered medications for this visit. There were no vitals filed for this visit. There is no height or weight on file to calculate BMI. Wt Readings from Last 3 Encounters:  11/29/21 : 158 lb (71.7 kg)  09/23/21 : 153 lb (69.4 kg)  09/16/21 : 153 lb (69.4 kg)    BP Readings from Last 3 Encounters:  11/29/21 : 118/70  09/23/21 : 137/85  09/16/21 : 122/80'          Review of Systems    Objective:   Physical Exam  Constitutional:       General: She is not in acute distress. Appearance: Normal appearance. She is not ill-appearing. Comments: She is at her home  She has no distress. Appropriate    Neurological:      Mental Status: She is alert. Assessment:        Diagnosis Orders   1. Acute bacterial sinusitis       Orders Placed This Encounter   Medications    amoxicillin-clavulanate (AUGMENTIN) 875-125 MG per tablet     Sig: Take 1 tablet by mouth 2 times daily for 10 days     Dispense:  20 tablet     Refill:  0       Uri/ sinusitis   She was in hospital for covid in July    yoseph is  being evaluated by a Virtual Visit (video visit) encounter to address concerns as mentioned above. A caregiver was present when appropriate. Due to this being a TeleHealth encounter (During WXSQJ-17 public health emergency), evaluation of the following organ systems was limited: Vitals/Constitutional/EENT/Resp/CV/GI//MS/Neuro/Skin/Heme-Lymph-Imm. Pursuant to the emergency declaration under the [facility][note 3][facility], [address] [place] [address] authority and the [employer] Act, this Virtual Visit was conducted with patient's (and/or legal guardian's) consent, to reduce the patient's risk of exposure to COVID-19 and provide necessary medical care. The patient (and/or legal guardian) has also been advised to contact this office for worsening conditions or problems, and seek emergency medical treatment and/or call 911 if deemed necessary.        Services were provided through a video synchronous discussion virtually to substitute for in-person clinic visit. Patient and provider were located at their individual homes.            Plan:      [facility]in to [facility] [place]  Do take fluids  Get rest  Worse call or go to ANDREA Marinelli MD

## 2022-02-24 RX ORDER — GLIPIZIDE 10 MG/1
5 TABLET ORAL
Qty: 180 TABLET | Refills: 1 | Status: SHIPPED | OUTPATIENT
Start: 2022-02-24

## 2022-04-11 DIAGNOSIS — I10 HYPERTENSION, UNSPECIFIED TYPE: ICD-10-CM

## 2022-04-11 RX ORDER — SITAGLIPTIN 100 MG/1
TABLET, FILM COATED ORAL
Qty: 90 TABLET | Refills: 1 | Status: SHIPPED | OUTPATIENT
Start: 2022-04-11 | End: 2022-06-27 | Stop reason: SDUPTHER

## 2022-04-11 RX ORDER — ATORVASTATIN CALCIUM 10 MG/1
TABLET, FILM COATED ORAL
Qty: 90 TABLET | Refills: 1 | Status: SHIPPED | OUTPATIENT
Start: 2022-04-11 | End: 2022-06-27 | Stop reason: SDUPTHER

## 2022-04-11 RX ORDER — METOPROLOL SUCCINATE 50 MG/1
TABLET, EXTENDED RELEASE ORAL
Qty: 90 TABLET | Refills: 1 | Status: SHIPPED | OUTPATIENT
Start: 2022-04-11 | End: 2022-10-06

## 2022-05-27 DIAGNOSIS — N18.31 STAGE 3A CHRONIC KIDNEY DISEASE (HCC): ICD-10-CM

## 2022-05-27 LAB
ALBUMIN SERPL-MCNC: 4.5 G/DL (ref 3.4–5)
ANION GAP SERPL CALCULATED.3IONS-SCNC: 16 MMOL/L (ref 3–16)
BUN BLDV-MCNC: 27 MG/DL (ref 7–20)
CALCIUM SERPL-MCNC: 10.7 MG/DL (ref 8.3–10.6)
CHLORIDE BLD-SCNC: 103 MMOL/L (ref 99–110)
CO2: 24 MMOL/L (ref 21–32)
CREAT SERPL-MCNC: 0.9 MG/DL (ref 0.6–1.2)
CREATININE URINE: 53.9 MG/DL (ref 28–259)
GFR AFRICAN AMERICAN: >60
GFR NON-AFRICAN AMERICAN: >60
GLUCOSE BLD-MCNC: 67 MG/DL (ref 70–99)
HCT VFR BLD CALC: 41.6 % (ref 36–48)
HEMOGLOBIN: 13.8 G/DL (ref 12–16)
MCH RBC QN AUTO: 29.5 PG (ref 26–34)
MCHC RBC AUTO-ENTMCNC: 33.1 G/DL (ref 31–36)
MCV RBC AUTO: 89.1 FL (ref 80–100)
PDW BLD-RTO: 13.6 % (ref 12.4–15.4)
PHOSPHORUS: 3.7 MG/DL (ref 2.5–4.9)
PLATELET # BLD: 194 K/UL (ref 135–450)
PMV BLD AUTO: 9.5 FL (ref 5–10.5)
POTASSIUM SERPL-SCNC: 4 MMOL/L (ref 3.5–5.1)
PROTEIN PROTEIN: 33 MG/DL
PROTEIN/CREAT RATIO: 0.6 MG/DL
RBC # BLD: 4.67 M/UL (ref 4–5.2)
SODIUM BLD-SCNC: 143 MMOL/L (ref 136–145)
WBC # BLD: 6.4 K/UL (ref 4–11)

## 2022-06-06 RX ORDER — DILTIAZEM HYDROCHLORIDE 240 MG/1
CAPSULE, EXTENDED RELEASE ORAL
Qty: 90 CAPSULE | Refills: 0 | Status: SHIPPED | OUTPATIENT
Start: 2022-06-06 | End: 2022-10-06

## 2022-06-06 RX ORDER — HYDROCHLOROTHIAZIDE 12.5 MG/1
CAPSULE, GELATIN COATED ORAL
Qty: 90 CAPSULE | Refills: 0 | Status: SHIPPED | OUTPATIENT
Start: 2022-06-06 | End: 2022-10-06

## 2022-06-06 RX ORDER — LOSARTAN POTASSIUM 100 MG/1
TABLET ORAL
Qty: 90 TABLET | Refills: 0 | Status: SHIPPED | OUTPATIENT
Start: 2022-06-06 | End: 2022-10-06

## 2022-06-27 ENCOUNTER — OFFICE VISIT (OUTPATIENT)
Dept: FAMILY MEDICINE CLINIC | Age: 62
End: 2022-06-27
Payer: COMMERCIAL

## 2022-06-27 VITALS
HEIGHT: 62 IN | WEIGHT: 175 LBS | BODY MASS INDEX: 32.2 KG/M2 | TEMPERATURE: 98.1 F | HEART RATE: 76 BPM | DIASTOLIC BLOOD PRESSURE: 80 MMHG | SYSTOLIC BLOOD PRESSURE: 134 MMHG

## 2022-06-27 DIAGNOSIS — E11.29 TYPE 2 DIABETES MELLITUS WITH MICROALBUMINURIA, WITHOUT LONG-TERM CURRENT USE OF INSULIN (HCC): ICD-10-CM

## 2022-06-27 DIAGNOSIS — R80.9 TYPE 2 DIABETES MELLITUS WITH MICROALBUMINURIA, WITHOUT LONG-TERM CURRENT USE OF INSULIN (HCC): ICD-10-CM

## 2022-06-27 DIAGNOSIS — I10 PRIMARY HYPERTENSION: Primary | ICD-10-CM

## 2022-06-27 DIAGNOSIS — I10 PRIMARY HYPERTENSION: ICD-10-CM

## 2022-06-27 LAB
A/G RATIO: 1.8 (ref 1.1–2.2)
ALBUMIN SERPL-MCNC: 4.6 G/DL (ref 3.4–5)
ALP BLD-CCNC: 71 U/L (ref 40–129)
ALT SERPL-CCNC: 55 U/L (ref 10–40)
ANION GAP SERPL CALCULATED.3IONS-SCNC: 16 MMOL/L (ref 3–16)
AST SERPL-CCNC: 31 U/L (ref 15–37)
BILIRUB SERPL-MCNC: 0.3 MG/DL (ref 0–1)
BUN BLDV-MCNC: 25 MG/DL (ref 7–20)
CALCIUM SERPL-MCNC: 10.2 MG/DL (ref 8.3–10.6)
CHLORIDE BLD-SCNC: 100 MMOL/L (ref 99–110)
CHOLESTEROL, TOTAL: 152 MG/DL (ref 0–199)
CO2: 22 MMOL/L (ref 21–32)
CREAT SERPL-MCNC: 1.1 MG/DL (ref 0.6–1.2)
GFR AFRICAN AMERICAN: >60
GFR NON-AFRICAN AMERICAN: 50
GLUCOSE BLD-MCNC: 143 MG/DL (ref 70–99)
HDLC SERPL-MCNC: 55 MG/DL (ref 40–60)
LDL CHOLESTEROL CALCULATED: 63 MG/DL
POTASSIUM SERPL-SCNC: 4.1 MMOL/L (ref 3.5–5.1)
SODIUM BLD-SCNC: 138 MMOL/L (ref 136–145)
TOTAL PROTEIN: 7.2 G/DL (ref 6.4–8.2)
TRIGL SERPL-MCNC: 169 MG/DL (ref 0–150)
VLDLC SERPL CALC-MCNC: 34 MG/DL

## 2022-06-27 PROCEDURE — 1036F TOBACCO NON-USER: CPT | Performed by: FAMILY MEDICINE

## 2022-06-27 PROCEDURE — G8427 DOCREV CUR MEDS BY ELIG CLIN: HCPCS | Performed by: FAMILY MEDICINE

## 2022-06-27 PROCEDURE — 3017F COLORECTAL CA SCREEN DOC REV: CPT | Performed by: FAMILY MEDICINE

## 2022-06-27 PROCEDURE — 99214 OFFICE O/P EST MOD 30 MIN: CPT | Performed by: FAMILY MEDICINE

## 2022-06-27 PROCEDURE — 2022F DILAT RTA XM EVC RTNOPTHY: CPT | Performed by: FAMILY MEDICINE

## 2022-06-27 PROCEDURE — G8417 CALC BMI ABV UP PARAM F/U: HCPCS | Performed by: FAMILY MEDICINE

## 2022-06-27 PROCEDURE — 3046F HEMOGLOBIN A1C LEVEL >9.0%: CPT | Performed by: FAMILY MEDICINE

## 2022-06-27 RX ORDER — ATORVASTATIN CALCIUM 10 MG/1
TABLET, FILM COATED ORAL
Qty: 90 TABLET | Refills: 1 | Status: SHIPPED | OUTPATIENT
Start: 2022-06-27

## 2022-06-27 SDOH — ECONOMIC STABILITY: FOOD INSECURITY: WITHIN THE PAST 12 MONTHS, YOU WORRIED THAT YOUR FOOD WOULD RUN OUT BEFORE YOU GOT MONEY TO BUY MORE.: NEVER TRUE

## 2022-06-27 SDOH — ECONOMIC STABILITY: FOOD INSECURITY: WITHIN THE PAST 12 MONTHS, THE FOOD YOU BOUGHT JUST DIDN'T LAST AND YOU DIDN'T HAVE MONEY TO GET MORE.: NEVER TRUE

## 2022-06-27 SDOH — ECONOMIC STABILITY: TRANSPORTATION INSECURITY
IN THE PAST 12 MONTHS, HAS LACK OF TRANSPORTATION KEPT YOU FROM MEETINGS, WORK, OR FROM GETTING THINGS NEEDED FOR DAILY LIVING?: NO

## 2022-06-27 SDOH — ECONOMIC STABILITY: TRANSPORTATION INSECURITY
IN THE PAST 12 MONTHS, HAS THE LACK OF TRANSPORTATION KEPT YOU FROM MEDICAL APPOINTMENTS OR FROM GETTING MEDICATIONS?: NO

## 2022-06-27 ASSESSMENT — PATIENT HEALTH QUESTIONNAIRE - PHQ9
SUM OF ALL RESPONSES TO PHQ QUESTIONS 1-9: 1
SUM OF ALL RESPONSES TO PHQ QUESTIONS 1-9: 1
SUM OF ALL RESPONSES TO PHQ9 QUESTIONS 1 & 2: 1
SUM OF ALL RESPONSES TO PHQ QUESTIONS 1-9: 1
SUM OF ALL RESPONSES TO PHQ QUESTIONS 1-9: 1
1. LITTLE INTEREST OR PLEASURE IN DOING THINGS: 0
2. FEELING DOWN, DEPRESSED OR HOPELESS: 1

## 2022-06-27 ASSESSMENT — ENCOUNTER SYMPTOMS
CHEST TIGHTNESS: 0
ABDOMINAL PAIN: 0
SHORTNESS OF BREATH: 0
BLOOD IN STOOL: 0
NAUSEA: 0
DIARRHEA: 0
CONSTIPATION: 0
ABDOMINAL DISTENTION: 0
VOMITING: 0

## 2022-06-27 ASSESSMENT — SOCIAL DETERMINANTS OF HEALTH (SDOH): HOW HARD IS IT FOR YOU TO PAY FOR THE VERY BASICS LIKE FOOD, HOUSING, MEDICAL CARE, AND HEATING?: NOT HARD AT ALL

## 2022-06-27 NOTE — PROGRESS NOTES
Subjective:      Patient ID: Lucas Cobb is a 65 y.o. female. Chief Complaint   Patient presents with    Check-Up     diabetes, hypertension, lipids - pt is fasting        Patient presents with:  Check-Up: diabetes, hypertension, lipids - pt is fasting    Here for the above  On same meds . At home the glucose is 130's and lower     YOB: 1960    Date of Visit:  6/27/2022     -- Ciprofloxacin -- Other (See Comments)    --  hallucinations   -- Hydrocodone -- Itching    Current Outpatient Medications:  hydroCHLOROthiazide (MICROZIDE) 12.5 MG capsule, TAKE 1 CAPSULE BY MOUTH EVERY DAY IN THE MORNING, Disp: 90 capsule, Rfl: 0  dilTIAZem (DILT-XR) 240 MG extended release capsule, TAKE 1 CAPSULE BY MOUTH EVERY DAY, Disp: 90 capsule, Rfl: 0  losartan (COZAAR) 100 MG tablet, TAKE 1 TABLET BY MOUTH EVERY DAY, Disp: 90 tablet, Rfl: 0  metFORMIN (GLUCOPHAGE) 1000 MG tablet, TAKE 1 TABLET BY MOUTH TWICE A DAY WITH MEALS, Disp: 180 tablet, Rfl: 0  atorvastatin (LIPITOR) 10 MG tablet, TAKE 1 TABLET BY MOUTH EVERY DAY, Disp: 90 tablet, Rfl: 1  metoprolol succinate (TOPROL XL) 50 MG extended release tablet, TAKE 1 TABLET BY MOUTH EVERY DAY, Disp: 90 tablet, Rfl: 1  JANUVIA 100 MG tablet, TAKE 1 TABLET BY MOUTH EVERY DAY, Disp: 90 tablet, Rfl: 1  glipiZIDE (GLUCOTROL) 10 MG tablet, Take 0.5 tablets by mouth 2 times daily (before meals), Disp: 180 tablet,   vitamin B-12 (CYANOCOBALAMIN) 100 MCG tablet, Take 250 mcg by mouth daily. , Disp: , Rfl:   Multiple Vitamin CAPS, Take 1 capsule by mouth daily , Disp: , Rfl:     No current facility-administered medications for this visit.      ---------------------------               06/27/22                      0914         ---------------------------   BP:          134/80         Site:    Left Upper Arm     Position:     Sitting        Cuff Size:   Large Adult      Pulse:         76           Temp:   98.1 °F (36.7 °C)   TempSrc: Temporal        Weight: 175 lb (79.4 kg)    Height:  5' 2\" (1.575 m)   ---------------------------  Body mass index is 32.01 kg/m². Wt Readings from Last 3 Encounters:  06/27/22 : 175 lb (79.4 kg)  11/29/21 : 158 lb (71.7 kg)  09/23/21 : 153 lb (69.4 kg)    BP Readings from Last 3 Encounters:  06/27/22 : 134/80  11/29/21 : 118/70  09/23/21 : 137/85              Review of Systems   Constitutional: Negative for appetite change, chills, fever and unexpected weight change. Respiratory: Negative for chest tightness and shortness of breath. Cardiovascular: Negative for chest pain, palpitations and leg swelling. Gastrointestinal: Negative for abdominal distention, abdominal pain, blood in stool, constipation, diarrhea, nausea and vomiting. Genitourinary: Negative for difficulty urinating, dysuria and hematuria. Musculoskeletal:        Feet ok no sores    Neurological: Negative for headaches. Objective:   Physical Exam  Constitutional:       General: She is not in acute distress. Appearance: Normal appearance. She is well-developed. She is not ill-appearing or diaphoretic. HENT:      Head: Normocephalic and atraumatic. Eyes:      General: No scleral icterus. Neck:      Thyroid: No thyroid mass or thyromegaly. Cardiovascular:      Rate and Rhythm: Normal rate and regular rhythm. Heart sounds: Normal heart sounds. No murmur heard. No friction rub. No gallop. Comments:   No edema legs  Pulmonary:      Effort: Pulmonary effort is normal. No tachypnea, accessory muscle usage or respiratory distress. Breath sounds: Normal breath sounds. No decreased breath sounds, wheezing, rhonchi or rales. Chest:   Breasts:      Right: No supraclavicular adenopathy. Left: No supraclavicular adenopathy. Abdominal:      General: Bowel sounds are normal. There is no distension or abdominal bruit. Palpations: Abdomen is soft.  There is no hepatomegaly, splenomegaly, mass or pulsatile mass. Tenderness: There is no abdominal tenderness. There is no guarding. Musculoskeletal:      Cervical back: Neck supple. Lymphadenopathy:      Cervical: No cervical adenopathy. Upper Body:      Right upper body: No supraclavicular adenopathy. Left upper body: No supraclavicular adenopathy. Skin:     General: Skin is warm and dry. Coloration: Skin is not pale. Nails: There is no clubbing. Neurological:      General: No focal deficit present. Mental Status: She is alert. Assessment:       Diagnosis Orders   1. Primary hypertension  Comprehensive Metabolic Panel    Lipid Panel   2.  Type 2 diabetes mellitus with microalbuminuria, without long-term current use of insulin (HCC)  Comprehensive Metabolic Panel    Lipid Panel    Hemoglobin A1C     Orders Placed This Encounter   Medications    atorvastatin (LIPITOR) 10 MG tablet     Sig: TAKE 1 TABLET BY MOUTH EVERY DAY     Dispense:  90 tablet     Refill:  1    SITagliptin (JANUVIA) 100 MG tablet     Sig: Take 1 tablet by mouth daily     Dispense:  90 tablet     Refill:  1     She declined to have pneumonia vaccine       Plan:      Continue the diet and the current medicines  Do try and trim the weight back down  Consider the shingle vaccine   See us in 4 months         Tevin Ascencio MD

## 2022-06-27 NOTE — PATIENT INSTRUCTIONS
Continue the diet and the current medicines  Do try and trim the weight back down  Consider the shingle vaccine   See us in 4 months

## 2022-06-28 LAB
ESTIMATED AVERAGE GLUCOSE: 165.7 MG/DL
HBA1C MFR BLD: 7.4 %

## 2022-10-05 DIAGNOSIS — I10 HYPERTENSION, UNSPECIFIED TYPE: ICD-10-CM

## 2022-10-06 RX ORDER — METOPROLOL SUCCINATE 50 MG/1
TABLET, EXTENDED RELEASE ORAL
Qty: 90 TABLET | Refills: 1 | Status: SHIPPED | OUTPATIENT
Start: 2022-10-06

## 2022-10-06 RX ORDER — LOSARTAN POTASSIUM 100 MG/1
TABLET ORAL
Qty: 90 TABLET | Refills: 1 | Status: SHIPPED | OUTPATIENT
Start: 2022-10-06

## 2022-10-06 RX ORDER — HYDROCHLOROTHIAZIDE 12.5 MG/1
CAPSULE, GELATIN COATED ORAL
Qty: 90 CAPSULE | Refills: 1 | Status: SHIPPED | OUTPATIENT
Start: 2022-10-06

## 2022-10-06 RX ORDER — DILTIAZEM HYDROCHLORIDE 240 MG/1
CAPSULE, EXTENDED RELEASE ORAL
Qty: 90 CAPSULE | Refills: 1 | Status: SHIPPED | OUTPATIENT
Start: 2022-10-06

## 2022-12-20 RX ORDER — ATORVASTATIN CALCIUM 10 MG/1
TABLET, FILM COATED ORAL
Qty: 90 TABLET | Refills: 1 | Status: SHIPPED | OUTPATIENT
Start: 2022-12-20

## 2022-12-20 NOTE — PROGRESS NOTES
Pulmonary Progress Note    CC:  Follow up respiratory failure, COVID    Subjective:  9 liters HF  Having more nausea and back pain which she believes is related to a kidney stone. She has had them in the past and has had similar pain. She received morphine with little help. Breathing has been improving    ROS  Less SOB  Back pain  Nausea       Intake/Output Summary (Last 24 hours) at 7/22/2021 0722  Last data filed at 7/22/2021 0646  Gross per 24 hour   Intake 908 ml   Output 750 ml   Net 158 ml         PHYSICAL EXAM:  Blood pressure 124/74, pulse (!) 43, temperature 98.2 °F (36.8 °C), temperature source Oral, resp. rate 16, height 5' 2\" (1.575 m), weight 153 lb 10.6 oz (69.7 kg), SpO2 97 %, not currently breastfeeding.'  Gen: Looks uncomfortable    Eyes: PERRL. No sclera icterus. No conjunctival injection. ENT: No discharge. Pharynx clear. External appearance of ears and nose normal.  Neck: Trachea midline. No obvious mass. Resp: Diminished   CV: Jolayne Clayman. No murmur or rub. GI: Non-tender. Non-distended. No hernia. Skin: Warm, dry, normal texture and turgor. No nodule on exposed extremities. Lymph: No cervical LAD. No supraclavicular LAD. M/S: No cyanosis. No clubbing. No joint deformity. Neuro: Moves all four extremities. CN 2-12 tested, no defect noted.   Ext:   no edema    Medications:    Scheduled Meds:   insulin glargine  20 Units Subcutaneous BID    docusate sodium  100 mg Oral Daily    insulin lispro  12 Units Subcutaneous TID     guaiFENesin  600 mg Oral BID    dexamethasone  10 mg Oral Daily    insulin lispro  0-18 Units Subcutaneous TID     insulin lispro  0-9 Units Subcutaneous Nightly    atorvastatin  10 mg Oral Daily    sodium chloride flush  5-40 mL Intravenous 2 times per day    Vitamin D  1,000 Units Oral Daily    zinc sulfate  50 mg Oral Daily    ascorbic acid  500 mg Oral Daily    famotidine  20 mg Oral Daily    potassium citrate  10 mEq Oral BID  Continuous Infusions:   heparin (PORCINE) Infusion 980 Units/hr (07/21/21 0506)    sodium chloride      dextrose         PRN Meds:  senna, albuterol sulfate HFA **AND** ipratropium, guaiFENesin-dextromethorphan, sodium chloride, sodium chloride flush, sodium chloride, ondansetron **OR** ondansetron, polyethylene glycol, acetaminophen **OR** acetaminophen, glucose, dextrose, glucagon (rDNA), dextrose, promethazine, benzonatate, albuterol sulfate HFA    Labs:  CBC:   Recent Labs     07/21/21 0441 07/22/21 0448   WBC 9.1 16.5*   HGB 13.6 13.4   HCT 39.5 38.9   MCV 87.8 87.4    368     BMP:   Recent Labs     07/20/21  0434 07/21/21 0441 07/22/21 0448    139 134*   K 4.2 4.5 4.5    102 101   CO2 27 27 27   PHOS 3.9 4.0 3.9   BUN 25* 29* 37*   CREATININE 0.9 0.9 0.9     LIVER PROFILE:   Recent Labs     07/20/21  0434 07/21/21 0441 07/22/21 0448   AST 30 20 12*   ALT 44* 40 32   BILITOT 0.4 0.3 0.3   ALKPHOS 41 42 40     PT/INR: No results for input(s): PROTIME, INR in the last 72 hours. APTT:   Recent Labs     07/20/21  0434 07/21/21 0441 07/22/21 0448   APTT 80.8* 72.0* 87.6*     UA:No results for input(s): NITRITE, COLORU, PHUR, LABCAST, WBCUA, RBCUA, MUCUS, TRICHOMONAS, YEAST, BACTERIA, CLARITYU, SPECGRAV, LEUKOCYTESUR, UROBILINOGEN, BILIRUBINUR, BLOODU, GLUCOSEU, AMORPHOUS in the last 72 hours. Invalid input(s): Hugo Colemanather  No results for input(s): PH, PCO2, PO2 in the last 72 hours. Films:  Chest imaging reports were reviewed and imaging was reviewed by me and showed no new films    ABG:  None    Cultures:  None    I reviewed the labs and images listed above    Assessment/Plan:    Acute Hypoxic Respiratory Failure with saturations less than 90% on room air  Titrate oxygen for saturations greater than or equal to 88%   COVID-19 pneumonia  o Competed decadron 20 mg for 5 days, and now on 10 mg for 5 days  o Remdesivir completed    Acute PE  o Heparin gtt.  Recommend NOAC when ok with hospitalist    ARDS   Flank pain presumably from kidney stone     DVT prophylaxis  Heparin     PT/OT         Monique Ellis, DO  [facility] General:     NAD, well-nourished, well-appearing  Head:     NC/AT, EOMI, oral mucosa moist  Neck:     supple  Lungs:     CTA b/l, no w/r/r  CVS:     S1S2, RRR, no m/g/r  Abd:     +BS, s/nt/nd, no organomegaly  Ext:    2+ radial and pedal pulses, no c/c/e  Neuro: grossly intact

## 2022-12-20 NOTE — ED NOTES
Discharge and education instructions reviewed. Patient verbalized understanding, teach-back successful. Patient denied questions at this time. No acute distress noted. Patient instructed to follow-up as noted - return to emergency department if symptoms worsen. Patient verbalized understanding. Discharged per EDMD with discharge instructions.           Junior Cristela RN  08/21/21 0359 [Negative] : Heme/Lymph [As Noted in HPI] : as noted in HPI

## 2023-01-09 RX ORDER — SITAGLIPTIN 100 MG/1
TABLET, FILM COATED ORAL
Qty: 90 TABLET | Refills: 0 | Status: SHIPPED | OUTPATIENT
Start: 2023-01-09 | End: 2023-03-03

## 2023-02-07 ENCOUNTER — HOSPITAL ENCOUNTER (INPATIENT)
Age: 63
LOS: 4 days | Discharge: HOME | DRG: 683 | End: 2023-02-11
Attending: EMERGENCY MEDICINE | Admitting: STUDENT IN AN ORGANIZED HEALTH CARE EDUCATION/TRAINING PROGRAM
Payer: COMMERCIAL

## 2023-02-07 ENCOUNTER — APPOINTMENT (OUTPATIENT)
Dept: CT IMAGING | Age: 63
DRG: 683 | End: 2023-02-07
Payer: COMMERCIAL

## 2023-02-07 DIAGNOSIS — N17.9 AKI (ACUTE KIDNEY INJURY) (HCC): ICD-10-CM

## 2023-02-07 DIAGNOSIS — R10.9 FLANK PAIN: ICD-10-CM

## 2023-02-07 DIAGNOSIS — N30.00 ACUTE CYSTITIS WITHOUT HEMATURIA: Primary | ICD-10-CM

## 2023-02-07 PROBLEM — N39.0 UTI (URINARY TRACT INFECTION): Status: ACTIVE | Noted: 2023-02-07

## 2023-02-07 PROBLEM — N18.9 ACUTE KIDNEY INJURY SUPERIMPOSED ON CKD (HCC): Status: ACTIVE | Noted: 2023-02-07

## 2023-02-07 LAB
ANION GAP SERPL CALCULATED.3IONS-SCNC: 12 MMOL/L (ref 3–16)
BACTERIA: ABNORMAL /HPF
BASOPHILS ABSOLUTE: 0.1 K/UL (ref 0–0.2)
BASOPHILS RELATIVE PERCENT: 0.8 %
BILIRUBIN URINE: NEGATIVE
BLOOD, URINE: NEGATIVE
BUN BLDV-MCNC: 29 MG/DL (ref 7–20)
CALCIUM SERPL-MCNC: 10.3 MG/DL (ref 8.3–10.6)
CHLORIDE BLD-SCNC: 97 MMOL/L (ref 99–110)
CHP ED QC CHECK: YES
CLARITY: ABNORMAL
CO2: 28 MMOL/L (ref 21–32)
COLOR: YELLOW
CREAT SERPL-MCNC: 1.8 MG/DL (ref 0.6–1.2)
EOSINOPHILS ABSOLUTE: 0.2 K/UL (ref 0–0.6)
EOSINOPHILS RELATIVE PERCENT: 1.9 %
EPITHELIAL CELLS, UA: 5 /HPF (ref 0–5)
GFR SERPL CREATININE-BSD FRML MDRD: 31 ML/MIN/{1.73_M2}
GLUCOSE BLD-MCNC: 227 MG/DL
GLUCOSE BLD-MCNC: 228 MG/DL (ref 70–99)
GLUCOSE URINE: NEGATIVE MG/DL
HCT VFR BLD CALC: 40.2 % (ref 36–48)
HEMOGLOBIN: 13.6 G/DL (ref 12–16)
HYALINE CASTS: 2 /LPF (ref 0–8)
KETONES, URINE: ABNORMAL MG/DL
LEUKOCYTE ESTERASE, URINE: ABNORMAL
LYMPHOCYTES ABSOLUTE: 2 K/UL (ref 1–5.1)
LYMPHOCYTES RELATIVE PERCENT: 24.5 %
MCH RBC QN AUTO: 29.7 PG (ref 26–34)
MCHC RBC AUTO-ENTMCNC: 33.8 G/DL (ref 31–36)
MCV RBC AUTO: 87.9 FL (ref 80–100)
MICROSCOPIC EXAMINATION: YES
MONOCYTES ABSOLUTE: 0.5 K/UL (ref 0–1.3)
MONOCYTES RELATIVE PERCENT: 6.3 %
NEUTROPHILS ABSOLUTE: 5.3 K/UL (ref 1.7–7.7)
NEUTROPHILS RELATIVE PERCENT: 66.5 %
NITRITE, URINE: NEGATIVE
PDW BLD-RTO: 13.1 % (ref 12.4–15.4)
PH UA: 5 (ref 5–8)
PLATELET # BLD: 302 K/UL (ref 135–450)
PMV BLD AUTO: 8.5 FL (ref 5–10.5)
POTASSIUM REFLEX MAGNESIUM: 4 MMOL/L (ref 3.5–5.1)
PROTEIN UA: 100 MG/DL
RBC # BLD: 4.57 M/UL (ref 4–5.2)
RBC UA: 3 /HPF (ref 0–4)
SODIUM BLD-SCNC: 137 MMOL/L (ref 136–145)
SPECIFIC GRAVITY UA: 1.01 (ref 1–1.03)
URINE REFLEX TO CULTURE: YES
URINE TYPE: ABNORMAL
UROBILINOGEN, URINE: 0.2 E.U./DL
WBC # BLD: 8 K/UL (ref 4–11)
WBC UA: 53 /HPF (ref 0–5)

## 2023-02-07 PROCEDURE — 74176 CT ABD & PELVIS W/O CONTRAST: CPT

## 2023-02-07 PROCEDURE — 6360000002 HC RX W HCPCS: Performed by: EMERGENCY MEDICINE

## 2023-02-07 PROCEDURE — 96374 THER/PROPH/DIAG INJ IV PUSH: CPT

## 2023-02-07 PROCEDURE — 99285 EMERGENCY DEPT VISIT HI MDM: CPT

## 2023-02-07 PROCEDURE — 87086 URINE CULTURE/COLONY COUNT: CPT

## 2023-02-07 PROCEDURE — 2580000003 HC RX 258: Performed by: EMERGENCY MEDICINE

## 2023-02-07 PROCEDURE — 85025 COMPLETE CBC W/AUTO DIFF WBC: CPT

## 2023-02-07 PROCEDURE — 80069 RENAL FUNCTION PANEL: CPT

## 2023-02-07 PROCEDURE — 80048 BASIC METABOLIC PNL TOTAL CA: CPT

## 2023-02-07 PROCEDURE — 1200000000 HC SEMI PRIVATE

## 2023-02-07 PROCEDURE — 81001 URINALYSIS AUTO W/SCOPE: CPT

## 2023-02-07 PROCEDURE — 96375 TX/PRO/DX INJ NEW DRUG ADDON: CPT

## 2023-02-07 PROCEDURE — 83735 ASSAY OF MAGNESIUM: CPT

## 2023-02-07 RX ORDER — 0.9 % SODIUM CHLORIDE 0.9 %
1000 INTRAVENOUS SOLUTION INTRAVENOUS ONCE
Status: COMPLETED | OUTPATIENT
Start: 2023-02-07 | End: 2023-02-08

## 2023-02-07 RX ORDER — INSULIN LISPRO 100 [IU]/ML
0-4 INJECTION, SOLUTION INTRAVENOUS; SUBCUTANEOUS NIGHTLY
Status: DISCONTINUED | OUTPATIENT
Start: 2023-02-08 | End: 2023-02-11 | Stop reason: HOSPADM

## 2023-02-07 RX ORDER — DILTIAZEM HYDROCHLORIDE 120 MG/1
240 CAPSULE, COATED, EXTENDED RELEASE ORAL DAILY
Status: DISCONTINUED | OUTPATIENT
Start: 2023-02-08 | End: 2023-02-11 | Stop reason: HOSPADM

## 2023-02-07 RX ORDER — INSULIN LISPRO 100 [IU]/ML
0-4 INJECTION, SOLUTION INTRAVENOUS; SUBCUTANEOUS
Status: DISCONTINUED | OUTPATIENT
Start: 2023-02-08 | End: 2023-02-11 | Stop reason: HOSPADM

## 2023-02-07 RX ORDER — SODIUM CHLORIDE 0.9 % (FLUSH) 0.9 %
5-40 SYRINGE (ML) INJECTION EVERY 12 HOURS SCHEDULED
Status: DISCONTINUED | OUTPATIENT
Start: 2023-02-08 | End: 2023-02-11 | Stop reason: HOSPADM

## 2023-02-07 RX ORDER — OXYCODONE HYDROCHLORIDE AND ACETAMINOPHEN 5; 325 MG/1; MG/1
1 TABLET ORAL EVERY 6 HOURS PRN
Status: DISCONTINUED | OUTPATIENT
Start: 2023-02-07 | End: 2023-02-11 | Stop reason: HOSPADM

## 2023-02-07 RX ORDER — SODIUM CHLORIDE 9 MG/ML
INJECTION, SOLUTION INTRAVENOUS CONTINUOUS
Status: DISCONTINUED | OUTPATIENT
Start: 2023-02-07 | End: 2023-02-08

## 2023-02-07 RX ORDER — ACETAMINOPHEN 650 MG/1
650 SUPPOSITORY RECTAL EVERY 6 HOURS PRN
Status: DISCONTINUED | OUTPATIENT
Start: 2023-02-07 | End: 2023-02-11 | Stop reason: HOSPADM

## 2023-02-07 RX ORDER — ONDANSETRON 2 MG/ML
4 INJECTION INTRAMUSCULAR; INTRAVENOUS ONCE
Status: COMPLETED | OUTPATIENT
Start: 2023-02-07 | End: 2023-02-07

## 2023-02-07 RX ORDER — ACETAMINOPHEN 325 MG/1
650 TABLET ORAL EVERY 6 HOURS PRN
Status: DISCONTINUED | OUTPATIENT
Start: 2023-02-07 | End: 2023-02-11 | Stop reason: HOSPADM

## 2023-02-07 RX ORDER — ENOXAPARIN SODIUM 100 MG/ML
40 INJECTION SUBCUTANEOUS DAILY
Status: DISCONTINUED | OUTPATIENT
Start: 2023-02-08 | End: 2023-02-11 | Stop reason: HOSPADM

## 2023-02-07 RX ORDER — ONDANSETRON 2 MG/ML
4 INJECTION INTRAMUSCULAR; INTRAVENOUS EVERY 6 HOURS PRN
Status: DISCONTINUED | OUTPATIENT
Start: 2023-02-07 | End: 2023-02-11 | Stop reason: HOSPADM

## 2023-02-07 RX ORDER — LOSARTAN POTASSIUM 25 MG/1
25 TABLET ORAL DAILY
Status: DISCONTINUED | OUTPATIENT
Start: 2023-02-08 | End: 2023-02-11 | Stop reason: HOSPADM

## 2023-02-07 RX ORDER — ATORVASTATIN CALCIUM 10 MG/1
10 TABLET, FILM COATED ORAL DAILY
Status: DISCONTINUED | OUTPATIENT
Start: 2023-02-08 | End: 2023-02-11 | Stop reason: HOSPADM

## 2023-02-07 RX ORDER — DEXTROSE MONOHYDRATE 100 MG/ML
INJECTION, SOLUTION INTRAVENOUS CONTINUOUS PRN
Status: DISCONTINUED | OUTPATIENT
Start: 2023-02-07 | End: 2023-02-11 | Stop reason: HOSPADM

## 2023-02-07 RX ORDER — SODIUM CHLORIDE 0.9 % (FLUSH) 0.9 %
5-40 SYRINGE (ML) INJECTION PRN
Status: DISCONTINUED | OUTPATIENT
Start: 2023-02-07 | End: 2023-02-11 | Stop reason: HOSPADM

## 2023-02-07 RX ORDER — 0.9 % SODIUM CHLORIDE 0.9 %
500 INTRAVENOUS SOLUTION INTRAVENOUS ONCE
Status: COMPLETED | OUTPATIENT
Start: 2023-02-07 | End: 2023-02-08

## 2023-02-07 RX ORDER — OXYCODONE AND ACETAMINOPHEN 7.5; 325 MG/1; MG/1
1 TABLET ORAL EVERY 6 HOURS PRN
Status: DISCONTINUED | OUTPATIENT
Start: 2023-02-07 | End: 2023-02-11 | Stop reason: HOSPADM

## 2023-02-07 RX ORDER — FENTANYL CITRATE 50 UG/ML
25 INJECTION, SOLUTION INTRAMUSCULAR; INTRAVENOUS ONCE
Status: COMPLETED | OUTPATIENT
Start: 2023-02-07 | End: 2023-02-07

## 2023-02-07 RX ORDER — METOPROLOL SUCCINATE 50 MG/1
50 TABLET, EXTENDED RELEASE ORAL DAILY
Status: DISCONTINUED | OUTPATIENT
Start: 2023-02-08 | End: 2023-02-11 | Stop reason: HOSPADM

## 2023-02-07 RX ORDER — SODIUM CHLORIDE 9 MG/ML
INJECTION, SOLUTION INTRAVENOUS PRN
Status: DISCONTINUED | OUTPATIENT
Start: 2023-02-07 | End: 2023-02-11 | Stop reason: HOSPADM

## 2023-02-07 RX ADMIN — ONDANSETRON 4 MG: 2 INJECTION INTRAMUSCULAR; INTRAVENOUS at 23:03

## 2023-02-07 RX ADMIN — SODIUM CHLORIDE 500 ML: 9 INJECTION, SOLUTION INTRAVENOUS at 23:03

## 2023-02-07 RX ADMIN — FENTANYL CITRATE 25 MCG: 50 INJECTION, SOLUTION INTRAMUSCULAR; INTRAVENOUS at 23:03

## 2023-02-07 ASSESSMENT — ENCOUNTER SYMPTOMS
EYE REDNESS: 0
VOMITING: 0
ABDOMINAL PAIN: 0
RHINORRHEA: 0
SHORTNESS OF BREATH: 0
SORE THROAT: 0
NAUSEA: 1

## 2023-02-07 ASSESSMENT — LIFESTYLE VARIABLES
HOW MANY STANDARD DRINKS CONTAINING ALCOHOL DO YOU HAVE ON A TYPICAL DAY: PATIENT DOES NOT DRINK
HOW OFTEN DO YOU HAVE A DRINK CONTAINING ALCOHOL: NEVER

## 2023-02-07 ASSESSMENT — PAIN SCALES - GENERAL: PAINLEVEL_OUTOF10: 8

## 2023-02-07 ASSESSMENT — PAIN DESCRIPTION - ORIENTATION: ORIENTATION: LEFT

## 2023-02-07 ASSESSMENT — PAIN DESCRIPTION - LOCATION: LOCATION: FLANK

## 2023-02-08 LAB
ALBUMIN SERPL-MCNC: 3.5 G/DL (ref 3.4–5)
ALBUMIN SERPL-MCNC: 4.3 G/DL (ref 3.4–5)
ANION GAP SERPL CALCULATED.3IONS-SCNC: 12 MMOL/L (ref 3–16)
ANION GAP SERPL CALCULATED.3IONS-SCNC: 13 MMOL/L (ref 3–16)
BASOPHILS ABSOLUTE: 0 K/UL (ref 0–0.2)
BASOPHILS RELATIVE PERCENT: 0.4 %
BUN BLDV-MCNC: 25 MG/DL (ref 7–20)
BUN BLDV-MCNC: 32 MG/DL (ref 7–20)
CALCIUM SERPL-MCNC: 10.5 MG/DL (ref 8.3–10.6)
CALCIUM SERPL-MCNC: 9.1 MG/DL (ref 8.3–10.6)
CHLORIDE BLD-SCNC: 100 MMOL/L (ref 99–110)
CHLORIDE BLD-SCNC: 104 MMOL/L (ref 99–110)
CO2: 22 MMOL/L (ref 21–32)
CO2: 29 MMOL/L (ref 21–32)
CREAT SERPL-MCNC: 1.2 MG/DL (ref 0.6–1.2)
CREAT SERPL-MCNC: 1.8 MG/DL (ref 0.6–1.2)
EOSINOPHILS ABSOLUTE: 0.2 K/UL (ref 0–0.6)
EOSINOPHILS RELATIVE PERCENT: 2.5 %
GFR SERPL CREATININE-BSD FRML MDRD: 31 ML/MIN/{1.73_M2}
GFR SERPL CREATININE-BSD FRML MDRD: 51 ML/MIN/{1.73_M2}
GLUCOSE BLD-MCNC: 148 MG/DL (ref 70–99)
GLUCOSE BLD-MCNC: 149 MG/DL (ref 70–99)
GLUCOSE BLD-MCNC: 191 MG/DL (ref 70–99)
GLUCOSE BLD-MCNC: 194 MG/DL (ref 70–99)
GLUCOSE BLD-MCNC: 227 MG/DL (ref 70–99)
GLUCOSE BLD-MCNC: 283 MG/DL (ref 70–99)
HCT VFR BLD CALC: 34.2 % (ref 36–48)
HEMOGLOBIN: 11.5 G/DL (ref 12–16)
LYMPHOCYTES ABSOLUTE: 2.5 K/UL (ref 1–5.1)
LYMPHOCYTES RELATIVE PERCENT: 32.8 %
MAGNESIUM: 1.3 MG/DL (ref 1.8–2.4)
MAGNESIUM: 1.5 MG/DL (ref 1.8–2.4)
MCH RBC QN AUTO: 29.9 PG (ref 26–34)
MCHC RBC AUTO-ENTMCNC: 33.5 G/DL (ref 31–36)
MCV RBC AUTO: 89.1 FL (ref 80–100)
MONOCYTES ABSOLUTE: 0.6 K/UL (ref 0–1.3)
MONOCYTES RELATIVE PERCENT: 7.3 %
NEUTROPHILS ABSOLUTE: 4.3 K/UL (ref 1.7–7.7)
NEUTROPHILS RELATIVE PERCENT: 57 %
PDW BLD-RTO: 13.2 % (ref 12.4–15.4)
PERFORMED ON: ABNORMAL
PHOSPHORUS: 3.1 MG/DL (ref 2.5–4.9)
PHOSPHORUS: 3.3 MG/DL (ref 2.5–4.9)
PLATELET # BLD: 237 K/UL (ref 135–450)
PMV BLD AUTO: 9.2 FL (ref 5–10.5)
POTASSIUM SERPL-SCNC: 4 MMOL/L (ref 3.5–5.1)
POTASSIUM SERPL-SCNC: 4 MMOL/L (ref 3.5–5.1)
RBC # BLD: 3.83 M/UL (ref 4–5.2)
SODIUM BLD-SCNC: 139 MMOL/L (ref 136–145)
SODIUM BLD-SCNC: 141 MMOL/L (ref 136–145)
URINE CULTURE, ROUTINE: NORMAL
WBC # BLD: 7.6 K/UL (ref 4–11)

## 2023-02-08 PROCEDURE — 6360000002 HC RX W HCPCS: Performed by: EMERGENCY MEDICINE

## 2023-02-08 PROCEDURE — 6360000002 HC RX W HCPCS: Performed by: STUDENT IN AN ORGANIZED HEALTH CARE EDUCATION/TRAINING PROGRAM

## 2023-02-08 PROCEDURE — 85025 COMPLETE CBC W/AUTO DIFF WBC: CPT

## 2023-02-08 PROCEDURE — 80069 RENAL FUNCTION PANEL: CPT

## 2023-02-08 PROCEDURE — 2580000003 HC RX 258: Performed by: EMERGENCY MEDICINE

## 2023-02-08 PROCEDURE — 87040 BLOOD CULTURE FOR BACTERIA: CPT

## 2023-02-08 PROCEDURE — 36415 COLL VENOUS BLD VENIPUNCTURE: CPT

## 2023-02-08 PROCEDURE — 2580000003 HC RX 258: Performed by: STUDENT IN AN ORGANIZED HEALTH CARE EDUCATION/TRAINING PROGRAM

## 2023-02-08 PROCEDURE — 1200000000 HC SEMI PRIVATE

## 2023-02-08 PROCEDURE — 6370000000 HC RX 637 (ALT 250 FOR IP): Performed by: STUDENT IN AN ORGANIZED HEALTH CARE EDUCATION/TRAINING PROGRAM

## 2023-02-08 PROCEDURE — 2580000003 HC RX 258: Performed by: INTERNAL MEDICINE

## 2023-02-08 PROCEDURE — 83735 ASSAY OF MAGNESIUM: CPT

## 2023-02-08 PROCEDURE — 6360000002 HC RX W HCPCS: Performed by: INTERNAL MEDICINE

## 2023-02-08 RX ORDER — MAGNESIUM SULFATE IN WATER 40 MG/ML
2000 INJECTION, SOLUTION INTRAVENOUS ONCE
Status: COMPLETED | OUTPATIENT
Start: 2023-02-08 | End: 2023-02-08

## 2023-02-08 RX ORDER — BIOTIN 10000 MCG
CAPSULE ORAL
COMMUNITY

## 2023-02-08 RX ORDER — ASCORBIC ACID 500 MG
500 TABLET ORAL DAILY
COMMUNITY

## 2023-02-08 RX ORDER — SODIUM CHLORIDE 9 MG/ML
INJECTION, SOLUTION INTRAVENOUS CONTINUOUS
Status: DISCONTINUED | OUTPATIENT
Start: 2023-02-08 | End: 2023-02-11 | Stop reason: HOSPADM

## 2023-02-08 RX ADMIN — OXYCODONE AND ACETAMINOPHEN 1 TABLET: 5; 325 TABLET ORAL at 06:16

## 2023-02-08 RX ADMIN — OXYCODONE AND ACETAMINOPHEN 1 TABLET: 7.5; 325 TABLET ORAL at 22:40

## 2023-02-08 RX ADMIN — METOPROLOL SUCCINATE 50 MG: 50 TABLET, EXTENDED RELEASE ORAL at 09:23

## 2023-02-08 RX ADMIN — DILTIAZEM HYDROCHLORIDE 240 MG: 120 CAPSULE, COATED, EXTENDED RELEASE ORAL at 10:43

## 2023-02-08 RX ADMIN — ATORVASTATIN CALCIUM 10 MG: 10 TABLET, FILM COATED ORAL at 09:24

## 2023-02-08 RX ADMIN — OXYCODONE AND ACETAMINOPHEN 1 TABLET: 5; 325 TABLET ORAL at 12:20

## 2023-02-08 RX ADMIN — SODIUM CHLORIDE: 9 INJECTION, SOLUTION INTRAVENOUS at 14:53

## 2023-02-08 RX ADMIN — SODIUM CHLORIDE: 9 INJECTION, SOLUTION INTRAVENOUS at 01:09

## 2023-02-08 RX ADMIN — CEFTRIAXONE 1000 MG: 1 INJECTION, POWDER, FOR SOLUTION INTRAMUSCULAR; INTRAVENOUS at 22:25

## 2023-02-08 RX ADMIN — SODIUM CHLORIDE: 9 INJECTION, SOLUTION INTRAVENOUS at 22:21

## 2023-02-08 RX ADMIN — CEFTRIAXONE 1000 MG: 1 INJECTION, POWDER, FOR SOLUTION INTRAMUSCULAR; INTRAVENOUS at 00:03

## 2023-02-08 RX ADMIN — Medication 10 ML: at 09:24

## 2023-02-08 RX ADMIN — ENOXAPARIN SODIUM 40 MG: 100 INJECTION SUBCUTANEOUS at 09:24

## 2023-02-08 RX ADMIN — SODIUM CHLORIDE 1000 ML: 9 INJECTION, SOLUTION INTRAVENOUS at 00:01

## 2023-02-08 RX ADMIN — ONDANSETRON 4 MG: 2 INJECTION INTRAMUSCULAR; INTRAVENOUS at 15:18

## 2023-02-08 RX ADMIN — MAGNESIUM SULFATE HEPTAHYDRATE 2000 MG: 40 INJECTION, SOLUTION INTRAVENOUS at 09:24

## 2023-02-08 RX ADMIN — INSULIN LISPRO 2 UNITS: 100 INJECTION, SOLUTION INTRAVENOUS; SUBCUTANEOUS at 16:30

## 2023-02-08 RX ADMIN — LOSARTAN POTASSIUM 25 MG: 25 TABLET, FILM COATED ORAL at 09:24

## 2023-02-08 ASSESSMENT — PAIN DESCRIPTION - LOCATION
LOCATION: FLANK
LOCATION: FLANK

## 2023-02-08 ASSESSMENT — PAIN DESCRIPTION - PAIN TYPE
TYPE: ACUTE PAIN
TYPE: ACUTE PAIN

## 2023-02-08 ASSESSMENT — PAIN DESCRIPTION - DESCRIPTORS
DESCRIPTORS: SHARP
DESCRIPTORS: SHARP

## 2023-02-08 ASSESSMENT — PAIN DESCRIPTION - FREQUENCY
FREQUENCY: CONTINUOUS
FREQUENCY: CONTINUOUS

## 2023-02-08 ASSESSMENT — PAIN DESCRIPTION - ONSET
ONSET: ON-GOING
ONSET: ON-GOING

## 2023-02-08 ASSESSMENT — PAIN DESCRIPTION - ORIENTATION
ORIENTATION: LEFT
ORIENTATION: LEFT

## 2023-02-08 ASSESSMENT — PAIN SCALES - GENERAL
PAINLEVEL_OUTOF10: 8
PAINLEVEL_OUTOF10: 0
PAINLEVEL_OUTOF10: 6
PAINLEVEL_OUTOF10: 7
PAINLEVEL_OUTOF10: 3

## 2023-02-08 NOTE — PROGRESS NOTES
Hospitalist Progress Note      PCP: Young Dotson MD    Date of Admission: 2/7/2023        Subjective: Improved left flank pain, no fever or chills at this time denies abdominal pain no nausea or vomiting. Medications:  Reviewed    Infusion Medications    sodium chloride      dextrose      sodium chloride 100 mL/hr at 02/08/23 0109     Scheduled Medications    dilTIAZem  240 mg Oral Daily    atorvastatin  10 mg Oral Daily    losartan  25 mg Oral Daily    metoprolol succinate  50 mg Oral Daily    sodium chloride flush  5-40 mL IntraVENous 2 times per day    enoxaparin  40 mg SubCUTAneous Daily    insulin lispro  0-4 Units SubCUTAneous TID WC    insulin lispro  0-4 Units SubCUTAneous Nightly    cefTRIAXone (ROCEPHIN) IV  1,000 mg IntraVENous Nightly     PRN Meds: sodium chloride flush, sodium chloride, acetaminophen **OR** acetaminophen, glucose, dextrose bolus **OR** dextrose bolus, glucagon (rDNA), dextrose, ondansetron, oxyCODONE-acetaminophen, oxyCODONE-acetaminophen    No intake or output data in the 24 hours ending 02/08/23 0739    Physical Exam Performed:    BP (!) 140/85   Pulse 66   Temp 98.8 °F (37.1 °C) (Oral)   Resp 18   Ht 5' 2\" (1.575 m)   Wt 176 lb 9.4 oz (80.1 kg)   SpO2 97%   BMI 32.30 kg/m²     General appearance: No apparent distress  Neck: Supple  Respiratory:  Normal respiratory effort. Clear to auscultation, bilaterally without Rales/Wheezes/Rhonchi. Cardiovascular: Regular rate and rhythm with normal S1/S2 without murmurs, rubs or gallops. Abdomen: Soft, non-tender, non-distended   Musculoskeletal: No clubbing, cyanosis   Skin: Skin color, texture, turgor normal.  No rashes or lesions.   Neurologic:  No focal weakness   Psychiatric: Alert and oriented  Capillary Refill: Brisk, 3 seconds, normal   Peripheral Pulses: +2 palpable, equal bilaterally       Labs:   Recent Labs     02/07/23  2230 02/08/23  0613   WBC 8.0 7.6   HGB 13.6 11.5*   HCT 40.2 34.2*    237     Recent Labs     02/07/23  2230     137   K 4.0  4.0     97*   CO2 29  28   BUN 32*  29*   CREATININE 1.8*  1.8*   CALCIUM 10.5  10.3   PHOS 3.3     No results for input(s): AST, ALT, BILIDIR, BILITOT, ALKPHOS in the last 72 hours. No results for input(s): INR in the last 72 hours. No results for input(s): Mack Fast in the last 72 hours. Urinalysis:      Lab Results   Component Value Date/Time    NITRU Negative 02/07/2023 10:30 PM    WBCUA 53 02/07/2023 10:30 PM    BACTERIA Rare 02/07/2023 10:30 PM    RBCUA 3 02/07/2023 10:30 PM    BLOODU Negative 02/07/2023 10:30 PM    SPECGRAV 1.014 02/07/2023 10:30 PM    GLUCOSEU Negative 02/07/2023 10:30 PM       Radiology:  CT ABDOMEN PELVIS WO CONTRAST Additional Contrast? None   Final Result   Renal stones but negative for obstructive uropathy. None    Assessment/Plan:    Active Hospital Problems    Diagnosis     UTI (urinary tract infection) [N39.0]      Priority: Medium    Flank pain [R10.9]      Priority: Medium    Acute kidney injury superimposed on CKD (HCC) [N17.9, N18.9]      Priority: Medium    MARCI (acute kidney injury) ([facility] [address] [N17.9]     Diabetes mellitus, type 2 ([facility] [address] [E11.9]        Possibly complicated UTI, patient started on IV antibiotics we will keep for now, cultures pending. Acute kidney injury IV fluids, repeat renal function in a.m. Nephrolithiasis, will consult urology  Anemia, appears chronic, monitor, no obvious bleeding  Hypomagnesemia, replaced with IV supplement  Diabetes mellitus with hyperglycemia sliding scale  Essential hypertension, resume p.o. medications        Diet: ADULT DIET; Regular; 4 carb choices (60 gm/meal);  Low Sodium (2 gm)  Code Status: Full Code      Jay Lewis MD

## 2023-02-08 NOTE — PLAN OF CARE
Problem: Discharge Planning  Goal: Discharge to home or other facility with appropriate resources  Outcome: Progressing  Flowsheets (Taken 2/8/2023 1621)  Discharge to home or other facility with appropriate resources:   Identify barriers to discharge with patient and caregiver   Identify discharge learning needs (meds, wound care, etc)   Refer to discharge planning if patient needs post-hospital services based on physician order or complex needs related to functional status, cognitive ability or social support system   Arrange for needed discharge resources and transportation as appropriate     Problem: Pain  Goal: Verbalizes/displays adequate comfort level or baseline comfort level  Outcome: Progressing  Flowsheets (Taken 2/8/2023 1621)  Verbalizes/displays adequate comfort level or baseline comfort level:   Encourage patient to monitor pain and request assistance   Administer analgesics based on type and severity of pain and evaluate response   Consider cultural and social influences on pain and pain management   Assess pain using appropriate pain scale   Implement non-pharmacological measures as appropriate and evaluate response     Problem: ABCDS Injury Assessment  Goal: Absence of physical injury  Outcome: Progressing  Flowsheets (Taken 2/8/2023 1621)  Absence of Physical Injury: Implement safety measures based on patient assessment

## 2023-02-08 NOTE — CONSULTS
Consulting Physician: Dr. Kristen Huerta    Reason for Consult: Nephrolithiasis    History of Present Illness: Monroe Song is a 65 y.o. female with history of CKD, HLD, HTN, DM who came to the hospital for left flank pain. She has prior history of kidney stones and feels this pain is a little bit higher than it normally would be for her kidneys. Her pain is better today. Creatinine was elevated at 1.8 yesterday, repeat level was not drawn today. UA with possible infection, culture is pending. CT revealed non-obstructing bilateral kidney stones. Patient and I reviewed the image and she understands the location of the stone is likely not the cause of her flank pain. Past Medical History:   Past Medical History:   Diagnosis Date    Chronic kidney disease 2009    kidney stones    Hyperlipidemia     Hypertension     MDRO (multiple drug resistant organisms) resistance     MRSA infection 2013    left lower leg    Right pulmonary embolus (HCC)     Type II or unspecified type diabetes mellitus without mention of complication, not stated as uncontrolled        Past Surgical History:  Past Surgical History:   Procedure Laterality Date    [address] [place] [address]    COLONOSCOPY  06/07/2018    Dr. Arthur Schmitz. adenoma polyp, repeat in 5 years    CYSTOSCOPY Right 7/22/2021    CYSTOSCOPY, RIGHT URETERAL STENT INSERTION,RIGHT RETROGRADE PYLEOGRAM performed by Erick Diaz MD at [facility] [address]    LITHOTRIPSY  2009       Social History:  Social History     Socioeconomic History    Marital status:       Spouse name: Not on file    Number of children: Not on file    Years of education: Not on file    Highest education level: Not on file   Occupational History    Not on file   Tobacco Use    Smoking status: Never    Smokeless tobacco: Never   Substance and Sexual Activity    Alcohol use: No    Drug use: No    Sexual activity: Yes     Partners: Male   Other Topics Concern    Not on file   Social History Narrative    Not on file     Social Determinants of Health     Financial Resource Strain: Low Risk     Difficulty of Paying Living Expenses: Not hard at all   Food Insecurity: No Food Insecurity    Worried About [address] in the Last Year: Never true    [facility] in the Last Year: Never true   Transportation Needs: No Transportation Needs    Lack of Transportation (Medical): No    Lack of Transportation (Non-Medical):  No   Physical Activity: Not on file   Stress: Not on file   Social Connections: Not on file   Intimate Partner Violence: Not on file   Housing Stability: Not on file       Family History:  Family History   Problem Relation Age of Onset    Cancer Mother         pancreatic    Cancer Father         lung    Heart Disease Father         Bypass x 5    Heart Disease Sister         Stent- CAD    Diabetes Sister        Meds:   Current Facility-Administered Medications: 0.9 % sodium chloride infusion, , IntraVENous, Continuous  dilTIAZem (CARDIZEM CD) extended release capsule 240 mg, 240 mg, Oral, Daily  atorvastatin (LIPITOR) tablet 10 mg, 10 mg, Oral, Daily  losartan (COZAAR) tablet 25 mg, 25 mg, Oral, Daily  metoprolol succinate (TOPROL XL) extended release tablet 50 mg, 50 mg, Oral, Daily  sodium chloride flush 0.9 % injection 5-40 mL, 5-40 mL, IntraVENous, 2 times per day  sodium chloride flush 0.9 % injection 5-40 mL, 5-40 mL, IntraVENous, PRN  0.9 % sodium chloride infusion, , IntraVENous, PRN  enoxaparin (LOVENOX) injection 40 mg, 40 mg, SubCUTAneous, Daily  acetaminophen (TYLENOL) tablet 650 mg, 650 mg, Oral, Q6H PRN **OR** acetaminophen (TYLENOL) suppository 650 mg, 650 mg, Rectal, Q6H PRN  insulin lispro (HUMALOG) injection vial 0-4 Units, 0-4 Units, SubCUTAneous, TID WC  insulin lispro (HUMALOG) injection vial 0-4 Units, 0-4 Units, SubCUTAneous, Nightly  glucose chewable tablet 16 g, 4 tablet, Oral, PRN  dextrose bolus 10% 125 mL, 125 mL, IntraVENous, PRN **OR** dextrose bolus 10% 250 mL, 250 mL, IntraVENous, PRN  glucagon (rDNA) injection 1 mg, 1 mg, SubCUTAneous, PRN  dextrose 10 % infusion, , IntraVENous, Continuous PRN  ondansetron (ZOFRAN) injection 4 mg, 4 mg, IntraVENous, Q6H PRN  cefTRIAXone (ROCEPHIN) 1,000 mg in sodium chloride 0.9 % 50 mL IVPB (mini-bag), 1,000 mg, IntraVENous, Nightly  oxyCODONE-acetaminophen (PERCOCET) 5-325 MG per tablet 1 tablet, 1 tablet, Oral, Q6H PRN  oxyCODONE-acetaminophen (PERCOCET) 7.5-325 MG per tablet 1 tablet, 1 tablet, Oral, Q6H PRN    Vitals:  BP (!) 149/71   Pulse 62   Temp 97.5 °F (36.4 °C) (Oral)   Resp 21   Ht 5' 2\" (1.575 m)   Wt 176 lb 9.4 oz (80.1 kg)   SpO2 94%   BMI 32.30 kg/m²   No intake or output data in the 24 hours ending 02/08/23 1701    Review of Systems:  10 Systems were reviewed and negative except as in HPI      Physical Exam:  General Appearance: Alert and oriented, cooperative, no distress, appears stated age  Head: Normocephalic, without obvious abnormality, atraumatic  Back: no CVA tenderness  Lungs: respirations unlabored, no wheezing  Heart: Regular rate and rhythm, no lower extremity edema noted  Abdomen: Soft, non-tender, non-distended, no masses  Skin: Skin color, texture, turgor normal, no rashes or lesions  Neurologic: no gross deficits  Female :   Bladder is non tender  No CVA tenderness  Spontaneously voiding  Pelvic Exam Not Indicated    Labs:  CBC   Lab Results   Component Value Date/Time    WBC 7.6 02/08/2023 06:13 AM    RBC 3.83 02/08/2023 06:13 AM    HGB 11.5 02/08/2023 06:13 AM    HCT 34.2 02/08/2023 06:13 AM    MCV 89.1 02/08/2023 06:13 AM    MCH 29.9 02/08/2023 06:13 AM    MCHC 33.5 02/08/2023 06:13 AM    RDW 13.2 02/08/2023 06:13 AM     02/08/2023 06:13 AM    MPV 9.2 02/08/2023 06:13 AM     BMP   Lab Results   Component Value Date/Time     02/07/2023 10:30 PM     02/07/2023 10:30 PM    K 4.0 02/07/2023 10:30 PM    K 4.0 02/07/2023 10:30 PM    CL 97 02/07/2023 10:30 PM     02/07/2023 10:30 PM    CO2 28 02/07/2023 10:30 PM    CO2 29 02/07/2023 10:30 PM    BUN 29 02/07/2023 10:30 PM    BUN 32 02/07/2023 10:30 PM    CREATININE 1.8 02/07/2023 10:30 PM    CREATININE 1.8 02/07/2023 10:30 PM    GLUCOSE 228 02/07/2023 10:30 PM    GLUCOSE 227 02/07/2023 10:30 PM    GLUCOSE 227 02/07/2023 10:30 PM    CALCIUM 10.3 02/07/2023 10:30 PM    CALCIUM 10.5 02/07/2023 10:30 PM       Urinalysis:   Lab Results   Component Value Date/Time    COLORU Yellow 02/07/2023 10:30 PM    GLUCOSEU Negative 02/07/2023 10:30 PM    BLOODU Negative 02/07/2023 10:30 PM    NITRU Negative 02/07/2023 10:30 PM    LEUKOCYTESUR MODERATE 02/07/2023 10:30 PM       Imaging: Pertinent images and radiologist's report were reviewed independently  CT abdomen/pelvis 2/7/23  Impression   Renal stones but negative for obstructive uropathy. Impression/Plan:   - 63y.o. female with flank pain, MARCI, UTI. Consulted for nephrolithiasis. - Reviewed CT together and she understands the location of her stone is not the cause of her pain, she tells me the pain feels a little bit higher than the kidney itself. - Urine culture pending, continue antibiotics  - ensure renal function is drawn tomorrow, she says she has seen Dr. Josefina Hernadez in the past for issues with MARCI in the past as well.      Chester Alcantara, JEANNIE - CNP 3/3/45015:22 PM

## 2023-02-08 NOTE — PROGRESS NOTES
Patient arrived on unit, A&O X4. VSS. IV flushes easily, blood return noted, dressing CDI, infusing NS @ 100 ml/hr. Patient denies pain at this time. Tolerating PO intake and appetite adequate. No other needs verbalized at this time. Standard safety precaution and call light within reach.  Electronically signed by Yulia Villela RN on 2/8/2023 at 4:05 PM

## 2023-02-08 NOTE — ED PROVIDER NOTES
EMERGENCY DEPARTMENT ENCOUNTER            Pt Name: Ellen Elise   MRN: [id number]   [place] 1960   Date of evaluation: 2/7/2023   Provider: Deana Maza MD   PCP: Devi Lloyd MD   Note Started: 9:57 PM EST 2/7/23          CHIEF COMPLAINT     Chief Complaint   Patient presents with    Flank Pain     Pt reports left sided flank pain that started around 4 this afternoon. Pt endorses past history of kidney stones. Pt alert and oriented with no signs of distress noted and rates pain as a 8/10. HISTORY OF PRESENT ILLNESS:   History from : Patient   Limitations to history : None     Ellen Elise is a 65 y.o. female who presents complaining of left flank pain. Her pain started at 4:00 PM today. Started out as a moderate pain but has been worsening. She currently rates her pain as moderate to severe at times. Its in her left flank. With radiation to her left lower quadrant. Is associated with nausea but no vomiting. No fever. No chest pain. No shortness of breath. She states it feels somewhat similar to her prior kidney stone she had on the right. That was several years ago. Nursing Notes were all reviewed and agreed with, or any disagreements were addressed in the HPI. REVIEW OF SYSTEMS :    Review of Systems   Constitutional:  Negative for fever. HENT:  Negative for rhinorrhea and sore throat. Eyes:  Negative for redness. Respiratory:  Negative for shortness of breath. Cardiovascular:  Negative for chest pain. Gastrointestinal:  Positive for nausea. Negative for abdominal pain and vomiting. Genitourinary:  Positive for flank pain. Allergic/Immunologic: Immunocompromised state: 0 min. Neurological:  Negative for headaches. Hematological:  Negative for adenopathy. Psychiatric/Behavioral:  Negative for confusion.        MEDICAL HISTORY   has a past medical history of Chronic kidney disease (2009), Hyperlipidemia, Hypertension, MDRO (multiple drug resistant organisms) resistance, MRSA infection (2013), Right pulmonary embolus ([address] and Type II or unspecified type diabetes mellitus without mention of complication, not stated as uncontrolled. Past Surgical History:   Procedure Laterality Date    [facility]    COLONOSCOPY  06/07/2018    Dr. La Ledesma.  adenoma polyp, repeat in 5 years    CYSTOSCOPY Right 7/22/2021    CYSTOSCOPY, RIGHT URETERAL STENT INSERTION,RIGHT RETROGRADE PYLEOGRAM performed by Ivan Mc MD at [address] [facility]  2009      [address]       Discharge Medication List as of 2/11/2023 12:19 PM        CONTINUE these medications which have NOT CHANGED    Details   vitamin C (ASCORBIC ACID) 500 MG tablet Take 500 mg by mouth dailyHistorical Med      Biotin 10 MG CAPS Take by mouthHistorical Med      JANUVIA 100 MG tablet TAKE 1 TABLET BY MOUTH EVERY DAY, Disp-90 tablet, R-0Normal      atorvastatin (LIPITOR) 10 MG tablet TAKE 1 TABLET BY MOUTH EVERY DAY, Disp-90 tablet, R-1Normal      metFORMIN (GLUCOPHAGE) 1000 MG tablet Take 1 tablet by mouth 2 times daily (with meals), Disp-180 tablet, R-0Normal      metoprolol succinate (TOPROL XL) 50 MG extended release tablet TAKE 1 TABLET BY MOUTH EVERY DAY, Disp-90 tablet, R-1Normal      losartan (COZAAR) 100 MG tablet TAKE 1 TABLET BY MOUTH EVERY DAY, Disp-90 tablet, R-1Normal      hydroCHLOROthiazide (MICROZIDE) 12.5 MG capsule TAKE 1 CAPSULE BY MOUTH EVERY DAY IN THE MORNING, Disp-90 capsule, R-1Normal      dilTIAZem (DILT-XR) 240 MG extended release capsule TAKE 1 CAPSULE BY MOUTH EVERY DAY, Disp-90 capsule, R-1Normal      glipiZIDE (GLUCOTROL) 10 MG tablet Take 0.5 tablets by mouth 2 times daily (before meals), Disp-180 tablet, R-1Normal      citric acid-potassium citrate (POLYCITRA) 1100-334 MG/5ML solution Take 5 mLs by mouth 2 times dailyHistorical Med      vitamin B-12 (CYANOCOBALAMIN) 100 MCG tablet Take 250 mcg by mouth daily.      Multiple Vitamin CAPS Take 1 capsule by mouth daily Historical Med            SCREENINGS          Demetrius Coma Scale  Eye Opening: Spontaneous  Best Verbal Response: Oriented  Best Motor Response: Obeys commands  [place] Coma Scale Score: 15                CIWA Assessment  BP: (!) 163/67  Heart Rate: 68                  PHYSICAL EXAM :  ED Triage Vitals [02/07/23 2147]   BP Temp Temp Source Heart Rate Resp SpO2 Height Weight   (!) 181/97 98.8 °F (37.1 °C) Oral 83 16 98 % 5' 2\" (1.575 m) 176 lb 9.4 oz (80.1 kg)        Physical Exam  Vitals and nursing note reviewed. Constitutional:       Appearance: She is well-developed. She is not diaphoretic. HENT:      Head: Normocephalic and atraumatic. Mouth/Throat:      Mouth: Mucous membranes are moist.   Eyes:      General:         Right eye: No discharge. Left eye: No discharge. Conjunctiva/sclera: Conjunctivae normal.   Cardiovascular:      Rate and Rhythm: Normal rate. Pulses: Normal pulses. Heart sounds: No murmur heard. Pulmonary:      Effort: Pulmonary effort is normal. No respiratory distress. Abdominal:      Palpations: Abdomen is soft. Tenderness: There is no abdominal tenderness. There is left CVA tenderness. There is no right CVA tenderness, guarding or rebound. Musculoskeletal:         General: Normal range of motion. Cervical back: Neck supple. Skin:     General: Skin is warm and dry. Capillary Refill: Capillary refill takes less than 2 seconds. Neurological:      Mental Status: She is alert and oriented to person, place, and time.    Psychiatric:         Behavior: Behavior normal.     ___    DIAGNOSTIC RESULTS     LABS:   Labs Reviewed   BASIC METABOLIC PANEL W/ REFLEX TO MG FOR LOW K - Abnormal; Notable for the following components:       Result Value    Chloride 97 (*)     Glucose 228 (*)     BUN 29 (*)     Creatinine 1.8 (*)     Est, Glom Filt Rate 31 (*)     All other components within normal limits   URINALYSIS WITH REFLEX TO CULTURE - Abnormal; Notable for the following components:    Clarity, UA CLOUDY (*)     Ketones, Urine TRACE (*)     Protein,  (*)     Leukocyte Esterase, Urine MODERATE (*)     All other components within normal limits   MICROSCOPIC URINALYSIS - Abnormal; Notable for the following components:    Bacteria, UA Rare (*)     WBC, UA 53 (*)     All other components within normal limits   CBC WITH AUTO DIFFERENTIAL - Abnormal; Notable for the following components:    RBC 3.83 (*)     Hemoglobin 11.5 (*)     Hematocrit 34.2 (*)     All other components within normal limits   MAGNESIUM - Abnormal; Notable for the following components:    Magnesium 1.50 (*)     All other components within normal limits    Narrative:     add on   RENAL FUNCTION PANEL - Abnormal; Notable for the following components:    Glucose 227 (*)     BUN 32 (*)     Creatinine 1.8 (*)     Est, Glom Filt Rate 31 (*)     All other components within normal limits    Narrative:     add on   CBC WITH AUTO DIFFERENTIAL - Abnormal; Notable for the following components:    RBC 3.83 (*)     Hemoglobin 11.4 (*)     Hematocrit 32.9 (*)     All other components within normal limits   MAGNESIUM - Abnormal; Notable for the following components:    Magnesium 1.30 (*)     All other components within normal limits   RENAL FUNCTION PANEL - Abnormal; Notable for the following components:    Glucose 148 (*)     BUN 25 (*)     Est, Glom Filt Rate 51 (*)     All other components within normal limits   COMPREHENSIVE METABOLIC PANEL - Abnormal; Notable for the following components:    Glucose 178 (*)     Total Protein 6.3 (*)     ALT 68 (*)     AST 52 (*)     All other components within normal limits   CBC WITH AUTO DIFFERENTIAL - Abnormal; Notable for the following components:    Hemoglobin 11.9 (*)     Hematocrit 35.0 (*)     All other components within normal limits   MAGNESIUM - Abnormal; Notable for the following components: Magnesium 1.30 (*)     All other components within normal limits   RENAL FUNCTION PANEL - Abnormal; Notable for the following components:    Glucose 173 (*)     All other components within normal limits   MAGNESIUM - Abnormal; Notable for the following components:    Magnesium 1.20 (*)     All other components within normal limits    Narrative:     Collection has been rescheduled by HANNA at 02/10/2023 07:57 Reason: Add   on   BASIC METABOLIC PANEL - Abnormal; Notable for the following components:    Glucose 164 (*)     All other components within normal limits    Narrative:     Collection has been rescheduled by HANNA at 02/10/2023 07:57 Reason: Add   on   COMPREHENSIVE METABOLIC PANEL - Abnormal; Notable for the following components:    Glucose 157 (*)     Total Protein 6.1 (*)     ALT 80 (*)     AST 58 (*)     All other components within normal limits   MAGNESIUM - Abnormal; Notable for the following components:    Magnesium 1.60 (*)     All other components within normal limits   POCT GLUCOSE - Abnormal; Notable for the following components:    POC Glucose 149 (*)     All other components within normal limits   POCT GLUCOSE - Abnormal; Notable for the following components:    POC Glucose 194 (*)     All other components within normal limits   POCT GLUCOSE - Abnormal; Notable for the following components:    POC Glucose 283 (*)     All other components within normal limits   POCT GLUCOSE - Abnormal; Notable for the following components:    POC Glucose 191 (*)     All other components within normal limits   POCT GLUCOSE - Abnormal; Notable for the following components:    POC Glucose 178 (*)     All other components within normal limits   POCT GLUCOSE - Abnormal; Notable for the following components:    POC Glucose 199 (*)     All other components within normal limits   POCT GLUCOSE - Abnormal; Notable for the following components:    POC Glucose 189 (*)     All other components within normal limits   POCT GLUCOSE - Abnormal; Notable for the following components:    POC Glucose 258 (*)     All other components within normal limits   POCT GLUCOSE - Abnormal; Notable for the following components:    POC Glucose 158 (*)     All other components within normal limits   POCT GLUCOSE - Abnormal; Notable for the following components:    POC Glucose 254 (*)     All other components within normal limits   POCT GLUCOSE - Abnormal; Notable for the following components:    POC Glucose 226 (*)     All other components within normal limits   POCT GLUCOSE - Abnormal; Notable for the following components:    POC Glucose 207 (*)     All other components within normal limits   POCT GLUCOSE - Abnormal; Notable for the following components:    POC Glucose 161 (*)     All other components within normal limits   POCT GLUCOSE - Abnormal; Notable for the following components:    POC Glucose 330 (*)     All other components within normal limits   POCT GLUCOSE - Normal   CULTURE, URINE    Narrative:     ORDER#: [id number]                          ORDERED BY: Brittney Gil  SOURCE: Urine Clean Catch                  COLLECTED:  02/07/23 22:30  ANTIBIOTICS AT [facility]ASPRE[facility].:                      RECEIVED :  02/07/23 23:18   CULTURE, BLOOD 1    Narrative:     ORDER#: [id number]                          ORDERED BY: Vish Kramer  SOURCE: Blood                              COLLECTED:  02/08/23 08:00  ANTIBIOTICS AT [facility]PRE[facility].:                      RECEIVED :  02/08/23 08:43  If child <=2 yrs old please draw pediatric bottle. ~Blood Culture 1   CULTURE, BLOOD 1    Narrative:     ORDER#: [id number]                          ORDERED BY: Vish Kramer  SOURCE: Blood                              COLLECTED:  02/08/23 00:06  ANTIBIOTICS AT [facility]PRE[facility].:                      RECEIVED :  02/08/23 00:25  If child <=2 yrs old please draw pediatric bottle. ~Blood Culture 1   CBC WITH AUTO DIFFERENTIAL   MAGNESIUM    Narrative:     Collection has been rescheduled by Michell Villasenor at 02/10/2023 18:51 Reason: Add   on   POCT GLUCOSE   POCT GLUCOSE   POCT GLUCOSE   POCT GLUCOSE   POCT GLUCOSE   POCT GLUCOSE   POCT GLUCOSE   POCT GLUCOSE   POCT GLUCOSE   POCT GLUCOSE   POCT GLUCOSE   POCT GLUCOSE   POCT GLUCOSE      When ordered only abnormal lab results are displayed. All other labs were within normal range or not returned as of this dictation. RADIOLOGY:      Non-plain film images such as CT, Ultrasound and MRI are read by the radiologist. Plain radiographic images are visualized and preliminarily interpreted by the ED Provider with the below findings:   Interpretation per the Radiologist below, if available at the time of this note:     CT ABDOMEN PELVIS WO CONTRAST Additional Contrast? None   Final Result   Renal stones but negative for obstructive uropathy. No results found. EKG:      PROCEDURES   Unless otherwise noted below, none     CRITICAL CARE TIME   0 min        Vitals:    Vitals:    02/11/23 0455 02/11/23 0700 02/11/23 0717 02/11/23 1115   BP: (!) 144/69  (!) 146/80 (!) 163/67   Pulse: 57  61 68   Resp: 17  17 17   Temp: 97.6 °F (36.4 °C)  97.9 °F (36.6 °C) 98.1 °F (36.7 °C)   TempSrc: Oral  Oral Oral   SpO2: 96%  96% 96%   Weight:  183 lb 6.8 oz (83.2 kg)     Height:                 Is this patient to be included in the SEP-1 Core Measure due to severe sepsis or septic shock? No   Exclusion criteria - the patient is NOT to be included for SEP-1 Core Measure due to:  2+ SIRS criteria are not met       CC/HPI Summary, DDx, ED Course, and Reassessment:     DDX: Kidney Stone, pyelonephritis, colitis, other    Work-up in the patient shows no tachycardia. No hypotension. She has a urinalysis with moderate leukocyte esterase and over 50 white cells in her urine. Epithelial cells are 5. Her BUN is 29 with a creatinine of 1.8. She has hypomagnesemia. She has a mild anemia with a hemoglobin of 11.5 and hematocrit of 34.2.   CT shows stones but no obstructive uropathy. At this time the patient has urinary tract infection with renal insufficiency. She will need to be admitted for IV fluids along with treatment for her UTI. Patient was given the following medications:   Medications   0.9 % sodium chloride bolus (0 mLs IntraVENous Stopped 2/8/23 0001)   ondansetron (ZOFRAN) injection 4 mg (4 mg IntraVENous Given 2/7/23 2303)   fentaNYL (SUBLIMAZE) injection 25 mcg (25 mcg IntraVENous Given 2/7/23 2303)   cefTRIAXone (ROCEPHIN) 1,000 mg in sodium chloride 0.9 % 50 mL IVPB (mini-bag) (0 mg IntraVENous Stopped 2/8/23 0033)   0.9 % sodium chloride bolus (0 mLs IntraVENous Stopped 2/8/23 0101)   magnesium sulfate 2000 mg in 50 mL IVPB premix (0 mg IntraVENous Stopped 2/8/23 1137)   magnesium sulfate 2000 mg in 50 mL IVPB premix (0 mg IntraVENous Stopped 2/10/23 0843)   magnesium sulfate 2000 mg in 50 mL IVPB premix (0 mg IntraVENous Stopped 2/10/23 1317)   magnesium sulfate 2000 mg in 50 mL IVPB premix (2,000 mg IntraVENous New Bag 2/11/23 1044)        CONSULTS: (Who and What was discussed)  IP CONSULT TO UROLOGY    Discussion with Other Profesionals : None    Social Determinants : None    Records Reviewed : None    Chronic Conditions:   has a past medical history of Chronic kidney disease (2009), Hyperlipidemia, Hypertension, MDRO (multiple drug resistant organisms) resistance, MRSA infection (2013), Right pulmonary embolus ([address] and Type II or unspecified type diabetes mellitus without mention of complication, not stated as uncontrolled. Disposition Considerations:    I am the Primary Clinician of Record. FINAL IMPRESSION    1. Acute cystitis without hematuria    2. Flank pain    3.  MARCI (acute kidney injury) [facility])           DISPOSITION/PLAN     PATIENT REFERRED TO:   MD Kathryn Sweet [facility]  [phone]    Follow up in 1 week(s)      MD Kathryn Sweet [place] [address]  [phone]          Urology    Follow up in 1 week(s)       DISCHARGE MEDICATIONS:   Discharge Medication List as of 2/11/2023 12:19 PM        START taking these medications    Details   cefdinir (OMNICEF) 300 MG capsule Take 1 capsule by mouth 2 times daily for 3 days, Disp-6 capsule, R-0Normal      magnesium oxide (MAG-OX) 400 (240 Mg) MG tablet Take 1 tablet by mouth daily, Disp-30 tablet, R-0Normal              DISCONTINUED MEDICATIONS:   Discharge Medication List as of 2/11/2023 12:19 PM                 (Please note that portions of this note were completed with a voice recognition program.  Efforts were made to edit the dictations but occasionally words are mis-transcribed.)       Vitaliy Villagomez MD (electronically signed)              Vitaliy Villagomez MD  02/12/23 1312

## 2023-02-08 NOTE — PROGRESS NOTES
4 Eyes Skin Assessment     NAME:  David Durham  YOB: 1960  MEDICAL RECORD NUMBER:  [id number]    The patient is being assessed for  Admission    I agree that One RN has performed a thorough Head to Toe Skin Assessment on the patient. ALL assessment sites listed below have been assessed. Areas assessed by both nurses:    Head, Face, Ears, Shoulders, Back, Chest, Arms, Elbows, Hands, Sacrum. Buttock, Coccyx, Ischium, and Legs. Feet and Heels        Does the Patient have a Wound?  No noted wound(s)       Stanley Prevention initiated by RN: No   Wound Care Orders initiated by RN: No    Pressure Injury (Stage 3,4, Unstageable, DTI, NWPT, and Complex wounds) if present, place referral order by RN under : No    New and Established Ostomies, if present place, referral order under : No      Nurse 1 eSignature: Electronically signed by Karen Kothari RN on 2/8/23 at 4:06 PM EST    **SHARE this note so that the co-signing nurse can place an eSignature**    Nurse 2 eSignature: Electronically signed by Cl Shoemaker RN on 2/8/23 at 4:11 PM EST

## 2023-02-08 NOTE — CARE COORDINATION
INITIAL CASE MANAGEMENT ASSESSMENT    Reviewed chart, met with patient to assess possible discharge needs. Explained Case Management role/services. 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 Would use them again if needed. Transportation: Sister to transport     Medications: [facility] in [place]    PCP: Kwadwo Robledo MD      HD/PD: N/A    PLAN/COMMENTS: Return home w/ family support. [facility] if home care needed. Provided Home Care and SNF list.  Advanced Care Planning completed. The Plan for Transition of Care is related to the following treatment goals: return home    The Patient  was provided with a choice of provider and agrees   with the discharge plan. [x] Yes [] No    Freedom of choice list was provided with basic dialogue that supports the patient's individualized plan of care/goals, treatment preferences and shares the quality data associated with the providers. [x] Yes [] No    SW/CM provided contact information for patient or family to call with any questions. SW/CM will follow and assist as needed.

## 2023-02-08 NOTE — ACP (ADVANCE CARE PLANNING)
Advance Care Planning     Advance Care Planning Activator (Inpatient)  Conversation Note      Date of ACP Conversation: 2/8/2023     Conversation Conducted with: Patient with Decision Making Capacity    ACP Activator: Bakari Purvis [address] Decision Maker:     Current Designated Health Care Decision Maker:     Primary Decision MakeNancy Hennessy Child - [phone]    Secondary Decision Maker: Sherif Means - Child    Today we documented Decision Maker(s) consistent with Legal Next of Kin hierarchy. Care Preferences    Ventilation: \"If you were in your present state of health and suddenly became very ill and were unable to breathe on your own, what would your preference be about the use of a ventilator (breathing machine) if it were available to you? \"      Would the patient desire the use of ventilator (breathing machine)?: yes    \"If your health worsens and it becomes clear that your chance of recovery is unlikely, what would your preference be about the use of a ventilator (breathing machine) if it were available to you? \"     Would the patient desire the use of ventilator (breathing machine)?: No      Resuscitation  \"CPR works best to restart the heart when there is a sudden event, like a heart attack, in someone who is otherwise healthy. Unfortunately, CPR does not typically restart the heart for people who have serious health conditions or who are very sick. \"    \"In the event your heart stopped as a result of an underlying serious health condition, would you want attempts to be made to restart your heart (answer \"yes\" for attempt to resuscitate) or would you prefer a natural death (answer \"no\" for do not attempt to resuscitate)? \" yes       [x] Yes   [] No   Educated Patient / [employer] regarding differences between Advance Directives and portable DNR orders.     Length of ACP Conversation in minutes:  10    Conversation Outcomes:  [x] ACP discussion completed  [] Existing advance directive reviewed with patient; no changes to patient's previously recorded wishes  [] New Advance Directive completed  [] Portable Do Not Rescitate prepared for Provider review and signature  [] POLST/POST/MOLST/MOST prepared for Provider review and signature      Follow-up plan:    [] Schedule follow-up conversation to continue planning  [] Referred individual to Provider for additional questions/concerns   [] Advised patient/agent/surrogate to review completed ACP document and update if needed with changes in condition, patient preferences or care setting    [x] This note routed to one or more involved healthcare providers

## 2023-02-08 NOTE — H&P
Hospital Medicine History & Physical      PCP: Jory Campos MD    Date of Admission: 2/7/2023    Date of Service: Pt seen/examined on 2/7/2023 and admitted to inpatient    Chief Complaint: Left flank pain      History Of Present Illness: The patient is a 65 y.o. female with past medical history as below who presents to [facility] with acute onset and progressive worsening left flank pain since earlier today while she was at home. She initially noted that it was fairly mild but then started to get progressively worse. She has had previous kidney stones in the past.  She does not feel as though she passed a kidney stone yet however. Denies any recent other pain prior to today. She denies any other recent symptoms prior to day of fever, chills, dizziness, syncope, nausea/vomiting/diarrhea/frontal abdominal pain, leg swelling, rashes, dysuria, chest pain, shortness of breath. He has been eating and drinking fine at home without issues and she does feel as though she has been urinating okay while she was here in the ED. Past Medical History:        Diagnosis Date    Chronic kidney disease 2009    kidney stones    Hyperlipidemia     Hypertension     MDRO (multiple drug resistant organisms) resistance     MRSA infection 2013    left lower leg    Right pulmonary embolus (HCC)     Type II or unspecified type diabetes mellitus without mention of complication, not stated as uncontrolled        Past Surgical History:        Procedure Laterality Date    [facility]    COLONOSCOPY  06/07/2018    Dr. Shandra Saravia.  adenoma polyp, repeat in 5 years    CYSTOSCOPY Right 7/22/2021    CYSTOSCOPY, RIGHT URETERAL STENT INSERTION,RIGHT RETROGRADE PYLEOGRAM performed by Jeison Hogan MD at [place] [address]    LITHOTRIPSY  2009       Medications Prior to Admission:    Prior to Admission medications    Medication Sig Start Date End Date Taking? Authorizing Provider   JANUVIA 100 MG tablet TAKE 1 TABLET BY MOUTH EVERY DAY 1/9/23   Jamil Turcios MD   atorvastatin (LIPITOR) 10 MG tablet TAKE 1 TABLET BY MOUTH EVERY DAY 12/20/22   Jamil Turcios MD   metFORMIN (GLUCOPHAGE) 1000 MG tablet Take 1 tablet by mouth 2 times daily (with meals) 12/20/22   Jamil Turcios MD   metoprolol succinate (TOPROL XL) 50 MG extended release tablet TAKE 1 TABLET BY MOUTH EVERY DAY 10/6/22   Jamil Turcios MD   losartan (COZAAR) 100 MG tablet TAKE 1 TABLET BY MOUTH EVERY DAY 10/6/22   Jamil Turcios MD   hydroCHLOROthiazide (MICROZIDE) 12.5 MG capsule TAKE 1 CAPSULE BY MOUTH EVERY DAY IN THE MORNING 10/6/22   Jamil Turcios MD   dilTIAZem (DILT-XR) 240 MG extended release capsule TAKE 1 CAPSULE BY MOUTH EVERY DAY 10/6/22   Jamil Turcios MD   glipiZIDE (GLUCOTROL) 10 MG tablet Take 0.5 tablets by mouth 2 times daily (before meals) 2/24/22   Jamil Turcios MD   citric acid-potassium citrate [facility]) 1100-334 MG/5ML solution Take 5 mLs by mouth 2 times daily 5/15/21   Historical Provider, MD   vitamin B-12 (CYANOCOBALAMIN) 100 MCG tablet Take 250 mcg by mouth daily. Historical Provider, MD   Multiple Vitamin CAPS Take 1 capsule by mouth daily     Historical Provider, MD       Allergies:  Ciprofloxacin and Hydrocodone    Social History:  The patient currently lives home    TOBACCO:   reports that she has never smoked. She has never used smokeless tobacco.  ETOH:   reports no history of alcohol use. Family History:  Reviewed in detail and negative for DM, Early CAD, Cancer, CVA. Positive as follows:        Problem Relation Age of Onset    Cancer Mother         pancreatic    Cancer Father         lung    Heart Disease Father         Bypass x 5    Heart Disease Sister         Stent- CAD    Diabetes Sister        REVIEW OF SYSTEMS:    and as noted in the HPI.  All other systems reviewed and negative. PHYSICAL EXAM:    BP (!) 140/85   Pulse 66   Temp 98.8 °F (37.1 °C) (Oral)   Resp 18   Ht 5' 2\" (1.575 m)   Wt 176 lb 9.4 oz (80.1 kg)   SpO2 97%   BMI 32.30 kg/m²     General appearance: Currently alert and oriented x4, no acute respiratory stress and flank pain seems to be improved  HEENT Normal cephalic, atraumatic without obvious deformity. Pupils equal, round, and reactive to light. Extra ocular muscles intact. Mildly dry mucous membranes, anicteric sclera  Neck: Supple, no JVD  Lungs: Clear breath sounds bilaterally  Heart: Regular rate and rhythm, no murmurs  Abdomen: Anteriorly is soft, nontender, nonstick, active bowel sounds, does seem to have some slightly present left flank tenderness on palpation but overall is improved if she is not moving  Extremities: No edema  Skin: No rashes  Neurologic: Grossly intact neurologically  Mental status: Alert, oriented, thought content appropriate. Capillary Refill: Acceptable  < 3 seconds  Peripheral Pulses: +3 Easily felt, not easily obliterated with pressure      CT abdomen and pelvis without contrast:  02/07/23 2232  CT ABDOMEN PELVIS WO CONTRAST Additional Contrast? None   Performed: 02/07/23 2210  Final        Impression: Renal stones but negative for obstructive uropathy. CBC   Recent Labs     02/07/23 2230 02/08/23  0613   WBC 8.0 7.6   HGB 13.6 11.5*   HCT 40.2 34.2*    237      RENAL  Recent Labs     02/07/23 2230     137   K 4.0  4.0     97*   CO2 29  28   PHOS 3.3   BUN 32*  29*   CREATININE 1.8*  1.8*     LFT'S  No results for input(s): AST, ALT, ALB, BILIDIR, BILITOT, ALKPHOS in the last 72 hours. COAG  No results for input(s): INR in the last 72 hours. CARDIAC ENZYMES  No results for input(s): CKTOTAL, CKMB, CKMBINDEX, TROPONINI in the last 72 hours.     U/A:    Lab Results   Component Value Date/Time    NITRITE n 09/18/2018 02:35 PM    COLORU Yellow 02/07/2023 10:30 PM    WBCUA 53 02/07/2023 10:30 PM    RBCUA 3 02/07/2023 10:30 PM    MUCUS Rare 08/21/2021 12:14 PM    BACTERIA Rare 02/07/2023 10:30 PM    CLARITYU CLOUDY 02/07/2023 10:30 PM    SPECGRAV 1.014 02/07/2023 10:30 PM    LEUKOCYTESUR MODERATE 02/07/2023 10:30 PM    BLOODU Negative 02/07/2023 10:30 PM    GLUCOSEU Negative 02/07/2023 10:30 PM    AMORPHOUS Rare 08/21/2021 12:14 PM       ABG  No results found for: XQT6EJV, BEART, H8RVXFHU, PHART, THGBART, VCG4BFY, PO2ART, YEM9BZQ        Active Hospital Problems    Diagnosis Date Noted    UTI (urinary tract infection) [N39.0] 02/07/2023     Priority: Medium    Flank pain [R10.9] 02/07/2023     Priority: Medium    Acute kidney injury superimposed on CKD ([facility] [N17.9, N18.9] 02/07/2023     Priority: Medium    MARCI (acute kidney injury) ([facility] [address] [N17.9] 07/14/2021    Diabetes mellitus, type 2 ([facility] [address] [E11.9] 02/27/2012         PHYSICIANS CERTIFICATION:    I certify that Doris Patino is expected to be hospitalized for greater than 2 midnights based on the following assessment and plan:      ASSESSMENT/PLAN:  MARCI  UTI  Type 2 diabetes    Plan:  Unclear etiology of MARCI, does not seem to be obstructive in nature, does also seem to have some component of urinary tract infection but does not entirely fit the picture of pyelonephritis  Continue Rocephin at this time for UTI  Continue patient on 100/h of normal saline for IV fluid hydration  Restart patient's home medications but decreasing some of her home doses to decrease risk of worsening acute kidney injury  Continue patient on Percocet 5 and 7.5 mg for flank pain  Repeat labs daily    DVT Prophylaxis: Lovenox  Diet: ADULT DIET; Regular; 4 carb choices (60 gm/meal); Low Sodium (2 gm)  Code Status: Full Code  PT/OT Eval Status: Ambulatory    Dispo -pending clinical course       Hipolito Au DO    Thank you Desmond Cornejo MD for the opportunity to be involved in this patient's care.  If you have any questions or concerns please feel free to contact me at [phone].

## 2023-02-08 NOTE — PROGRESS NOTES
Medication Reconciliation    List of medications patient is currently taking is complete. Source of information: 1.  Conversation with patient at bedside                                      2. EPIC records        Phyllis Lr, [facility]   2/8/2023  11:19 AM

## 2023-02-09 LAB
A/G RATIO: 1.3 (ref 1.1–2.2)
ALBUMIN SERPL-MCNC: 3.5 G/DL (ref 3.4–5)
ALBUMIN SERPL-MCNC: 3.6 G/DL (ref 3.4–5)
ALP BLD-CCNC: 54 U/L (ref 40–129)
ALT SERPL-CCNC: 68 U/L (ref 10–40)
ANION GAP SERPL CALCULATED.3IONS-SCNC: 13 MMOL/L (ref 3–16)
ANION GAP SERPL CALCULATED.3IONS-SCNC: 9 MMOL/L (ref 3–16)
AST SERPL-CCNC: 52 U/L (ref 15–37)
BASOPHILS ABSOLUTE: 0.1 K/UL (ref 0–0.2)
BASOPHILS RELATIVE PERCENT: 0.8 %
BILIRUB SERPL-MCNC: <0.2 MG/DL (ref 0–1)
BUN BLDV-MCNC: 12 MG/DL (ref 7–20)
BUN BLDV-MCNC: 13 MG/DL (ref 7–20)
CALCIUM SERPL-MCNC: 8.6 MG/DL (ref 8.3–10.6)
CALCIUM SERPL-MCNC: 8.7 MG/DL (ref 8.3–10.6)
CHLORIDE BLD-SCNC: 101 MMOL/L (ref 99–110)
CHLORIDE BLD-SCNC: 103 MMOL/L (ref 99–110)
CO2: 24 MMOL/L (ref 21–32)
CO2: 27 MMOL/L (ref 21–32)
CREAT SERPL-MCNC: 0.9 MG/DL (ref 0.6–1.2)
CREAT SERPL-MCNC: 0.9 MG/DL (ref 0.6–1.2)
EOSINOPHILS ABSOLUTE: 0.2 K/UL (ref 0–0.6)
EOSINOPHILS RELATIVE PERCENT: 3.4 %
GFR SERPL CREATININE-BSD FRML MDRD: >60 ML/MIN/{1.73_M2}
GFR SERPL CREATININE-BSD FRML MDRD: >60 ML/MIN/{1.73_M2}
GLUCOSE BLD-MCNC: 173 MG/DL (ref 70–99)
GLUCOSE BLD-MCNC: 178 MG/DL (ref 70–99)
GLUCOSE BLD-MCNC: 178 MG/DL (ref 70–99)
GLUCOSE BLD-MCNC: 189 MG/DL (ref 70–99)
GLUCOSE BLD-MCNC: 199 MG/DL (ref 70–99)
GLUCOSE BLD-MCNC: 258 MG/DL (ref 70–99)
HCT VFR BLD CALC: 32.9 % (ref 36–48)
HEMOGLOBIN: 11.4 G/DL (ref 12–16)
LYMPHOCYTES ABSOLUTE: 2.3 K/UL (ref 1–5.1)
LYMPHOCYTES RELATIVE PERCENT: 35.1 %
MAGNESIUM: 1.3 MG/DL (ref 1.8–2.4)
MCH RBC QN AUTO: 29.8 PG (ref 26–34)
MCHC RBC AUTO-ENTMCNC: 34.7 G/DL (ref 31–36)
MCV RBC AUTO: 85.7 FL (ref 80–100)
MONOCYTES ABSOLUTE: 0.5 K/UL (ref 0–1.3)
MONOCYTES RELATIVE PERCENT: 7.2 %
NEUTROPHILS ABSOLUTE: 3.5 K/UL (ref 1.7–7.7)
NEUTROPHILS RELATIVE PERCENT: 53.5 %
PDW BLD-RTO: 12.8 % (ref 12.4–15.4)
PERFORMED ON: ABNORMAL
PHOSPHORUS: 2.6 MG/DL (ref 2.5–4.9)
PLATELET # BLD: 205 K/UL (ref 135–450)
PMV BLD AUTO: 8.4 FL (ref 5–10.5)
POTASSIUM SERPL-SCNC: 3.7 MMOL/L (ref 3.5–5.1)
POTASSIUM SERPL-SCNC: 3.7 MMOL/L (ref 3.5–5.1)
RBC # BLD: 3.83 M/UL (ref 4–5.2)
SODIUM BLD-SCNC: 138 MMOL/L (ref 136–145)
SODIUM BLD-SCNC: 139 MMOL/L (ref 136–145)
TOTAL PROTEIN: 6.3 G/DL (ref 6.4–8.2)
WBC # BLD: 6.5 K/UL (ref 4–11)

## 2023-02-09 PROCEDURE — 85025 COMPLETE CBC W/AUTO DIFF WBC: CPT

## 2023-02-09 PROCEDURE — 6360000002 HC RX W HCPCS: Performed by: STUDENT IN AN ORGANIZED HEALTH CARE EDUCATION/TRAINING PROGRAM

## 2023-02-09 PROCEDURE — 83735 ASSAY OF MAGNESIUM: CPT

## 2023-02-09 PROCEDURE — 1200000000 HC SEMI PRIVATE

## 2023-02-09 PROCEDURE — 80053 COMPREHEN METABOLIC PANEL: CPT

## 2023-02-09 PROCEDURE — 6370000000 HC RX 637 (ALT 250 FOR IP): Performed by: STUDENT IN AN ORGANIZED HEALTH CARE EDUCATION/TRAINING PROGRAM

## 2023-02-09 PROCEDURE — 94760 N-INVAS EAR/PLS OXIMETRY 1: CPT

## 2023-02-09 PROCEDURE — 36415 COLL VENOUS BLD VENIPUNCTURE: CPT

## 2023-02-09 PROCEDURE — 2580000003 HC RX 258: Performed by: STUDENT IN AN ORGANIZED HEALTH CARE EDUCATION/TRAINING PROGRAM

## 2023-02-09 PROCEDURE — 2580000003 HC RX 258: Performed by: INTERNAL MEDICINE

## 2023-02-09 RX ADMIN — LOSARTAN POTASSIUM 25 MG: 25 TABLET, FILM COATED ORAL at 09:06

## 2023-02-09 RX ADMIN — DILTIAZEM HYDROCHLORIDE 240 MG: 120 CAPSULE, COATED, EXTENDED RELEASE ORAL at 09:06

## 2023-02-09 RX ADMIN — ENOXAPARIN SODIUM 40 MG: 100 INJECTION SUBCUTANEOUS at 09:06

## 2023-02-09 RX ADMIN — CEFTRIAXONE 1000 MG: 1 INJECTION, POWDER, FOR SOLUTION INTRAMUSCULAR; INTRAVENOUS at 21:10

## 2023-02-09 RX ADMIN — OXYCODONE AND ACETAMINOPHEN 1 TABLET: 7.5; 325 TABLET ORAL at 05:23

## 2023-02-09 RX ADMIN — INSULIN LISPRO 2 UNITS: 100 INJECTION, SOLUTION INTRAVENOUS; SUBCUTANEOUS at 21:10

## 2023-02-09 RX ADMIN — METOPROLOL SUCCINATE 50 MG: 50 TABLET, EXTENDED RELEASE ORAL at 09:06

## 2023-02-09 RX ADMIN — SODIUM CHLORIDE: 9 INJECTION, SOLUTION INTRAVENOUS at 07:14

## 2023-02-09 RX ADMIN — ATORVASTATIN CALCIUM 10 MG: 10 TABLET, FILM COATED ORAL at 09:06

## 2023-02-09 RX ADMIN — OXYCODONE AND ACETAMINOPHEN 1 TABLET: 5; 325 TABLET ORAL at 19:18

## 2023-02-09 RX ADMIN — ONDANSETRON 4 MG: 2 INJECTION INTRAMUSCULAR; INTRAVENOUS at 05:23

## 2023-02-09 ASSESSMENT — PAIN DESCRIPTION - ORIENTATION
ORIENTATION: LEFT

## 2023-02-09 ASSESSMENT — PAIN SCALES - GENERAL
PAINLEVEL_OUTOF10: 6
PAINLEVEL_OUTOF10: 5
PAINLEVEL_OUTOF10: 0
PAINLEVEL_OUTOF10: 7
PAINLEVEL_OUTOF10: 0

## 2023-02-09 ASSESSMENT — PAIN DESCRIPTION - FREQUENCY
FREQUENCY: INTERMITTENT
FREQUENCY: CONTINUOUS
FREQUENCY: CONTINUOUS

## 2023-02-09 ASSESSMENT — PAIN DESCRIPTION - DESCRIPTORS
DESCRIPTORS: ACHING;DISCOMFORT
DESCRIPTORS: SHARP
DESCRIPTORS: SHARP

## 2023-02-09 ASSESSMENT — PAIN DESCRIPTION - ONSET
ONSET: PROGRESSIVE
ONSET: ON-GOING
ONSET: ON-GOING

## 2023-02-09 ASSESSMENT — PAIN DESCRIPTION - PAIN TYPE
TYPE: ACUTE PAIN

## 2023-02-09 ASSESSMENT — PAIN DESCRIPTION - LOCATION
LOCATION: FLANK

## 2023-02-09 ASSESSMENT — PAIN - FUNCTIONAL ASSESSMENT
PAIN_FUNCTIONAL_ASSESSMENT: ACTIVITIES ARE NOT PREVENTED

## 2023-02-09 NOTE — PROGRESS NOTES
Hospitalist Progress Note      PCP: Jayce Reeves MD    Date of Admission: 2/7/2023        Subjective: Better, minimal flank pain, no fever chills nausea or abdominal pain      Medications:  Reviewed    Infusion Medications    sodium chloride 100 mL/hr at 02/09/23 0850    sodium chloride      dextrose       Scheduled Medications    dilTIAZem  240 mg Oral Daily    atorvastatin  10 mg Oral Daily    losartan  25 mg Oral Daily    metoprolol succinate  50 mg Oral Daily    sodium chloride flush  5-40 mL IntraVENous 2 times per day    enoxaparin  40 mg SubCUTAneous Daily    insulin lispro  0-4 Units SubCUTAneous TID WC    insulin lispro  0-4 Units SubCUTAneous Nightly    cefTRIAXone (ROCEPHIN) IV  1,000 mg IntraVENous Nightly     PRN Meds: sodium chloride flush, sodium chloride, acetaminophen **OR** acetaminophen, glucose, dextrose bolus **OR** dextrose bolus, glucagon (rDNA), dextrose, ondansetron, oxyCODONE-acetaminophen, oxyCODONE-acetaminophen      Intake/Output Summary (Last 24 hours) at 2/9/2023 0911  Last data filed at 2/9/2023 0850  Gross per 24 hour   Intake 3006.31 ml   Output 1500 ml   Net 1506.31 ml       Physical Exam Performed:    /78   Pulse 56   Temp 97.7 °F (36.5 °C) (Oral)   Resp 20   Ht 5' 2\" (1.575 m)   Wt 156 lb 8.4 oz (71 kg)   SpO2 92%   BMI 28.63 kg/m²     General appearance: No apparent distress  Neck: Supple  Respiratory:  Normal respiratory effort. Clear to auscultation, bilaterally without Rales/Wheezes/Rhonchi. Cardiovascular: Regular rate and rhythm with normal S1/S2 without murmurs, rubs or gallops. Abdomen: Soft, non-tender  Musculoskeletal: No clubbing, cyanosis  Skin: Skin color, texture, turgor normal.  No rashes or lesions.   Neurologic:  No focal weakness   Psychiatric: Alert and oriented  Capillary Refill: Brisk, 3 seconds, normal   Peripheral Pulses: +2 palpable, equal bilaterally       Labs:   Recent Labs     02/07/23  2230 02/08/23  0613 02/09/23  0556   WBC 8.0 7.6 6.5   HGB 13.6 11.5* 11.4*   HCT 40.2 34.2* 32.9*    237 205     Recent Labs     02/07/23  2230 02/08/23  0613     137 139   K 4.0  4.0 4.0     97* 104   CO2 29  28 22   BUN 32*  29* 25*   CREATININE 1.8*  1.8* 1.2   CALCIUM 10.5  10.3 9.1   PHOS 3.3 3.1     No results for input(s): AST, ALT, BILIDIR, BILITOT, ALKPHOS in the last 72 hours. No results for input(s): INR in the last 72 hours. No results for input(s): Imagene Plank in the last 72 hours. Urinalysis:      Lab Results   Component Value Date/Time    NITRU Negative 02/07/2023 10:30 PM    WBCUA 53 02/07/2023 10:30 PM    BACTERIA Rare 02/07/2023 10:30 PM    RBCUA 3 02/07/2023 10:30 PM    BLOODU Negative 02/07/2023 10:30 PM    SPECGRAV 1.014 02/07/2023 10:30 PM    GLUCOSEU Negative 02/07/2023 10:30 PM       Radiology:  CT ABDOMEN PELVIS WO CONTRAST Additional Contrast? None   Final Result   Renal stones but negative for obstructive uropathy. IP CONSULT TO UROLOGY    Assessment/Plan:    Active Hospital Problems    Diagnosis     UTI (urinary tract infection) [N39.0]      Priority: Medium    Flank pain [R10.9]      Priority: Medium    Acute kidney injury superimposed on CKD ([facility] [address] [N17.9, N18.9]      Priority: Medium    MARCI (acute kidney injury) ([address] [N17.9]     Diabetes mellitus, type 2 ([facility] [address] [E11.9]        Possibly complicated UTI, patient started on IV antibiotics we will keep for now, cultures still pending. Acute kidney injury IV fluids, repeat renal function in a.m. Nephrolithiasis, will consult urology  Anemia, appears chronic, monitor, no obvious bleeding  Diabetes mellitus with hyperglycemia sliding scale  Essential hypertension, resume p.o. medications          Diet: ADULT DIET; Regular; 4 carb choices (60 gm/meal);  Low Sodium (2 gm)  Code Status: Full Code      Shruti Joe MD

## 2023-02-09 NOTE — PLAN OF CARE
Problem: Discharge Planning  Goal: Discharge to home or other facility with appropriate resources  2/9/2023 0853 by Leyla Chamberlain RN  Outcome: Progressing  Flowsheets (Taken 2/8/2023 1621 by Hermes Thompson RN)  Discharge to home or other facility with appropriate resources:   Identify barriers to discharge with patient and caregiver   Identify discharge learning needs (meds, wound care, etc)   Refer to discharge planning if patient needs post-hospital services based on physician order or complex needs related to functional status, cognitive ability or social support system   Arrange for needed discharge resources and transportation as appropriate  2/9/2023 0742 by Roel Zelaya RN  Outcome: Progressing  Flowsheets (Taken 2/8/2023 1621 by Hermes Thompson RN)  Discharge to home or other facility with appropriate resources:   Identify barriers to discharge with patient and caregiver   Identify discharge learning needs (meds, wound care, etc)   Refer to discharge planning if patient needs post-hospital services based on physician order or complex needs related to functional status, cognitive ability or social support system   Arrange for needed discharge resources and transportation as appropriate     Problem: Pain  Goal: Verbalizes/displays adequate comfort level or baseline comfort level  2/9/2023 0853 by Leyla Chamberlain RN  Outcome: Progressing  Flowsheets (Taken 2/8/2023 1621 by Hermes Thompson RN)  Verbalizes/displays adequate comfort level or baseline comfort level:   Encourage patient to monitor pain and request assistance   Administer analgesics based on type and severity of pain and evaluate response   Consider cultural and social influences on pain and pain management   Assess pain using appropriate pain scale   Implement non-pharmacological measures as appropriate and evaluate response  2/9/2023 0742 by Roel Zelaya RN  Outcome: Progressing  Flowsheets (Taken 2/8/2023 1621 by Arianna Lassiter Yasir Eaton, RN)  Verbalizes/displays adequate comfort level or baseline comfort level:   Encourage patient to monitor pain and request assistance   Administer analgesics based on type and severity of pain and evaluate response   Consider cultural and social influences on pain and pain management   Assess pain using appropriate pain scale   Implement non-pharmacological measures as appropriate and evaluate response     Problem: ABCDS Injury Assessment  Goal: Absence of physical injury  2/9/2023 0853 by Jose Dunbar RN  Outcome: Progressing  Flowsheets (Taken 2/8/2023 1621 by Stacia Thompson RN)  Absence of Physical Injury: Implement safety measures based on patient assessment  2/9/2023 0742 by Herman Sharpe RN  Outcome: Progressing  Flowsheets (Taken 2/8/2023 1621 by Stacia Thompson RN)  Absence of Physical Injury: Implement safety measures based on patient assessment

## 2023-02-09 NOTE — PROGRESS NOTES
Physician Progress Note      Michelle Pineda  [facility] #:                  [id number]  :                       1960  ADMIT DATE:       2023 9:45 PM  [address] [place] DATE:  RESPONDING  PROVIDER #:        Tanya Worley DO        QUERY TEXT:    Stage of Chronic Kidney Disease: Please provide further specificity, if known. Clinical indicators include: bun, creatinine, ckd, chronic kidney disease  Options provided:  -- Chronic kidney disease stage 1  -- Chronic kidney disease stage 2  -- Chronic kidney disease stage 3  -- Chronic kidney disease stage 3a  -- Chronic kidney disease stage 3b  -- Chronic kidney disease stage 4  -- Chronic kidney disease stage 5  -- Chronic kidney disease stage 5, requiring dialysis  -- End stage renal disease  -- Other - I will add my own diagnosis  -- Disagree - Not applicable / Not valid  -- Disagree - Clinically Unable to determine / Unknown        PROVIDER RESPONSE TEXT:    The patient has chronic kidney disease stage 2. Electronically signed by:   Pa Canela DO 2023 8:38 AM

## 2023-02-09 NOTE — PLAN OF CARE
Problem: Discharge Planning  Goal: Discharge to home or other facility with appropriate resources  Outcome: Progressing  Flowsheets (Taken 2/8/2023 1621 by Charbel Melton, RN)  Discharge to home or other facility with appropriate resources:   Identify barriers to discharge with patient and caregiver   Identify discharge learning needs (meds, wound care, etc)   Refer to discharge planning if patient needs post-hospital services based on physician order or complex needs related to functional status, cognitive ability or social support system   Arrange for needed discharge resources and transportation as appropriate     Problem: Pain  Goal: Verbalizes/displays adequate comfort level or baseline comfort level  Outcome: Progressing  Flowsheets (Taken 2/8/2023 1621 by Charbel Melton RN)  Verbalizes/displays adequate comfort level or baseline comfort level:   Encourage patient to monitor pain and request assistance   Administer analgesics based on type and severity of pain and evaluate response   Consider cultural and social influences on pain and pain management   Assess pain using appropriate pain scale   Implement non-pharmacological measures as appropriate and evaluate response     Problem: ABCDS Injury Assessment  Goal: Absence of physical injury  Outcome: Progressing  Flowsheets (Taken 2/8/2023 1621 by Charbel Melton, RN)  Absence of Physical Injury: Implement safety measures based on patient assessment

## 2023-02-10 LAB
ANION GAP SERPL CALCULATED.3IONS-SCNC: 9 MMOL/L (ref 3–16)
BASOPHILS ABSOLUTE: 0 K/UL (ref 0–0.2)
BASOPHILS RELATIVE PERCENT: 0.6 %
BUN BLDV-MCNC: 11 MG/DL (ref 7–20)
CALCIUM SERPL-MCNC: 8.8 MG/DL (ref 8.3–10.6)
CHLORIDE BLD-SCNC: 104 MMOL/L (ref 99–110)
CO2: 28 MMOL/L (ref 21–32)
CREAT SERPL-MCNC: 0.9 MG/DL (ref 0.6–1.2)
EOSINOPHILS ABSOLUTE: 0.2 K/UL (ref 0–0.6)
EOSINOPHILS RELATIVE PERCENT: 3 %
GFR SERPL CREATININE-BSD FRML MDRD: >60 ML/MIN/{1.73_M2}
GLUCOSE BLD-MCNC: 158 MG/DL (ref 70–99)
GLUCOSE BLD-MCNC: 164 MG/DL (ref 70–99)
GLUCOSE BLD-MCNC: 207 MG/DL (ref 70–99)
GLUCOSE BLD-MCNC: 226 MG/DL (ref 70–99)
GLUCOSE BLD-MCNC: 254 MG/DL (ref 70–99)
HCT VFR BLD CALC: 35 % (ref 36–48)
HEMOGLOBIN: 11.9 G/DL (ref 12–16)
LYMPHOCYTES ABSOLUTE: 1.9 K/UL (ref 1–5.1)
LYMPHOCYTES RELATIVE PERCENT: 34.1 %
MAGNESIUM: 1.2 MG/DL (ref 1.8–2.4)
MAGNESIUM: 2.2 MG/DL (ref 1.8–2.4)
MCH RBC QN AUTO: 29.6 PG (ref 26–34)
MCHC RBC AUTO-ENTMCNC: 34 G/DL (ref 31–36)
MCV RBC AUTO: 87.1 FL (ref 80–100)
MONOCYTES ABSOLUTE: 0.5 K/UL (ref 0–1.3)
MONOCYTES RELATIVE PERCENT: 9 %
NEUTROPHILS ABSOLUTE: 3 K/UL (ref 1.7–7.7)
NEUTROPHILS RELATIVE PERCENT: 53.3 %
PDW BLD-RTO: 13 % (ref 12.4–15.4)
PERFORMED ON: ABNORMAL
PLATELET # BLD: 212 K/UL (ref 135–450)
PMV BLD AUTO: 8.7 FL (ref 5–10.5)
POTASSIUM SERPL-SCNC: 4.1 MMOL/L (ref 3.5–5.1)
RBC # BLD: 4.02 M/UL (ref 4–5.2)
SODIUM BLD-SCNC: 141 MMOL/L (ref 136–145)
WBC # BLD: 5.7 K/UL (ref 4–11)

## 2023-02-10 PROCEDURE — 36415 COLL VENOUS BLD VENIPUNCTURE: CPT

## 2023-02-10 PROCEDURE — 85025 COMPLETE CBC W/AUTO DIFF WBC: CPT

## 2023-02-10 PROCEDURE — 6360000002 HC RX W HCPCS: Performed by: INTERNAL MEDICINE

## 2023-02-10 PROCEDURE — 80048 BASIC METABOLIC PNL TOTAL CA: CPT

## 2023-02-10 PROCEDURE — 2580000003 HC RX 258: Performed by: STUDENT IN AN ORGANIZED HEALTH CARE EDUCATION/TRAINING PROGRAM

## 2023-02-10 PROCEDURE — 6360000002 HC RX W HCPCS: Performed by: STUDENT IN AN ORGANIZED HEALTH CARE EDUCATION/TRAINING PROGRAM

## 2023-02-10 PROCEDURE — 2580000003 HC RX 258: Performed by: INTERNAL MEDICINE

## 2023-02-10 PROCEDURE — 6370000000 HC RX 637 (ALT 250 FOR IP): Performed by: STUDENT IN AN ORGANIZED HEALTH CARE EDUCATION/TRAINING PROGRAM

## 2023-02-10 PROCEDURE — 83735 ASSAY OF MAGNESIUM: CPT

## 2023-02-10 PROCEDURE — 94760 N-INVAS EAR/PLS OXIMETRY 1: CPT

## 2023-02-10 PROCEDURE — 1200000000 HC SEMI PRIVATE

## 2023-02-10 RX ORDER — MAGNESIUM SULFATE IN WATER 40 MG/ML
2000 INJECTION, SOLUTION INTRAVENOUS ONCE
Status: COMPLETED | OUTPATIENT
Start: 2023-02-10 | End: 2023-02-10

## 2023-02-10 RX ADMIN — INSULIN LISPRO 2 UNITS: 100 INJECTION, SOLUTION INTRAVENOUS; SUBCUTANEOUS at 12:12

## 2023-02-10 RX ADMIN — LOSARTAN POTASSIUM 25 MG: 25 TABLET, FILM COATED ORAL at 08:30

## 2023-02-10 RX ADMIN — ATORVASTATIN CALCIUM 10 MG: 10 TABLET, FILM COATED ORAL at 08:30

## 2023-02-10 RX ADMIN — ONDANSETRON 4 MG: 2 INJECTION INTRAMUSCULAR; INTRAVENOUS at 20:53

## 2023-02-10 RX ADMIN — METOPROLOL SUCCINATE 50 MG: 50 TABLET, EXTENDED RELEASE ORAL at 08:30

## 2023-02-10 RX ADMIN — Medication 10 ML: at 20:53

## 2023-02-10 RX ADMIN — ENOXAPARIN SODIUM 40 MG: 100 INJECTION SUBCUTANEOUS at 08:30

## 2023-02-10 RX ADMIN — MAGNESIUM SULFATE HEPTAHYDRATE 2000 MG: 40 INJECTION, SOLUTION INTRAVENOUS at 11:17

## 2023-02-10 RX ADMIN — CEFTRIAXONE 1000 MG: 1 INJECTION, POWDER, FOR SOLUTION INTRAMUSCULAR; INTRAVENOUS at 20:46

## 2023-02-10 RX ADMIN — MAGNESIUM SULFATE HEPTAHYDRATE 2000 MG: 40 INJECTION, SOLUTION INTRAVENOUS at 06:43

## 2023-02-10 RX ADMIN — SODIUM CHLORIDE: 9 INJECTION, SOLUTION INTRAVENOUS at 05:47

## 2023-02-10 RX ADMIN — INSULIN LISPRO 1 UNITS: 100 INJECTION, SOLUTION INTRAVENOUS; SUBCUTANEOUS at 17:08

## 2023-02-10 RX ADMIN — DILTIAZEM HYDROCHLORIDE 240 MG: 120 CAPSULE, COATED, EXTENDED RELEASE ORAL at 08:30

## 2023-02-10 RX ADMIN — OXYCODONE AND ACETAMINOPHEN 1 TABLET: 7.5; 325 TABLET ORAL at 20:53

## 2023-02-10 ASSESSMENT — PAIN DESCRIPTION - ORIENTATION: ORIENTATION: LEFT

## 2023-02-10 ASSESSMENT — PAIN SCALES - GENERAL
PAINLEVEL_OUTOF10: 2
PAINLEVEL_OUTOF10: 0
PAINLEVEL_OUTOF10: 0
PAINLEVEL_OUTOF10: 7
PAINLEVEL_OUTOF10: 0

## 2023-02-10 ASSESSMENT — PAIN - FUNCTIONAL ASSESSMENT: PAIN_FUNCTIONAL_ASSESSMENT: ACTIVITIES ARE NOT PREVENTED

## 2023-02-10 ASSESSMENT — PAIN DESCRIPTION - ONSET: ONSET: PROGRESSIVE

## 2023-02-10 ASSESSMENT — PAIN DESCRIPTION - LOCATION: LOCATION: FLANK

## 2023-02-10 ASSESSMENT — PAIN DESCRIPTION - FREQUENCY: FREQUENCY: INTERMITTENT

## 2023-02-10 ASSESSMENT — PAIN DESCRIPTION - DESCRIPTORS: DESCRIPTORS: ACHING

## 2023-02-10 ASSESSMENT — PAIN DESCRIPTION - PAIN TYPE: TYPE: ACUTE PAIN

## 2023-02-10 NOTE — PROGRESS NOTES
Checking on patient Q2H for nutrition needs, hygiene needs, comfort measures, mobility, fall risk interventions, and safe environment. All precautions and interventions in place. Educated patient on use of call light and telephone. Patient verbalizes understanding. Call light/telephone in reach.   Electronically signed by Otto Jimenez RN on 2/10/2023 at 11:48 AM

## 2023-02-10 NOTE — PROGRESS NOTES
Patient resting in bed this evening, c/o increased L Flank pain. PRN Percocet administered per orders for management. See eMAR for documentation. Patient satisfied with this intervention. Denies further needs. Will continue to monitor and assess.

## 2023-02-10 NOTE — PLAN OF CARE
Problem: Discharge Planning  Goal: Discharge to home or other facility with appropriate resources  2/10/2023 1147 by Louann Vance RN  Outcome: Progressing  Flowsheets (Taken 2/10/2023 0054 by Jennie Mi)  Discharge to home or other facility with appropriate resources:   Identify barriers to discharge with patient and caregiver   Refer to discharge planning if patient needs post-hospital services based on physician order or complex needs related to functional status, cognitive ability or social support system   Arrange for needed discharge resources and transportation as appropriate   Identify discharge learning needs (meds, wound care, etc)  2/10/2023 0054 by Jennie Mi  Outcome: Progressing  Flowsheets (Taken 2/10/2023 0054)  Discharge to home or other facility with appropriate resources:   Identify barriers to discharge with patient and caregiver   Refer to discharge planning if patient needs post-hospital services based on physician order or complex needs related to functional status, cognitive ability or social support system   Arrange for needed discharge resources and transportation as appropriate   Identify discharge learning needs (meds, wound care, etc)     Problem: Pain  Goal: Verbalizes/displays adequate comfort level or baseline comfort level  2/10/2023 1147 by Louann Vance RN  Outcome: Progressing  Flowsheets (Taken 2/10/2023 0054 by Jennie Mi)  Verbalizes/displays adequate comfort level or baseline comfort level:   Encourage patient to monitor pain and request assistance   Administer analgesics based on type and severity of pain and evaluate response   Assess pain using appropriate pain scale   Implement non-pharmacological measures as appropriate and evaluate response   Consider cultural and social influences on pain and pain management  2/10/2023 0054 by Jennie Mi  Outcome: Progressing  Flowsheets (Taken 2/10/2023 0054)  Verbalizes/displays adequate comfort level or baseline comfort level:   Encourage patient to monitor pain and request assistance   Administer analgesics based on type and severity of pain and evaluate response   Assess pain using appropriate pain scale   Implement non-pharmacological measures as appropriate and evaluate response   Consider cultural and social influences on pain and pain management     Problem: ABCDS Injury Assessment  Goal: Absence of physical injury  2/10/2023 1147 by Cheryl Bassett RN  Outcome: Progressing  Flowsheets (Taken 2/10/2023 0054 by [facility] Close)  Absence of Physical Injury: Implement safety measures based on patient assessment  2/10/2023 0054 by Cathy Close  Outcome: Progressing  Flowsheets (Taken 2/10/2023 0054)  Absence of Physical Injury: Implement safety measures based on patient assessment

## 2023-02-10 NOTE — PROGRESS NOTES
Hospitalist Progress Note      PCP: Shane Brar MD    Date of Admission: 2/7/2023        Subjective: Feels better, no pain no chest pain or shortness of breath no nausea vomiting fever or chills      Medications:  Reviewed    Infusion Medications    sodium chloride 100 mL/hr at 02/10/23 0547    sodium chloride      dextrose       Scheduled Medications    magnesium sulfate  2,000 mg IntraVENous Once    dilTIAZem  240 mg Oral Daily    atorvastatin  10 mg Oral Daily    losartan  25 mg Oral Daily    metoprolol succinate  50 mg Oral Daily    sodium chloride flush  5-40 mL IntraVENous 2 times per day    enoxaparin  40 mg SubCUTAneous Daily    insulin lispro  0-4 Units SubCUTAneous TID WC    insulin lispro  0-4 Units SubCUTAneous Nightly    cefTRIAXone (ROCEPHIN) IV  1,000 mg IntraVENous Nightly     PRN Meds: sodium chloride flush, sodium chloride, acetaminophen **OR** acetaminophen, glucose, dextrose bolus **OR** dextrose bolus, glucagon (rDNA), dextrose, ondansetron, oxyCODONE-acetaminophen, oxyCODONE-acetaminophen      Intake/Output Summary (Last 24 hours) at 2/10/2023 0628  Last data filed at 2/10/2023 0320  Gross per 24 hour   Intake 3086.31 ml   Output 900 ml   Net 2186.31 ml       Physical Exam Performed:    BP (!) 159/85   Pulse 69   Temp 98.1 °F (36.7 °C) (Oral)   Resp 16   Ht 5' 2\" (1.575 m)   Wt 156 lb 8.4 oz (71 kg)   SpO2 97%   BMI 28.63 kg/m²     General appearance: No apparent distress  Neck: Supple  Respiratory:  Normal respiratory effort. Clear to auscultation, bilaterally without Rales/Wheezes/Rhonchi. Cardiovascular: Regular rate and rhythm with normal S1/S2 without murmurs, rubs or gallops. Abdomen: Soft, non-tender  Musculoskeletal: No clubbing, cyanosis   Skin: Skin color, texture, turgor normal.  No rashes or lesions.   Neurologic: No focal weakness  Psychiatric: Alert and oriented  Capillary Refill: Brisk, 3 seconds, normal   Peripheral Pulses: +2 palpable, equal bilaterally Labs:   Recent Labs     02/07/23  2230 02/08/23  0613 02/09/23  0556   WBC 8.0 7.6 6.5   HGB 13.6 11.5* 11.4*   HCT 40.2 34.2* 32.9*    237 205     Recent Labs     02/07/23  2230 02/08/23  0613 02/09/23  0921     137 139 138  139   K 4.0  4.0 4.0 3.7  3.7     97* 104 101  103   CO2 29  28 22 24  27   BUN 32*  29* 25* 12  13   CREATININE 1.8*  1.8* 1.2 0.9  0.9   CALCIUM 10.5  10.3 9.1 8.6  8.7   PHOS 3.3 3.1 2.6     Recent Labs     02/09/23  0921   AST 52*   ALT 68*   BILITOT <0.2   ALKPHOS 54     No results for input(s): INR in the last 72 hours. No results for input(s): Lanier Palin in the last 72 hours. Urinalysis:      Lab Results   Component Value Date/Time    NITRU Negative 02/07/2023 10:30 PM    WBCUA 53 02/07/2023 10:30 PM    BACTERIA Rare 02/07/2023 10:30 PM    RBCUA 3 02/07/2023 10:30 PM    BLOODU Negative 02/07/2023 10:30 PM    SPECGRAV 1.014 02/07/2023 10:30 PM    GLUCOSEU Negative 02/07/2023 10:30 PM       Radiology:  CT ABDOMEN PELVIS WO CONTRAST Additional Contrast? None   Final Result   Renal stones but negative for obstructive uropathy. IP CONSULT TO UROLOGY    Assessment/Plan:    Active Hospital Problems    Diagnosis     UTI (urinary tract infection) [N39.0]      Priority: Medium    Flank pain [R10.9]      Priority: Medium    Acute kidney injury superimposed on CKD ([facility] [N17.9, N18.9]      Priority: Medium    MARCI (acute kidney injury) ([facility] [N17.9]     Diabetes mellitus, type 2 ([facility] [E11.9]    Possibly complicated UTI, patient started on IV antibiotics we will keep for now, cultures still pending. Acute kidney injury IV fluids, renal function improved  Nephrolithiasis, consulted urology follow-up as outpatient  Anemia, appears chronic, monitor, no obvious bleeding  Diabetes mellitus with hyperglycemia sliding scale  Essential hypertension, resume p.o. medications  Hypomagnesemia being replaced        Diet: ADULT DIET;  Regular; 4 carb choices (60 gm/meal);  Low Sodium (2 gm)  Code Status: Full Code      Kimberlee Muhammad MD

## 2023-02-10 NOTE — PLAN OF CARE
Problem: Discharge Planning  Goal: Discharge to home or other facility with appropriate resources  Outcome: Progressing  Flowsheets (Taken 2/10/2023 0054)  Discharge to home or other facility with appropriate resources:   Identify barriers to discharge with patient and caregiver   Refer to discharge planning if patient needs post-hospital services based on physician order or complex needs related to functional status, cognitive ability or social support system   Arrange for needed discharge resources and transportation as appropriate   Identify discharge learning needs (meds, wound care, etc)     Problem: Pain  Goal: Verbalizes/displays adequate comfort level or baseline comfort level  Outcome: Progressing  Flowsheets (Taken 2/10/2023 0054)  Verbalizes/displays adequate comfort level or baseline comfort level:   Encourage patient to monitor pain and request assistance   Administer analgesics based on type and severity of pain and evaluate response   Assess pain using appropriate pain scale   Implement non-pharmacological measures as appropriate and evaluate response   Consider cultural and social influences on pain and pain management     Problem: ABCDS Injury Assessment  Goal: Absence of physical injury  Outcome: Progressing  Flowsheets (Taken 2/10/2023 0054)  Absence of Physical Injury: Implement safety measures based on patient assessment

## 2023-02-11 VITALS
HEART RATE: 68 BPM | WEIGHT: 183.42 LBS | DIASTOLIC BLOOD PRESSURE: 67 MMHG | OXYGEN SATURATION: 96 % | SYSTOLIC BLOOD PRESSURE: 163 MMHG | RESPIRATION RATE: 17 BRPM | BODY MASS INDEX: 33.75 KG/M2 | HEIGHT: 62 IN | TEMPERATURE: 98.1 F

## 2023-02-11 LAB
A/G RATIO: 1.4 (ref 1.1–2.2)
ALBUMIN SERPL-MCNC: 3.6 G/DL (ref 3.4–5)
ALP BLD-CCNC: 54 U/L (ref 40–129)
ALT SERPL-CCNC: 80 U/L (ref 10–40)
ANION GAP SERPL CALCULATED.3IONS-SCNC: 11 MMOL/L (ref 3–16)
AST SERPL-CCNC: 58 U/L (ref 15–37)
BILIRUB SERPL-MCNC: 0.3 MG/DL (ref 0–1)
BUN BLDV-MCNC: 10 MG/DL (ref 7–20)
CALCIUM SERPL-MCNC: 8.7 MG/DL (ref 8.3–10.6)
CHLORIDE BLD-SCNC: 104 MMOL/L (ref 99–110)
CO2: 26 MMOL/L (ref 21–32)
CREAT SERPL-MCNC: 0.7 MG/DL (ref 0.6–1.2)
GFR SERPL CREATININE-BSD FRML MDRD: >60 ML/MIN/{1.73_M2}
GLUCOSE BLD-MCNC: 157 MG/DL (ref 70–99)
GLUCOSE BLD-MCNC: 161 MG/DL (ref 70–99)
GLUCOSE BLD-MCNC: 330 MG/DL (ref 70–99)
MAGNESIUM: 1.6 MG/DL (ref 1.8–2.4)
PERFORMED ON: ABNORMAL
PERFORMED ON: ABNORMAL
POTASSIUM SERPL-SCNC: 3.6 MMOL/L (ref 3.5–5.1)
SODIUM BLD-SCNC: 141 MMOL/L (ref 136–145)
TOTAL PROTEIN: 6.1 G/DL (ref 6.4–8.2)

## 2023-02-11 PROCEDURE — 6360000002 HC RX W HCPCS: Performed by: STUDENT IN AN ORGANIZED HEALTH CARE EDUCATION/TRAINING PROGRAM

## 2023-02-11 PROCEDURE — 83735 ASSAY OF MAGNESIUM: CPT

## 2023-02-11 PROCEDURE — 6360000002 HC RX W HCPCS: Performed by: INTERNAL MEDICINE

## 2023-02-11 PROCEDURE — 6370000000 HC RX 637 (ALT 250 FOR IP): Performed by: STUDENT IN AN ORGANIZED HEALTH CARE EDUCATION/TRAINING PROGRAM

## 2023-02-11 PROCEDURE — 80053 COMPREHEN METABOLIC PANEL: CPT

## 2023-02-11 PROCEDURE — 36415 COLL VENOUS BLD VENIPUNCTURE: CPT

## 2023-02-11 RX ORDER — MAGNESIUM SULFATE IN WATER 40 MG/ML
2000 INJECTION, SOLUTION INTRAVENOUS ONCE
Status: COMPLETED | OUTPATIENT
Start: 2023-02-11 | End: 2023-02-11

## 2023-02-11 RX ORDER — CEFDINIR 300 MG/1
300 CAPSULE ORAL 2 TIMES DAILY
Qty: 6 CAPSULE | Refills: 0 | Status: SHIPPED | OUTPATIENT
Start: 2023-02-11 | End: 2023-02-14

## 2023-02-11 RX ORDER — LANOLIN ALCOHOL/MO/W.PET/CERES
400 CREAM (GRAM) TOPICAL DAILY
Qty: 30 TABLET | Refills: 0 | Status: SHIPPED | OUTPATIENT
Start: 2023-02-11

## 2023-02-11 RX ADMIN — ATORVASTATIN CALCIUM 10 MG: 10 TABLET, FILM COATED ORAL at 10:07

## 2023-02-11 RX ADMIN — DILTIAZEM HYDROCHLORIDE 240 MG: 120 CAPSULE, COATED, EXTENDED RELEASE ORAL at 10:07

## 2023-02-11 RX ADMIN — METOPROLOL SUCCINATE 50 MG: 50 TABLET, EXTENDED RELEASE ORAL at 10:07

## 2023-02-11 RX ADMIN — INSULIN LISPRO 3 UNITS: 100 INJECTION, SOLUTION INTRAVENOUS; SUBCUTANEOUS at 11:53

## 2023-02-11 RX ADMIN — LOSARTAN POTASSIUM 25 MG: 25 TABLET, FILM COATED ORAL at 10:07

## 2023-02-11 RX ADMIN — ENOXAPARIN SODIUM 40 MG: 100 INJECTION SUBCUTANEOUS at 10:07

## 2023-02-11 RX ADMIN — MAGNESIUM SULFATE HEPTAHYDRATE 2000 MG: 40 INJECTION, SOLUTION INTRAVENOUS at 10:44

## 2023-02-11 NOTE — CARE COORDINATION
CASE MANAGEMENT DISCHARGE SUMMARY:    DISCHARGE DATE: 2/11/23    DISCHARGED TO HOME     TRANSPORTATION: [facility]               Spoke with pt on the phone. Pt reports no dc needs or concerns.   Pt has no need for home care              Electronically signed by [id number] ZOHRA Toussaint on 2/11/2023 at 11:17 AM

## 2023-02-11 NOTE — DISCHARGE SUMMARY
Hospital Medicine Discharge Summary    Patient ID: Violet Rajan      Patient's PCP: Tim Lou MD    Admit Date: 2/7/2023     Discharge Date:   02/11/2023    Admitting Provider: Jair Rios DO     Discharge Provider: Dottie Parks MD     Discharge Diagnoses: Active Hospital Problems    Diagnosis     UTI (urinary tract infection) [N39.0]      Priority: Medium    Flank pain [R10.9]      Priority: Medium    Acute kidney injury superimposed on CKD (HCC) [N17.9, N18.9]      Priority: Medium    MARCI (acute kidney injury) ([facility] [address] [N17.9]     Diabetes mellitus, type 2 ([facility] [E11.9]        The patient was seen and examined on day of discharge and this discharge summary is in conjunction with any daily progress note from day of discharge. Hospital Course:     From HPI:\"The patient is a 65 y.o. female with past medical history as below who presents to [facility] with acute onset and progressive worsening left flank pain since earlier today while she was at home. She initially noted that it was fairly mild but then started to get progressively worse. She has had previous kidney stones in the past.  She does not feel as though she passed a kidney stone yet however. Denies any recent other pain prior to today. She denies any other recent symptoms prior to day of fever, chills, dizziness, syncope, nausea/vomiting/diarrhea/frontal abdominal pain, leg swelling, rashes, dysuria, chest pain, shortness of breath. He has been eating and drinking fine at home without issues and she does feel as though she has been urinating okay while she was here in the ED. \"        Thought to have complicated UTI, likely simple UTI, patient started on IV antibiotics we will keep for now, culture not impressive, will keep on 3 more days of cefdinir p.o.   Acute kidney injury IV fluids, renal function improved  Nephrolithiasis, consulted urology follow-up as outpatient  Anemia, appears chronic, monitor, no obvious bleeding  Diabetes mellitus, restart p.o. regimen  Essential hypertension, resume p.o. medications  Hypomagnesemia being replaced and will add supplement on discharge  Minimally elevated liver function test follow-up with primary care physician for further work-up if deemed necessary          Physical Exam Performed:     BP (!) 146/80   Pulse 61   Temp 97.9 °F (36.6 °C) (Oral)   Resp 17   Ht 5' 2\" (1.575 m)   Wt 183 lb 6.8 oz (83.2 kg)   SpO2 96%   BMI 33.55 kg/m²       General appearance:  No apparent distress  HEENT:  Normal cephalic  Neck: Supple  Respiratory:  Normal respiratory effort. Clear to auscultation, bilaterally without Rales/Wheezes/Rhonchi. Cardiovascular:  Regular rate and rhythm with normal S1/S2 without murmurs, rubs or gallops. Abdomen: Soft, non-tender  Musculoskeletal:  No clubbing, cyanosis   Skin: Skin color, texture, turgor normal.  No rashes or lesions. Neurologic:  No focal weakness   Psychiatric:  Alert and oriented  Capillary Refill: Brisk,< 3 seconds   Peripheral Pulses: +2 palpable, equal bilaterally       Labs: For convenience and continuity at follow-up the following most recent labs are provided:      CBC:    Lab Results   Component Value Date/Time    WBC 5.7 02/10/2023 05:33 AM    HGB 11.9 02/10/2023 05:33 AM    HCT 35.0 02/10/2023 05:33 AM     02/10/2023 05:33 AM       Renal:    Lab Results   Component Value Date/Time     02/11/2023 07:47 AM    K 3.6 02/11/2023 07:47 AM    K 4.0 02/07/2023 10:30 PM     02/11/2023 07:47 AM    CO2 26 02/11/2023 07:47 AM    BUN 10 02/11/2023 07:47 AM    CREATININE 0.7 02/11/2023 07:47 AM    CALCIUM 8.7 02/11/2023 07:47 AM    PHOS 2.6 02/09/2023 09:21 AM         Significant Diagnostic Studies    Radiology:   CT ABDOMEN PELVIS WO CONTRAST Additional Contrast? None   Final Result   Renal stones but negative for obstructive uropathy.                 Consults:     IP CONSULT TO UROLOGY    Disposition:  home     Condition at Discharge: Stable    Discharge Instructions/Follow-up:  PCP, Urology    Code Status:  Full Code     Activity: activity as tolerated    Diet: diabetic diet      Discharge Medications:     Current Discharge Medication List             Details   cefdinir (OMNICEF) 300 MG capsule Take 1 capsule by mouth 2 times daily for 3 days  Qty: 6 capsule, Refills: 0      magnesium oxide (MAG-OX) 400 (240 Mg) MG tablet Take 1 tablet by mouth daily  Qty: 30 tablet, Refills: 0                Details   vitamin C (ASCORBIC ACID) 500 MG tablet Take 500 mg by mouth daily      Biotin 10 MG CAPS Take by mouth      JANUVIA 100 MG tablet TAKE 1 TABLET BY MOUTH EVERY DAY  Qty: 90 tablet, Refills: 0      atorvastatin (LIPITOR) 10 MG tablet TAKE 1 TABLET BY MOUTH EVERY DAY  Qty: 90 tablet, Refills: 1      metFORMIN (GLUCOPHAGE) 1000 MG tablet Take 1 tablet by mouth 2 times daily (with meals)  Qty: 180 tablet, Refills: 0      metoprolol succinate (TOPROL XL) 50 MG extended release tablet TAKE 1 TABLET BY MOUTH EVERY DAY  Qty: 90 tablet, Refills: 1    Associated Diagnoses: Hypertension, unspecified type      losartan (COZAAR) 100 MG tablet TAKE 1 TABLET BY MOUTH EVERY DAY  Qty: 90 tablet, Refills: 1      hydroCHLOROthiazide (MICROZIDE) 12.5 MG capsule TAKE 1 CAPSULE BY MOUTH EVERY DAY IN THE MORNING  Qty: 90 capsule, Refills: 1      dilTIAZem (DILT-XR) 240 MG extended release capsule TAKE 1 CAPSULE BY MOUTH EVERY DAY  Qty: 90 capsule, Refills: 1      glipiZIDE (GLUCOTROL) 10 MG tablet Take 0.5 tablets by mouth 2 times daily (before meals)  Qty: 180 tablet, Refills: 1      citric acid-potassium citrate (POLYCITRA) 1100-334 MG/5ML solution Take 5 mLs by mouth 2 times daily      vitamin B-12 (CYANOCOBALAMIN) 100 MCG tablet Take 250 mcg by mouth daily.       Multiple Vitamin CAPS Take 1 capsule by mouth daily              Time Spent on discharge: 45 minutes in the examination, evaluation, counseling and review of medications and discharge plan.      Signed:    Shruti Joe MD   2/11/2023      Thank you Bharathi Simental MD for the opportunity to be involved in this patient's care. If you have any questions or concerns, please feel free to contact me at [phone].

## 2023-02-11 NOTE — PROGRESS NOTES
PT AAO x4 per this shift. Pt tolerating diet. IVF infusing. Pt c/o of intermittent pain to left flank. Pt resting well. Pt without any other needs voiced at this time. Pt resting well. Fall precautions in place. Call light within reach. Will continue to round.  Electronically signed by Donna Hernandez RN on 2/11/2023 at 6:04 AM

## 2023-02-11 NOTE — DISCHARGE INSTR - COC
Continuity of Care Form    Patient Name: Karen Henson   :  1960  MRN:  [id number]    Admit date:  2023  Discharge date:  ***    Code Status Order: Full Code   Advance Directives:     Admitting Physician:  Slime Brooke DO  PCP: Jael Guerra MD    Discharging Nurse: [facility] Unit/Room#: S2V-1484/3124-01  Discharging Unit Phone Number: ***    Emergency Contact:   Extended Emergency Contact Information  Primary Emergency Contact: [address]  Phone: [phone]  Relation: Brother/Sister  Secondary Emergency Contact: [place] Phone: [phone]  Mobile Phone: [phone]  Relation: Child    Past Surgical History:  Past Surgical History:   Procedure Laterality Date    [facility]    COLONOSCOPY  2018    Dr. Karen Rangel.  adenoma polyp, repeat in 5 years    CYSTOSCOPY Right 2021    CYSTOSCOPY, RIGHT URETERAL STENT INSERTION,RIGHT RETROGRADE PYLEOGRAM performed by Ron Meyer MD at [address] [place] [address]         Immunization History:   Immunization History   Administered Date(s) Administered    Influenza Virus Vaccine 10/10/2016, 2017    Influenza, FLUARIX, FLULAVAL, Pia Emms (age 10 mo+) AND AFLURIA, (age 1 y+), PF, 0.5mL 2015    Influenza, FLUCELVAX, (age 10 mo+), MDCK, PF, 0.5mL 10/17/2018, 10/21/2019    Tdap (Boostrix, Adacel) 2015       Active Problems:  Patient Active Problem List   Diagnosis Code    Obesity E66.9    HTN (hypertension) I10    Diabetes mellitus, type 2 ([facility] [address] E11.9    Pneumonia due to COVID-19 virus U07.1, J12.82    MARCI (acute kidney injury) ([facility] [address] N17.9    Acute respiratory failure with hypoxia (HCC) J96.01    Neutropenia ([facility]) D70.9    ARDS (adult respiratory distress syndrome) ([facility] [address] J80    UTI (urinary tract infection) N39.0    Flank pain R10.9    Acute kidney injury superimposed on CKD (HCC) N17.9, N18.9       Isolation/Infection:   Isolation            No Isolation Patient Infection Status       Infection Onset Added Last Indicated Last Indicated By Review Planned Expiration Resolved Resolved By    None active    Resolved    COVID-19 21 COVID-19, Rapid   21     COVID-19 (Rule Out) 21 COVID-19 Rapid (Ordered)   21 Rule-Out Test Resulted            Nurse Assessment:  Last Vital Signs: BP (!) 146/80   Pulse 61   Temp 97.9 °F (36.6 °C) (Oral)   Resp 17   Ht 5' 2\" (1.575 m)   Wt 183 lb 6.8 oz (83.2 kg)   SpO2 96%   BMI 33.55 kg/m²     Last documented pain score (0-10 scale): Pain Level: 2  Last Weight:   Wt Readings from Last 1 Encounters:   23 183 lb 6.8 oz (83.2 kg)     Mental Status:  {IP PT MENTAL STATUS:}    IV Access:  { GUZMAN IV ACCESS:610726396}    Nursing Mobility/ADLs:  Walking   {CHP DME GTLE:313141904}  Transfer  {CHP DME AQIC:173371831}  Bathing  {CHP DME QSOP:366811502}  Dressing  {CHP DME EGID:156370679}  Toileting  {CHP DME CKHF:253548904}  Feeding  {CHP DME PVVF:630598214}  Med Admin  {P DME USXQ:016837536}  Med Delivery   { GUZMAN MED Delivery:769663694}    Wound Care Documentation and Therapy:        Elimination:  Continence: Bowel: {YES / TO:58199}  Bladder: {YES / PJ:60113}  Urinary Catheter: {Urinary Catheter:847713846}   Colostomy/Ileostomy/Ileal Conduit: {YES / YW:31524}       Date of Last BM: ***    Intake/Output Summary (Last 24 hours) at 2023 1013  Last data filed at 2023 0816  Gross per 24 hour   Intake 1820 ml   Output --   Net 1820 ml     I/O last 3 completed shifts:   In: 720 [P.O.:720]  Out: 1300 [Urine:1300]    Safety Concerns:     [address] GUZMAN Safety Concerns:946720090}    Impairments/Disabilities:      [address] GUZMAN Impairments/Disabilities:771432038}    Nutrition Therapy:  Current Nutrition Therapy:   [address] GUZMAN Diet List:364535494}    Routes of Feeding: {CHP DME Other Feedings:868175334}  Liquids: {Slp liquid thickness:76519}  Daily Fluid Restriction: {CHP DME Yes amt example:814980291}  Last Modified Barium Swallow with Video (Video Swallowing Test): {Done Not Done [facility]:139361422}    Treatments at the Time of Hospital Discharge:   Respiratory Treatments: ***  Oxygen Therapy:  {Therapy; copd oxygen:74698}  Ventilator:    { CC Vent TKNQ:240469107}    Rehab Therapies: {THERAPEUTIC INTERVENTION:7953624612}  Weight Bearing Status/Restrictions: { CC Weight Bearin}  Other Medical Equipment (for information only, NOT a DME order):  {EQUIPMENT:351253815}  Other Treatments: ***    Patient's personal belongings (please select all that are sent with patient):  {[facility] DME Belongings:651687396}    RN SIGNATURE:  {Esignature:736679335}    CASE MANAGEMENT/SOCIAL WORK SECTION    Inpatient Status Date: ***    Readmission Risk Assessment Score:  Readmission Risk              Risk of Unplanned Readmission:  14           Discharging to Facility/ Agency   Name:   Address:  Phone:  Fax:    Dialysis Facility (if applicable)   Name:  Address:  Dialysis Schedule:  Phone:  Fax:    / signature: {Esignature:445791174}    PHYSICIAN SECTION    Prognosis: {Prognosis:9373510290}    Condition at Discharge: [address] [place] [address] Patient Condition:474039451}    Rehab Potential (if transferring to Rehab): {Prognosis:9714209442}    Recommended Labs or Other Treatments After Discharge: ***    Physician Certification: I certify the above information and transfer of Deo Saxena  is necessary for the continuing treatment of the diagnosis listed and that she requires {Admit to Appropriate Level of Care:91391} for {GREATER/LESS:244682031} 30 days.      Update Admission H&P: {P DME Changes in KYVYR:680341899}    PHYSICIAN SIGNATURE:  {Esignature:230358162}

## 2023-02-11 NOTE — PLAN OF CARE
Problem: Discharge Planning  Goal: Discharge to home or other facility with appropriate resources  2/11/2023 0037 by Shaista De Los Santos RN  Outcome: Progressing  Flowsheets (Taken 2/10/2023 0054 by Shanda Knight)  Discharge to home or other facility with appropriate resources:   Identify barriers to discharge with patient and caregiver   Refer to discharge planning if patient needs post-hospital services based on physician order or complex needs related to functional status, cognitive ability or social support system   Arrange for needed discharge resources and transportation as appropriate   Identify discharge learning needs (meds, wound care, etc)  2/10/2023 1147 by Dino Soto RN  Outcome: Progressing  Flowsheets (Taken 2/10/2023 0054 by Shanda Knight)  Discharge to home or other facility with appropriate resources:   Identify barriers to discharge with patient and caregiver   Refer to discharge planning if patient needs post-hospital services based on physician order or complex needs related to functional status, cognitive ability or social support system   Arrange for needed discharge resources and transportation as appropriate   Identify discharge learning needs (meds, wound care, etc)     Problem: Pain  Goal: Verbalizes/displays adequate comfort level or baseline comfort level  2/11/2023 0037 by Shaista De Los Santos RN  Outcome: Progressing  Flowsheets (Taken 2/10/2023 0054 by Shanda Knight)  Verbalizes/displays adequate comfort level or baseline comfort level:   Encourage patient to monitor pain and request assistance   Administer analgesics based on type and severity of pain and evaluate response   Assess pain using appropriate pain scale   Implement non-pharmacological measures as appropriate and evaluate response   Consider cultural and social influences on pain and pain management  2/10/2023 1147 by Dino Soto RN  Outcome: Progressing  Flowsheets (Taken 2/10/2023 0054 by Shanda Knight)  Verbalizes/displays adequate comfort level or baseline comfort level:   Encourage patient to monitor pain and request assistance   Administer analgesics based on type and severity of pain and evaluate response   Assess pain using appropriate pain scale   Implement non-pharmacological measures as appropriate and evaluate response   Consider cultural and social influences on pain and pain management     Problem: ABCDS Injury Assessment  Goal: Absence of physical injury  2/11/2023 0037 by Amish Sampson RN  Outcome: Progressing  Flowsheets  Taken 2/11/2023 0037  Absence of Physical Injury: Implement safety measures based on patient assessment  Taken 2/11/2023 0036  Absence of Physical Injury: Implement safety measures based on patient assessment  2/10/2023 1147 by Des Lee RN  Outcome: Progressing  Flowsheets (Taken 2/10/2023 0054 by Britney Us)  Absence of Physical Injury: Implement safety measures based on patient assessment     Problem: Safety - Adult  Goal: Free from fall injury  Outcome: Progressing  Flowsheets (Taken 2/11/2023 0037)  Free From Fall Injury:   Based on caregiver fall risk screen, instruct family/caregiver to ask for assistance with transferring infant if caregiver noted to have fall risk factors   Instruct family/caregiver on patient safety

## 2023-02-11 NOTE — PLAN OF CARE
Problem: ABCDS Injury Assessment  Goal: Absence of physical injury  2/11/2023 0037 by Mateo Arauz RN  Outcome: Progressing  Flowsheets  Taken 2/11/2023 0037  Absence of Physical Injury: Implement safety measures based on patient assessment  Taken 2/11/2023 0036  Absence of Physical Injury: Implement safety measures based on patient assessment     Problem: Discharge Planning  Goal: Discharge to home or other facility with appropriate resources  2/11/2023 1145 by Mary Palma RN  Outcome: Adequate for Discharge  Flowsheets (Taken 2/11/2023 0800)  Discharge to home or other facility with appropriate resources: Identify barriers to discharge with patient and caregiver  2/11/2023 0037 by Mateo Arauz RN  Outcome: Progressing  Flowsheets  Taken 2/10/2023 2010 by Mateo Aaruz RN  Discharge to home or other facility with appropriate resources:   Identify barriers to discharge with patient and caregiver   Arrange for needed discharge resources and transportation as appropriate   Identify discharge learning needs (meds, wound care, etc)  Taken 2/10/2023 0054 by Moises Irby  Discharge to home or other facility with appropriate resources:   Identify barriers to discharge with patient and caregiver   Refer to discharge planning if patient needs post-hospital services based on physician order or complex needs related to functional status, cognitive ability or social support system   Arrange for needed discharge resources and transportation as appropriate   Identify discharge learning needs (meds, wound care, etc)     Problem: Pain  Goal: Verbalizes/displays adequate comfort level or baseline comfort level  2/11/2023 1145 by Mary Palma RN  Outcome: Adequate for Discharge  2/11/2023 0037 by Mateo Arauz RN  Outcome: Progressing  Flowsheets (Taken 2/10/2023 0054 by Moises Irby)  Verbalizes/displays adequate comfort level or baseline comfort level:   Encourage patient to monitor pain and request assistance   Administer analgesics based on type and severity of pain and evaluate response   Assess pain using appropriate pain scale   Implement non-pharmacological measures as appropriate and evaluate response   Consider cultural and social influences on pain and pain management     Problem: Safety - Adult  Goal: Free from fall injury  2/11/2023 1145 by Milton Ramos RN  Outcome: Adequate for Discharge  2/11/2023 0037 by Wilber Amin RN  Outcome: Progressing  Flowsheets (Taken 2/11/2023 0037)  Free From Fall Injury:   Based on caregiver fall risk screen, instruct family/caregiver to ask for assistance with transferring infant if caregiver noted to have fall risk factors   Instruct family/caregiver on patient safety

## 2023-02-12 LAB
BLOOD CULTURE, ROUTINE: NORMAL
BLOOD CULTURE, ROUTINE: NORMAL

## 2023-02-13 ENCOUNTER — TELEPHONE (OUTPATIENT)
Dept: FAMILY MEDICINE CLINIC | Age: 63
End: 2023-02-13

## 2023-02-13 NOTE — RESULT ENCOUNTER NOTE
Patient's positive result has been appropriately evaluated by the provider pool.      Medication was phoned to the patient's pharmacy per protocol:    Pharmacy:   [address] [facility],[address] [address]  Phone: [phone] Fax: [phone] [address], [address] [facility] [phone] [facility] [phone]  [address] [address]  Phone: [phone] Fax: [phone]    Phone number:   Pharmacist receiving the prescription:

## 2023-02-13 NOTE — TELEPHONE ENCOUNTER
Care Transitions Initial Follow Up Call    Outreach made within 2 business days of discharge: Yes    Patient: Hans Guajardo Patient : 1960   MRN: [id number]  Reason for Admission: There are no discharge diagnoses documented for the most recent discharge. Discharge Date: 23       Spoke with: Karyna Haro     Discharge department/facility: [facility]    TCM Interactive Patient Contact:  Was patient able to fill all prescriptions: Yes  Was patient instructed to bring all medications to the follow-up visit: Yes  Is patient taking all medications as directed in the discharge summary? Yes  Does patient understand their discharge instructions: Yes  Does patient have questions or concerns that need addressed prior to 7-14 day follow up office visit: no    Scheduled appointment with Urology within 7-14 days    Follow Up  No future appointments.     Alexa Mcnamara MA

## 2023-03-03 DIAGNOSIS — I10 HYPERTENSION, UNSPECIFIED TYPE: ICD-10-CM

## 2023-03-03 RX ORDER — GLIPIZIDE 10 MG/1
5 TABLET ORAL
Qty: 90 TABLET | Refills: 0 | Status: SHIPPED | OUTPATIENT
Start: 2023-03-03

## 2023-03-03 RX ORDER — METOPROLOL SUCCINATE 50 MG/1
TABLET, EXTENDED RELEASE ORAL
Qty: 90 TABLET | Refills: 0 | Status: SHIPPED | OUTPATIENT
Start: 2023-03-03

## 2023-03-03 RX ORDER — SITAGLIPTIN 100 MG/1
TABLET, FILM COATED ORAL
Qty: 90 TABLET | Refills: 0 | Status: SHIPPED | OUTPATIENT
Start: 2023-03-03

## 2023-03-03 RX ORDER — HYDROCHLOROTHIAZIDE 12.5 MG/1
CAPSULE, GELATIN COATED ORAL
Qty: 90 CAPSULE | Refills: 0 | Status: SHIPPED | OUTPATIENT
Start: 2023-03-03

## 2023-03-03 RX ORDER — LOSARTAN POTASSIUM 100 MG/1
TABLET ORAL
Qty: 90 TABLET | Refills: 0 | Status: SHIPPED | OUTPATIENT
Start: 2023-03-03

## 2023-03-03 RX ORDER — DILTIAZEM HYDROCHLORIDE 240 MG/1
CAPSULE, EXTENDED RELEASE ORAL
Qty: 90 CAPSULE | Refills: 0 | Status: SHIPPED | OUTPATIENT
Start: 2023-03-03

## 2023-03-07 ENCOUNTER — TELEPHONE (OUTPATIENT)
Dept: FAMILY MEDICINE CLINIC | Age: 63
End: 2023-03-07

## 2023-03-07 DIAGNOSIS — R80.9 TYPE 2 DIABETES MELLITUS WITH MICROALBUMINURIA, WITHOUT LONG-TERM CURRENT USE OF INSULIN (HCC): Primary | ICD-10-CM

## 2023-03-07 DIAGNOSIS — E11.29 TYPE 2 DIABETES MELLITUS WITH MICROALBUMINURIA, WITHOUT LONG-TERM CURRENT USE OF INSULIN (HCC): Primary | ICD-10-CM

## 2023-03-07 NOTE — TELEPHONE ENCOUNTER
[phone] ([place]) - Dottie Orlando advised that lab orders are in Epic and to be fasting at least 10-12 hours.

## 2023-03-07 NOTE — TELEPHONE ENCOUNTER
----- Message from Bebe Hernandez sent at 3/7/2023  9:39 AM EST -----  Subject: Message to Provider    QUESTIONS  Information for Provider? Pt has a diabetes follow up on 3/21/23. She is   asking if she needs blood work done prior to appt. Please call pt to   advise.   ---------------------------------------------------------------------------  --------------  Steve Wallace INFO  [phone]; OK to leave message on voicemail  ---------------------------------------------------------------------------  --------------  SCRIPT ANSWERS  Relationship to Patient?  Self

## 2023-03-09 PROBLEM — N39.0 UTI (URINARY TRACT INFECTION): Status: RESOLVED | Noted: 2023-02-07 | Resolved: 2023-03-09

## 2023-03-13 ENCOUNTER — OFFICE VISIT (OUTPATIENT)
Dept: FAMILY MEDICINE CLINIC | Age: 63
End: 2023-03-13
Payer: COMMERCIAL

## 2023-03-13 VITALS
BODY MASS INDEX: 32.76 KG/M2 | SYSTOLIC BLOOD PRESSURE: 138 MMHG | DIASTOLIC BLOOD PRESSURE: 84 MMHG | TEMPERATURE: 98 F | HEIGHT: 62 IN | HEART RATE: 92 BPM | WEIGHT: 178 LBS

## 2023-03-13 DIAGNOSIS — R80.9 TYPE 2 DIABETES MELLITUS WITH MICROALBUMINURIA, WITHOUT LONG-TERM CURRENT USE OF INSULIN (HCC): ICD-10-CM

## 2023-03-13 DIAGNOSIS — E11.29 TYPE 2 DIABETES MELLITUS WITH MICROALBUMINURIA, WITHOUT LONG-TERM CURRENT USE OF INSULIN (HCC): ICD-10-CM

## 2023-03-13 DIAGNOSIS — R10.13 EPIGASTRIC PAIN: Primary | ICD-10-CM

## 2023-03-13 PROCEDURE — 3046F HEMOGLOBIN A1C LEVEL >9.0%: CPT | Performed by: FAMILY MEDICINE

## 2023-03-13 PROCEDURE — 3075F SYST BP GE 130 - 139MM HG: CPT | Performed by: FAMILY MEDICINE

## 2023-03-13 PROCEDURE — 2022F DILAT RTA XM EVC RTNOPTHY: CPT | Performed by: FAMILY MEDICINE

## 2023-03-13 PROCEDURE — G8417 CALC BMI ABV UP PARAM F/U: HCPCS | Performed by: FAMILY MEDICINE

## 2023-03-13 PROCEDURE — 1036F TOBACCO NON-USER: CPT | Performed by: FAMILY MEDICINE

## 2023-03-13 PROCEDURE — G8484 FLU IMMUNIZE NO ADMIN: HCPCS | Performed by: FAMILY MEDICINE

## 2023-03-13 PROCEDURE — 99214 OFFICE O/P EST MOD 30 MIN: CPT | Performed by: FAMILY MEDICINE

## 2023-03-13 PROCEDURE — 3079F DIAST BP 80-89 MM HG: CPT | Performed by: FAMILY MEDICINE

## 2023-03-13 PROCEDURE — 1111F DSCHRG MED/CURRENT MED MERGE: CPT | Performed by: FAMILY MEDICINE

## 2023-03-13 PROCEDURE — G8427 DOCREV CUR MEDS BY ELIG CLIN: HCPCS | Performed by: FAMILY MEDICINE

## 2023-03-13 PROCEDURE — 3017F COLORECTAL CA SCREEN DOC REV: CPT | Performed by: FAMILY MEDICINE

## 2023-03-13 RX ORDER — PANTOPRAZOLE SODIUM 40 MG/1
40 TABLET, DELAYED RELEASE ORAL
Qty: 30 TABLET | Refills: 5 | Status: SHIPPED | OUTPATIENT
Start: 2023-03-13

## 2023-03-13 SDOH — ECONOMIC STABILITY: FOOD INSECURITY: WITHIN THE PAST 12 MONTHS, YOU WORRIED THAT YOUR FOOD WOULD RUN OUT BEFORE YOU GOT MONEY TO BUY MORE.: NEVER TRUE

## 2023-03-13 SDOH — ECONOMIC STABILITY: HOUSING INSECURITY
IN THE LAST 12 MONTHS, WAS THERE A TIME WHEN YOU DID NOT HAVE A STEADY PLACE TO SLEEP OR SLEPT IN A SHELTER (INCLUDING NOW)?: NO

## 2023-03-13 SDOH — ECONOMIC STABILITY: INCOME INSECURITY: HOW HARD IS IT FOR YOU TO PAY FOR THE VERY BASICS LIKE FOOD, HOUSING, MEDICAL CARE, AND HEATING?: NOT HARD AT ALL

## 2023-03-13 SDOH — ECONOMIC STABILITY: FOOD INSECURITY: WITHIN THE PAST 12 MONTHS, THE FOOD YOU BOUGHT JUST DIDN'T LAST AND YOU DIDN'T HAVE MONEY TO GET MORE.: NEVER TRUE

## 2023-03-13 ASSESSMENT — PATIENT HEALTH QUESTIONNAIRE - PHQ9
SUM OF ALL RESPONSES TO PHQ QUESTIONS 1-9: 0
SUM OF ALL RESPONSES TO PHQ9 QUESTIONS 1 & 2: 0
2. FEELING DOWN, DEPRESSED OR HOPELESS: 0
SUM OF ALL RESPONSES TO PHQ QUESTIONS 1-9: 0
1. LITTLE INTEREST OR PLEASURE IN DOING THINGS: 0
SUM OF ALL RESPONSES TO PHQ QUESTIONS 1-9: 0
SUM OF ALL RESPONSES TO PHQ QUESTIONS 1-9: 0

## 2023-03-13 NOTE — PROGRESS NOTES
Subjective:      Patient ID: Nichole Sánchez is a 59 y.o. female.     Chief Complaint   Patient presents with    Abdominal Pain     Mid upper abdominal pain x last night        Patient presents with:  Abdominal Pain: Mid upper abdominal pain x last night    Here for the above   At the epigastric area and down the abdomen  Tender to touch  No affect with food  Twisting the torso and taking deep breath hurt  No fever  Comes and goes  Sharp and if she just sits still she is fine  Moving hurts    No dysphagia no gerd  Bm ok no blood  Urine is ok no pain no blood  No cough no cp     YOB: 1960    Date of Visit:  3/13/2023     -- Ciprofloxacin -- Other (See Comments)    --  hallucinations   -- Hydrocodone -- Itching    Current Outpatient Medications:  metoprolol succinate (TOPROL XL) 50 MG extended release tablet, TAKE 1 TABLET BY MOUTH EVERY DAY, Disp: 90 tablet, Rfl: 0  JANUVIA 100 MG tablet, TAKE 1 TABLET BY MOUTH EVERY DAY, Disp: 90 tablet, Rfl: 0  losartan (COZAAR) 100 MG tablet, TAKE 1 TABLET BY MOUTH EVERY DAY, Disp: 90 tablet, Rfl: 0  glipiZIDE (GLUCOTROL) 10 MG tablet, TAKE 0.5 TABLETS BY MOUTH 2 TIMES DAILY (BEFORE MEALS), Disp: 90 tablet, Rfl: 0  hydroCHLOROthiazide (MICROZIDE) 12.5 MG capsule, TAKE 1 CAPSULE BY MOUTH EVERY DAY IN THE MORNING, Disp: 90 capsule, Rfl: 0  metFORMIN (GLUCOPHAGE) 1000 MG tablet, TAKE 1 TABLET BY MOUTH TWICE A DAY WITH MEALS, Disp: 180 tablet, Rfl: 0  dilTIAZem (DILT-XR) 240 MG extended release capsule, TAKE 1 CAPSULE BY MOUTH EVERY DAY, Disp: 90 capsule, Rfl: 0  magnesium oxide (MAG-OX) 400 (240 Mg) MG tablet, Take 1 tablet by mouth daily, Disp: 30 tablet, Rfl: 0  vitamin C (ASCORBIC ACID) 500 MG tablet, Take 500 mg by mouth daily, Disp: , Rfl:   Biotin 10 MG CAPS, Take by mouth, Disp: , Rfl:   atorvastatin (LIPITOR) 10 MG tablet, TAKE 1 TABLET BY MOUTH EVERY DAY, Disp: 90 tablet, Rfl: 1  citric acid-potassium citrate (POLYCITRA) 1100-334 MG/5ML solution, Take 5 mLs by mouth 2 times daily, Disp: , Rfl:   vitamin B-12 (CYANOCOBALAMIN) 100 MCG tablet, Take 250 mcg by mouth daily. , Disp: , Rfl:   Multiple Vitamin CAPS, Take 1 capsule by mouth daily , Disp: , Rfl:     No current facility-administered medications for this visit.      --------------------------               03/13/23                     1642        --------------------------   BP:          138/84        Site:    Left Upper Arm    Position:    Sitting        Cuff Size:  Large Adult      Pulse:         92          Temp:   98 °F (36.7 °C)    TempSrc:    Temporal       Weight: 178 lb (80.7 kg)   Height: 5' 2\" (1.575 m)   --------------------------  Body mass index is 32.56 kg/m². Wt Readings from Last 3 Encounters:  03/13/23 : 178 lb (80.7 kg)  02/11/23 : 183 lb 6.8 oz (83.2 kg)  06/27/22 : 175 lb (79.4 kg)    BP Readings from Last 3 Encounters:  03/13/23 : 138/84  02/11/23 : (!) 163/67  06/27/22 : 134/80              Review of Systems    Objective:   Physical Exam  Constitutional:       General: She is not in acute distress. Appearance: Normal appearance. She is not ill-appearing. Abdominal:      General: Bowel sounds are normal. There is no distension or abdominal bruit. Palpations: Abdomen is soft. There is no hepatomegaly, splenomegaly, mass or pulsatile mass. Tenderness: There is abdominal tenderness in the epigastric area. There is no right CVA tenderness, left CVA tenderness or guarding. Comments: No marks or rash on the abdomen  No rash on the back    Skin:     General: Skin is warm and dry. Coloration: Skin is not pale. Neurological:      Mental Status: She is alert. Assessment:       Diagnosis Orders   1. Epigastric pain  Comprehensive Metabolic Panel    CBC with Auto Differential    Lipase    Urinalysis with Microscopic    XR CHEST (2 VW)      2.  Type 2 diabetes mellitus with microalbuminuria, without long-term current use of insulin [facility])  Urinalysis with Microscopic            Orders Placed This Encounter   Medications    pantoprazole (PROTONIX) 40 MG tablet     Sig: Take 1 tablet by mouth every morning (before breakfast)     Dispense:  30 tablet     Refill:  5       She no longer has a gallbladder    Ct abd and pelvis on 2/7/23 ok showed renal stones   Cmp high glucose and liver testing on 2/11/23  Urology visit on 2/28/23  Abdomen kub ok on 3/8/23      Plan:      Take fluids  Eat light  If worse go to the ER  See back as scheduled        Renuka Villeda MD

## 2023-03-14 ENCOUNTER — HOSPITAL ENCOUNTER (OUTPATIENT)
Dept: GENERAL RADIOLOGY | Age: 63
Discharge: HOME | End: 2023-03-14
Payer: COMMERCIAL

## 2023-03-14 ENCOUNTER — HOSPITAL ENCOUNTER (OUTPATIENT)
Age: 63
Discharge: HOME | End: 2023-03-14
Payer: COMMERCIAL

## 2023-03-14 DIAGNOSIS — R80.9 TYPE 2 DIABETES MELLITUS WITH MICROALBUMINURIA, WITHOUT LONG-TERM CURRENT USE OF INSULIN (HCC): ICD-10-CM

## 2023-03-14 DIAGNOSIS — E11.29 TYPE 2 DIABETES MELLITUS WITH MICROALBUMINURIA, WITHOUT LONG-TERM CURRENT USE OF INSULIN (HCC): ICD-10-CM

## 2023-03-14 DIAGNOSIS — R10.13 EPIGASTRIC PAIN: ICD-10-CM

## 2023-03-14 LAB
ALBUMIN SERPL-MCNC: 4.4 G/DL (ref 3.4–5)
ALBUMIN/GLOB SERPL: 1.5 {RATIO} (ref 1.1–2.2)
ALP SERPL-CCNC: 64 U/L (ref 40–129)
ALT SERPL-CCNC: 54 U/L (ref 10–40)
ANION GAP SERPL CALCULATED.3IONS-SCNC: 13 MMOL/L (ref 3–16)
AST SERPL-CCNC: 27 U/L (ref 15–37)
BACTERIA URNS QL MICRO: ABNORMAL /HPF
BASOPHILS # BLD: 0.1 K/UL (ref 0–0.2)
BASOPHILS NFR BLD: 0.6 %
BILIRUB SERPL-MCNC: 0.7 MG/DL (ref 0–1)
BILIRUB UR QL STRIP.AUTO: NEGATIVE
BUN SERPL-MCNC: 17 MG/DL (ref 7–20)
CALCIUM SERPL-MCNC: 10 MG/DL (ref 8.3–10.6)
CHLORIDE SERPL-SCNC: 97 MMOL/L (ref 99–110)
CHOLEST SERPL-MCNC: 149 MG/DL (ref 0–199)
CLARITY UR: ABNORMAL
CO2 SERPL-SCNC: 27 MMOL/L (ref 21–32)
COLOR UR: YELLOW
CREAT SERPL-MCNC: 1 MG/DL (ref 0.6–1.2)
DEPRECATED RDW RBC AUTO: 14 % (ref 12.4–15.4)
EOSINOPHIL # BLD: 0.1 K/UL (ref 0–0.6)
EOSINOPHIL NFR BLD: 1.4 %
EPI CELLS #/AREA URNS AUTO: 10 /HPF (ref 0–5)
GFR SERPLBLD CREATININE-BSD FMLA CKD-EPI: >60 ML/MIN/{1.73_M2}
GLUCOSE SERPL-MCNC: 143 MG/DL (ref 70–99)
GLUCOSE UR STRIP.AUTO-MCNC: NEGATIVE MG/DL
HCT VFR BLD AUTO: 38.7 % (ref 36–48)
HDLC SERPL-MCNC: 60 MG/DL (ref 40–60)
HGB BLD-MCNC: 13.1 G/DL (ref 12–16)
HGB UR QL STRIP.AUTO: ABNORMAL
HYALINE CASTS #/AREA URNS AUTO: 8 /LPF (ref 0–8)
KETONES UR STRIP.AUTO-MCNC: ABNORMAL MG/DL
LDLC SERPL CALC-MCNC: 57 MG/DL
LEUKOCYTE ESTERASE UR QL STRIP.AUTO: ABNORMAL
LIPASE SERPL-CCNC: 72 U/L (ref 13–60)
LYMPHOCYTES # BLD: 1.7 K/UL (ref 1–5.1)
LYMPHOCYTES NFR BLD: 19.2 %
MCH RBC QN AUTO: 30.1 PG (ref 26–34)
MCHC RBC AUTO-ENTMCNC: 33.7 G/DL (ref 31–36)
MCV RBC AUTO: 89.3 FL (ref 80–100)
MONOCYTES # BLD: 0.7 K/UL (ref 0–1.3)
MONOCYTES NFR BLD: 7.7 %
NEUTROPHILS # BLD: 6.2 K/UL (ref 1.7–7.7)
NEUTROPHILS NFR BLD: 71.1 %
NITRITE UR QL STRIP.AUTO: NEGATIVE
PH UR STRIP.AUTO: 5.5 [PH] (ref 5–8)
PLATELET # BLD AUTO: 192 K/UL (ref 135–450)
PMV BLD AUTO: 10 FL (ref 5–10.5)
POTASSIUM SERPL-SCNC: 3.9 MMOL/L (ref 3.5–5.1)
PROT SERPL-MCNC: 7.3 G/DL (ref 6.4–8.2)
PROT UR STRIP.AUTO-MCNC: 300 MG/DL
RBC # BLD AUTO: 4.34 M/UL (ref 4–5.2)
RBC CLUMPS #/AREA URNS AUTO: 5 /HPF (ref 0–4)
SODIUM SERPL-SCNC: 137 MMOL/L (ref 136–145)
SP GR UR STRIP.AUTO: 1.02 (ref 1–1.03)
TRIGL SERPL-MCNC: 161 MG/DL (ref 0–150)
UA DIPSTICK W REFLEX MICRO PNL UR: YES
URN SPEC COLLECT METH UR: ABNORMAL
UROBILINOGEN UR STRIP-ACNC: 0.2 E.U./DL
VLDLC SERPL CALC-MCNC: 32 MG/DL
WBC # BLD AUTO: 8.8 K/UL (ref 4–11)
WBC #/AREA URNS AUTO: 25 /HPF (ref 0–5)

## 2023-03-14 PROCEDURE — 71046 X-RAY EXAM CHEST 2 VIEWS: CPT

## 2023-03-15 DIAGNOSIS — R10.13 EPIGASTRIC PAIN: Primary | ICD-10-CM

## 2023-03-15 DIAGNOSIS — R74.8 ELEVATED LIPASE: ICD-10-CM

## 2023-03-15 LAB
EST. AVERAGE GLUCOSE BLD GHB EST-MCNC: 171.4 MG/DL
HBA1C MFR BLD: 7.6 %

## 2023-03-21 ENCOUNTER — HOSPITAL ENCOUNTER (OUTPATIENT)
Age: 63
End: 2023-03-21
Payer: COMMERCIAL

## 2023-03-21 ENCOUNTER — HOSPITAL ENCOUNTER (OUTPATIENT)
Dept: CT IMAGING | Age: 63
Discharge: HOME | End: 2023-03-21
Payer: COMMERCIAL

## 2023-03-21 ENCOUNTER — OFFICE VISIT (OUTPATIENT)
Dept: FAMILY MEDICINE CLINIC | Age: 63
End: 2023-03-21
Payer: COMMERCIAL

## 2023-03-21 VITALS
HEIGHT: 62 IN | TEMPERATURE: 97.1 F | DIASTOLIC BLOOD PRESSURE: 80 MMHG | BODY MASS INDEX: 32.39 KG/M2 | SYSTOLIC BLOOD PRESSURE: 128 MMHG | WEIGHT: 176 LBS

## 2023-03-21 DIAGNOSIS — R10.13 EPIGASTRIC PAIN: Primary | ICD-10-CM

## 2023-03-21 DIAGNOSIS — R10.13 EPIGASTRIC PAIN: ICD-10-CM

## 2023-03-21 DIAGNOSIS — R74.8 ELEVATED LIPASE: ICD-10-CM

## 2023-03-21 DIAGNOSIS — R82.81 PYURIA: ICD-10-CM

## 2023-03-21 PROCEDURE — 1036F TOBACCO NON-USER: CPT | Performed by: FAMILY MEDICINE

## 2023-03-21 PROCEDURE — 6360000004 HC RX CONTRAST MEDICATION: Performed by: FAMILY MEDICINE

## 2023-03-21 PROCEDURE — 74177 CT ABD & PELVIS W/CONTRAST: CPT

## 2023-03-21 PROCEDURE — G8484 FLU IMMUNIZE NO ADMIN: HCPCS | Performed by: FAMILY MEDICINE

## 2023-03-21 PROCEDURE — 99213 OFFICE O/P EST LOW 20 MIN: CPT | Performed by: FAMILY MEDICINE

## 2023-03-21 PROCEDURE — 3079F DIAST BP 80-89 MM HG: CPT | Performed by: FAMILY MEDICINE

## 2023-03-21 PROCEDURE — G8427 DOCREV CUR MEDS BY ELIG CLIN: HCPCS | Performed by: FAMILY MEDICINE

## 2023-03-21 PROCEDURE — 3074F SYST BP LT 130 MM HG: CPT | Performed by: FAMILY MEDICINE

## 2023-03-21 PROCEDURE — 3017F COLORECTAL CA SCREEN DOC REV: CPT | Performed by: FAMILY MEDICINE

## 2023-03-21 PROCEDURE — G8417 CALC BMI ABV UP PARAM F/U: HCPCS | Performed by: FAMILY MEDICINE

## 2023-03-21 RX ADMIN — IOPAMIDOL 100 ML: 755 INJECTION, SOLUTION INTRAVENOUS at 07:27

## 2023-03-21 RX ADMIN — IOPAMIDOL 50 ML: 510 INJECTION, SOLUTION INTRAVASCULAR at 06:08

## 2023-03-21 NOTE — PROGRESS NOTES
MCG tablet, Take 250 mcg by mouth daily. , Disp: , Rfl:   Multiple Vitamin CAPS, Take 1 capsule by mouth daily , Disp: , Rfl:     No current facility-administered medications for this visit.      ---------------------------               03/21/23                      0955         ---------------------------   BP:          128/80         Site:    Left Upper Arm     Position:     Sitting        Cuff Size:   Large Adult      Temp:   97.1 °F (36.2 °C)   TempSrc:    Temporal        Weight: 176 lb (79.8 kg)    Height:  5' 2\" (1.575 m)   ---------------------------  Body mass index is 32.19 kg/m². Wt Readings from Last 3 Encounters:    03/21/23 : 176 lb (79.8 kg)  03/13/23 : 178 lb (80.7 kg)  02/11/23 : 183 lb 6.8 oz (83.2 kg)    BP Readings from Last 3 Encounters:  03/21/23 : 128/80  03/13/23 : 138/84  02/11/23 : (!) 163/67          Review of Systems    Objective:   Physical Exam  Constitutional:       General: She is not in acute distress. Appearance: Normal appearance. She is not ill-appearing. Abdominal:      General: Bowel sounds are normal. There is no abdominal bruit. Palpations: Abdomen is soft. There is no hepatomegaly, splenomegaly, mass or pulsatile mass. Tenderness: There is no abdominal tenderness. There is no guarding. Neurological:      Mental Status: She is alert. Assessment:       Diagnosis Orders   1. Epigastric pain  Basic Metabolic Panel    Lipase      2. Pyuria  Urinalysis with Microscopic    Culture, Urine          Likely gastritis    Her gyne told her she has prolapsed uterus    We will likely not continue the protonix for long and I discussed themed with her and there reason.  To see if symptoms have healed       Plan:      Consider the shingle vaccine  Do stay with the diet and medicines  Do take fluids  Check the labs this week   See me in about 3 months         Anthony Jay MD

## 2023-03-21 NOTE — PATIENT INSTRUCTIONS
Consider the shingle vaccine  Do stay with the diet and medicines  Do take fluids  Check the labs this week   See me in about 3 months

## 2023-03-22 DIAGNOSIS — R10.13 EPIGASTRIC PAIN: ICD-10-CM

## 2023-03-22 DIAGNOSIS — R82.81 PYURIA: ICD-10-CM

## 2023-03-23 LAB
ANION GAP SERPL CALCULATED.3IONS-SCNC: 15 MMOL/L (ref 3–16)
BACTERIA UR CULT: NORMAL
BACTERIA URNS QL MICRO: ABNORMAL /HPF
BILIRUB UR QL STRIP.AUTO: NEGATIVE
BUN SERPL-MCNC: 19 MG/DL (ref 7–20)
CALCIUM SERPL-MCNC: 10.3 MG/DL (ref 8.3–10.6)
CHARACTER UR: ABNORMAL
CHLORIDE SERPL-SCNC: 100 MMOL/L (ref 99–110)
CLARITY UR: CLEAR
CO2 SERPL-SCNC: 26 MMOL/L (ref 21–32)
COLOR UR: ABNORMAL
CREAT SERPL-MCNC: 1.1 MG/DL (ref 0.6–1.2)
EPI CELLS #/AREA URNS AUTO: 9 /HPF (ref 0–5)
GFR SERPLBLD CREATININE-BSD FMLA CKD-EPI: 57 ML/MIN/{1.73_M2}
GLUCOSE SERPL-MCNC: 230 MG/DL (ref 70–99)
GLUCOSE UR STRIP.AUTO-MCNC: 100 MG/DL
HGB UR QL STRIP.AUTO: NEGATIVE
HYALINE CASTS #/AREA URNS AUTO: 2 /LPF (ref 0–8)
KETONES UR STRIP.AUTO-MCNC: ABNORMAL MG/DL
LEUKOCYTE ESTERASE UR QL STRIP.AUTO: ABNORMAL
LIPASE SERPL-CCNC: 81 U/L (ref 13–60)
NITRITE UR QL STRIP.AUTO: NEGATIVE
PH UR STRIP.AUTO: 5.5 [PH] (ref 5–8)
POTASSIUM SERPL-SCNC: 4.1 MMOL/L (ref 3.5–5.1)
PROT UR STRIP.AUTO-MCNC: 300 MG/DL
RBC CLUMPS #/AREA URNS AUTO: 2 /HPF (ref 0–4)
SODIUM SERPL-SCNC: 141 MMOL/L (ref 136–145)
SP GR UR STRIP.AUTO: 1.03 (ref 1–1.03)
UA DIPSTICK W REFLEX MICRO PNL UR: YES
URN SPEC COLLECT METH UR: ABNORMAL
UROBILINOGEN UR STRIP-ACNC: 0.2 E.U./DL
WBC #/AREA URNS AUTO: 3 /HPF (ref 0–5)

## 2023-04-04 RX ORDER — PANTOPRAZOLE SODIUM 40 MG/1
TABLET, DELAYED RELEASE ORAL
Qty: 30 TABLET | Refills: 5 | Status: SHIPPED | OUTPATIENT
Start: 2023-04-04

## 2023-05-11 ENCOUNTER — TELEPHONE (OUTPATIENT)
Dept: FAMILY MEDICINE CLINIC | Age: 63
End: 2023-05-11

## 2023-05-11 RX ORDER — NITROFURANTOIN 25; 75 MG/1; MG/1
100 CAPSULE ORAL 2 TIMES DAILY
Qty: 20 CAPSULE | Refills: 0 | Status: SHIPPED | OUTPATIENT
Start: 2023-05-11 | End: 2023-05-21

## 2023-05-11 NOTE — TELEPHONE ENCOUNTER
Pt has had a back ache and thinks it is a uti again like she had a couple months ago. She wants to know if she can get prescription since there was no appointments available. Did not want to see another provider.

## 2023-05-11 NOTE — TELEPHONE ENCOUNTER
Macrobid sent  She needs to make appt to see me   Worse to urgent care or ER   [facility]     Orders Placed This Encounter   Medications    nitrofurantoin, macrocrystal-monohydrate, (MACROBID) 100 MG capsule     Sig: Take 1 capsule by mouth 2 times daily for 10 days     Dispense:  20 capsule     Refill:  0

## 2023-05-30 RX ORDER — GLIPIZIDE 10 MG/1
5 TABLET ORAL
Qty: 90 TABLET | Refills: 1 | Status: SHIPPED | OUTPATIENT
Start: 2023-05-30

## 2023-06-28 RX ORDER — LOSARTAN POTASSIUM 100 MG/1
TABLET ORAL
Qty: 90 TABLET | Refills: 0 | Status: SHIPPED | OUTPATIENT
Start: 2023-06-28

## 2023-07-30 ENCOUNTER — HOSPITAL ENCOUNTER (EMERGENCY)
Age: 63
Discharge: HOME | End: 2023-07-30
Attending: EMERGENCY MEDICINE
Payer: COMMERCIAL

## 2023-07-30 VITALS
SYSTOLIC BLOOD PRESSURE: 170 MMHG | TEMPERATURE: 98.7 F | HEIGHT: 62 IN | OXYGEN SATURATION: 95 % | DIASTOLIC BLOOD PRESSURE: 82 MMHG | RESPIRATION RATE: 16 BRPM | HEART RATE: 98 BPM | BODY MASS INDEX: 32.76 KG/M2 | WEIGHT: 178 LBS

## 2023-07-30 DIAGNOSIS — N10 ACUTE PYELONEPHRITIS: Primary | ICD-10-CM

## 2023-07-30 LAB
BACTERIA URNS QL MICRO: ABNORMAL /HPF
BILIRUB UR QL STRIP.AUTO: NEGATIVE
CHARACTER UR: ABNORMAL
CLARITY UR: CLEAR
COLOR UR: YELLOW
GLUCOSE UR STRIP.AUTO-MCNC: 500 MG/DL
HGB UR QL STRIP.AUTO: ABNORMAL
KETONES UR STRIP.AUTO-MCNC: NEGATIVE MG/DL
LEUKOCYTE ESTERASE UR QL STRIP.AUTO: ABNORMAL
NITRITE UR QL STRIP.AUTO: POSITIVE
PH UR STRIP.AUTO: 7.5 [PH] (ref 5–8)
PROT UR STRIP.AUTO-MCNC: 100 MG/DL
RBC #/AREA URNS HPF: ABNORMAL /HPF (ref 0–4)
SP GR UR STRIP.AUTO: 1.01 (ref 1–1.03)
UA COMPLETE W REFLEX CULTURE PNL UR: ABNORMAL
UA DIPSTICK W REFLEX MICRO PNL UR: YES
URN SPEC COLLECT METH UR: ABNORMAL
UROBILINOGEN UR STRIP-ACNC: 0.2 E.U./DL
WBC #/AREA URNS HPF: ABNORMAL /HPF (ref 0–5)

## 2023-07-30 PROCEDURE — 6370000000 HC RX 637 (ALT 250 FOR IP): Performed by: EMERGENCY MEDICINE

## 2023-07-30 PROCEDURE — 81001 URINALYSIS AUTO W/SCOPE: CPT

## 2023-07-30 PROCEDURE — 99283 EMERGENCY DEPT VISIT LOW MDM: CPT

## 2023-07-30 RX ORDER — PHENAZOPYRIDINE HYDROCHLORIDE 100 MG/1
200 TABLET, FILM COATED ORAL 3 TIMES DAILY PRN
Qty: 12 TABLET | Refills: 0 | Status: SHIPPED | OUTPATIENT
Start: 2023-07-30 | End: 2023-08-01

## 2023-07-30 RX ORDER — IBUPROFEN 800 MG/1
800 TABLET ORAL EVERY 8 HOURS PRN
Qty: 30 TABLET | Refills: 0 | Status: SHIPPED | OUTPATIENT
Start: 2023-07-30

## 2023-07-30 RX ORDER — CEFUROXIME AXETIL 250 MG/1
250 TABLET ORAL 2 TIMES DAILY
Qty: 20 TABLET | Refills: 0 | Status: SHIPPED | OUTPATIENT
Start: 2023-07-30 | End: 2023-08-09

## 2023-07-30 RX ORDER — CEFUROXIME AXETIL 250 MG/1
500 TABLET ORAL ONCE
Status: COMPLETED | OUTPATIENT
Start: 2023-07-30 | End: 2023-07-30

## 2023-07-30 RX ORDER — IBUPROFEN 800 MG/1
800 TABLET ORAL ONCE
Status: DISCONTINUED | OUTPATIENT
Start: 2023-07-30 | End: 2023-07-30 | Stop reason: HOSPADM

## 2023-07-30 RX ADMIN — CEFUROXIME AXETIL 500 MG: 250 TABLET ORAL at 20:50

## 2023-07-30 ASSESSMENT — PAIN - FUNCTIONAL ASSESSMENT
PAIN_FUNCTIONAL_ASSESSMENT: NONE - DENIES PAIN
PAIN_FUNCTIONAL_ASSESSMENT: 0-10

## 2023-07-30 ASSESSMENT — PAIN DESCRIPTION - FREQUENCY: FREQUENCY: CONTINUOUS

## 2023-07-30 ASSESSMENT — PAIN DESCRIPTION - ONSET: ONSET: ON-GOING

## 2023-07-30 ASSESSMENT — PAIN DESCRIPTION - DESCRIPTORS: DESCRIPTORS: ACHING

## 2023-07-30 ASSESSMENT — PAIN DESCRIPTION - LOCATION: LOCATION: FLANK

## 2023-07-30 ASSESSMENT — PAIN SCALES - GENERAL: PAINLEVEL_OUTOF10: 8

## 2023-07-30 ASSESSMENT — PAIN DESCRIPTION - ORIENTATION: ORIENTATION: RIGHT

## 2023-07-30 ASSESSMENT — PAIN DESCRIPTION - PAIN TYPE: TYPE: ACUTE PAIN

## 2023-07-30 NOTE — ED TRIAGE NOTES
Pt states history of UTI, she started to feel pain today on left side with fouls smelling urine. She also states she has a fever but she is sunburned and no fever noted here.

## 2023-08-14 RX ORDER — PANTOPRAZOLE SODIUM 40 MG/1
TABLET, DELAYED RELEASE ORAL
Qty: 90 TABLET | Refills: 1 | Status: SHIPPED | OUTPATIENT
Start: 2023-08-14

## 2023-08-14 RX ORDER — ATORVASTATIN CALCIUM 10 MG/1
TABLET, FILM COATED ORAL
Qty: 90 TABLET | Refills: 1 | Status: SHIPPED | OUTPATIENT
Start: 2023-08-14

## 2023-08-15 NOTE — PROGRESS NOTES
Pt able to self prone today. During proning, oxygen saturations up to 100% on 20L. Titrated down to 10L. Once she supinated, her O2 remained above 90%. Pt switched to high flow nasal canula. Admission

## 2023-08-17 DIAGNOSIS — I10 HYPERTENSION, UNSPECIFIED TYPE: ICD-10-CM

## 2023-08-17 RX ORDER — METOPROLOL SUCCINATE 50 MG/1
50 TABLET, EXTENDED RELEASE ORAL DAILY
Qty: 90 TABLET | Refills: 0 | Status: SHIPPED | OUTPATIENT
Start: 2023-08-17

## 2023-08-29 ENCOUNTER — TELEPHONE (OUTPATIENT)
Dept: FAMILY MEDICINE CLINIC | Age: 63
End: 2023-08-29

## 2023-08-29 ENCOUNTER — OFFICE VISIT (OUTPATIENT)
Dept: FAMILY MEDICINE CLINIC | Age: 63
End: 2023-08-29
Payer: COMMERCIAL

## 2023-08-29 VITALS
HEIGHT: 62 IN | DIASTOLIC BLOOD PRESSURE: 78 MMHG | WEIGHT: 175.6 LBS | BODY MASS INDEX: 32.31 KG/M2 | SYSTOLIC BLOOD PRESSURE: 128 MMHG | TEMPERATURE: 97.6 F

## 2023-08-29 DIAGNOSIS — J01.90 ACUTE BACTERIAL SINUSITIS: Primary | ICD-10-CM

## 2023-08-29 DIAGNOSIS — B96.89 ACUTE BACTERIAL SINUSITIS: Primary | ICD-10-CM

## 2023-08-29 PROCEDURE — 99213 OFFICE O/P EST LOW 20 MIN: CPT | Performed by: INTERNAL MEDICINE

## 2023-08-29 PROCEDURE — 3078F DIAST BP <80 MM HG: CPT | Performed by: INTERNAL MEDICINE

## 2023-08-29 PROCEDURE — 1036F TOBACCO NON-USER: CPT | Performed by: INTERNAL MEDICINE

## 2023-08-29 PROCEDURE — 3074F SYST BP LT 130 MM HG: CPT | Performed by: INTERNAL MEDICINE

## 2023-08-29 PROCEDURE — G8417 CALC BMI ABV UP PARAM F/U: HCPCS | Performed by: INTERNAL MEDICINE

## 2023-08-29 PROCEDURE — G8427 DOCREV CUR MEDS BY ELIG CLIN: HCPCS | Performed by: INTERNAL MEDICINE

## 2023-08-29 PROCEDURE — 3017F COLORECTAL CA SCREEN DOC REV: CPT | Performed by: INTERNAL MEDICINE

## 2023-08-29 RX ORDER — CEFUROXIME AXETIL 250 MG/1
250 TABLET ORAL 2 TIMES DAILY
Qty: 20 TABLET | Refills: 0 | Status: SHIPPED | OUTPATIENT
Start: 2023-08-29 | End: 2023-09-08

## 2023-08-29 ASSESSMENT — ENCOUNTER SYMPTOMS
COUGH: 0
ABDOMINAL PAIN: 0
SHORTNESS OF BREATH: 0

## 2023-08-29 NOTE — PROGRESS NOTES
Malathi Oakley (:  1960) is a 59 y.o. female,Established patient, here for evaluation of the following chief complaint(s):  Sinusitis (Patient c/o post nasal drip, non-productive cough, discomfort in back, heada congestion since 2023)    Malathi Oakley is a 59 y.o. female with the following history as recorded in [facility]:  Patient Active Problem List    Diagnosis Date Noted    Flank pain 2023    Acute kidney injury superimposed on CKD ([address]) 2023    ARDS (adult respiratory distress syndrome) (HCC)     Acute respiratory failure with hypoxia (HCC)     Neutropenia ([address])     Pneumonia due to COVID-19 virus 2021    MARCI (acute kidney injury) ([address]) 2021    Diabetes mellitus, type 2 ([address]) 2012    Obesity 2012    HTN (hypertension) 2011     Current Outpatient Medications   Medication Sig Dispense Refill    metoprolol succinate (TOPROL XL) 50 MG extended release tablet Take 1 tablet by mouth daily 90 tablet 0    pantoprazole (PROTONIX) 40 MG tablet TAKE 1 TABLET BY MOUTH EVERY DAY BEFORE BREAKFAST 90 tablet 1    atorvastatin (LIPITOR) 10 MG tablet TAKE 1 TABLET BY MOUTH EVERY DAY 90 tablet 1    metFORMIN (GLUCOPHAGE) 1000 MG tablet TAKE 1 TABLET BY MOUTH TWICE A DAY WITH MEALS 180 tablet 0    losartan (COZAAR) 100 MG tablet TAKE 1 TABLET BY MOUTH EVERY DAY 90 tablet 0    dilTIAZem (DILACOR XR) 240 MG extended release capsule TAKE 1 CAPSULE BY MOUTH EVERY DAY 90 capsule 0    glipiZIDE (GLUCOTROL) 10 MG tablet TAKE 0.5 TABLETS BY MOUTH 2 TIMES DAILY (BEFORE MEALS) 90 tablet 1    hydroCHLOROthiazide (MICROZIDE) 12.5 MG capsule TAKE 1 CAPSULE BY MOUTH EVERY DAY IN THE MORNING 90 capsule 0    vitamin C (ASCORBIC ACID) 500 MG tablet Take 1 tablet by mouth daily      Biotin 10 MG CAPS Take by mouth      citric acid-potassium citrate (POLYCITRA) 1100-334 MG/5ML solution Take 5 mLs by mouth 2 times daily      vitamin B-12 (CYANOCOBALAMIN) 100 MCG tablet Take 2.5 tablets by

## 2023-08-29 NOTE — TELEPHONE ENCOUNTER
----- Message from Jayme Aleman sent at 8/29/2023 12:12 PM EDT -----  Subject: Message to Provider    QUESTIONS  Information for Provider? Patient did a COVID test today and it was   negative.  ---------------------------------------------------------------------------  --------------  Spring Carbajal INFO  [phone]; OK to leave message on voicemail  ---------------------------------------------------------------------------  --------------  SCRIPT ANSWERS  Relationship to Patient?  Self

## 2023-08-29 NOTE — TELEPHONE ENCOUNTER
She has appt today with Dr. Jackie Gross at 1:45. She was told to take a COVID test prior to coming in.

## 2023-09-14 RX ORDER — DILTIAZEM HYDROCHLORIDE 240 MG/1
CAPSULE, EXTENDED RELEASE ORAL
Qty: 90 CAPSULE | Refills: 0 | Status: SHIPPED | OUTPATIENT
Start: 2023-09-14

## 2023-09-28 DIAGNOSIS — I10 HYPERTENSION, UNSPECIFIED TYPE: ICD-10-CM

## 2023-09-28 RX ORDER — HYDROCHLOROTHIAZIDE 12.5 MG/1
CAPSULE, GELATIN COATED ORAL
Qty: 90 CAPSULE | Refills: 0 | Status: SHIPPED | OUTPATIENT
Start: 2023-09-28

## 2023-09-28 RX ORDER — METOPROLOL SUCCINATE 50 MG/1
50 TABLET, EXTENDED RELEASE ORAL DAILY
Qty: 90 TABLET | Refills: 0 | Status: SHIPPED | OUTPATIENT
Start: 2023-09-28

## 2023-10-04 ENCOUNTER — TELEPHONE (OUTPATIENT)
Dept: FAMILY MEDICINE CLINIC | Age: 63
End: 2023-10-04

## 2023-10-04 DIAGNOSIS — I10 HYPERTENSION, UNSPECIFIED TYPE: Primary | ICD-10-CM

## 2023-10-04 DIAGNOSIS — R80.9 TYPE 2 DIABETES MELLITUS WITH MICROALBUMINURIA, WITHOUT LONG-TERM CURRENT USE OF INSULIN (HCC): ICD-10-CM

## 2023-10-04 DIAGNOSIS — E11.29 TYPE 2 DIABETES MELLITUS WITH MICROALBUMINURIA, WITHOUT LONG-TERM CURRENT USE OF INSULIN (HCC): ICD-10-CM

## 2023-10-04 NOTE — TELEPHONE ENCOUNTER
----- Message from Dwayne Macedo sent at 10/4/2023  3:21 PM EDT -----  Subject: Referral Request    Reason for referral request? Patient is inquiring labs for appointment on   10-17-23 for a Diabetes follow up and is requesting a call back   Provider patient wants to be referred to(if known):     Provider Phone Number(if known):     Additional Information for Provider?   ---------------------------------------------------------------------------  --------------  [address] [place]    [phone]; OK to leave message on voicemail  ---------------------------------------------------------------------------  --------------

## 2023-10-05 NOTE — TELEPHONE ENCOUNTER
[phone] (home) - Je Martinez advised that lab orders are in Epic and to be fasting at least 10-12 hours.

## 2023-10-05 NOTE — TELEPHONE ENCOUNTER
Ok done     Diagnosis Orders   1. Hypertension, unspecified type  Comprehensive Metabolic Panel    Cholesterol, Total    Vitamin B12 & Folate      2.  Type 2 diabetes mellitus with microalbuminuria, without long-term current use of insulin (HCC)  Hemoglobin A1C    Comprehensive Metabolic Panel    Cholesterol, Total    Vitamin B12 & Folate

## 2023-10-17 ENCOUNTER — OFFICE VISIT (OUTPATIENT)
Dept: FAMILY MEDICINE CLINIC | Age: 63
End: 2023-10-17
Payer: COMMERCIAL

## 2023-10-17 VITALS
BODY MASS INDEX: 31.5 KG/M2 | HEART RATE: 76 BPM | TEMPERATURE: 98.1 F | WEIGHT: 171.2 LBS | HEIGHT: 62 IN | SYSTOLIC BLOOD PRESSURE: 136 MMHG | DIASTOLIC BLOOD PRESSURE: 80 MMHG

## 2023-10-17 DIAGNOSIS — E11.29 TYPE 2 DIABETES MELLITUS WITH MICROALBUMINURIA, WITHOUT LONG-TERM CURRENT USE OF INSULIN (HCC): ICD-10-CM

## 2023-10-17 DIAGNOSIS — I10 PRIMARY HYPERTENSION: Primary | ICD-10-CM

## 2023-10-17 DIAGNOSIS — R80.9 TYPE 2 DIABETES MELLITUS WITH MICROALBUMINURIA, WITHOUT LONG-TERM CURRENT USE OF INSULIN (HCC): ICD-10-CM

## 2023-10-17 PROCEDURE — 99213 OFFICE O/P EST LOW 20 MIN: CPT | Performed by: FAMILY MEDICINE

## 2023-10-17 PROCEDURE — 3017F COLORECTAL CA SCREEN DOC REV: CPT | Performed by: FAMILY MEDICINE

## 2023-10-17 PROCEDURE — 3079F DIAST BP 80-89 MM HG: CPT | Performed by: FAMILY MEDICINE

## 2023-10-17 PROCEDURE — G8484 FLU IMMUNIZE NO ADMIN: HCPCS | Performed by: FAMILY MEDICINE

## 2023-10-17 PROCEDURE — G8417 CALC BMI ABV UP PARAM F/U: HCPCS | Performed by: FAMILY MEDICINE

## 2023-10-17 PROCEDURE — G8427 DOCREV CUR MEDS BY ELIG CLIN: HCPCS | Performed by: FAMILY MEDICINE

## 2023-10-17 PROCEDURE — 1036F TOBACCO NON-USER: CPT | Performed by: FAMILY MEDICINE

## 2023-10-17 PROCEDURE — 2022F DILAT RTA XM EVC RTNOPTHY: CPT | Performed by: FAMILY MEDICINE

## 2023-10-17 PROCEDURE — 3052F HG A1C>EQUAL 8.0%<EQUAL 9.0%: CPT | Performed by: FAMILY MEDICINE

## 2023-10-17 PROCEDURE — 3075F SYST BP GE 130 - 139MM HG: CPT | Performed by: FAMILY MEDICINE

## 2023-10-17 RX ORDER — CLOTRIMAZOLE AND BETAMETHASONE DIPROPIONATE 10; .64 MG/G; MG/G
CREAM TOPICAL
Qty: 45 G | Refills: 0 | Status: SHIPPED | OUTPATIENT
Start: 2023-10-17

## 2023-10-17 ASSESSMENT — ENCOUNTER SYMPTOMS
NAUSEA: 0
VOMITING: 0
BLOOD IN STOOL: 0
ABDOMINAL PAIN: 0
DIARRHEA: 0
CHEST TIGHTNESS: 0
ABDOMINAL DISTENTION: 0
CONSTIPATION: 0
SHORTNESS OF BREATH: 0

## 2023-10-17 NOTE — PATIENT INSTRUCTIONS
Change the B12 to just twice a week   Start jardiance  We may be able to stop the hydrochlorothiazide medication  And possibly cut back on the glipizide  Cut the protonix in half for the next 2 week then stop it   See me back in 4 week

## 2023-10-18 RX ORDER — UBIDECARENONE 75 MG
CAPSULE ORAL
Qty: 30 TABLET | Refills: 0
Start: 2023-10-18

## 2023-11-09 ENCOUNTER — TELEPHONE (OUTPATIENT)
Dept: FAMILY MEDICINE CLINIC | Age: 63
End: 2023-11-09

## 2023-11-09 RX ORDER — BENZONATATE 100 MG/1
100 CAPSULE ORAL 3 TIMES DAILY PRN
Qty: 15 CAPSULE | Refills: 0 | Status: SHIPPED | OUTPATIENT
Start: 2023-11-09 | End: 2023-11-14

## 2023-11-09 RX ORDER — AZITHROMYCIN 250 MG/1
TABLET, FILM COATED ORAL
Qty: 1 PACKET | Refills: 0 | Status: SHIPPED | OUTPATIENT
Start: 2023-11-09 | End: 2023-11-19

## 2023-11-09 NOTE — TELEPHONE ENCOUNTER
Patient has had a cough for 1.5 weeks and taking coricidin for cough otc but no better. She would like script for cough. She has no other symptoms.     Pharmacy is [facility] in [place]

## 2023-11-09 NOTE — TELEPHONE ENCOUNTER
Med sent in if not resolved see me please    Orders Placed This Encounter   Medications    azithromycin (ZITHROMAX) 250 MG tablet     Sig: Take 2 tabs (500 mg) on Day 1, and take 1 tab (250 mg) on days 2 through 5.      Dispense:  1 packet     Refill:  0    benzonatate (TESSALON) 100 MG capsule     Sig: Take 1 capsule by mouth 3 times daily as needed for Cough     Dispense:  15 capsule     Refill:  0

## 2023-11-16 ENCOUNTER — ANESTHESIA EVENT (OUTPATIENT)
Dept: OPERATING ROOM | Age: 63
End: 2023-11-16
Payer: COMMERCIAL

## 2023-11-16 ENCOUNTER — ANESTHESIA (OUTPATIENT)
Dept: OPERATING ROOM | Age: 63
End: 2023-11-16
Payer: COMMERCIAL

## 2023-11-16 ENCOUNTER — HOSPITAL ENCOUNTER (INPATIENT)
Age: 63
LOS: 4 days | Discharge: HOME | DRG: 854 | End: 2023-11-20
Attending: EMERGENCY MEDICINE | Admitting: INTERNAL MEDICINE
Payer: COMMERCIAL

## 2023-11-16 ENCOUNTER — APPOINTMENT (OUTPATIENT)
Dept: CT IMAGING | Age: 63
DRG: 854 | End: 2023-11-16
Payer: COMMERCIAL

## 2023-11-16 DIAGNOSIS — N20.1 URETEROLITHIASIS: Primary | ICD-10-CM

## 2023-11-16 DIAGNOSIS — N10 ACUTE PYELONEPHRITIS: ICD-10-CM

## 2023-11-16 DIAGNOSIS — R82.81 PYURIA: ICD-10-CM

## 2023-11-16 DIAGNOSIS — N12 PYELONEPHRITIS: ICD-10-CM

## 2023-11-16 DIAGNOSIS — I48.0 PAROXYSMAL ATRIAL FIBRILLATION (HCC): ICD-10-CM

## 2023-11-16 DIAGNOSIS — I10 HYPERTENSION, UNSPECIFIED TYPE: ICD-10-CM

## 2023-11-16 LAB
ALBUMIN SERPL-MCNC: 4.7 G/DL (ref 3.4–5)
ALBUMIN/GLOB SERPL: 1.5 {RATIO} (ref 1.1–2.2)
ALP SERPL-CCNC: 60 U/L (ref 40–129)
ALT SERPL-CCNC: 72 U/L (ref 10–40)
ANION GAP SERPL CALCULATED.3IONS-SCNC: 17 MMOL/L (ref 3–16)
AST SERPL-CCNC: 42 U/L (ref 15–37)
BACTERIA URNS QL MICRO: ABNORMAL /HPF
BASOPHILS # BLD: 0 K/UL (ref 0–0.2)
BASOPHILS NFR BLD: 0.3 %
BILIRUB SERPL-MCNC: 0.5 MG/DL (ref 0–1)
BILIRUB UR QL STRIP.AUTO: NEGATIVE
BUN SERPL-MCNC: 29 MG/DL (ref 7–20)
CALCIUM SERPL-MCNC: 10.7 MG/DL (ref 8.3–10.6)
CHLORIDE SERPL-SCNC: 96 MMOL/L (ref 99–110)
CLARITY UR: ABNORMAL
CO2 SERPL-SCNC: 24 MMOL/L (ref 21–32)
COLOR UR: YELLOW
CREAT SERPL-MCNC: 1.5 MG/DL (ref 0.6–1.2)
DEPRECATED RDW RBC AUTO: 13.2 % (ref 12.4–15.4)
EOSINOPHIL # BLD: 0.1 K/UL (ref 0–0.6)
EOSINOPHIL NFR BLD: 0.6 %
EPI CELLS #/AREA URNS HPF: ABNORMAL /HPF (ref 0–5)
GFR SERPLBLD CREATININE-BSD FMLA CKD-EPI: 39 ML/MIN/{1.73_M2}
GLUCOSE BLD-MCNC: 251 MG/DL (ref 70–99)
GLUCOSE BLD-MCNC: 271 MG/DL (ref 70–99)
GLUCOSE BLD-MCNC: 272 MG/DL (ref 70–99)
GLUCOSE SERPL-MCNC: 311 MG/DL (ref 70–99)
GLUCOSE UR STRIP.AUTO-MCNC: >=1000 MG/DL
HCT VFR BLD AUTO: 41.7 % (ref 36–48)
HGB BLD-MCNC: 14 G/DL (ref 12–16)
HGB UR QL STRIP.AUTO: ABNORMAL
IMM GRANULOCYTES # BLD: 0 K/UL (ref 0–0.2)
IMM GRANULOCYTES NFR BLD: 0.1 %
KETONES UR STRIP.AUTO-MCNC: NEGATIVE MG/DL
LEUKOCYTE ESTERASE UR QL STRIP.AUTO: ABNORMAL
LYMPHOCYTES # BLD: 1.4 K/UL (ref 1–5.1)
LYMPHOCYTES NFR BLD: 16.5 %
MCH RBC QN AUTO: 30.2 PG (ref 26–34)
MCHC RBC AUTO-ENTMCNC: 33.6 G/DL (ref 32–36.4)
MCV RBC AUTO: 89.9 FL (ref 80–100)
MONOCYTES # BLD: 0.2 K/UL (ref 0–1.3)
MONOCYTES NFR BLD: 1.8 %
NEUTROPHILS # BLD: 7 K/UL (ref 1.7–7.7)
NEUTROPHILS NFR BLD: 80.7 %
NITRITE UR QL STRIP.AUTO: NEGATIVE
PERFORMED ON: ABNORMAL
PH UR STRIP.AUTO: 7.5 [PH] (ref 5–8)
PLATELET # BLD AUTO: 234 K/UL (ref 135–450)
PMV BLD AUTO: 10.2 FL (ref 5–10.5)
POTASSIUM SERPL-SCNC: 4.4 MMOL/L (ref 3.5–5.1)
PROT SERPL-MCNC: 7.8 G/DL (ref 6.4–8.2)
PROT UR STRIP.AUTO-MCNC: 100 MG/DL
RBC # BLD AUTO: 4.64 M/UL (ref 4–5.2)
RBC #/AREA URNS HPF: ABNORMAL /HPF (ref 0–4)
SODIUM SERPL-SCNC: 137 MMOL/L (ref 136–145)
SP GR UR STRIP.AUTO: 1.02 (ref 1–1.03)
UA COMPLETE W REFLEX CULTURE PNL UR: YES
UA DIPSTICK W REFLEX MICRO PNL UR: YES
URN SPEC COLLECT METH UR: ABNORMAL
UROBILINOGEN UR STRIP-ACNC: 0.2 E.U./DL
WBC # BLD AUTO: 8.7 K/UL (ref 4–11)
WBC #/AREA URNS HPF: ABNORMAL /HPF (ref 0–5)

## 2023-11-16 PROCEDURE — 87088 URINE BACTERIA CULTURE: CPT

## 2023-11-16 PROCEDURE — 1200000000 HC SEMI PRIVATE

## 2023-11-16 PROCEDURE — 87186 SC STD MICRODIL/AGAR DIL: CPT

## 2023-11-16 PROCEDURE — 3600000012 HC SURGERY LEVEL 2 ADDTL 15MIN: Performed by: UROLOGY

## 2023-11-16 PROCEDURE — 81001 URINALYSIS AUTO W/SCOPE: CPT

## 2023-11-16 PROCEDURE — 74176 CT ABD & PELVIS W/O CONTRAST: CPT

## 2023-11-16 PROCEDURE — 3600000002 HC SURGERY LEVEL 2 BASE: Performed by: UROLOGY

## 2023-11-16 PROCEDURE — 96375 TX/PRO/DX INJ NEW DRUG ADDON: CPT

## 2023-11-16 PROCEDURE — 6360000002 HC RX W HCPCS: Performed by: ANESTHESIOLOGY

## 2023-11-16 PROCEDURE — 7100000001 HC PACU RECOVERY - ADDTL 15 MIN: Performed by: UROLOGY

## 2023-11-16 PROCEDURE — 99285 EMERGENCY DEPT VISIT HI MDM: CPT

## 2023-11-16 PROCEDURE — C2617 STENT, NON-COR, TEM W/O DEL: HCPCS | Performed by: UROLOGY

## 2023-11-16 PROCEDURE — 96376 TX/PRO/DX INJ SAME DRUG ADON: CPT

## 2023-11-16 PROCEDURE — 87086 URINE CULTURE/COLONY COUNT: CPT

## 2023-11-16 PROCEDURE — 96365 THER/PROPH/DIAG IV INF INIT: CPT

## 2023-11-16 PROCEDURE — 85025 COMPLETE CBC W/AUTO DIFF WBC: CPT

## 2023-11-16 PROCEDURE — 36415 COLL VENOUS BLD VENIPUNCTURE: CPT

## 2023-11-16 PROCEDURE — 80053 COMPREHEN METABOLIC PANEL: CPT

## 2023-11-16 PROCEDURE — C1769 GUIDE WIRE: HCPCS | Performed by: UROLOGY

## 2023-11-16 PROCEDURE — 3700000000 HC ANESTHESIA ATTENDED CARE: Performed by: UROLOGY

## 2023-11-16 PROCEDURE — 6360000002 HC RX W HCPCS: Performed by: EMERGENCY MEDICINE

## 2023-11-16 PROCEDURE — 2580000003 HC RX 258: Performed by: EMERGENCY MEDICINE

## 2023-11-16 PROCEDURE — 2580000003 HC RX 258: Performed by: UROLOGY

## 2023-11-16 PROCEDURE — 3700000001 HC ADD 15 MINUTES (ANESTHESIA): Performed by: UROLOGY

## 2023-11-16 PROCEDURE — 7100000000 HC PACU RECOVERY - FIRST 15 MIN: Performed by: UROLOGY

## 2023-11-16 PROCEDURE — 0T778DZ DILATION OF LEFT URETER WITH INTRALUMINAL DEVICE, VIA NATURAL OR ARTIFICIAL OPENING ENDOSCOPIC: ICD-10-PCS | Performed by: UROLOGY

## 2023-11-16 PROCEDURE — 2709999900 HC NON-CHARGEABLE SUPPLY: Performed by: UROLOGY

## 2023-11-16 DEVICE — URETERAL STENT
Type: IMPLANTABLE DEVICE | Site: URETER | Status: FUNCTIONAL
Brand: CONTOUR™

## 2023-11-16 RX ORDER — PROPOFOL 10 MG/ML
INJECTION, EMULSION INTRAVENOUS PRN
Status: DISCONTINUED | OUTPATIENT
Start: 2023-11-16 | End: 2023-11-16 | Stop reason: SDUPTHER

## 2023-11-16 RX ORDER — 0.9 % SODIUM CHLORIDE 0.9 %
1000 INTRAVENOUS SOLUTION INTRAVENOUS ONCE
Status: COMPLETED | OUTPATIENT
Start: 2023-11-16 | End: 2023-11-16

## 2023-11-16 RX ORDER — DIPHENHYDRAMINE HYDROCHLORIDE 50 MG/ML
25 INJECTION INTRAMUSCULAR; INTRAVENOUS ONCE
Status: COMPLETED | OUTPATIENT
Start: 2023-11-16 | End: 2023-11-16

## 2023-11-16 RX ORDER — SODIUM CHLORIDE, SODIUM LACTATE, POTASSIUM CHLORIDE, AND CALCIUM CHLORIDE .6; .31; .03; .02 G/100ML; G/100ML; G/100ML; G/100ML
1000 INJECTION, SOLUTION INTRAVENOUS ONCE
Status: COMPLETED | OUTPATIENT
Start: 2023-11-16 | End: 2023-11-16

## 2023-11-16 RX ORDER — MAGNESIUM HYDROXIDE 1200 MG/15ML
LIQUID ORAL
Status: COMPLETED | OUTPATIENT
Start: 2023-11-16 | End: 2023-11-16

## 2023-11-16 RX ORDER — KETOROLAC TROMETHAMINE 15 MG/ML
15 INJECTION, SOLUTION INTRAMUSCULAR; INTRAVENOUS ONCE
Status: COMPLETED | OUTPATIENT
Start: 2023-11-16 | End: 2023-11-16

## 2023-11-16 RX ORDER — ONDANSETRON 2 MG/ML
4 INJECTION INTRAMUSCULAR; INTRAVENOUS ONCE
Status: COMPLETED | OUTPATIENT
Start: 2023-11-16 | End: 2023-11-16

## 2023-11-16 RX ORDER — OXYCODONE HYDROCHLORIDE 5 MG/1
5 TABLET ORAL PRN
Status: DISCONTINUED | OUTPATIENT
Start: 2023-11-16 | End: 2023-11-16 | Stop reason: HOSPADM

## 2023-11-16 RX ORDER — OXYCODONE HYDROCHLORIDE 10 MG/1
10 TABLET ORAL PRN
Status: DISCONTINUED | OUTPATIENT
Start: 2023-11-16 | End: 2023-11-16 | Stop reason: HOSPADM

## 2023-11-16 RX ORDER — SODIUM CHLORIDE 0.9 % (FLUSH) 0.9 %
5-40 SYRINGE (ML) INJECTION PRN
Status: DISCONTINUED | OUTPATIENT
Start: 2023-11-16 | End: 2023-11-16 | Stop reason: HOSPADM

## 2023-11-16 RX ORDER — SODIUM CHLORIDE 9 MG/ML
INJECTION, SOLUTION INTRAVENOUS PRN
Status: DISCONTINUED | OUTPATIENT
Start: 2023-11-16 | End: 2023-11-16 | Stop reason: HOSPADM

## 2023-11-16 RX ORDER — SODIUM CHLORIDE 0.9 % (FLUSH) 0.9 %
5-40 SYRINGE (ML) INJECTION EVERY 12 HOURS SCHEDULED
Status: DISCONTINUED | OUTPATIENT
Start: 2023-11-16 | End: 2023-11-16 | Stop reason: HOSPADM

## 2023-11-16 RX ORDER — ONDANSETRON 2 MG/ML
4 INJECTION INTRAMUSCULAR; INTRAVENOUS
Status: DISCONTINUED | OUTPATIENT
Start: 2023-11-16 | End: 2023-11-16 | Stop reason: HOSPADM

## 2023-11-16 RX ORDER — ATORVASTATIN CALCIUM 10 MG/1
10 TABLET, FILM COATED ORAL DAILY
Status: DISCONTINUED | OUTPATIENT
Start: 2023-11-17 | End: 2023-11-17

## 2023-11-16 RX ORDER — MORPHINE SULFATE 4 MG/ML
4 INJECTION, SOLUTION INTRAMUSCULAR; INTRAVENOUS
Status: COMPLETED | OUTPATIENT
Start: 2023-11-16 | End: 2023-11-16

## 2023-11-16 RX ADMIN — PROPOFOL 30 MG: 10 INJECTION, EMULSION INTRAVENOUS at 21:03

## 2023-11-16 RX ADMIN — SODIUM CHLORIDE, POTASSIUM CHLORIDE, SODIUM LACTATE AND CALCIUM CHLORIDE 1000 ML: 600; 310; 30; 20 INJECTION, SOLUTION INTRAVENOUS at 17:27

## 2023-11-16 RX ADMIN — DIPHENHYDRAMINE HYDROCHLORIDE 25 MG: 50 INJECTION INTRAMUSCULAR; INTRAVENOUS at 15:37

## 2023-11-16 RX ADMIN — PROPOFOL 20 MG: 10 INJECTION, EMULSION INTRAVENOUS at 21:07

## 2023-11-16 RX ADMIN — ONDANSETRON 4 MG: 2 INJECTION INTRAMUSCULAR; INTRAVENOUS at 18:55

## 2023-11-16 RX ADMIN — MORPHINE SULFATE 4 MG: 4 INJECTION, SOLUTION INTRAMUSCULAR; INTRAVENOUS at 14:07

## 2023-11-16 RX ADMIN — HYDROMORPHONE HYDROCHLORIDE 0.5 MG: 1 INJECTION, SOLUTION INTRAMUSCULAR; INTRAVENOUS; SUBCUTANEOUS at 15:36

## 2023-11-16 RX ADMIN — HYDROMORPHONE HYDROCHLORIDE 0.5 MG: 1 INJECTION, SOLUTION INTRAMUSCULAR; INTRAVENOUS; SUBCUTANEOUS at 18:30

## 2023-11-16 RX ADMIN — KETOROLAC TROMETHAMINE 15 MG: 15 INJECTION, SOLUTION INTRAMUSCULAR; INTRAVENOUS at 14:05

## 2023-11-16 RX ADMIN — CEFTRIAXONE 1000 MG: 1 INJECTION, POWDER, FOR SOLUTION INTRAMUSCULAR; INTRAVENOUS at 15:43

## 2023-11-16 RX ADMIN — ONDANSETRON 4 MG: 2 INJECTION INTRAMUSCULAR; INTRAVENOUS at 14:04

## 2023-11-16 RX ADMIN — SODIUM CHLORIDE 1000 ML: 9 INJECTION, SOLUTION INTRAVENOUS at 14:02

## 2023-11-16 ASSESSMENT — PAIN - FUNCTIONAL ASSESSMENT
PAIN_FUNCTIONAL_ASSESSMENT: 0-10

## 2023-11-16 ASSESSMENT — PAIN SCALES - GENERAL
PAINLEVEL_OUTOF10: 8
PAINLEVEL_OUTOF10: 5
PAINLEVEL_OUTOF10: 8
PAINLEVEL_OUTOF10: 4
PAINLEVEL_OUTOF10: 5
PAINLEVEL_OUTOF10: 8
PAINLEVEL_OUTOF10: 8

## 2023-11-16 ASSESSMENT — PAIN DESCRIPTION - ORIENTATION
ORIENTATION: LEFT

## 2023-11-16 ASSESSMENT — PAIN DESCRIPTION - LOCATION
LOCATION: FLANK

## 2023-11-16 ASSESSMENT — PAIN DESCRIPTION - DESCRIPTORS: DESCRIPTORS: ACHING

## 2023-11-16 NOTE — ED PROVIDER NOTES
WITHOUT CONTRAST 11/16/2023 1:53 pm TECHNIQUE: CT of the abdomen and pelvis was performed without the administration of intravenous contrast. Multiplanar reformatted images are provided for review. Automated exposure control, iterative reconstruction, and/or weight based adjustment of the mA/kV was utilized to reduce the radiation dose to as low as reasonably achievable. COMPARISON: 03/21/2023 HISTORY: ORDERING SYSTEM PROVIDED HISTORY: Left flank pain TECHNOLOGIST PROVIDED HISTORY: Reason for exam:->Left flank pain Additional Contrast?->None Decision Support Exception - unselect if not a suspected or confirmed emergency medical condition->Emergency Medical Condition (MA) Reason for Exam: left flank pain started just prior to arrival to ED-hx stones Relevant Medical/Surgical History: Cholcystectomy FINDINGS: Lower chest: Heart size is normal. The lung bases are clear : Leftstones in the kidneys. Mild left hydronephrosis. Left renal sinus stranding. Large stone in the proximal left ureter. It measures approximately 6 x 7 mm. No other stones along either ureter. No stones in the bladder. Organs: Liver, spleen, and adrenal glands are unremarkable. No peripancreatic stranding. Liver is elongated at 18.9 cm. GI/Bowel: The stomach is unremarkable. No small bowel dilation. No colonic wall thickening. Pelvis: No free fluid. Uterus is unremarkable. Multiple phleboliths in the pelvis. Neither ovary is enlarged. Peritoneum/Retroperitoneum: No enlarged lymphadenopathy. Aorta has atherosclerotic changes but is normal size. Bones/Soft Tissues: Mild scoliosis. Facet arthropathy lower lumbar spine. Osteoarthritic changes of the hips more prominent on the left. Osteitis pubis condensans. No subcutaneous mass. Proximal left ureteral stone with mild hydronephrosis. PROCEDURES   Unless otherwise noted below, none     Procedures      ECG  None    ED COURSE/MDM    59 y.o. female presents with sudden onset of pain.   The

## 2023-11-16 NOTE — ED TRIAGE NOTES
Patient came to ER with complaints of left flank pain. Patient stated started about 20 minutes ago. Patient increased respirations. Encouraged patient to do deep respirations. In through her nose out through her mouth.

## 2023-11-16 NOTE — ED NOTES
Patient complains of sever left flank pain. Dr. Dereck Solis gave medication order.        Carola Shoemaker RN  11/16/23 1556

## 2023-11-16 NOTE — ED NOTES
Patient walked to restroom x1 assist related to pain medications.        Mario Lakhani RN  11/16/23 2420

## 2023-11-16 NOTE — ED NOTES
Patient being transferred to [facility] per [facility].  Report given.        Carola Shoemaker, RN  11/16/23 6748

## 2023-11-17 PROBLEM — N20.1 URETERAL CALCULUS: Status: ACTIVE | Noted: 2023-11-17

## 2023-11-17 LAB
ANION GAP SERPL CALCULATED.3IONS-SCNC: 16 MMOL/L (ref 3–16)
BASOPHILS # BLD: 0 K/UL (ref 0–0.2)
BASOPHILS NFR BLD: 0.1 %
BUN SERPL-MCNC: 27 MG/DL (ref 7–20)
CALCIUM SERPL-MCNC: 8 MG/DL (ref 8.3–10.6)
CHLORIDE SERPL-SCNC: 100 MMOL/L (ref 99–110)
CO2 SERPL-SCNC: 22 MMOL/L (ref 21–32)
CREAT SERPL-MCNC: 1.8 MG/DL (ref 0.6–1.2)
DEPRECATED RDW RBC AUTO: 14.5 % (ref 12.4–15.4)
EOSINOPHIL # BLD: 0 K/UL (ref 0–0.6)
EOSINOPHIL NFR BLD: 0 %
GFR SERPLBLD CREATININE-BSD FMLA CKD-EPI: 31 ML/MIN/{1.73_M2}
GLUCOSE BLD-MCNC: 149 MG/DL (ref 70–99)
GLUCOSE BLD-MCNC: 166 MG/DL (ref 70–99)
GLUCOSE BLD-MCNC: 219 MG/DL (ref 70–99)
GLUCOSE BLD-MCNC: 220 MG/DL (ref 70–99)
GLUCOSE BLD-MCNC: 227 MG/DL (ref 70–99)
GLUCOSE BLD-MCNC: 244 MG/DL (ref 70–99)
GLUCOSE BLD-MCNC: 352 MG/DL (ref 70–99)
GLUCOSE SERPL-MCNC: 216 MG/DL (ref 70–99)
HCT VFR BLD AUTO: 34.5 % (ref 36–48)
HGB BLD-MCNC: 11.5 G/DL (ref 12–16)
LACTATE BLDV-SCNC: 3.2 MMOL/L (ref 0.4–2)
LACTATE BLDV-SCNC: 3.7 MMOL/L (ref 0.4–2)
LACTATE BLDV-SCNC: 3.9 MMOL/L (ref 0.4–2)
LYMPHOCYTES # BLD: 0.4 K/UL (ref 1–5.1)
LYMPHOCYTES NFR BLD: 1.7 %
MAGNESIUM SERPL-MCNC: 1.1 MG/DL (ref 1.8–2.4)
MAGNESIUM SERPL-MCNC: 2.4 MG/DL (ref 1.8–2.4)
MCH RBC QN AUTO: 30.1 PG (ref 26–34)
MCHC RBC AUTO-ENTMCNC: 33.3 G/DL (ref 31–36)
MCV RBC AUTO: 90.1 FL (ref 80–100)
MONOCYTES # BLD: 1.4 K/UL (ref 0–1.3)
MONOCYTES NFR BLD: 5.5 %
NEUTROPHILS # BLD: 23.8 K/UL (ref 1.7–7.7)
NEUTROPHILS NFR BLD: 92.7 %
PERFORMED ON: ABNORMAL
PLATELET # BLD AUTO: 121 K/UL (ref 135–450)
PMV BLD AUTO: 9.9 FL (ref 5–10.5)
POTASSIUM SERPL-SCNC: 3.4 MMOL/L (ref 3.5–5.1)
POTASSIUM SERPL-SCNC: 3.9 MMOL/L (ref 3.5–5.1)
RBC # BLD AUTO: 3.83 M/UL (ref 4–5.2)
SODIUM SERPL-SCNC: 138 MMOL/L (ref 136–145)
WBC # BLD AUTO: 25.7 K/UL (ref 4–11)

## 2023-11-17 PROCEDURE — 6370000000 HC RX 637 (ALT 250 FOR IP): Performed by: NURSE PRACTITIONER

## 2023-11-17 PROCEDURE — 2580000003 HC RX 258: Performed by: STUDENT IN AN ORGANIZED HEALTH CARE EDUCATION/TRAINING PROGRAM

## 2023-11-17 PROCEDURE — 36415 COLL VENOUS BLD VENIPUNCTURE: CPT

## 2023-11-17 PROCEDURE — 6370000000 HC RX 637 (ALT 250 FOR IP): Performed by: INTERNAL MEDICINE

## 2023-11-17 PROCEDURE — 6370000000 HC RX 637 (ALT 250 FOR IP): Performed by: STUDENT IN AN ORGANIZED HEALTH CARE EDUCATION/TRAINING PROGRAM

## 2023-11-17 PROCEDURE — 2580000003 HC RX 258: Performed by: INTERNAL MEDICINE

## 2023-11-17 PROCEDURE — 85025 COMPLETE CBC W/AUTO DIFF WBC: CPT

## 2023-11-17 PROCEDURE — 83605 ASSAY OF LACTIC ACID: CPT

## 2023-11-17 PROCEDURE — 2580000003 HC RX 258: Performed by: REGISTERED NURSE

## 2023-11-17 PROCEDURE — 2000000000 HC ICU R&B

## 2023-11-17 PROCEDURE — 84132 ASSAY OF SERUM POTASSIUM: CPT

## 2023-11-17 PROCEDURE — 6360000002 HC RX W HCPCS: Performed by: INTERNAL MEDICINE

## 2023-11-17 PROCEDURE — 83735 ASSAY OF MAGNESIUM: CPT

## 2023-11-17 PROCEDURE — 80048 BASIC METABOLIC PNL TOTAL CA: CPT

## 2023-11-17 PROCEDURE — 6360000002 HC RX W HCPCS: Performed by: STUDENT IN AN ORGANIZED HEALTH CARE EDUCATION/TRAINING PROGRAM

## 2023-11-17 RX ORDER — PANTOPRAZOLE SODIUM 40 MG/1
40 TABLET, DELAYED RELEASE ORAL
Status: DISCONTINUED | OUTPATIENT
Start: 2023-11-17 | End: 2023-11-20 | Stop reason: HOSPADM

## 2023-11-17 RX ORDER — METOPROLOL SUCCINATE 50 MG/1
50 TABLET, EXTENDED RELEASE ORAL DAILY
Status: DISCONTINUED | OUTPATIENT
Start: 2023-11-17 | End: 2023-11-18

## 2023-11-17 RX ORDER — MAGNESIUM SULFATE IN WATER 40 MG/ML
4000 INJECTION, SOLUTION INTRAVENOUS ONCE
Status: COMPLETED | OUTPATIENT
Start: 2023-11-17 | End: 2023-11-17

## 2023-11-17 RX ORDER — MAGNESIUM SULFATE IN WATER 40 MG/ML
2000 INJECTION, SOLUTION INTRAVENOUS PRN
Status: DISCONTINUED | OUTPATIENT
Start: 2023-11-17 | End: 2023-11-20 | Stop reason: HOSPADM

## 2023-11-17 RX ORDER — SODIUM CHLORIDE 0.9 % (FLUSH) 0.9 %
5-40 SYRINGE (ML) INJECTION PRN
Status: DISCONTINUED | OUTPATIENT
Start: 2023-11-17 | End: 2023-11-20 | Stop reason: HOSPADM

## 2023-11-17 RX ORDER — SODIUM CHLORIDE 0.9 % (FLUSH) 0.9 %
5-40 SYRINGE (ML) INJECTION EVERY 12 HOURS SCHEDULED
Status: DISCONTINUED | OUTPATIENT
Start: 2023-11-17 | End: 2023-11-20 | Stop reason: HOSPADM

## 2023-11-17 RX ORDER — INSULIN LISPRO 100 [IU]/ML
0-4 INJECTION, SOLUTION INTRAVENOUS; SUBCUTANEOUS NIGHTLY
Status: DISCONTINUED | OUTPATIENT
Start: 2023-11-17 | End: 2023-11-20 | Stop reason: HOSPADM

## 2023-11-17 RX ORDER — INSULIN GLARGINE 100 [IU]/ML
5 INJECTION, SOLUTION SUBCUTANEOUS NIGHTLY
Status: DISCONTINUED | OUTPATIENT
Start: 2023-11-17 | End: 2023-11-20 | Stop reason: HOSPADM

## 2023-11-17 RX ORDER — SODIUM CHLORIDE, SODIUM LACTATE, POTASSIUM CHLORIDE, AND CALCIUM CHLORIDE .6; .31; .03; .02 G/100ML; G/100ML; G/100ML; G/100ML
1000 INJECTION, SOLUTION INTRAVENOUS ONCE
Status: COMPLETED | OUTPATIENT
Start: 2023-11-17 | End: 2023-11-17

## 2023-11-17 RX ORDER — ACETAMINOPHEN 325 MG/1
650 TABLET ORAL EVERY 6 HOURS PRN
Status: DISCONTINUED | OUTPATIENT
Start: 2023-11-17 | End: 2023-11-20 | Stop reason: HOSPADM

## 2023-11-17 RX ORDER — ONDANSETRON 2 MG/ML
4 INJECTION INTRAMUSCULAR; INTRAVENOUS EVERY 6 HOURS PRN
Status: DISCONTINUED | OUTPATIENT
Start: 2023-11-17 | End: 2023-11-20 | Stop reason: HOSPADM

## 2023-11-17 RX ORDER — DIPHENHYDRAMINE HCL 25 MG
25 TABLET ORAL NIGHTLY PRN
Status: DISCONTINUED | OUTPATIENT
Start: 2023-11-17 | End: 2023-11-20 | Stop reason: HOSPADM

## 2023-11-17 RX ORDER — SODIUM CHLORIDE 9 MG/ML
INJECTION, SOLUTION INTRAVENOUS ONCE
Status: COMPLETED | OUTPATIENT
Start: 2023-11-17 | End: 2023-11-17

## 2023-11-17 RX ORDER — POTASSIUM CHLORIDE 7.45 MG/ML
10 INJECTION INTRAVENOUS PRN
Status: DISCONTINUED | OUTPATIENT
Start: 2023-11-17 | End: 2023-11-20 | Stop reason: HOSPADM

## 2023-11-17 RX ORDER — POLYETHYLENE GLYCOL 3350 17 G/17G
17 POWDER, FOR SOLUTION ORAL DAILY PRN
Status: DISCONTINUED | OUTPATIENT
Start: 2023-11-17 | End: 2023-11-20 | Stop reason: HOSPADM

## 2023-11-17 RX ORDER — 0.9 % SODIUM CHLORIDE 0.9 %
1000 INTRAVENOUS SOLUTION INTRAVENOUS ONCE
Status: COMPLETED | OUTPATIENT
Start: 2023-11-17 | End: 2023-11-17

## 2023-11-17 RX ORDER — ONDANSETRON 4 MG/1
4 TABLET, ORALLY DISINTEGRATING ORAL EVERY 8 HOURS PRN
Status: DISCONTINUED | OUTPATIENT
Start: 2023-11-17 | End: 2023-11-20 | Stop reason: HOSPADM

## 2023-11-17 RX ORDER — POTASSIUM CHLORIDE 20 MEQ/1
40 TABLET, EXTENDED RELEASE ORAL PRN
Status: DISCONTINUED | OUTPATIENT
Start: 2023-11-17 | End: 2023-11-20 | Stop reason: HOSPADM

## 2023-11-17 RX ORDER — ATORVASTATIN CALCIUM 10 MG/1
10 TABLET, FILM COATED ORAL NIGHTLY
Status: DISCONTINUED | OUTPATIENT
Start: 2023-11-17 | End: 2023-11-20 | Stop reason: HOSPADM

## 2023-11-17 RX ORDER — SODIUM CHLORIDE 9 MG/ML
INJECTION, SOLUTION INTRAVENOUS CONTINUOUS
Status: ACTIVE | OUTPATIENT
Start: 2023-11-17 | End: 2023-11-19

## 2023-11-17 RX ORDER — DILTIAZEM HYDROCHLORIDE 240 MG/1
240 CAPSULE, COATED, EXTENDED RELEASE ORAL DAILY
Status: DISCONTINUED | OUTPATIENT
Start: 2023-11-17 | End: 2023-11-19

## 2023-11-17 RX ORDER — SODIUM CHLORIDE 9 MG/ML
INJECTION, SOLUTION INTRAVENOUS PRN
Status: DISCONTINUED | OUTPATIENT
Start: 2023-11-17 | End: 2023-11-20 | Stop reason: HOSPADM

## 2023-11-17 RX ORDER — 0.9 % SODIUM CHLORIDE 0.9 %
500 INTRAVENOUS SOLUTION INTRAVENOUS ONCE
Status: COMPLETED | OUTPATIENT
Start: 2023-11-17 | End: 2023-11-17

## 2023-11-17 RX ORDER — DEXTROSE MONOHYDRATE 100 MG/ML
INJECTION, SOLUTION INTRAVENOUS CONTINUOUS PRN
Status: DISCONTINUED | OUTPATIENT
Start: 2023-11-17 | End: 2023-11-20 | Stop reason: HOSPADM

## 2023-11-17 RX ORDER — ENOXAPARIN SODIUM 100 MG/ML
40 INJECTION SUBCUTANEOUS DAILY
Status: DISCONTINUED | OUTPATIENT
Start: 2023-11-17 | End: 2023-11-18

## 2023-11-17 RX ORDER — INSULIN LISPRO 100 [IU]/ML
0-4 INJECTION, SOLUTION INTRAVENOUS; SUBCUTANEOUS
Status: DISCONTINUED | OUTPATIENT
Start: 2023-11-17 | End: 2023-11-20 | Stop reason: HOSPADM

## 2023-11-17 RX ORDER — ACETAMINOPHEN 650 MG/1
650 SUPPOSITORY RECTAL EVERY 6 HOURS PRN
Status: DISCONTINUED | OUTPATIENT
Start: 2023-11-17 | End: 2023-11-20 | Stop reason: HOSPADM

## 2023-11-17 RX ADMIN — INSULIN GLARGINE 5 UNITS: 100 INJECTION, SOLUTION SUBCUTANEOUS at 20:50

## 2023-11-17 RX ADMIN — INSULIN LISPRO 1 UNITS: 100 INJECTION, SOLUTION INTRAVENOUS; SUBCUTANEOUS at 08:53

## 2023-11-17 RX ADMIN — ONDANSETRON 4 MG: 2 INJECTION INTRAMUSCULAR; INTRAVENOUS at 03:03

## 2023-11-17 RX ADMIN — SODIUM CHLORIDE 500 ML: 9 INJECTION, SOLUTION INTRAVENOUS at 03:01

## 2023-11-17 RX ADMIN — SODIUM CHLORIDE: 9 INJECTION, SOLUTION INTRAVENOUS at 18:32

## 2023-11-17 RX ADMIN — MAGNESIUM SULFATE HEPTAHYDRATE 4000 MG: 40 INJECTION, SOLUTION INTRAVENOUS at 13:23

## 2023-11-17 RX ADMIN — PANTOPRAZOLE SODIUM 40 MG: 40 TABLET, DELAYED RELEASE ORAL at 05:16

## 2023-11-17 RX ADMIN — POTASSIUM CHLORIDE 40 MEQ: 1500 TABLET, EXTENDED RELEASE ORAL at 11:08

## 2023-11-17 RX ADMIN — SODIUM CHLORIDE: 9 INJECTION, SOLUTION INTRAVENOUS at 10:51

## 2023-11-17 RX ADMIN — SODIUM CHLORIDE: 9 INJECTION, SOLUTION INTRAVENOUS at 05:20

## 2023-11-17 RX ADMIN — HYDROMORPHONE HYDROCHLORIDE 0.5 MG: 1 INJECTION, SOLUTION INTRAMUSCULAR; INTRAVENOUS; SUBCUTANEOUS at 21:39

## 2023-11-17 RX ADMIN — DIPHENHYDRAMINE HCL 25 MG: 25 TABLET ORAL at 20:50

## 2023-11-17 RX ADMIN — ATORVASTATIN CALCIUM 10 MG: 10 TABLET, FILM COATED ORAL at 20:49

## 2023-11-17 RX ADMIN — INSULIN LISPRO 4 UNITS: 100 INJECTION, SOLUTION INTRAVENOUS; SUBCUTANEOUS at 16:11

## 2023-11-17 RX ADMIN — ACETAMINOPHEN 650 MG: 325 TABLET ORAL at 20:50

## 2023-11-17 RX ADMIN — INSULIN LISPRO 1 UNITS: 100 INJECTION, SOLUTION INTRAVENOUS; SUBCUTANEOUS at 12:27

## 2023-11-17 RX ADMIN — CEFTRIAXONE SODIUM 2000 MG: 2 INJECTION, POWDER, FOR SOLUTION INTRAMUSCULAR; INTRAVENOUS at 12:21

## 2023-11-17 RX ADMIN — ACETAMINOPHEN 650 MG: 325 TABLET ORAL at 11:17

## 2023-11-17 RX ADMIN — ACETAMINOPHEN 650 MG: 325 TABLET ORAL at 05:16

## 2023-11-17 RX ADMIN — SODIUM CHLORIDE, POTASSIUM CHLORIDE, SODIUM LACTATE AND CALCIUM CHLORIDE 1000 ML: 600; 310; 30; 20 INJECTION, SOLUTION INTRAVENOUS at 16:59

## 2023-11-17 RX ADMIN — ONDANSETRON 4 MG: 4 TABLET, ORALLY DISINTEGRATING ORAL at 12:28

## 2023-11-17 RX ADMIN — SODIUM CHLORIDE: 9 INJECTION, SOLUTION INTRAVENOUS at 06:10

## 2023-11-17 RX ADMIN — SODIUM CHLORIDE 1000 ML: 9 INJECTION, SOLUTION INTRAVENOUS at 08:33

## 2023-11-17 ASSESSMENT — PAIN SCALES - GENERAL
PAINLEVEL_OUTOF10: 5
PAINLEVEL_OUTOF10: 3
PAINLEVEL_OUTOF10: 5
PAINLEVEL_OUTOF10: 3
PAINLEVEL_OUTOF10: 3
PAINLEVEL_OUTOF10: 0
PAINLEVEL_OUTOF10: 0
PAINLEVEL_OUTOF10: 3
PAINLEVEL_OUTOF10: 1

## 2023-11-17 ASSESSMENT — PAIN DESCRIPTION - LOCATION
LOCATION: HEAD
LOCATION: FLANK
LOCATION: HEAD
LOCATION: FLANK

## 2023-11-17 ASSESSMENT — PAIN DESCRIPTION - FREQUENCY: FREQUENCY: CONTINUOUS

## 2023-11-17 ASSESSMENT — PAIN DESCRIPTION - DESCRIPTORS
DESCRIPTORS: ACHING;SHARP
DESCRIPTORS: ACHING

## 2023-11-17 ASSESSMENT — PAIN DESCRIPTION - ORIENTATION
ORIENTATION: ANTERIOR
ORIENTATION: LEFT
ORIENTATION: LEFT

## 2023-11-17 ASSESSMENT — PAIN DESCRIPTION - PAIN TYPE: TYPE: ACUTE PAIN

## 2023-11-17 ASSESSMENT — PAIN DESCRIPTION - ONSET: ONSET: GRADUAL

## 2023-11-17 NOTE — H&P
date for the next mammogram, in accordance with the [employer] of Radiology and the Society of Breast Imaging recommendations.    Care Plan discussed with:   Patient x    Family     RN x         Consultant  ---------------------------  ED physician   ----------         Information:    Reviewed previous records     X   Other      Expected  Disposition:   Home  x   HH/PT/OT/RN    SNF/LTC    Other:    SAFETY:   Code Status: Full Code   DVT prophylaxis Lovenox  Bladder catheter: Yes  Disposition: TBD     Time Spent:  Time spent in review of chart, discussion with other providers, including ER physician, nursing, case management, other family members, and patient himself, evaluation, assessment and strategy: 54 minutes    Felicia Lockwood MD  [address] [facility]

## 2023-11-17 NOTE — BRIEF OP NOTE
Brief Postoperative Note      Patient: Regina Floyd  YOB: 1960  MRN: [id number]    Date of Procedure: 11/16/2023    Pre-Op Diagnosis Codes:     * Renal calculus [N20.0]    Post-Op Diagnosis: Same       Procedure(s):  CYSTOSCOPY URETERAL STENT INSERTION    Surgeon(s):  Asuncion Lewis MD    Assistant:  Surgical Assistant: Yann Bangura    Anesthesia: General    Estimated Blood Loss (mL): Minimal    Complications: None    Specimens:   * No specimens in log *    Implants:  Implant Name Type Inv.  Item Serial No.  Lot No. LRB No. Used Action   STENT URET 6FR L26CM PERCFLX HYDR+ [employer] [id number]  [facility] 6FR L26CM PERCFLX HYDR+ TAPR TIP KODY  [employer] [facility] UROLOGY- [id number] Left 1 Implanted         Drains: * No LDAs found *    Findings: 7mm left UPJ stone with hydro and UTI      Electronically signed by Asuncion Lewis MD on 11/16/2023 at 9:28 PM

## 2023-11-17 NOTE — CONSULTS
Consulting Physician: Dr. Sav Baird in ED    Reason for Consult: left ureteral stone with hydronephrosis and UTI    History of Present Illness: Shy Lee is a 59 y.o. female with h/o stones who presented to [facility] ED with acute onset of left flank pain associated with nausea and vomiting. CT shows 7mm left UPJ stone with hydronephrosis. Urine appears grossly infected. Patient has worsened in just the last few hours and is now tachycardic to 120s and groggy during interview. Past Medical History:   Past Medical History:   Diagnosis Date    Chronic kidney disease 2009    kidney stones    Hyperlipidemia     Hypertension     MDRO (multiple drug resistant organisms) resistance     MRSA infection 2013    left lower leg    Right pulmonary embolus (HCC)     Type II or unspecified type diabetes mellitus without mention of complication, not stated as uncontrolled        Past Surgical History:  Past Surgical History:   Procedure Laterality Date    [address]    COLONOSCOPY  06/07/2018    Dr. Michele Mclain. adenoma polyp, repeat in 5 years    CYSTOSCOPY Right 7/22/2021    CYSTOSCOPY, RIGHT URETERAL STENT INSERTION,RIGHT RETROGRADE PYLEOGRAM performed by Buster Contreras MD at [facility]    LITHOTRIPSY  2009       Social History:  Social History     Socioeconomic History    Marital status:       Spouse name: Not on file    Number of children: Not on file    Years of education: Not on file    Highest education level: Not on file   Occupational History    Not on file   Tobacco Use    Smoking status: Never    Smokeless tobacco: Never   Substance and Sexual Activity    Alcohol use: No    Drug use: No    Sexual activity: Yes     Partners: Male   Other Topics Concern    Not on file   Social History Narrative    Not on file     Social Determinants of Health     Financial Resource Strain: Low Risk  (3/13/2023)    Overall Financial Resource Strain (CARDIA)     Difficulty of

## 2023-11-17 NOTE — OP NOTE
was noted. Bladder was drained. Scope was replaced. The left ureteral orifice was identified and  cannulated with 0.035 ZIPwire and over top of the wire, I placed a 6/26  left ureteral stent. The wire was removed and a good curl was noted  proximally on fluoroscopy and distally on cystoscopy. The bladder was  drained, scope was removed, and the procedure was ended. The patient  tolerated the procedure well and was taken to the recovery room in good  condition. She will be admitted to the floor for further medical  treatment.       Patricia Frankel MD    D: 11/16/2023 21:33:52       T: 11/17/2023 4:18:53     [email or web address]  Job#: [id number]     Doc#: [id number]    CC:

## 2023-11-18 PROBLEM — I48.0 PAROXYSMAL ATRIAL FIBRILLATION (HCC): Status: ACTIVE | Noted: 2023-11-18

## 2023-11-18 LAB
ANION GAP SERPL CALCULATED.3IONS-SCNC: 9 MMOL/L (ref 3–16)
BACTERIA UR CULT: ABNORMAL
BASOPHILS # BLD: 0 K/UL (ref 0–0.2)
BASOPHILS NFR BLD: 0 %
BUN SERPL-MCNC: 22 MG/DL (ref 7–20)
CALCIUM SERPL-MCNC: 8 MG/DL (ref 8.3–10.6)
CHLORIDE SERPL-SCNC: 107 MMOL/L (ref 99–110)
CO2 SERPL-SCNC: 24 MMOL/L (ref 21–32)
CREAT SERPL-MCNC: 1.3 MG/DL (ref 0.6–1.2)
DEPRECATED RDW RBC AUTO: 14.5 % (ref 12.4–15.4)
EKG ATRIAL RATE: 153 BPM
EKG DIAGNOSIS: NORMAL
EKG Q-T INTERVAL: 300 MS
EKG QRS DURATION: 84 MS
EKG QTC CALCULATION (BAZETT): 458 MS
EKG R AXIS: -12 DEGREES
EKG T AXIS: 39 DEGREES
EKG VENTRICULAR RATE: 140 BPM
EOSINOPHIL # BLD: 0 K/UL (ref 0–0.6)
EOSINOPHIL NFR BLD: 0 %
GFR SERPLBLD CREATININE-BSD FMLA CKD-EPI: 46 ML/MIN/{1.73_M2}
GLUCOSE BLD-MCNC: 118 MG/DL (ref 70–99)
GLUCOSE BLD-MCNC: 139 MG/DL (ref 70–99)
GLUCOSE BLD-MCNC: 166 MG/DL (ref 70–99)
GLUCOSE BLD-MCNC: 201 MG/DL (ref 70–99)
GLUCOSE SERPL-MCNC: 110 MG/DL (ref 70–99)
HCT VFR BLD AUTO: 34.5 % (ref 36–48)
HGB BLD-MCNC: 11.5 G/DL (ref 12–16)
LACTATE BLDV-SCNC: 1.7 MMOL/L (ref 0.4–2)
LYMPHOCYTES # BLD: 1.2 K/UL (ref 1–5.1)
LYMPHOCYTES NFR BLD: 5 %
MCH RBC QN AUTO: 30.1 PG (ref 26–34)
MCHC RBC AUTO-ENTMCNC: 33.4 G/DL (ref 31–36)
MCV RBC AUTO: 90.2 FL (ref 80–100)
MONOCYTES # BLD: 1 K/UL (ref 0–1.3)
MONOCYTES NFR BLD: 4 %
NEUTROPHILS # BLD: 22 K/UL (ref 1.7–7.7)
NEUTROPHILS NFR BLD: 69 %
NEUTS BAND NFR BLD MANUAL: 22 % (ref 0–7)
ORGANISM: ABNORMAL
PERFORMED ON: ABNORMAL
PLATELET # BLD AUTO: 106 K/UL (ref 135–450)
PLATELET BLD QL SMEAR: ADEQUATE
PMV BLD AUTO: 9.7 FL (ref 5–10.5)
POTASSIUM SERPL-SCNC: 4.2 MMOL/L (ref 3.5–5.1)
RBC # BLD AUTO: 3.83 M/UL (ref 4–5.2)
SLIDE REVIEW: ABNORMAL
SODIUM SERPL-SCNC: 140 MMOL/L (ref 136–145)
WBC # BLD AUTO: 24.2 K/UL (ref 4–11)

## 2023-11-18 PROCEDURE — 6360000002 HC RX W HCPCS: Performed by: STUDENT IN AN ORGANIZED HEALTH CARE EDUCATION/TRAINING PROGRAM

## 2023-11-18 PROCEDURE — 2580000003 HC RX 258: Performed by: STUDENT IN AN ORGANIZED HEALTH CARE EDUCATION/TRAINING PROGRAM

## 2023-11-18 PROCEDURE — 6370000000 HC RX 637 (ALT 250 FOR IP): Performed by: STUDENT IN AN ORGANIZED HEALTH CARE EDUCATION/TRAINING PROGRAM

## 2023-11-18 PROCEDURE — 2060000000 HC ICU INTERMEDIATE R&B

## 2023-11-18 PROCEDURE — 93005 ELECTROCARDIOGRAM TRACING: CPT | Performed by: STUDENT IN AN ORGANIZED HEALTH CARE EDUCATION/TRAINING PROGRAM

## 2023-11-18 PROCEDURE — 80048 BASIC METABOLIC PNL TOTAL CA: CPT

## 2023-11-18 PROCEDURE — 36415 COLL VENOUS BLD VENIPUNCTURE: CPT

## 2023-11-18 PROCEDURE — 85025 COMPLETE CBC W/AUTO DIFF WBC: CPT

## 2023-11-18 PROCEDURE — 93010 ELECTROCARDIOGRAM REPORT: CPT | Performed by: INTERNAL MEDICINE

## 2023-11-18 PROCEDURE — 83605 ASSAY OF LACTIC ACID: CPT

## 2023-11-18 PROCEDURE — 99223 1ST HOSP IP/OBS HIGH 75: CPT | Performed by: INTERNAL MEDICINE

## 2023-11-18 PROCEDURE — 6370000000 HC RX 637 (ALT 250 FOR IP): Performed by: INTERNAL MEDICINE

## 2023-11-18 RX ORDER — METOPROLOL SUCCINATE 50 MG/1
50 TABLET, EXTENDED RELEASE ORAL ONCE
Status: COMPLETED | OUTPATIENT
Start: 2023-11-18 | End: 2023-11-18

## 2023-11-18 RX ORDER — METOPROLOL SUCCINATE 50 MG/1
100 TABLET, EXTENDED RELEASE ORAL DAILY
Status: DISCONTINUED | OUTPATIENT
Start: 2023-11-19 | End: 2023-11-20 | Stop reason: HOSPADM

## 2023-11-18 RX ADMIN — ONDANSETRON 4 MG: 2 INJECTION INTRAMUSCULAR; INTRAVENOUS at 14:19

## 2023-11-18 RX ADMIN — PANTOPRAZOLE SODIUM 40 MG: 40 TABLET, DELAYED RELEASE ORAL at 08:14

## 2023-11-18 RX ADMIN — ACETAMINOPHEN 650 MG: 325 TABLET ORAL at 03:37

## 2023-11-18 RX ADMIN — INSULIN GLARGINE 5 UNITS: 100 INJECTION, SOLUTION SUBCUTANEOUS at 20:35

## 2023-11-18 RX ADMIN — ATORVASTATIN CALCIUM 10 MG: 10 TABLET, FILM COATED ORAL at 20:36

## 2023-11-18 RX ADMIN — SODIUM CHLORIDE, PRESERVATIVE FREE 10 ML: 5 INJECTION INTRAVENOUS at 08:16

## 2023-11-18 RX ADMIN — AMIODARONE HYDROCHLORIDE 150 MG: 50 INJECTION, SOLUTION INTRAVENOUS at 11:03

## 2023-11-18 RX ADMIN — APIXABAN 5 MG: 5 TABLET, FILM COATED ORAL at 17:17

## 2023-11-18 RX ADMIN — METOPROLOL SUCCINATE 50 MG: 50 TABLET, EXTENDED RELEASE ORAL at 12:27

## 2023-11-18 RX ADMIN — CEFTRIAXONE SODIUM 2000 MG: 2 INJECTION, POWDER, FOR SOLUTION INTRAMUSCULAR; INTRAVENOUS at 13:12

## 2023-11-18 RX ADMIN — HYDROMORPHONE HYDROCHLORIDE 0.5 MG: 1 INJECTION, SOLUTION INTRAMUSCULAR; INTRAVENOUS; SUBCUTANEOUS at 20:36

## 2023-11-18 RX ADMIN — SODIUM CHLORIDE, PRESERVATIVE FREE 10 ML: 5 INJECTION INTRAVENOUS at 20:37

## 2023-11-18 RX ADMIN — ACETAMINOPHEN 650 MG: 325 TABLET ORAL at 13:20

## 2023-11-18 RX ADMIN — INSULIN LISPRO 1 UNITS: 100 INJECTION, SOLUTION INTRAVENOUS; SUBCUTANEOUS at 12:27

## 2023-11-18 RX ADMIN — SODIUM CHLORIDE: 9 INJECTION, SOLUTION INTRAVENOUS at 16:11

## 2023-11-18 RX ADMIN — METOPROLOL SUCCINATE 50 MG: 50 TABLET, EXTENDED RELEASE ORAL at 08:14

## 2023-11-18 RX ADMIN — ONDANSETRON 4 MG: 2 INJECTION INTRAMUSCULAR; INTRAVENOUS at 20:36

## 2023-11-18 RX ADMIN — AMIODARONE HYDROCHLORIDE 0.5 MG/MIN: 50 INJECTION, SOLUTION INTRAVENOUS at 21:36

## 2023-11-18 RX ADMIN — AMIODARONE HYDROCHLORIDE 1 MG/MIN: 50 INJECTION, SOLUTION INTRAVENOUS at 11:13

## 2023-11-18 RX ADMIN — HYDROMORPHONE HYDROCHLORIDE 0.5 MG: 1 INJECTION, SOLUTION INTRAMUSCULAR; INTRAVENOUS; SUBCUTANEOUS at 05:09

## 2023-11-18 RX ADMIN — SODIUM CHLORIDE: 9 INJECTION, SOLUTION INTRAVENOUS at 14:38

## 2023-11-18 RX ADMIN — HYDROMORPHONE HYDROCHLORIDE 0.5 MG: 1 INJECTION, SOLUTION INTRAMUSCULAR; INTRAVENOUS; SUBCUTANEOUS at 14:10

## 2023-11-18 ASSESSMENT — PAIN SCALES - GENERAL
PAINLEVEL_OUTOF10: 6
PAINLEVEL_OUTOF10: 6
PAINLEVEL_OUTOF10: 0
PAINLEVEL_OUTOF10: 0
PAINLEVEL_OUTOF10: 3
PAINLEVEL_OUTOF10: 4
PAINLEVEL_OUTOF10: 4
PAINLEVEL_OUTOF10: 2
PAINLEVEL_OUTOF10: 3
PAINLEVEL_OUTOF10: 3

## 2023-11-18 ASSESSMENT — PAIN DESCRIPTION - LOCATION
LOCATION: FLANK
LOCATION: HEAD
LOCATION: FLANK
LOCATION: FLANK
LOCATION: HEAD
LOCATION: FLANK
LOCATION: BACK

## 2023-11-18 ASSESSMENT — PAIN DESCRIPTION - ORIENTATION
ORIENTATION: LEFT

## 2023-11-18 ASSESSMENT — PAIN DESCRIPTION - DESCRIPTORS
DESCRIPTORS: ACHING
DESCRIPTORS: ACHING

## 2023-11-18 ASSESSMENT — PAIN - FUNCTIONAL ASSESSMENT: PAIN_FUNCTIONAL_ASSESSMENT: ACTIVITIES ARE NOT PREVENTED

## 2023-11-18 NOTE — CONSULTS
[facility]  Cardiology Consult Note        CC:      A-fib with RVR             HPI:   This is a 59 y.o. female with history of kidney stones admitted with nausea, pain in her left flank and bloating in her stomach. She was found to have a large kidney stone. A Ureteric stent has been placed by urology 2 days ago. Initially the patient was hypotensive, septic with lactic acidosis. She has been treated with IV antibiotics and stabilized. Today she went into A-fib with RVR which she has no previous history off. She has history of diabetes hypertension and high cholesterol for which she is medicated. This admission the patient does not recall having any chest pain shortness of breath palpitations or dizziness. Past Medical History:   Diagnosis Date    Chronic kidney disease 2009    kidney stones    Hyperlipidemia     Hypertension     MDRO (multiple drug resistant organisms) resistance     MRSA infection 2013    left lower leg    Right pulmonary embolus (HCC)     Type II or unspecified type diabetes mellitus without mention of complication, not stated as uncontrolled       Past Surgical History:   Procedure Laterality Date    [address]    COLONOSCOPY  06/07/2018    Dr. Jason Martin. adenoma polyp, repeat in 5 years    CYSTOSCOPY Right 7/22/2021    CYSTOSCOPY, RIGHT URETERAL STENT INSERTION,RIGHT RETROGRADE PYLEOGRAM performed by Eileen Alvarez MD at [facility] Left 11/16/2023    CYSTOSCOPY URETERAL STENT INSERTION performed by Asuncion Lewis MD at [facility]    LITHOTRIPSY  2009      Family History   Problem Relation Age of Onset    Cancer Mother         pancreatic    Cancer Father         lung    Heart Disease Father         Bypass x 5    Heart Disease Sister         Stent- CAD    Diabetes Sister       Social History     Tobacco Use    Smoking status: Never    Smokeless tobacco: Never   Substance Use Topics    Alcohol use: No    Drug use:  No

## 2023-11-19 ENCOUNTER — APPOINTMENT (OUTPATIENT)
Dept: GENERAL RADIOLOGY | Age: 63
DRG: 854 | End: 2023-11-19
Payer: COMMERCIAL

## 2023-11-19 LAB
ANION GAP SERPL CALCULATED.3IONS-SCNC: 10 MMOL/L (ref 3–16)
ANISOCYTOSIS BLD QL SMEAR: ABNORMAL
BACTERIA URNS QL MICRO: ABNORMAL /HPF
BASOPHILS # BLD: 0 K/UL (ref 0–0.2)
BASOPHILS NFR BLD: 0 %
BILIRUB UR QL STRIP.AUTO: NEGATIVE
BUN SERPL-MCNC: 17 MG/DL (ref 7–20)
CALCIUM SERPL-MCNC: 8.4 MG/DL (ref 8.3–10.6)
CHARACTER UR: ABNORMAL
CHLORIDE SERPL-SCNC: 106 MMOL/L (ref 99–110)
CLARITY UR: ABNORMAL
CO2 SERPL-SCNC: 22 MMOL/L (ref 21–32)
COLOR UR: YELLOW
CREAT SERPL-MCNC: 1.1 MG/DL (ref 0.6–1.2)
DEPRECATED RDW RBC AUTO: 14.9 % (ref 12.4–15.4)
DOHLE BOD BLD QL SMEAR: PRESENT
EOSINOPHIL # BLD: 0 K/UL (ref 0–0.6)
EOSINOPHIL NFR BLD: 0 %
EPI CELLS #/AREA URNS AUTO: 5 /HPF (ref 0–5)
GFR SERPLBLD CREATININE-BSD FMLA CKD-EPI: 57 ML/MIN/{1.73_M2}
GLUCOSE BLD-MCNC: 138 MG/DL (ref 70–99)
GLUCOSE BLD-MCNC: 150 MG/DL (ref 70–99)
GLUCOSE BLD-MCNC: 163 MG/DL (ref 70–99)
GLUCOSE BLD-MCNC: 182 MG/DL (ref 70–99)
GLUCOSE SERPL-MCNC: 149 MG/DL (ref 70–99)
GLUCOSE UR STRIP.AUTO-MCNC: >=1000 MG/DL
HCT VFR BLD AUTO: 33.3 % (ref 36–48)
HGB BLD-MCNC: 10.9 G/DL (ref 12–16)
HGB UR QL STRIP.AUTO: ABNORMAL
HYALINE CASTS #/AREA URNS AUTO: 0 /LPF (ref 0–8)
KETONES UR STRIP.AUTO-MCNC: 40 MG/DL
LEUKOCYTE ESTERASE UR QL STRIP.AUTO: ABNORMAL
LYMPHOCYTES # BLD: 0.8 K/UL (ref 1–5.1)
LYMPHOCYTES NFR BLD: 3 %
MCH RBC QN AUTO: 29.6 PG (ref 26–34)
MCHC RBC AUTO-ENTMCNC: 32.9 G/DL (ref 31–36)
MCV RBC AUTO: 90 FL (ref 80–100)
MONOCYTES # BLD: 0.8 K/UL (ref 0–1.3)
MONOCYTES NFR BLD: 3 %
MUCUS: PRESENT
NEUTROPHILS # BLD: 24.7 K/UL (ref 1.7–7.7)
NEUTROPHILS NFR BLD: 79 %
NEUTS BAND NFR BLD MANUAL: 15 % (ref 0–7)
NEUTS VAC BLD QL SMEAR: PRESENT
NITRITE UR QL STRIP.AUTO: NEGATIVE
PERFORMED ON: ABNORMAL
PH UR STRIP.AUTO: 5 [PH] (ref 5–8)
PLATELET # BLD AUTO: 128 K/UL (ref 135–450)
PLATELET BLD QL SMEAR: ABNORMAL
PMV BLD AUTO: 10.6 FL (ref 5–10.5)
POTASSIUM SERPL-SCNC: 4.4 MMOL/L (ref 3.5–5.1)
PROT UR STRIP.AUTO-MCNC: 100 MG/DL
RBC # BLD AUTO: 3.7 M/UL (ref 4–5.2)
RBC CLUMPS #/AREA URNS AUTO: 502 /HPF (ref 0–4)
SLIDE REVIEW: ABNORMAL
SODIUM SERPL-SCNC: 138 MMOL/L (ref 136–145)
SP GR UR STRIP.AUTO: 1.02 (ref 1–1.03)
TOXIC GRANULES BLD QL SMEAR: PRESENT
UA DIPSTICK W REFLEX MICRO PNL UR: YES
URN SPEC COLLECT METH UR: ABNORMAL
UROBILINOGEN UR STRIP-ACNC: 0.2 E.U./DL
WBC # BLD AUTO: 26.3 K/UL (ref 4–11)
WBC #/AREA URNS HPF: ABNORMAL /HPF (ref 0–5)

## 2023-11-19 PROCEDURE — 2060000000 HC ICU INTERMEDIATE R&B

## 2023-11-19 PROCEDURE — 6360000002 HC RX W HCPCS: Performed by: STUDENT IN AN ORGANIZED HEALTH CARE EDUCATION/TRAINING PROGRAM

## 2023-11-19 PROCEDURE — 81001 URINALYSIS AUTO W/SCOPE: CPT

## 2023-11-19 PROCEDURE — 74018 RADEX ABDOMEN 1 VIEW: CPT

## 2023-11-19 PROCEDURE — 94760 N-INVAS EAR/PLS OXIMETRY 1: CPT

## 2023-11-19 PROCEDURE — 85025 COMPLETE CBC W/AUTO DIFF WBC: CPT

## 2023-11-19 PROCEDURE — 6370000000 HC RX 637 (ALT 250 FOR IP): Performed by: STUDENT IN AN ORGANIZED HEALTH CARE EDUCATION/TRAINING PROGRAM

## 2023-11-19 PROCEDURE — 2580000003 HC RX 258: Performed by: STUDENT IN AN ORGANIZED HEALTH CARE EDUCATION/TRAINING PROGRAM

## 2023-11-19 PROCEDURE — 6370000000 HC RX 637 (ALT 250 FOR IP): Performed by: INTERNAL MEDICINE

## 2023-11-19 PROCEDURE — 80048 BASIC METABOLIC PNL TOTAL CA: CPT

## 2023-11-19 PROCEDURE — 99233 SBSQ HOSP IP/OBS HIGH 50: CPT | Performed by: INTERNAL MEDICINE

## 2023-11-19 RX ORDER — 0.9 % SODIUM CHLORIDE 0.9 %
500 INTRAVENOUS SOLUTION INTRAVENOUS ONCE
Status: CANCELLED | OUTPATIENT
Start: 2023-11-19

## 2023-11-19 RX ORDER — SIMETHICONE 80 MG
80 TABLET,CHEWABLE ORAL EVERY 6 HOURS PRN
Status: DISCONTINUED | OUTPATIENT
Start: 2023-11-19 | End: 2023-11-20 | Stop reason: HOSPADM

## 2023-11-19 RX ORDER — LISINOPRIL 20 MG/1
20 TABLET ORAL DAILY
Status: DISCONTINUED | OUTPATIENT
Start: 2023-11-19 | End: 2023-11-20 | Stop reason: HOSPADM

## 2023-11-19 RX ADMIN — SODIUM CHLORIDE, PRESERVATIVE FREE 10 ML: 5 INJECTION INTRAVENOUS at 08:30

## 2023-11-19 RX ADMIN — HYDROMORPHONE HYDROCHLORIDE 0.5 MG: 1 INJECTION, SOLUTION INTRAMUSCULAR; INTRAVENOUS; SUBCUTANEOUS at 18:56

## 2023-11-19 RX ADMIN — PANTOPRAZOLE SODIUM 40 MG: 40 TABLET, DELAYED RELEASE ORAL at 06:32

## 2023-11-19 RX ADMIN — APIXABAN 5 MG: 5 TABLET, FILM COATED ORAL at 20:42

## 2023-11-19 RX ADMIN — ATORVASTATIN CALCIUM 10 MG: 10 TABLET, FILM COATED ORAL at 20:42

## 2023-11-19 RX ADMIN — CEFTRIAXONE SODIUM 2000 MG: 2 INJECTION, POWDER, FOR SOLUTION INTRAMUSCULAR; INTRAVENOUS at 12:00

## 2023-11-19 RX ADMIN — APIXABAN 5 MG: 5 TABLET, FILM COATED ORAL at 08:29

## 2023-11-19 RX ADMIN — SODIUM CHLORIDE, PRESERVATIVE FREE 10 ML: 5 INJECTION INTRAVENOUS at 21:44

## 2023-11-19 RX ADMIN — INSULIN GLARGINE 5 UNITS: 100 INJECTION, SOLUTION SUBCUTANEOUS at 20:00

## 2023-11-19 RX ADMIN — EMPAGLIFLOZIN 10 MG: 10 TABLET, FILM COATED ORAL at 08:29

## 2023-11-19 RX ADMIN — METOPROLOL SUCCINATE 100 MG: 50 TABLET, EXTENDED RELEASE ORAL at 08:29

## 2023-11-19 RX ADMIN — AMIODARONE HYDROCHLORIDE 0.5 MG/MIN: 50 INJECTION, SOLUTION INTRAVENOUS at 14:26

## 2023-11-19 RX ADMIN — LISINOPRIL 20 MG: 20 TABLET ORAL at 20:42

## 2023-11-19 RX ADMIN — HYDROMORPHONE HYDROCHLORIDE 0.5 MG: 1 INJECTION, SOLUTION INTRAMUSCULAR; INTRAVENOUS; SUBCUTANEOUS at 01:56

## 2023-11-19 ASSESSMENT — PAIN DESCRIPTION - ORIENTATION
ORIENTATION: LEFT

## 2023-11-19 ASSESSMENT — PAIN SCALES - GENERAL
PAINLEVEL_OUTOF10: 5
PAINLEVEL_OUTOF10: 5
PAINLEVEL_OUTOF10: 4
PAINLEVEL_OUTOF10: 6

## 2023-11-19 ASSESSMENT — PAIN SCALES - WONG BAKER: WONGBAKER_NUMERICALRESPONSE: 0

## 2023-11-19 ASSESSMENT — PAIN DESCRIPTION - LOCATION
LOCATION: FLANK

## 2023-11-19 ASSESSMENT — PAIN DESCRIPTION - DESCRIPTORS: DESCRIPTORS: ACHING

## 2023-11-19 NOTE — CARE COORDINATION
SW completed chart review, pt is from home, initially ambulated with x1 assist, but according RN update today, Pt awake and alert. Bolus still infusing, VS noted with improvement: 117/82, 81p. Able to titrate pt to RA, saturation is 92% RA. RN ambulated pt to toilet, voided pale yellow urine and passing flatus. Color returning to face and voicing feeling \"so much better\". SW will follow for needs, but at this time, ANR, will watch for improvement.      Alexandro Marquez LMSW, [address] [facility] Social Work Case Management   Phone: [phone]  Fax: [phone]

## 2023-11-20 VITALS
BODY MASS INDEX: 29.29 KG/M2 | HEIGHT: 62 IN | HEART RATE: 74 BPM | OXYGEN SATURATION: 96 % | WEIGHT: 159.17 LBS | DIASTOLIC BLOOD PRESSURE: 85 MMHG | SYSTOLIC BLOOD PRESSURE: 153 MMHG | TEMPERATURE: 97.7 F | RESPIRATION RATE: 17 BRPM

## 2023-11-20 LAB
ANION GAP SERPL CALCULATED.3IONS-SCNC: 11 MMOL/L (ref 3–16)
BASOPHILS # BLD: 0 K/UL (ref 0–0.2)
BASOPHILS NFR BLD: 0 %
BUN SERPL-MCNC: 13 MG/DL (ref 7–20)
CALCIUM SERPL-MCNC: 8.8 MG/DL (ref 8.3–10.6)
CHLORIDE SERPL-SCNC: 103 MMOL/L (ref 99–110)
CO2 SERPL-SCNC: 25 MMOL/L (ref 21–32)
CREAT SERPL-MCNC: 0.9 MG/DL (ref 0.6–1.2)
DEPRECATED RDW RBC AUTO: 14.6 % (ref 12.4–15.4)
EOSINOPHIL # BLD: 0 K/UL (ref 0–0.6)
EOSINOPHIL NFR BLD: 0 %
GFR SERPLBLD CREATININE-BSD FMLA CKD-EPI: >60 ML/MIN/{1.73_M2}
GLUCOSE BLD-MCNC: 120 MG/DL (ref 70–99)
GLUCOSE BLD-MCNC: 190 MG/DL (ref 70–99)
GLUCOSE SERPL-MCNC: 126 MG/DL (ref 70–99)
HCT VFR BLD AUTO: 32.2 % (ref 36–48)
HGB BLD-MCNC: 10.9 G/DL (ref 12–16)
LACTATE BLDV-SCNC: 0.9 MMOL/L (ref 0.4–1.9)
LYMPHOCYTES # BLD: 1.9 K/UL (ref 1–5.1)
LYMPHOCYTES NFR BLD: 10 %
MCH RBC QN AUTO: 30.2 PG (ref 26–34)
MCHC RBC AUTO-ENTMCNC: 34 G/DL (ref 31–36)
MCV RBC AUTO: 88.7 FL (ref 80–100)
MONOCYTES # BLD: 0 K/UL (ref 0–1.3)
MONOCYTES NFR BLD: 0 %
NEUTROPHILS # BLD: 17.4 K/UL (ref 1.7–7.7)
NEUTROPHILS NFR BLD: 86 %
NEUTS BAND NFR BLD MANUAL: 4 % (ref 0–7)
PERFORMED ON: ABNORMAL
PERFORMED ON: ABNORMAL
PLATELET # BLD AUTO: 149 K/UL (ref 135–450)
PLATELET BLD QL SMEAR: ADEQUATE
PMV BLD AUTO: 10.2 FL (ref 5–10.5)
POTASSIUM SERPL-SCNC: 3.9 MMOL/L (ref 3.5–5.1)
RBC # BLD AUTO: 3.63 M/UL (ref 4–5.2)
RBC MORPH BLD: NORMAL
SLIDE REVIEW: ABNORMAL
SODIUM SERPL-SCNC: 139 MMOL/L (ref 136–145)
WBC # BLD AUTO: 19.3 K/UL (ref 4–11)

## 2023-11-20 PROCEDURE — 83605 ASSAY OF LACTIC ACID: CPT

## 2023-11-20 PROCEDURE — 2700000000 HC OXYGEN THERAPY PER DAY

## 2023-11-20 PROCEDURE — 6360000002 HC RX W HCPCS: Performed by: STUDENT IN AN ORGANIZED HEALTH CARE EDUCATION/TRAINING PROGRAM

## 2023-11-20 PROCEDURE — 80048 BASIC METABOLIC PNL TOTAL CA: CPT

## 2023-11-20 PROCEDURE — 6370000000 HC RX 637 (ALT 250 FOR IP): Performed by: STUDENT IN AN ORGANIZED HEALTH CARE EDUCATION/TRAINING PROGRAM

## 2023-11-20 PROCEDURE — 94761 N-INVAS EAR/PLS OXIMETRY MLT: CPT

## 2023-11-20 PROCEDURE — 85025 COMPLETE CBC W/AUTO DIFF WBC: CPT

## 2023-11-20 PROCEDURE — 99233 SBSQ HOSP IP/OBS HIGH 50: CPT | Performed by: INTERNAL MEDICINE

## 2023-11-20 PROCEDURE — 6370000000 HC RX 637 (ALT 250 FOR IP): Performed by: INTERNAL MEDICINE

## 2023-11-20 PROCEDURE — 2580000003 HC RX 258: Performed by: STUDENT IN AN ORGANIZED HEALTH CARE EDUCATION/TRAINING PROGRAM

## 2023-11-20 RX ORDER — OXYCODONE HYDROCHLORIDE 5 MG/1
5 TABLET ORAL EVERY 6 HOURS PRN
Qty: 20 TABLET | Refills: 0 | Status: SHIPPED | OUTPATIENT
Start: 2023-11-20 | End: 2023-11-25

## 2023-11-20 RX ORDER — AMOXICILLIN AND CLAVULANATE POTASSIUM 875; 125 MG/1; MG/1
1 TABLET, FILM COATED ORAL 2 TIMES DAILY
Qty: 10 TABLET | Refills: 0 | Status: SHIPPED | OUTPATIENT
Start: 2023-11-20 | End: 2023-11-25

## 2023-11-20 RX ORDER — OXYCODONE HYDROCHLORIDE 5 MG/1
5 TABLET ORAL EVERY 6 HOURS PRN
Qty: 20 TABLET | Refills: 0 | Status: SHIPPED | OUTPATIENT
Start: 2023-11-20 | End: 2023-11-20 | Stop reason: SDUPTHER

## 2023-11-20 RX ORDER — METOPROLOL SUCCINATE 50 MG/1
100 TABLET, EXTENDED RELEASE ORAL DAILY
Qty: 90 TABLET | Refills: 0 | Status: SHIPPED | OUTPATIENT
Start: 2023-11-20

## 2023-11-20 RX ADMIN — APIXABAN 5 MG: 5 TABLET, FILM COATED ORAL at 10:28

## 2023-11-20 RX ADMIN — EMPAGLIFLOZIN 10 MG: 10 TABLET, FILM COATED ORAL at 10:28

## 2023-11-20 RX ADMIN — CEFTRIAXONE SODIUM 2000 MG: 2 INJECTION, POWDER, FOR SOLUTION INTRAMUSCULAR; INTRAVENOUS at 14:08

## 2023-11-20 RX ADMIN — METOPROLOL SUCCINATE 100 MG: 50 TABLET, EXTENDED RELEASE ORAL at 10:28

## 2023-11-20 RX ADMIN — PANTOPRAZOLE SODIUM 40 MG: 40 TABLET, DELAYED RELEASE ORAL at 06:21

## 2023-11-20 RX ADMIN — SODIUM CHLORIDE, PRESERVATIVE FREE 10 ML: 5 INJECTION INTRAVENOUS at 10:29

## 2023-11-20 RX ADMIN — HYDROMORPHONE HYDROCHLORIDE 0.5 MG: 1 INJECTION, SOLUTION INTRAMUSCULAR; INTRAVENOUS; SUBCUTANEOUS at 03:41

## 2023-11-20 RX ADMIN — SIMETHICONE 80 MG: 80 TABLET, CHEWABLE ORAL at 14:09

## 2023-11-20 RX ADMIN — LISINOPRIL 20 MG: 20 TABLET ORAL at 10:28

## 2023-11-20 ASSESSMENT — PAIN DESCRIPTION - ORIENTATION: ORIENTATION: LEFT

## 2023-11-20 ASSESSMENT — PAIN DESCRIPTION - LOCATION: LOCATION: BACK

## 2023-11-20 ASSESSMENT — PAIN SCALES - GENERAL: PAINLEVEL_OUTOF10: 5

## 2023-11-20 NOTE — DISCHARGE SUMMARY
HISTORY: Reason for exam:->Left flank pain Additional Contrast?->None Decision Support Exception - unselect if not a suspected or confirmed emergency medical condition->Emergency Medical Condition (MA) Reason for Exam: left flank pain started just prior to arrival to ED-hx stones Relevant Medical/Surgical History: Cholcystectomy FINDINGS: Lower chest: Heart size is normal. The lung bases are clear : Leftstones in the kidneys. Mild left hydronephrosis. Left renal sinus stranding. Large stone in the proximal left ureter. It measures approximately 6 x 7 mm. No other stones along either ureter. No stones in the bladder. Organs: Liver, spleen, and adrenal glands are unremarkable. No peripancreatic stranding. Liver is elongated at 18.9 cm. GI/Bowel: The stomach is unremarkable. No small bowel dilation. No colonic wall thickening. Pelvis: No free fluid. Uterus is unremarkable. Multiple phleboliths in the pelvis. Neither ovary is enlarged. Peritoneum/Retroperitoneum: No enlarged lymphadenopathy. Aorta has atherosclerotic changes but is normal size. Bones/Soft Tissues: Mild scoliosis. Facet arthropathy lower lumbar spine. Osteoarthritic changes of the hips more prominent on the left. Osteitis pubis condensans. No subcutaneous mass. Proximal left ureteral stone with mild hydronephrosis. CBC:   Recent Labs     11/18/23  0411 11/19/23  0742 11/20/23  0624   WBC 24.2* 26.3* 19.3*   HGB 11.5* 10.9* 10.9*   * 128* 149     BMP:    Recent Labs     11/18/23  0411 11/19/23  0742 11/20/23  0624    138 139   K 4.2 4.4 3.9    106 103   CO2 24 22 25   BUN 22* 17 13   CREATININE 1.3* 1.1 0.9   GLUCOSE 110* 149* 126*     Hepatic: No results for input(s): \"AST\", \"ALT\", \"ALB\", \"BILITOT\", \"ALKPHOS\" in the last 72 hours.   Lipids:   Lab Results   Component Value Date/Time    CHOL 144 10/16/2023 11:30 AM    HDL 60 03/14/2023 10:27 AM    TRIG 161 03/14/2023 10:27 AM     Hemoglobin A1C:   Lab Results

## 2023-11-20 NOTE — PLAN OF CARE
Problem: Discharge Planning  Goal: Discharge to home or other facility with appropriate resources  Outcome: Completed  Flowsheets (Taken 11/20/2023 1035)  Discharge to home or other facility with appropriate resources:   Identify barriers to discharge with patient and caregiver   Arrange for needed discharge resources and transportation as appropriate   Identify discharge learning needs (meds, wound care, etc)     Problem: Pain  Goal: Verbalizes/displays adequate comfort level or baseline comfort level  Outcome: Completed     Problem: Chronic Conditions and Co-morbidities  Goal: Patient's chronic conditions and co-morbidity symptoms are monitored and maintained or improved  Outcome: Completed  Flowsheets (Taken 11/20/2023 1035)  Care Plan - Patient's Chronic Conditions and Co-Morbidity Symptoms are Monitored and Maintained or Improved:   Monitor and assess patient's chronic conditions and comorbid symptoms for stability, deterioration, or improvement   Collaborate with multidisciplinary team to address chronic and comorbid conditions and prevent exacerbation or deterioration     Problem: Safety - Adult  Goal: Free from fall injury  Outcome: Completed     Problem: Neurosensory - Adult  Goal: Achieves stable or improved neurological status  Outcome: Completed  Flowsheets (Taken 11/20/2023 1035)  Achieves stable or improved neurological status: Assess for and report changes in neurological status  Goal: Achieves maximal functionality and self care  Outcome: Completed  Flowsheets (Taken 11/20/2023 1035)  Achieves maximal functionality and self care: Monitor swallowing and airway patency with patient fatigue and changes in neurological status     Problem: Respiratory - Adult  Goal: Achieves optimal ventilation and oxygenation  Outcome: Completed  Flowsheets (Taken 11/20/2023 1035)  Achieves optimal ventilation and oxygenation:   Assess for changes in respiratory status   Assess for changes in mentation and behavior
Problem: Discharge Planning  Goal: Discharge to home or other facility with appropriate resources  Outcome: Progressing     Problem: Pain  Goal: Verbalizes/displays adequate comfort level or baseline comfort level  Outcome: Progressing     Problem: Chronic Conditions and Co-morbidities  Goal: Patient's chronic conditions and co-morbidity symptoms are monitored and maintained or improved  Outcome: Progressing     Problem: Safety - Adult  Goal: Free from fall injury  Outcome: Progressing     Problem: Gastrointestinal - Adult  Goal: Minimal or absence of nausea and vomiting  Outcome: Progressing
Problem: Discharge Planning  Goal: Discharge to home or other facility with appropriate resources  Outcome: Progressing  Flowsheets  Taken 11/18/2023 0820 by Daly Avila RN  Discharge to home or other facility with appropriate resources:   Identify barriers to discharge with patient and caregiver   Arrange for needed discharge resources and transportation as appropriate     Problem: Pain  Goal: Verbalizes/displays adequate comfort level or baseline comfort level  Outcome: Progressing    Problem: Chronic Conditions and Co-morbidities  Goal: Patient's chronic conditions and co-morbidity symptoms are monitored and maintained or improved  Outcome: Progressing  Flowsheets  Taken 11/18/2023 0820 by Nico Cuba [facility] - Patient's Chronic Conditions and Co-Morbidity Symptoms are Monitored and Maintained or Improved:   Monitor and assess patient's chronic conditions and comorbid symptoms for stability, deterioration, or improvement   Collaborate with multidisciplinary team to address chronic and comorbid conditions and prevent exacerbation or deterioration   Update acute care plan with appropriate goals if chronic or comorbid symptoms are exacerbated and prevent overall improvement and discharge  Problem: Safety - Adult  Goal: Free from fall injury  Outcome: Progressing
Problem: Discharge Planning  Goal: Discharge to home or other facility with appropriate resources  Outcome: Progressing  Flowsheets (Taken 11/16/2023 2418)  Discharge to home or other facility with appropriate resources:   Identify barriers to discharge with patient and caregiver   Identify discharge learning needs (meds, wound care, etc)   Refer to discharge planning if patient needs post-hospital services based on physician order or complex needs related to functional status, cognitive ability or social support system   Arrange for needed discharge resources and transportation as appropriate     Problem: Pain  Goal: Verbalizes/displays adequate comfort level or baseline comfort level  Outcome: Progressing     Problem: Chronic Conditions and Co-morbidities  Goal: Patient's chronic conditions and co-morbidity symptoms are monitored and maintained or improved  Outcome: Progressing     Problem: Safety - Adult  Goal: Free from fall injury  Outcome: Progressing
Problem: Discharge Planning  Goal: Discharge to home or other facility with appropriate resources  Outcome: Progressing  Flowsheets (Taken 11/17/2023 1215)  Discharge to home or other facility with appropriate resources:   Identify barriers to discharge with patient and caregiver   Arrange for needed discharge resources and transportation as appropriate     Problem: Pain  Goal: Verbalizes/displays adequate comfort level or baseline comfort level  Outcome: Progressing     Problem: Chronic Conditions and Co-morbidities  Goal: Patient's chronic conditions and co-morbidity symptoms are monitored and maintained or improved  Outcome: Progressing  Flowsheets (Taken 11/17/2023 1215)  Care Plan - Patient's Chronic Conditions and Co-Morbidity Symptoms are Monitored and Maintained or Improved:   Monitor and assess patient's chronic conditions and comorbid symptoms for stability, deterioration, or improvement   Collaborate with multidisciplinary team to address chronic and comorbid conditions and prevent exacerbation or deterioration   Update acute care plan with appropriate goals if chronic or comorbid symptoms are exacerbated and prevent overall improvement and discharge     Problem: Safety - Adult  Goal: Free from fall injury  Outcome: Progressing
remains intact  Outcome: Progressing     Problem: Anxiety  Goal: Will report anxiety at manageable levels  Description: INTERVENTIONS:  1. Administer medication as ordered  2. Teach and rehearse alternative coping skills  3. Provide emotional support with 1:1 interaction with staff  Outcome: Li Ralph (Taken 11/18/2023 2102 by Amber Corbin RN)  Will report anxiety at manageable levels: Administer medication as ordered     Problem: Coping  Goal: Pt/Family able to verbalize concerns and demonstrate effective coping strategies  Description: INTERVENTIONS:  1. Assist patient/family to identify coping skills, available support systems and cultural and spiritual values  2. Provide emotional support, including active listening and acknowledgement of concerns of patient and caregivers  3. Reduce environmental stimuli, as able  4. Instruct patient/family in relaxation techniques, as appropriate  5.  Assess for spiritual pain/suffering and initiate Spiritual Care, Psychosocial Clinical Specialist consults as needed  Outcome: Li Ralph (Taken 11/18/2023 2102 by Amber Corbin RN)  Patient/family able to verbalize anxieties, fears, and concerns, and demonstrate effective coping: Assist patient/family to identify coping skills, available support systems and cultural and spiritual values

## 2023-11-21 ENCOUNTER — TELEPHONE (OUTPATIENT)
Dept: FAMILY MEDICINE CLINIC | Age: 63
End: 2023-11-21

## 2023-11-21 NOTE — TELEPHONE ENCOUNTER
Care Transitions Initial Follow Up Call    Outreach made within 2 business days of discharge: Yes    Patient: Get Shrestha Patient : 1960   MRN: [id number]  Reason for Admission: There are no discharge diagnoses documented for the most recent discharge. Discharge Date: 23       Spoke with: Nikunj Paulson     Discharge department/facility: [facility]     TCM Interactive Patient Contact:  Was patient able to fill all prescriptions: Yes  Was patient instructed to bring all medications to the follow-up visit: Yes  Is patient taking all medications as directed in the discharge summary? Yes  Does patient understand their discharge instructions: Yes  Does patient have questions or concerns that need addressed prior to 7-14 day follow up office visit: no    Scheduled appointment with PCP within 7-14 days    Follow Up  No future appointments.   Appt scheduled on 2023    [place], [place]

## 2023-12-01 ENCOUNTER — OFFICE VISIT (OUTPATIENT)
Dept: FAMILY MEDICINE CLINIC | Age: 63
End: 2023-12-01
Payer: COMMERCIAL

## 2023-12-01 VITALS
HEART RATE: 80 BPM | DIASTOLIC BLOOD PRESSURE: 84 MMHG | SYSTOLIC BLOOD PRESSURE: 130 MMHG | WEIGHT: 158.8 LBS | HEIGHT: 62 IN | BODY MASS INDEX: 29.22 KG/M2 | TEMPERATURE: 97.9 F

## 2023-12-01 DIAGNOSIS — J06.9 ACUTE UPPER RESPIRATORY INFECTION, UNSPECIFIED: ICD-10-CM

## 2023-12-01 DIAGNOSIS — Z09 HOSPITAL DISCHARGE FOLLOW-UP: Primary | ICD-10-CM

## 2023-12-01 DIAGNOSIS — I48.0 PAROXYSMAL ATRIAL FIBRILLATION (HCC): ICD-10-CM

## 2023-12-01 DIAGNOSIS — N12 PYELONEPHRITIS DUE TO ESCHERICHIA COLI: ICD-10-CM

## 2023-12-01 DIAGNOSIS — D64.89 ANEMIA DUE TO OTHER CAUSE, NOT CLASSIFIED: ICD-10-CM

## 2023-12-01 DIAGNOSIS — B96.20 PYELONEPHRITIS DUE TO ESCHERICHIA COLI: ICD-10-CM

## 2023-12-01 PROCEDURE — 3017F COLORECTAL CA SCREEN DOC REV: CPT | Performed by: FAMILY MEDICINE

## 2023-12-01 PROCEDURE — 3074F SYST BP LT 130 MM HG: CPT | Performed by: FAMILY MEDICINE

## 2023-12-01 PROCEDURE — G8427 DOCREV CUR MEDS BY ELIG CLIN: HCPCS | Performed by: FAMILY MEDICINE

## 2023-12-01 PROCEDURE — 1036F TOBACCO NON-USER: CPT | Performed by: FAMILY MEDICINE

## 2023-12-01 PROCEDURE — 99214 OFFICE O/P EST MOD 30 MIN: CPT | Performed by: FAMILY MEDICINE

## 2023-12-01 PROCEDURE — G8417 CALC BMI ABV UP PARAM F/U: HCPCS | Performed by: FAMILY MEDICINE

## 2023-12-01 PROCEDURE — 3078F DIAST BP <80 MM HG: CPT | Performed by: FAMILY MEDICINE

## 2023-12-01 PROCEDURE — 1111F DSCHRG MED/CURRENT MED MERGE: CPT | Performed by: FAMILY MEDICINE

## 2023-12-01 PROCEDURE — G8484 FLU IMMUNIZE NO ADMIN: HCPCS | Performed by: FAMILY MEDICINE

## 2023-12-01 RX ORDER — AZITHROMYCIN 250 MG/1
TABLET, FILM COATED ORAL
Qty: 1 PACKET | Refills: 0 | Status: SHIPPED | OUTPATIENT
Start: 2023-12-01 | End: 2023-12-11

## 2023-12-01 ASSESSMENT — ENCOUNTER SYMPTOMS
CHEST TIGHTNESS: 0
VOMITING: 0
ABDOMINAL DISTENTION: 0
SHORTNESS OF BREATH: 0
BLOOD IN STOOL: 0
ABDOMINAL PAIN: 0
NAUSEA: 0
DIARRHEA: 0
CONSTIPATION: 0

## 2023-12-01 NOTE — PATIENT INSTRUCTIONS
Continue the medicines  See me in mid January  Call for any problem  If the glucose continues to drop call and let me know

## 2023-12-06 ENCOUNTER — TELEPHONE (OUTPATIENT)
Dept: FAMILY MEDICINE CLINIC | Age: 63
End: 2023-12-06

## 2023-12-06 DIAGNOSIS — R05.9 COUGH, UNSPECIFIED TYPE: Primary | ICD-10-CM

## 2023-12-06 NOTE — TELEPHONE ENCOUNTER
Patient has completed 2 scripts for zpak and now her cough is worse. She would like script for cough.     Pharmacy is [facility] in [place]

## 2023-12-07 ENCOUNTER — HOSPITAL ENCOUNTER (OUTPATIENT)
Age: 63
Discharge: HOME | End: 2023-12-07
Payer: COMMERCIAL

## 2023-12-07 ENCOUNTER — HOSPITAL ENCOUNTER (OUTPATIENT)
Dept: GENERAL RADIOLOGY | Age: 63
Discharge: HOME | End: 2023-12-07
Payer: COMMERCIAL

## 2023-12-07 DIAGNOSIS — R05.9 COUGH, UNSPECIFIED TYPE: ICD-10-CM

## 2023-12-07 PROCEDURE — 71046 X-RAY EXAM CHEST 2 VIEWS: CPT

## 2023-12-09 ENCOUNTER — TELEPHONE (OUTPATIENT)
Dept: FAMILY MEDICINE CLINIC | Age: 63
End: 2023-12-09

## 2023-12-09 RX ORDER — PREDNISONE 10 MG/1
TABLET ORAL
Qty: 12 TABLET | Refills: 0 | Status: SHIPPED | OUTPATIENT
Start: 2023-12-09

## 2023-12-09 RX ORDER — BENZONATATE 100 MG/1
100 CAPSULE ORAL 3 TIMES DAILY PRN
Qty: 21 CAPSULE | Refills: 0 | Status: SHIPPED | OUTPATIENT
Start: 2023-12-09 | End: 2023-12-16

## 2023-12-09 RX ORDER — GLIPIZIDE 10 MG/1
10 TABLET ORAL
Qty: 60 TABLET | Refills: 4 | Status: SHIPPED | OUTPATIENT
Start: 2023-12-09

## 2023-12-09 RX ORDER — LOSARTAN POTASSIUM 100 MG/1
TABLET ORAL
Qty: 90 TABLET | Refills: 1 | Status: SHIPPED | OUTPATIENT
Start: 2023-12-09

## 2023-12-09 NOTE — TELEPHONE ENCOUNTER
Called to get  update  Still with cough. She thinks it is getting better but still present. Felt warm last night no temp taken  No sob  Her concern is the persistent cough  She has used treatment as noted recently  Cxr ok     Also clarified her script she has been  taking for some time one whole glipizide bid not a half. As she is due for refill     She is asked to call on Monday with update    Kylee sal    Orders Placed This Encounter   Medications    predniSONE (DELTASONE) 10 MG tablet     Sig: Prednisone 10 mg. # 12.  Take 2 po bid for 1 day,  then take 1 po bid for 3 days,  then take 1 po daily for 2 days then stop     Dispense:  12 tablet     Refill:  0    benzonatate (TESSALON) 100 MG capsule     Sig: Take 1 capsule by mouth 3 times daily as needed for Cough     Dispense:  21 capsule     Refill:  0

## 2023-12-15 ENCOUNTER — OFFICE VISIT (OUTPATIENT)
Dept: FAMILY MEDICINE CLINIC | Age: 63
End: 2023-12-15
Payer: COMMERCIAL

## 2023-12-15 VITALS
HEIGHT: 62 IN | WEIGHT: 165.2 LBS | DIASTOLIC BLOOD PRESSURE: 74 MMHG | BODY MASS INDEX: 30.4 KG/M2 | TEMPERATURE: 97.2 F | SYSTOLIC BLOOD PRESSURE: 126 MMHG

## 2023-12-15 DIAGNOSIS — R35.0 FREQUENCY OF MICTURITION: Primary | ICD-10-CM

## 2023-12-15 LAB
BILIRUBIN, POC: NEGATIVE
BLOOD URINE, POC: NORMAL
CLARITY, POC: NORMAL
COLOR, POC: YELLOW
GLUCOSE URINE, POC: NORMAL
KETONES, POC: NEGATIVE
LEUKOCYTE EST, POC: NEGATIVE
NITRITE, POC: NEGATIVE
PH, POC: 6
PROTEIN, POC: NORMAL
SPECIFIC GRAVITY, POC: 1.01
UROBILINOGEN, POC: 0.2

## 2023-12-15 PROCEDURE — G8417 CALC BMI ABV UP PARAM F/U: HCPCS | Performed by: INTERNAL MEDICINE

## 2023-12-15 PROCEDURE — 3074F SYST BP LT 130 MM HG: CPT | Performed by: INTERNAL MEDICINE

## 2023-12-15 PROCEDURE — 1111F DSCHRG MED/CURRENT MED MERGE: CPT | Performed by: INTERNAL MEDICINE

## 2023-12-15 PROCEDURE — G8427 DOCREV CUR MEDS BY ELIG CLIN: HCPCS | Performed by: INTERNAL MEDICINE

## 2023-12-15 PROCEDURE — 1036F TOBACCO NON-USER: CPT | Performed by: INTERNAL MEDICINE

## 2023-12-15 PROCEDURE — 99213 OFFICE O/P EST LOW 20 MIN: CPT | Performed by: INTERNAL MEDICINE

## 2023-12-15 PROCEDURE — G8484 FLU IMMUNIZE NO ADMIN: HCPCS | Performed by: INTERNAL MEDICINE

## 2023-12-15 PROCEDURE — 81002 URINALYSIS NONAUTO W/O SCOPE: CPT | Performed by: INTERNAL MEDICINE

## 2023-12-15 PROCEDURE — 3017F COLORECTAL CA SCREEN DOC REV: CPT | Performed by: INTERNAL MEDICINE

## 2023-12-15 PROCEDURE — 3078F DIAST BP <80 MM HG: CPT | Performed by: INTERNAL MEDICINE

## 2023-12-15 NOTE — PROGRESS NOTES
MORNING 90 capsule 0    metFORMIN (GLUCOPHAGE) 1000 MG tablet TAKE 1 TABLET BY MOUTH TWICE A DAY WITH FOOD 180 tablet 0    pantoprazole (PROTONIX) 40 MG tablet TAKE 1 TABLET BY MOUTH EVERY DAY BEFORE BREAKFAST 90 tablet 1    atorvastatin (LIPITOR) 10 MG tablet TAKE 1 TABLET BY MOUTH EVERY DAY 90 tablet 1    vitamin C (ASCORBIC ACID) 500 MG tablet Take 1 tablet by mouth daily      Biotin 10 MG CAPS Take by mouth      citric acid-potassium citrate (POLYCITRA) 1100-334 MG/5ML solution Take 5 mLs by mouth 2 times daily      Multiple Vitamin CAPS Take 1 capsule by mouth daily       benzonatate (TESSALON) 100 MG capsule Take 1 capsule by mouth 3 times daily as needed for Cough (Patient not taking: Reported on 12/15/2023) 21 capsule 0     No current facility-administered medications for this visit. Allergies: Ciprofloxacin and Hydrocodone  Past Medical History:   Diagnosis Date    Chronic kidney disease 2009    kidney stones    Hyperlipidemia     Hypertension     MDRO (multiple drug resistant organisms) resistance     MRSA infection 2013    left lower leg    Right pulmonary embolus (HCC)     Type II or unspecified type diabetes mellitus without mention of complication, not stated as uncontrolled      Past Surgical History:   Procedure Laterality Date    [address]    COLONOSCOPY  06/07/2018    Dr. Dandre Leung.  adenoma polyp, repeat in 5 years    CYSTOSCOPY Right 7/22/2021    CYSTOSCOPY, RIGHT URETERAL STENT INSERTION,RIGHT RETROGRADE PYLEOGRAM performed by Allan Rueda MD at [address] [place] [address] Left 11/16/2023    CYSTOSCOPY URETERAL STENT INSERTION performed by Hola Davis MD at [facility]    [facility]  2009     Family History   Problem Relation Age of Onset    Cancer Mother         pancreatic    Cancer Father         lung    Heart Disease Father         Bypass x 5    Heart Disease Sister         Stent- CAD    Diabetes Sister      Social History     Tobacco Use    Smoking

## 2023-12-17 DIAGNOSIS — I10 HYPERTENSION, UNSPECIFIED TYPE: ICD-10-CM

## 2023-12-18 RX ORDER — METOPROLOL SUCCINATE 50 MG/1
50 TABLET, EXTENDED RELEASE ORAL DAILY
Qty: 90 TABLET | Refills: 1 | Status: SHIPPED | OUTPATIENT
Start: 2023-12-18

## 2023-12-18 RX ORDER — HYDROCHLOROTHIAZIDE 12.5 MG/1
CAPSULE, GELATIN COATED ORAL
Qty: 90 CAPSULE | Refills: 1 | Status: SHIPPED | OUTPATIENT
Start: 2023-12-18

## 2023-12-18 RX ORDER — ATORVASTATIN CALCIUM 10 MG/1
TABLET, FILM COATED ORAL
Qty: 90 TABLET | Refills: 1 | Status: SHIPPED | OUTPATIENT
Start: 2023-12-18

## 2023-12-27 ENCOUNTER — HOSPITAL ENCOUNTER (OUTPATIENT)
Dept: CARDIOLOGY | Age: 63
Discharge: HOME | End: 2023-12-27
Attending: STUDENT IN AN ORGANIZED HEALTH CARE EDUCATION/TRAINING PROGRAM
Payer: COMMERCIAL

## 2023-12-27 DIAGNOSIS — I48.0 PAROXYSMAL ATRIAL FIBRILLATION (HCC): ICD-10-CM

## 2023-12-27 PROCEDURE — 93306 TTE W/DOPPLER COMPLETE: CPT

## 2024-01-09 ENCOUNTER — TELEPHONE (OUTPATIENT)
Dept: FAMILY MEDICINE CLINIC | Age: 64
End: 2024-01-09

## 2024-01-09 ENCOUNTER — TELEPHONE (OUTPATIENT)
Dept: CARDIOLOGY CLINIC | Age: 64
End: 2024-01-09

## 2024-01-09 ENCOUNTER — OFFICE VISIT (OUTPATIENT)
Dept: CARDIOLOGY CLINIC | Age: 64
End: 2024-01-09
Payer: COMMERCIAL

## 2024-01-09 VITALS
SYSTOLIC BLOOD PRESSURE: 134 MMHG | HEART RATE: 90 BPM | HEIGHT: 62 IN | WEIGHT: 158 LBS | DIASTOLIC BLOOD PRESSURE: 88 MMHG | OXYGEN SATURATION: 97 % | BODY MASS INDEX: 29.08 KG/M2

## 2024-01-09 DIAGNOSIS — I48.0 PAROXYSMAL ATRIAL FIBRILLATION (HCC): Primary | ICD-10-CM

## 2024-01-09 PROCEDURE — G8484 FLU IMMUNIZE NO ADMIN: HCPCS | Performed by: INTERNAL MEDICINE

## 2024-01-09 PROCEDURE — G8427 DOCREV CUR MEDS BY ELIG CLIN: HCPCS | Performed by: INTERNAL MEDICINE

## 2024-01-09 PROCEDURE — 93000 ELECTROCARDIOGRAM COMPLETE: CPT | Performed by: INTERNAL MEDICINE

## 2024-01-09 PROCEDURE — G8417 CALC BMI ABV UP PARAM F/U: HCPCS | Performed by: INTERNAL MEDICINE

## 2024-01-09 PROCEDURE — 1036F TOBACCO NON-USER: CPT | Performed by: INTERNAL MEDICINE

## 2024-01-09 PROCEDURE — 99214 OFFICE O/P EST MOD 30 MIN: CPT | Performed by: INTERNAL MEDICINE

## 2024-01-09 PROCEDURE — 3075F SYST BP GE 130 - 139MM HG: CPT | Performed by: INTERNAL MEDICINE

## 2024-01-09 PROCEDURE — 3017F COLORECTAL CA SCREEN DOC REV: CPT | Performed by: INTERNAL MEDICINE

## 2024-01-09 PROCEDURE — 3079F DIAST BP 80-89 MM HG: CPT | Performed by: INTERNAL MEDICINE

## 2024-01-09 NOTE — PROGRESS NOTES
[facility]  Cardiology Progress Note        CC:      A-fib with RVR             HPI:   Meghan is a 63-year-old patient with history of kidney stone.  She was admitted to [facility] with nausea flank pain abdominal bloating.  She was found to have a large kidney stone.  She underwent ureteric stent placement.  Her admission was complicated by hypotension sepsis and lactic acidosis.  During that time the patient went to A-fib with RVR which responded to amiodarone drip.  She converted to sinus rhythm.  She has history of diabetes and hypertension.  An echocardiogram performed after her discharge shows normal LV size and function with no structural heart disease.  She is presently on Eliquis for a [id number] score of 2      Past Medical History:   Diagnosis Date    Chronic kidney disease     kidney stones    Hyperlipidemia     Hypertension     MDRO (multiple drug resistant organisms) resistance     MRSA infection     left lower leg    Right pulmonary embolus (HCC)     Type II or unspecified type diabetes mellitus without mention of complication, not stated as uncontrolled       Past Surgical History:   Procedure Laterality Date     SECTION      CHOLECYSTECTOMY      COLONOSCOPY  2018    Dr. Posey. adenoma polyp, repeat in 5 years    CYSTOSCOPY Right 2021    CYSTOSCOPY, RIGHT URETERAL STENT INSERTION,RIGHT RETROGRADE PYLEOGRAM performed by Moraima Dennison MD at [facility] OR    CYSTOSCOPY Left 2023    CYSTOSCOPY URETERAL STENT INSERTION performed by Brett Lord MD at [facility] OR    LITHOTRIPSY  2009      Family History   Problem Relation Age of Onset    Cancer Mother         pancreatic    Cancer Father         lung    Heart Disease Father         Bypass x 5    Heart Disease Sister         Stent- CAD    Diabetes Sister       Social History     Tobacco Use    Smoking status: Never    Smokeless tobacco: Never   Substance Use Topics    Alcohol use: No    Drug

## 2024-01-09 NOTE — TELEPHONE ENCOUNTER
----- Message from Mylene Zimmer sent at 1/9/2024  4:13 PM EST -----  Subject: Medication Problem     Medication: apixaban (ELIQUIS) 5 MG TABS tablet  Dosage: 2 a day   Ordering Provider:     Question/Problem: Patient has a dental procedure on 1/11/2024 & she wants   to know if she needs to stop taking this medication before her procedure,   please call & advise?       Pharmacy: [id number] - [place], [place] - [address] [place] [address]   [phone] - F [phone]    ---------------------------------------------------------------------------  --------------  CALL BACK INFO  [phone]; OK to leave message on voicemail  ---------------------------------------------------------------------------  --------------    SCRIPT ANSWERS  Relationship to Patient: Self

## 2024-01-09 NOTE — TELEPHONE ENCOUNTER
Heike is calling into the office to inform [facility] that she is seeing her dentist this Thursday to clear out some bone fragments and getting local anesthetic.     Please advise if she needs clearance.    Heike can be reached at [phone].

## 2024-01-10 ENCOUNTER — HOSPITAL ENCOUNTER (OUTPATIENT)
Dept: ULTRASOUND IMAGING | Age: 64
Discharge: HOME | End: 2024-01-10
Attending: SURGERY
Payer: COMMERCIAL

## 2024-01-10 DIAGNOSIS — N20.1 CALCULUS OF URETER: ICD-10-CM

## 2024-01-10 PROCEDURE — 76770 US EXAM ABDO BACK WALL COMP: CPT

## 2024-01-12 ENCOUNTER — OFFICE VISIT (OUTPATIENT)
Dept: FAMILY MEDICINE CLINIC | Age: 64
End: 2024-01-12
Payer: COMMERCIAL

## 2024-01-12 VITALS
DIASTOLIC BLOOD PRESSURE: 84 MMHG | WEIGHT: 159.4 LBS | HEART RATE: 76 BPM | HEIGHT: 62 IN | SYSTOLIC BLOOD PRESSURE: 138 MMHG | TEMPERATURE: 98 F | BODY MASS INDEX: 29.33 KG/M2

## 2024-01-12 DIAGNOSIS — E11.29 TYPE 2 DIABETES MELLITUS WITH MICROALBUMINURIA, WITHOUT LONG-TERM CURRENT USE OF INSULIN (HCC): ICD-10-CM

## 2024-01-12 DIAGNOSIS — R80.9 TYPE 2 DIABETES MELLITUS WITH MICROALBUMINURIA, WITHOUT LONG-TERM CURRENT USE OF INSULIN (HCC): ICD-10-CM

## 2024-01-12 DIAGNOSIS — D64.9 ANEMIA, UNSPECIFIED TYPE: ICD-10-CM

## 2024-01-12 DIAGNOSIS — D64.9 ANEMIA, UNSPECIFIED TYPE: Primary | ICD-10-CM

## 2024-01-12 DIAGNOSIS — R93.89 ABNORMAL CHEST X-RAY: ICD-10-CM

## 2024-01-12 LAB
IRON SATN MFR SERPL: 17 % (ref 15–50)
IRON SERPL-MCNC: 62 UG/DL (ref 37–145)
TIBC SERPL-MCNC: 361 UG/DL (ref 260–445)

## 2024-01-12 PROCEDURE — G8427 DOCREV CUR MEDS BY ELIG CLIN: HCPCS | Performed by: FAMILY MEDICINE

## 2024-01-12 PROCEDURE — 99214 OFFICE O/P EST MOD 30 MIN: CPT | Performed by: FAMILY MEDICINE

## 2024-01-12 PROCEDURE — 1036F TOBACCO NON-USER: CPT | Performed by: FAMILY MEDICINE

## 2024-01-12 PROCEDURE — 3075F SYST BP GE 130 - 139MM HG: CPT | Performed by: FAMILY MEDICINE

## 2024-01-12 PROCEDURE — 3079F DIAST BP 80-89 MM HG: CPT | Performed by: FAMILY MEDICINE

## 2024-01-12 PROCEDURE — G8484 FLU IMMUNIZE NO ADMIN: HCPCS | Performed by: FAMILY MEDICINE

## 2024-01-12 PROCEDURE — 2022F DILAT RTA XM EVC RTNOPTHY: CPT | Performed by: FAMILY MEDICINE

## 2024-01-12 PROCEDURE — 3046F HEMOGLOBIN A1C LEVEL >9.0%: CPT | Performed by: FAMILY MEDICINE

## 2024-01-12 PROCEDURE — 3017F COLORECTAL CA SCREEN DOC REV: CPT | Performed by: FAMILY MEDICINE

## 2024-01-12 PROCEDURE — G8417 CALC BMI ABV UP PARAM F/U: HCPCS | Performed by: FAMILY MEDICINE

## 2024-01-12 ASSESSMENT — PATIENT HEALTH QUESTIONNAIRE - PHQ9
1. LITTLE INTEREST OR PLEASURE IN DOING THINGS: 0
SUM OF ALL RESPONSES TO PHQ QUESTIONS 1-9: 0
SUM OF ALL RESPONSES TO PHQ9 QUESTIONS 1 & 2: 0
2. FEELING DOWN, DEPRESSED OR HOPELESS: 0
SUM OF ALL RESPONSES TO PHQ QUESTIONS 1-9: 0

## 2024-01-12 NOTE — PROGRESS NOTES
Subjective:      Patient ID: Heike Means is a 63 y.o. female.    Chief Complaint   Patient presents with    Follow-up        Patient presents with:  Follow-up    Here for recheck  She is feeling better  She is on the meds  She has f/u with dr power    YOB: 1960    Date of Visit:  1/12/2024     -- Ciprofloxacin -- Other (See Comments)    --  hallucinations   -- Hydrocodone -- Itching    Current Outpatient Medications:  apixaban (ELIQUIS) 5 MG TABS tablet, Take 1 tablet by mouth 2 times daily, Disp: 60 tablet, Rfl: 3  atorvastatin (LIPITOR) 10 MG tablet, TAKE 1 TABLET BY MOUTH EVERY DAY, Disp: 90 tablet, Rfl: 1  metoprolol succinate (TOPROL XL) 50 MG extended release tablet, TAKE 1 TABLET BY MOUTH EVERY DAY, Disp: 90 tablet, Rfl: 1  hydroCHLOROthiazide (MICROZIDE) 12.5 MG capsule, TAKE 1 CAPSULE BY MOUTH EVERY DAY IN THE MORNING, Disp: 90 capsule, Rfl: 1  glipiZIDE (GLUCOTROL) 10 MG tablet, Take 1 tablet by mouth 2 times daily (before meals), Disp: 60 tablet, Rfl: 4  losartan (COZAAR) 100 MG tablet, TAKE 1 TABLET BY MOUTH EVERY DAY, Disp: 90 tablet, Rfl: 1  vitamin B-12 (CYANOCOBALAMIN) 100 MCG tablet, One tablet twice a week, Disp: 30 tablet, Rfl: 0  empagliflozin (JARDIANCE) 10 MG tablet, Take 1 tablet by mouth daily, Disp: 90 tablet, Rfl: 1  metFORMIN (GLUCOPHAGE) 1000 MG tablet, TAKE 1 TABLET BY MOUTH TWICE A DAY WITH FOOD, Disp: 180 tablet, Rfl: 0  vitamin C (ASCORBIC ACID) 500 MG tablet, Take 1 tablet by mouth daily, Disp: , Rfl:   Biotin 10 MG CAPS, Take by mouth, Disp: , Rfl:   citric acid-potassium citrate (POLYCITRA) 1100-334 MG/5ML solution, Take 5 mLs by mouth 2 times daily, Disp: , Rfl:   Multiple Vitamin CAPS, Take 1 capsule by mouth daily , Disp: , Rfl:     No current facility-administered medications for this visit.      ---------------------------------                  01/12/24                            1057            ---------------------------------   BP:

## 2024-01-12 NOTE — PATIENT INSTRUCTIONS
Repeat the chest xray at the end of this month   Continue the medicines  See me back in about 3-4 months

## 2024-01-13 LAB
EST. AVERAGE GLUCOSE BLD GHB EST-MCNC: 165.7 MG/DL
HBA1C MFR BLD: 7.4 %

## 2024-01-29 ENCOUNTER — HOSPITAL ENCOUNTER (OUTPATIENT)
Age: 64
Discharge: HOME | End: 2024-01-29
Payer: COMMERCIAL

## 2024-01-29 ENCOUNTER — HOSPITAL ENCOUNTER (OUTPATIENT)
Dept: GENERAL RADIOLOGY | Age: 64
Discharge: HOME | End: 2024-01-29
Payer: COMMERCIAL

## 2024-01-29 DIAGNOSIS — R93.89 ABNORMAL CHEST X-RAY: ICD-10-CM

## 2024-01-29 PROCEDURE — 71046 X-RAY EXAM CHEST 2 VIEWS: CPT

## 2024-02-07 ENCOUNTER — OFFICE VISIT (OUTPATIENT)
Dept: UROGYNECOLOGY | Age: 64
End: 2024-02-07
Payer: COMMERCIAL

## 2024-02-07 VITALS
DIASTOLIC BLOOD PRESSURE: 86 MMHG | TEMPERATURE: 97.8 F | SYSTOLIC BLOOD PRESSURE: 136 MMHG | HEART RATE: 73 BPM | OXYGEN SATURATION: 98 % | RESPIRATION RATE: 16 BRPM | WEIGHT: 159 LBS | BODY MASS INDEX: 29.08 KG/M2

## 2024-02-07 DIAGNOSIS — N81.11 CYSTOCELE, MIDLINE: ICD-10-CM

## 2024-02-07 DIAGNOSIS — N39.3 STRESS INCONTINENCE: ICD-10-CM

## 2024-02-07 DIAGNOSIS — N81.4 UTERINE PROLAPSE: ICD-10-CM

## 2024-02-07 DIAGNOSIS — N81.6 RECTOCELE: ICD-10-CM

## 2024-02-07 DIAGNOSIS — R35.0 URINARY FREQUENCY: Primary | ICD-10-CM

## 2024-02-07 LAB
BILIRUBIN, POC: NORMAL
BLOOD URINE, POC: NORMAL
CLARITY, POC: CLEAR
COLOR, POC: YELLOW
EMPTY COUGH STRESS TEST: NORMAL
FIRST SENSATION: 100 CC
FULL COUGH STRESS TEST: NORMAL
GLUCOSE URINE, POC: NORMAL
KETONES, POC: NORMAL
LEUKOCYTE EST, POC: NORMAL
MAX SENSATION: 225 CC
NITRATE, URINE POC: NORMAL
NITRITE, POC: NORMAL
PH, POC: 6
POST VOID RESIDUAL (PVR): 60 ML
PROTEIN, POC: NORMAL
RBC URINE, POC: NORMAL
SECOND SENSATION: 150 CC
SPASM: NORMAL
SPECIFIC GRAVITY, POC: 1.02
UROBILINOGEN, POC: NORMAL
WBC URINE, POC: NORMAL

## 2024-02-07 PROCEDURE — G8484 FLU IMMUNIZE NO ADMIN: HCPCS | Performed by: OBSTETRICS & GYNECOLOGY

## 2024-02-07 PROCEDURE — 3075F SYST BP GE 130 - 139MM HG: CPT | Performed by: OBSTETRICS & GYNECOLOGY

## 2024-02-07 PROCEDURE — 81002 URINALYSIS NONAUTO W/O SCOPE: CPT | Performed by: OBSTETRICS & GYNECOLOGY

## 2024-02-07 PROCEDURE — G8427 DOCREV CUR MEDS BY ELIG CLIN: HCPCS | Performed by: OBSTETRICS & GYNECOLOGY

## 2024-02-07 PROCEDURE — 99204 OFFICE O/P NEW MOD 45 MIN: CPT | Performed by: OBSTETRICS & GYNECOLOGY

## 2024-02-07 PROCEDURE — 3079F DIAST BP 80-89 MM HG: CPT | Performed by: OBSTETRICS & GYNECOLOGY

## 2024-02-07 PROCEDURE — G8417 CALC BMI ABV UP PARAM F/U: HCPCS | Performed by: OBSTETRICS & GYNECOLOGY

## 2024-02-07 PROCEDURE — 51725 SIMPLE CYSTOMETROGRAM: CPT | Performed by: OBSTETRICS & GYNECOLOGY

## 2024-02-07 RX ORDER — FLUTICASONE PROPIONATE 50 MCG
SPRAY, SUSPENSION (ML) NASAL
COMMUNITY
Start: 2023-12-19

## 2024-02-07 RX ORDER — LORATADINE 10 MG/1
10 TABLET ORAL DAILY
COMMUNITY
Start: 2023-12-19

## 2024-02-07 RX ORDER — ALBUTEROL SULFATE 90 UG/1
AEROSOL, METERED RESPIRATORY (INHALATION)
COMMUNITY
Start: 2024-01-15

## 2024-02-07 RX ORDER — DOXYCYCLINE HYCLATE 100 MG/1
CAPSULE ORAL
COMMUNITY
Start: 2023-12-22

## 2024-02-07 RX ORDER — DEXTROMETHORPHAN HYDROBROMIDE AND PROMETHAZINE HYDROCHLORIDE 15; 6.25 MG/5ML; MG/5ML
SYRUP ORAL
COMMUNITY
Start: 2023-12-22

## 2024-02-07 RX ORDER — PANTOPRAZOLE SODIUM 40 MG/1
TABLET, DELAYED RELEASE ORAL
COMMUNITY
Start: 2024-02-04

## 2024-02-07 RX ORDER — FLUCONAZOLE 100 MG/1
TABLET ORAL
COMMUNITY
Start: 2023-12-22

## 2024-02-07 RX ORDER — POTASSIUM CITRATE 15 MEQ/1
15 TABLET, EXTENDED RELEASE ORAL 2 TIMES DAILY
COMMUNITY

## 2024-02-07 NOTE — PROGRESS NOTES
(Ba): +5   Cervix or Cuff (C): +2     Genital Hiatus (gh): 6   Perineal Body (pb): 3   Total Vaginal Length (tvl): 8     Posterior Wall (Ap): -1   Posterior Wall (Bp): -1   Posterior Fornix (D): -4     No data recorded     Assessment/Plan:     Heike Means is a 63 y.o. female with   1. Urinary frequency    2. Uterine prolapse    3. Stress incontinence    4. Cystocele, midline    5. Rectocele    Old records reviewed, outside records reviewed, cystometrogram reviewed    Heike presents today with a chief complaint of pelvic organ prolapse.  On examination today she does not fact have a large cystocele and uterine prolapse.  I did draw a picture of her prolapse today and talked with her about observation pessary or surgical repairs of her prolapse.  She is only interested in having this surgically corrected.  She also complains of stress urinary incontinence we however were not able to demonstrate that today so I will have her return for urodynamics and cystourethroscopy.  I did give her handouts on both prolapse and stress incontinence today.  This will be a Co. case with Dr. Keller.    Orders Placed This Encounter   Procedures    POCT Urinalysis no Micro    Cystometrogram     No orders of the defined types were placed in this encounter.      TANA HAYES MD

## 2024-02-12 RX ORDER — PREDNISONE 10 MG/1
TABLET ORAL
Qty: 12 TABLET | Refills: 0 | OUTPATIENT
Start: 2024-02-12

## 2024-03-04 DIAGNOSIS — N18.31 STAGE 3A CHRONIC KIDNEY DISEASE (HCC): ICD-10-CM

## 2024-03-04 LAB
ALBUMIN SERPL-MCNC: 4.6 G/DL (ref 3.4–5)
ANION GAP SERPL CALCULATED.3IONS-SCNC: 11 MMOL/L (ref 3–16)
BUN SERPL-MCNC: 30 MG/DL (ref 7–20)
CALCIUM SERPL-MCNC: 10.7 MG/DL (ref 8.3–10.6)
CHLORIDE SERPL-SCNC: 100 MMOL/L (ref 99–110)
CO2 SERPL-SCNC: 29 MMOL/L (ref 21–32)
CREAT SERPL-MCNC: 1.2 MG/DL (ref 0.6–1.2)
GFR SERPLBLD CREATININE-BSD FMLA CKD-EPI: 51 ML/MIN/{1.73_M2}
GLUCOSE SERPL-MCNC: 136 MG/DL (ref 70–99)
PHOSPHATE SERPL-MCNC: 4.5 MG/DL (ref 2.5–4.9)
POTASSIUM SERPL-SCNC: 4.6 MMOL/L (ref 3.5–5.1)
SODIUM SERPL-SCNC: 140 MMOL/L (ref 136–145)

## 2024-03-07 RX ORDER — CLOTRIMAZOLE AND BETAMETHASONE DIPROPIONATE 10; .64 MG/G; MG/G
CREAM TOPICAL
Qty: 45 G | Refills: 0 | OUTPATIENT
Start: 2024-03-07

## 2024-03-15 ENCOUNTER — PROCEDURE VISIT (OUTPATIENT)
Dept: UROGYNECOLOGY | Age: 64
End: 2024-03-15
Payer: COMMERCIAL

## 2024-03-15 VITALS
DIASTOLIC BLOOD PRESSURE: 93 MMHG | HEART RATE: 67 BPM | TEMPERATURE: 97.4 F | SYSTOLIC BLOOD PRESSURE: 169 MMHG | RESPIRATION RATE: 18 BRPM | OXYGEN SATURATION: 98 %

## 2024-03-15 DIAGNOSIS — N39.3 STRESS INCONTINENCE: ICD-10-CM

## 2024-03-15 DIAGNOSIS — N81.4 UTERINE PROLAPSE: ICD-10-CM

## 2024-03-15 DIAGNOSIS — R35.0 URINARY FREQUENCY: Primary | ICD-10-CM

## 2024-03-15 DIAGNOSIS — N81.11 CYSTOCELE, MIDLINE: ICD-10-CM

## 2024-03-15 DIAGNOSIS — N81.6 RECTOCELE: ICD-10-CM

## 2024-03-15 LAB
BILIRUBIN, POC: NORMAL
BLOOD URINE, POC: NORMAL
CLARITY, POC: CLEAR
COLOR, POC: YELLOW
GLUCOSE URINE, POC: POSITIVE
KETONES, POC: NORMAL
LEUKOCYTE EST, POC: NORMAL
NITRITE, POC: NORMAL
PH, POC: 6.5
PROTEIN, POC: NORMAL
SPECIFIC GRAVITY, POC: 1.02
UROBILINOGEN, POC: 0.2

## 2024-03-15 PROCEDURE — 51741 ELECTRO-UROFLOWMETRY FIRST: CPT | Performed by: OBSTETRICS & GYNECOLOGY

## 2024-03-15 PROCEDURE — 51729 CYSTOMETROGRAM W/VP&UP: CPT | Performed by: OBSTETRICS & GYNECOLOGY

## 2024-03-15 PROCEDURE — 51797 INTRAABDOMINAL PRESSURE TEST: CPT | Performed by: OBSTETRICS & GYNECOLOGY

## 2024-03-15 PROCEDURE — 51784 ANAL/URINARY MUSCLE STUDY: CPT | Performed by: OBSTETRICS & GYNECOLOGY

## 2024-03-15 PROCEDURE — 81002 URINALYSIS NONAUTO W/O SCOPE: CPT | Performed by: OBSTETRICS & GYNECOLOGY

## 2024-03-15 NOTE — PROGRESS NOTES
Urodynamic Procedure Note    Hieke M   1960    Urodynamicist: Dr. Urena    Equipment: [employer]alife    Brief History/Indication:    Patient is a 64 y.o. female with subjective complaints of prolapse for a urodynamic evaluation.    Cystometrogram   WBC Urine, POC   Date Value Ref Range Status   02/07/2024 neg  Final     RBC, UA   Date Value Ref Range Status   11/19/2023 502 (H) 0 - 4 /HPF Final     RBC Urine, POC   Date Value Ref Range Status   02/07/2024 neg  Final     Nitrate, UA POC   Date Value Ref Range Status   02/07/2024 neg  Final     post void residual   Date Value Ref Range Status   02/07/2024 60 ml Final     FIRST SENSATION   Date Value Ref Range Status   02/07/2024 100 cc Final     SECOND SENSATION   Date Value Ref Range Status   02/07/2024 150 cc Final     MAX SENSATION   Date Value Ref Range Status   02/07/2024 225 cc Final     EMPTY COUGH STRESS TEST   Date Value Ref Range Status   02/07/2024 neg  Final     FULL COUGH STRESS TEST   Date Value Ref Range Status   02/07/2024 neg  Final     SPASM   Date Value Ref Range Status   02/07/2024 neg  Final       Pelvic Organ Prolapse Quantification  Anterior Wall (Aa): +3   Anterior Wall (Ba): +5   Cervix or Cuff (C): +2     Genital Hiatus (gh): 6   Perineal Body (pb): 3   Total Vaginal Length (tvl): 8     Posterior Wall (Ap): -1   Posterior Wall (Bp): -1   Posterior Fornix (D): -4     No data recorded        Procedure:  The Patient was taken to the Urodynamics suite and a free urine flow was obtained followed by catheterization for residual urine. A double-lumen urodynamic catheter was introduced to the bladder for measuring bladder pressure, and for filling. EMG patch electrodes were placed perianally for recording activity of the anal sphincter. A vaginal catheter was inserted to record the abdominal pressure. The entire process was displayed and analyzed using the Ultralife Urodynamic machine and software.    Findings:   No results found for this

## 2024-03-25 ENCOUNTER — TELEPHONE (OUTPATIENT)
Dept: CARDIOLOGY CLINIC | Age: 64
End: 2024-03-25

## 2024-03-25 ENCOUNTER — TELEPHONE (OUTPATIENT)
Dept: UROGYNECOLOGY | Age: 64
End: 2024-03-25

## 2024-03-25 ENCOUNTER — PREP FOR PROCEDURE (OUTPATIENT)
Dept: UROGYNECOLOGY | Age: 64
End: 2024-03-25

## 2024-03-25 ENCOUNTER — PROCEDURE VISIT (OUTPATIENT)
Dept: UROGYNECOLOGY | Age: 64
End: 2024-03-25
Payer: COMMERCIAL

## 2024-03-25 VITALS
RESPIRATION RATE: 16 BRPM | HEART RATE: 87 BPM | TEMPERATURE: 98 F | OXYGEN SATURATION: 95 % | DIASTOLIC BLOOD PRESSURE: 90 MMHG | SYSTOLIC BLOOD PRESSURE: 139 MMHG

## 2024-03-25 DIAGNOSIS — N39.3 STRESS INCONTINENCE: ICD-10-CM

## 2024-03-25 DIAGNOSIS — N81.6 RECTOCELE: ICD-10-CM

## 2024-03-25 DIAGNOSIS — N81.11 CYSTOCELE, MIDLINE: ICD-10-CM

## 2024-03-25 DIAGNOSIS — R35.0 URINARY FREQUENCY: Primary | ICD-10-CM

## 2024-03-25 DIAGNOSIS — N81.3 COMPLETE UTERINE PROLAPSE: ICD-10-CM

## 2024-03-25 DIAGNOSIS — N81.4 UTERINE PROLAPSE: ICD-10-CM

## 2024-03-25 PROCEDURE — 52285 CYSTOSCOPY AND TREATMENT: CPT | Performed by: OBSTETRICS & GYNECOLOGY

## 2024-03-25 RX ORDER — SODIUM CHLORIDE 9 MG/ML
INJECTION, SOLUTION INTRAVENOUS PRN
OUTPATIENT
Start: 2024-03-25

## 2024-03-25 RX ORDER — SODIUM CHLORIDE 0.9 % (FLUSH) 0.9 %
5-40 SYRINGE (ML) INJECTION EVERY 12 HOURS SCHEDULED
OUTPATIENT
Start: 2024-03-25

## 2024-03-25 RX ORDER — SODIUM CHLORIDE 0.9 % (FLUSH) 0.9 %
5-40 SYRINGE (ML) INJECTION PRN
OUTPATIENT
Start: 2024-03-25

## 2024-03-25 RX ORDER — SODIUM CHLORIDE, SODIUM LACTATE, POTASSIUM CHLORIDE, CALCIUM CHLORIDE 600; 310; 30; 20 MG/100ML; MG/100ML; MG/100ML; MG/100ML
INJECTION, SOLUTION INTRAVENOUS CONTINUOUS
OUTPATIENT
Start: 2024-03-25

## 2024-03-25 RX ORDER — ENOXAPARIN SODIUM 100 MG/ML
40 INJECTION SUBCUTANEOUS ONCE
OUTPATIENT
Start: 2024-03-25 | End: 2024-03-25

## 2024-03-25 RX ORDER — FINERENONE 10 MG/1
1 TABLET, FILM COATED ORAL DAILY
COMMUNITY

## 2024-03-25 NOTE — TELEPHONE ENCOUNTER
Called patient and informed her I would get the paperwork to her Nephrologist office at this time. Patient thankful.     [facility]5786[facility]

## 2024-03-25 NOTE — TELEPHONE ENCOUNTER
Patient requesting cardiac clearance for hysterectomy     CARDIAC CLEARANCE     What type of procedure are you having? Hysterectomy and pelvic floor repair     Which physician is performing your procedure? Dr. Samir Urena    When is your procedure scheduled for? 4/22/24    Where are you having this procedure? [facility]     Are you taking Blood Thinners?   If so what? (Name/dose/frequesncy)  Eliquis 5mg BID     Does the surgeon want you to stop your blood thinner?  If so for how long? 2 weeks if possible but a minimum of 1 week    Phone Number and Contact Name for Physicians office:  Venessa Gimenez  [phone]    Fax number to send information:          Patient saw Dr. Moon 1/9/24 , Echo 12/27/23,   Denies new or worsening cardiac symptoms.    Hx A-fib w/ RVR , DBM, HTN, [id number] score of 2, CKD,

## 2024-03-25 NOTE — PROGRESS NOTES
3/25/2024     HPI:     Name: Heike Means  YOB: 1960    CC: Heike Means is a 64 y.o. female presenting for an evaluation of prolapse.        HPI: How long have you had this problem?    Please rate the severity of your problem: moderate  Anything make it better?     Ob/Gyn History:    OB History    Para Term  AB Living   3 3 3     3   SAB IAB Ectopic Molar Multiple Live Births             3      # Outcome Date GA Lbr Fady/2nd Weight Sex Delivery Anes PTL Lv   3 Term      CS-Unspec   TRACY   2 Term      Vag-Spont   TRACY   1 Term      Vag-Spont   TRACY     Past Medical History:   Past Medical History:   Diagnosis Date    Chronic kidney disease     kidney stones    Hyperlipidemia     Hypertension     MDRO (multiple drug resistant organisms) resistance     MRSA infection     left lower leg    Right pulmonary embolus (HCC)     Type II or unspecified type diabetes mellitus without mention of complication, not stated as uncontrolled      Past Surgical History:   Past Surgical History:   Procedure Laterality Date     SECTION      CHOLECYSTECTOMY      COLONOSCOPY  2018    Dr. Brooks. adenoma polyp, repeat in 5 years    COLONOSCOPY  2023    dr brooks repeat in 5 years    CYSTOSCOPY Right 2021    CYSTOSCOPY, RIGHT URETERAL STENT INSERTION,RIGHT RETROGRADE PYLEOGRAM performed by Moraima Dennison MD at [facility] OR    CYSTOSCOPY Left 2023    CYSTOSCOPY URETERAL STENT INSERTION performed by Brett Lord MD at [facility] OR    LITHOTRIPSY       Current Medications:  Current Outpatient Medications   Medication Sig Dispense Refill    KERENDIA 10 MG TABS Take 1 tablet by mouth daily      Potassium Citrate ER (UROCIT-K) 15 MEQ (1620 MG) TBCR extended release tablet Take 1 tablet by mouth 2 times daily      Cholecalciferol (VITAMIN D-3 PO) Take by mouth      metFORMIN (GLUCOPHAGE) 1000 MG tablet TAKE 1 TABLET BY MOUTH TWICE A DAY WITH FOOD 180

## 2024-04-01 ENCOUNTER — HOSPITAL ENCOUNTER (OUTPATIENT)
Age: 64
Discharge: HOME | End: 2024-04-01
Payer: COMMERCIAL

## 2024-04-01 ENCOUNTER — OFFICE VISIT (OUTPATIENT)
Dept: FAMILY MEDICINE CLINIC | Age: 64
End: 2024-04-01
Payer: COMMERCIAL

## 2024-04-01 VITALS
DIASTOLIC BLOOD PRESSURE: 84 MMHG | WEIGHT: 163.4 LBS | HEIGHT: 62 IN | BODY MASS INDEX: 30.07 KG/M2 | HEART RATE: 76 BPM | TEMPERATURE: 98.1 F | SYSTOLIC BLOOD PRESSURE: 130 MMHG

## 2024-04-01 DIAGNOSIS — I48.0 PAROXYSMAL ATRIAL FIBRILLATION (HCC): ICD-10-CM

## 2024-04-01 DIAGNOSIS — Z01.818 PREOP EXAMINATION: ICD-10-CM

## 2024-04-01 DIAGNOSIS — N81.10 ACQUIRED FEMALE BLADDER PROLAPSE: ICD-10-CM

## 2024-04-01 DIAGNOSIS — E11.29 TYPE 2 DIABETES MELLITUS WITH DIABETIC MICROALBUMINURIA, WITHOUT LONG-TERM CURRENT USE OF INSULIN (HCC): ICD-10-CM

## 2024-04-01 DIAGNOSIS — N81.4 PROLAPSED UTERUS: ICD-10-CM

## 2024-04-01 DIAGNOSIS — R80.9 TYPE 2 DIABETES MELLITUS WITH DIABETIC MICROALBUMINURIA, WITHOUT LONG-TERM CURRENT USE OF INSULIN (HCC): ICD-10-CM

## 2024-04-01 DIAGNOSIS — Z01.818 PREOP EXAMINATION: Primary | ICD-10-CM

## 2024-04-01 DIAGNOSIS — I10 PRIMARY HYPERTENSION: ICD-10-CM

## 2024-04-01 LAB
EKG ATRIAL RATE: 65 BPM
EKG DIAGNOSIS: NORMAL
EKG P AXIS: 34 DEGREES
EKG P-R INTERVAL: 186 MS
EKG Q-T INTERVAL: 388 MS
EKG QRS DURATION: 90 MS
EKG QTC CALCULATION (BAZETT): 403 MS
EKG R AXIS: -5 DEGREES
EKG T AXIS: 52 DEGREES
EKG VENTRICULAR RATE: 65 BPM

## 2024-04-01 PROCEDURE — 3017F COLORECTAL CA SCREEN DOC REV: CPT | Performed by: FAMILY MEDICINE

## 2024-04-01 PROCEDURE — 1036F TOBACCO NON-USER: CPT | Performed by: FAMILY MEDICINE

## 2024-04-01 PROCEDURE — 3051F HG A1C>EQUAL 7.0%<8.0%: CPT | Performed by: FAMILY MEDICINE

## 2024-04-01 PROCEDURE — 2022F DILAT RTA XM EVC RTNOPTHY: CPT | Performed by: FAMILY MEDICINE

## 2024-04-01 PROCEDURE — 3075F SYST BP GE 130 - 139MM HG: CPT | Performed by: FAMILY MEDICINE

## 2024-04-01 PROCEDURE — 93005 ELECTROCARDIOGRAM TRACING: CPT

## 2024-04-01 PROCEDURE — 3079F DIAST BP 80-89 MM HG: CPT | Performed by: FAMILY MEDICINE

## 2024-04-01 PROCEDURE — 99214 OFFICE O/P EST MOD 30 MIN: CPT | Performed by: FAMILY MEDICINE

## 2024-04-01 PROCEDURE — G8427 DOCREV CUR MEDS BY ELIG CLIN: HCPCS | Performed by: FAMILY MEDICINE

## 2024-04-01 PROCEDURE — G8417 CALC BMI ABV UP PARAM F/U: HCPCS | Performed by: FAMILY MEDICINE

## 2024-04-01 PROCEDURE — 93010 ELECTROCARDIOGRAM REPORT: CPT | Performed by: INTERNAL MEDICINE

## 2024-04-01 SDOH — ECONOMIC STABILITY: FOOD INSECURITY: WITHIN THE PAST 12 MONTHS, YOU WORRIED THAT YOUR FOOD WOULD RUN OUT BEFORE YOU GOT MONEY TO BUY MORE.: NEVER TRUE

## 2024-04-01 SDOH — ECONOMIC STABILITY: INCOME INSECURITY: HOW HARD IS IT FOR YOU TO PAY FOR THE VERY BASICS LIKE FOOD, HOUSING, MEDICAL CARE, AND HEATING?: NOT HARD AT ALL

## 2024-04-01 SDOH — ECONOMIC STABILITY: FOOD INSECURITY: WITHIN THE PAST 12 MONTHS, THE FOOD YOU BOUGHT JUST DIDN'T LAST AND YOU DIDN'T HAVE MONEY TO GET MORE.: NEVER TRUE

## 2024-04-01 ASSESSMENT — ENCOUNTER SYMPTOMS
BACK PAIN: 0
COUGH: 0
BLOOD IN STOOL: 0
SORE THROAT: 0
NAUSEA: 0
CHEST TIGHTNESS: 0
ABDOMINAL DISTENTION: 0
SHORTNESS OF BREATH: 0
EYE PAIN: 0
TROUBLE SWALLOWING: 0
VOICE CHANGE: 0
CONSTIPATION: 0

## 2024-04-01 NOTE — PROGRESS NOTES
Subjective:      Patient ID: Heike Means is a 64 y.o. female.    Chief Complaint   Patient presents with    Pre-op Exam     Hysterectomy on 4- by Dr. Samir Urena at [facility].         Patient presents with:  Pre-op Exam: Hysterectomy on 4- by Dr. Samir Urena at [facility].     Here for the above.   For hysterectomy and bladder   She is well     Allergies:   -- Ciprofloxacin -- Other (See Comments)    --  hallucinations   -- Hydrocodone -- Itching   -- Hydrocodone-Acetaminophen      height is 1.575 m (5' 2\") and weight is 74.1 kg (163 lb 6.4 oz). Her temporal temperature is 98.1 °F (36.7 °C). Her blood pressure is 130/84 and her pulse is 76.     No tobacco hx. No ETOH use.      Current Outpatient Medications:   ·  KERENDIA 10 MG TABS, Take 1 tablet by mouth daily  ·  Cholecalciferol (VITAMIN D-3 PO), Take by mouth,  ·  metFORMIN (GLUCOPHAGE) 1000 MG tablet, TAKE 1 TABLET BY MOUTH TWICE A DAY WITH FOOD,  ·  apixaban (ELIQUIS) 5 MG TABS tablet, Take 1 tablet by mouth 2 times daily,   ·  atorvastatin (LIPITOR) 10 MG tablet, TAKE 1 TABLET BY MOUTH EVERY DAY  ·  metoprolol succinate (TOPROL XL) 50 MG extended release tablet, TAKE 1 TABLET BY MOUTH EVERY DAY  ·  hydroCHLOROthiazide (MICROZIDE) 12.5 MG capsule, TAKE 1 CAPSULE BY MOUTH EVERY DAY IN THE MORNING,   ·  glipiZIDE (GLUCOTROL) 10 MG tablet, Take 1 tablet by mouth 2 times daily (before meals),   ·  losartan (COZAAR) 100 MG tablet, TAKE 1 TABLET BY MOUTH EVERY DAY,   ·  vitamin B-12 (CYANOCOBALAMIN) 100 MCG tablet, One tablet twice a week,  ·  empagliflozin (JARDIANCE) 10 MG tablet, Take 1 tablet by mouth daily  ·  vitamin C (ASCORBIC ACID) 500 MG tablet, Take 1 tablet by mouth daily  ·  Biotin 10 MG CAPS, Take by mouth  ·  citric acid-potassium citrate (POLYCITRA) 1100-334 MG/5ML solution, Take 5 mLs by mouth 2 times daily,   ·  Multiple Vitamin CAPS, Take 1 capsule by mouth daily ,  ·  Potassium Citrate ER (UROCIT-K) 15 MEQ (1620 MG)

## 2024-04-01 NOTE — PATIENT INSTRUCTIONS
On the morning of the surgery take the metoprolol with just enough water to get the pill down as soon as you wake up   No diabetes medicine the night before  Stop the eliquis at least 2 day before the surgery. If dr diaz wanted a week that is ok   See me back in 4 months

## 2024-04-03 LAB
ANION GAP SERPL CALCULATED.3IONS-SCNC: 20 MMOL/L (ref 3–16)
BASOPHILS # BLD: 0 K/UL (ref 0–0.2)
BASOPHILS NFR BLD: 0.7 %
BUN SERPL-MCNC: 43 MG/DL (ref 7–20)
CALCIUM SERPL-MCNC: 10.8 MG/DL (ref 8.3–10.6)
CHLORIDE SERPL-SCNC: 100 MMOL/L (ref 99–110)
CO2 SERPL-SCNC: 22 MMOL/L (ref 21–32)
CREAT SERPL-MCNC: 1.3 MG/DL (ref 0.6–1.2)
DEPRECATED RDW RBC AUTO: 14.6 % (ref 12.4–15.4)
EOSINOPHIL # BLD: 0.2 K/UL (ref 0–0.6)
EOSINOPHIL NFR BLD: 3.1 %
GFR SERPLBLD CREATININE-BSD FMLA CKD-EPI: 46 ML/MIN/{1.73_M2}
GLUCOSE SERPL-MCNC: 116 MG/DL (ref 70–99)
HCT VFR BLD AUTO: 46.1 % (ref 36–48)
HGB BLD-MCNC: 15.3 G/DL (ref 12–16)
LYMPHOCYTES # BLD: 2 K/UL (ref 1–5.1)
LYMPHOCYTES NFR BLD: 38.3 %
MCH RBC QN AUTO: 29 PG (ref 26–34)
MCHC RBC AUTO-ENTMCNC: 33.1 G/DL (ref 31–36)
MCV RBC AUTO: 87.5 FL (ref 80–100)
MONOCYTES # BLD: 0.3 K/UL (ref 0–1.3)
MONOCYTES NFR BLD: 6.1 %
NEUTROPHILS # BLD: 2.8 K/UL (ref 1.7–7.7)
NEUTROPHILS NFR BLD: 51.8 %
PLATELET # BLD AUTO: 224 K/UL (ref 135–450)
PMV BLD AUTO: 9.8 FL (ref 5–10.5)
POTASSIUM SERPL-SCNC: 4.8 MMOL/L (ref 3.5–5.1)
RBC # BLD AUTO: 5.27 M/UL (ref 4–5.2)
SODIUM SERPL-SCNC: 142 MMOL/L (ref 136–145)
WBC # BLD AUTO: 5.3 K/UL (ref 4–11)

## 2024-04-04 LAB
EST. AVERAGE GLUCOSE BLD GHB EST-MCNC: 159.9 MG/DL
HBA1C MFR BLD: 7.2 %

## 2024-04-09 RX ORDER — GLIPIZIDE 10 MG/1
10 TABLET ORAL
Qty: 180 TABLET | Refills: 1 | Status: SHIPPED | OUTPATIENT
Start: 2024-04-09

## 2024-04-12 ENCOUNTER — TELEPHONE (OUTPATIENT)
Dept: UROGYNECOLOGY | Age: 64
End: 2024-04-12

## 2024-04-12 NOTE — TELEPHONE ENCOUNTER
Surgery scheduled for 4/22/2024 with Dr. Urena at [facility].    Called patient and allowed time for any additional questions prior to surgery.       Advised to stop all vitamins, herbal supplements, Aspirin, or NSAIDS the week prior to surgery.    Medications to hold morning of surgery: Eliquis (hold at least 48 hours prior), HCTZ, Losartan, hold diabetes medication night before surgery per PCP.     Confirmed date and time of surgery with patient as well as post op appointment.      Answered all questions patient had in regards to upcoming surgery - Asked patient to call office if there are any additional questions.

## 2024-04-16 ENCOUNTER — TELEPHONE (OUTPATIENT)
Dept: UROGYNECOLOGY | Age: 64
End: 2024-04-16

## 2024-04-16 NOTE — TELEPHONE ENCOUNTER
Informed patient of surgery denial by her insurance. Denial letter states patient does not have an updated pap smear on file.     Called patient GYN office ([facility]/[place]). They stated her last pap smear was in 11/2021. She is technically not due for one until November of this year.     Called patient back and informed her that per Dr. Urena she should come in this week for an updated pap smear with Dolly France NP prior to surgery.     Patient scheduled for this Thursday, 4/18 at the [facility].     No further needs at this time.

## 2024-04-18 ENCOUNTER — OFFICE VISIT (OUTPATIENT)
Dept: UROGYNECOLOGY | Age: 64
End: 2024-04-18
Payer: COMMERCIAL

## 2024-04-18 VITALS
OXYGEN SATURATION: 98 % | RESPIRATION RATE: 16 BRPM | HEART RATE: 83 BPM | TEMPERATURE: 98 F | DIASTOLIC BLOOD PRESSURE: 93 MMHG | SYSTOLIC BLOOD PRESSURE: 157 MMHG

## 2024-04-18 DIAGNOSIS — N81.3 COMPLETE UTERINE PROLAPSE: Primary | ICD-10-CM

## 2024-04-18 PROCEDURE — 3077F SYST BP >= 140 MM HG: CPT | Performed by: NURSE PRACTITIONER

## 2024-04-18 PROCEDURE — 99212 OFFICE O/P EST SF 10 MIN: CPT | Performed by: NURSE PRACTITIONER

## 2024-04-18 PROCEDURE — 3080F DIAST BP >= 90 MM HG: CPT | Performed by: NURSE PRACTITIONER

## 2024-04-18 PROCEDURE — 3017F COLORECTAL CA SCREEN DOC REV: CPT | Performed by: NURSE PRACTITIONER

## 2024-04-18 PROCEDURE — G8428 CUR MEDS NOT DOCUMENT: HCPCS | Performed by: NURSE PRACTITIONER

## 2024-04-18 PROCEDURE — 1036F TOBACCO NON-USER: CPT | Performed by: NURSE PRACTITIONER

## 2024-04-18 PROCEDURE — G8417 CALC BMI ABV UP PARAM F/U: HCPCS | Performed by: NURSE PRACTITIONER

## 2024-04-18 NOTE — PROGRESS NOTES
EVERY DAY 90 tablet 1    metoprolol succinate (TOPROL XL) 50 MG extended release tablet TAKE 1 TABLET BY MOUTH EVERY DAY 90 tablet 1    hydroCHLOROthiazide (MICROZIDE) 12.5 MG capsule TAKE 1 CAPSULE BY MOUTH EVERY DAY IN THE MORNING 90 capsule 1    losartan (COZAAR) 100 MG tablet TAKE 1 TABLET BY MOUTH EVERY DAY 90 tablet 1    vitamin B-12 (CYANOCOBALAMIN) 100 MCG tablet One tablet twice a week 30 tablet 0    empagliflozin (JARDIANCE) 10 MG tablet Take 1 tablet by mouth daily 90 tablet 1    vitamin C (ASCORBIC ACID) 500 MG tablet Take 1 tablet by mouth daily      Biotin 10 MG CAPS Take by mouth      citric acid-potassium citrate (POLYCITRA) 1100-334 MG/5ML solution Take 5 mLs by mouth 2 times daily      Multiple Vitamin CAPS Take 1 capsule by mouth daily        No current facility-administered medications for this visit.     Social History:   Social History     Socioeconomic History    Marital status:      Spouse name: Not on file    Number of children: Not on file    Years of education: Not on file    Highest education level: Not on file   Occupational History    Not on file   Tobacco Use    Smoking status: Never    Smokeless tobacco: Never   Vaping Use    Vaping Use: Never used   Substance and Sexual Activity    Alcohol use: No    Drug use: No    Sexual activity: Yes     Partners: Male   Other Topics Concern    Not on file   Social History Narrative    Not on file     Social Determinants of Health     Financial Resource Strain: Low Risk  (4/1/2024)    Overall Financial Resource Strain (CARDIA)     Difficulty of Paying Living Expenses: Not hard at all   Food Insecurity: No Food Insecurity (4/1/2024)    Hunger Vital Sign     Worried About Running Out of Food in the Last Year: Never true     Ran Out of Food in the Last Year: Never true   Transportation Needs: No Transportation Needs (4/1/2024)    PRAPARE - Transportation     Lack of Transportation (Medical): No     Lack of Transportation (Non-Medical): No

## 2024-04-19 ENCOUNTER — TELEPHONE (OUTPATIENT)
Dept: UROGYNECOLOGY | Age: 64
End: 2024-04-19

## 2024-04-19 ENCOUNTER — ANESTHESIA EVENT (OUTPATIENT)
Dept: OPERATING ROOM | Age: 64
End: 2024-04-19
Payer: COMMERCIAL

## 2024-04-19 NOTE — TELEPHONE ENCOUNTER
Called [facility] to schedule peer to peer for surgery appeal and approval.     Peer to peer scheduled for today, April 19th at 2 pm.     Authorization number: [id number]   Member ID: [id number]  Medical Director: Esperanza Henry  [facility] peer to peer phone number: [phone]        Informed Dr. Urena at this time.     Called patient- left  stating peer to peer date and time has been scheduled. Advised her we will keep her updated. Encouraged her to call office back should any questions arise.

## 2024-04-19 NOTE — TELEPHONE ENCOUNTER
19-Apr-2024 06:00 Zach Smith was seen today but she had another question regarding the COVID Vaccine. Please call patient at [phone].

## 2024-04-19 NOTE — TELEPHONE ENCOUNTER
Per Dr. Urena peer to peer completed, requesting pap smear results to be faxed to [phone] once back. Case will remain open until Monday at the end of the day.     Patient notified.

## 2024-04-22 ENCOUNTER — ANESTHESIA (OUTPATIENT)
Dept: OPERATING ROOM | Age: 64
End: 2024-04-22
Payer: COMMERCIAL

## 2024-04-22 ENCOUNTER — HOSPITAL ENCOUNTER (OUTPATIENT)
Age: 64
Setting detail: OBSERVATION
Discharge: HOME | End: 2024-04-23
Attending: OBSTETRICS & GYNECOLOGY | Admitting: OBSTETRICS & GYNECOLOGY
Payer: COMMERCIAL

## 2024-04-22 ENCOUNTER — CLINICAL DOCUMENTATION (OUTPATIENT)
Dept: UROGYNECOLOGY | Age: 64
End: 2024-04-22

## 2024-04-22 DIAGNOSIS — G89.18 POST-OP PAIN: Primary | ICD-10-CM

## 2024-04-22 DIAGNOSIS — N81.3 COMPLETE UTERINE PROLAPSE: ICD-10-CM

## 2024-04-22 DIAGNOSIS — N81.11 CYSTOCELE, MIDLINE: ICD-10-CM

## 2024-04-22 DIAGNOSIS — N81.6 RECTOCELE: ICD-10-CM

## 2024-04-22 PROBLEM — N81.4 CYSTOCELE WITH UTERINE PROLAPSE: Status: ACTIVE | Noted: 2024-04-22

## 2024-04-22 LAB
GLUCOSE BLD-MCNC: 217 MG/DL (ref 70–99)
GLUCOSE BLD-MCNC: 227 MG/DL (ref 70–99)
GLUCOSE BLD-MCNC: 246 MG/DL (ref 70–99)
GLUCOSE BLD-MCNC: 268 MG/DL (ref 70–99)
GLUCOSE BLD-MCNC: 276 MG/DL (ref 70–99)
PERFORMED ON: ABNORMAL

## 2024-04-22 PROCEDURE — 6360000002 HC RX W HCPCS: Performed by: NURSE ANESTHETIST, CERTIFIED REGISTERED

## 2024-04-22 PROCEDURE — 6360000002 HC RX W HCPCS: Performed by: OBSTETRICS & GYNECOLOGY

## 2024-04-22 PROCEDURE — 58260 VAGINAL HYSTERECTOMY: CPT | Performed by: OBSTETRICS & GYNECOLOGY

## 2024-04-22 PROCEDURE — 88305 TISSUE EXAM BY PATHOLOGIST: CPT

## 2024-04-22 PROCEDURE — A4217 STERILE WATER/SALINE, 500 ML: HCPCS | Performed by: OBSTETRICS & GYNECOLOGY

## 2024-04-22 PROCEDURE — 3700000000 HC ANESTHESIA ATTENDED CARE: Performed by: OBSTETRICS & GYNECOLOGY

## 2024-04-22 PROCEDURE — 6370000000 HC RX 637 (ALT 250 FOR IP): Performed by: OBSTETRICS & GYNECOLOGY

## 2024-04-22 PROCEDURE — 57250 REPAIR RECTUM & VAGINA: CPT | Performed by: OBSTETRICS & GYNECOLOGY

## 2024-04-22 PROCEDURE — 3600000004 HC SURGERY LEVEL 4 BASE: Performed by: OBSTETRICS & GYNECOLOGY

## 2024-04-22 PROCEDURE — 3700000001 HC ADD 15 MINUTES (ANESTHESIA): Performed by: OBSTETRICS & GYNECOLOGY

## 2024-04-22 PROCEDURE — G0378 HOSPITAL OBSERVATION PER HR: HCPCS

## 2024-04-22 PROCEDURE — 7100000000 HC PACU RECOVERY - FIRST 15 MIN: Performed by: OBSTETRICS & GYNECOLOGY

## 2024-04-22 PROCEDURE — 57283 COLPOPEXY INTRAPERITONEAL: CPT | Performed by: OBSTETRICS & GYNECOLOGY

## 2024-04-22 PROCEDURE — 96372 THER/PROPH/DIAG INJ SC/IM: CPT

## 2024-04-22 PROCEDURE — 2580000003 HC RX 258: Performed by: OBSTETRICS & GYNECOLOGY

## 2024-04-22 PROCEDURE — 7100000001 HC PACU RECOVERY - ADDTL 15 MIN: Performed by: OBSTETRICS & GYNECOLOGY

## 2024-04-22 PROCEDURE — 2500000003 HC RX 250 WO HCPCS: Performed by: OBSTETRICS & GYNECOLOGY

## 2024-04-22 PROCEDURE — 2580000003 HC RX 258: Performed by: ANESTHESIOLOGY

## 2024-04-22 PROCEDURE — 3600000014 HC SURGERY LEVEL 4 ADDTL 15MIN: Performed by: OBSTETRICS & GYNECOLOGY

## 2024-04-22 PROCEDURE — 57285 REPAIR PARAVAG DEFECT VAG: CPT | Performed by: OBSTETRICS & GYNECOLOGY

## 2024-04-22 PROCEDURE — 2500000003 HC RX 250 WO HCPCS: Performed by: NURSE ANESTHETIST, CERTIFIED REGISTERED

## 2024-04-22 PROCEDURE — 2709999900 HC NON-CHARGEABLE SUPPLY: Performed by: OBSTETRICS & GYNECOLOGY

## 2024-04-22 RX ORDER — DEXAMETHASONE SODIUM PHOSPHATE 4 MG/ML
INJECTION, SOLUTION INTRA-ARTICULAR; INTRALESIONAL; INTRAMUSCULAR; INTRAVENOUS; SOFT TISSUE PRN
Status: DISCONTINUED | OUTPATIENT
Start: 2024-04-22 | End: 2024-04-22 | Stop reason: SDUPTHER

## 2024-04-22 RX ORDER — HYDROCHLOROTHIAZIDE 12.5 MG/1
12.5 CAPSULE, GELATIN COATED ORAL EVERY MORNING
Status: DISCONTINUED | OUTPATIENT
Start: 2024-04-22 | End: 2024-04-23 | Stop reason: HOSPADM

## 2024-04-22 RX ORDER — LIDOCAINE HYDROCHLORIDE 20 MG/ML
INJECTION, SOLUTION EPIDURAL; INFILTRATION; INTRACAUDAL; PERINEURAL PRN
Status: DISCONTINUED | OUTPATIENT
Start: 2024-04-22 | End: 2024-04-22 | Stop reason: SDUPTHER

## 2024-04-22 RX ORDER — DEXMEDETOMIDINE HYDROCHLORIDE 100 UG/ML
INJECTION, SOLUTION INTRAVENOUS PRN
Status: DISCONTINUED | OUTPATIENT
Start: 2024-04-22 | End: 2024-04-22 | Stop reason: SDUPTHER

## 2024-04-22 RX ORDER — MIDAZOLAM HYDROCHLORIDE 1 MG/ML
INJECTION INTRAMUSCULAR; INTRAVENOUS PRN
Status: DISCONTINUED | OUTPATIENT
Start: 2024-04-22 | End: 2024-04-22 | Stop reason: SDUPTHER

## 2024-04-22 RX ORDER — PROPOFOL 10 MG/ML
INJECTION, EMULSION INTRAVENOUS PRN
Status: DISCONTINUED | OUTPATIENT
Start: 2024-04-22 | End: 2024-04-22 | Stop reason: SDUPTHER

## 2024-04-22 RX ORDER — PHENYLEPHRINE HCL IN 0.9% NACL 1 MG/10 ML
SYRINGE (ML) INTRAVENOUS PRN
Status: DISCONTINUED | OUTPATIENT
Start: 2024-04-22 | End: 2024-04-22 | Stop reason: SDUPTHER

## 2024-04-22 RX ORDER — ROCURONIUM BROMIDE 10 MG/ML
INJECTION, SOLUTION INTRAVENOUS PRN
Status: DISCONTINUED | OUTPATIENT
Start: 2024-04-22 | End: 2024-04-22 | Stop reason: SDUPTHER

## 2024-04-22 RX ORDER — ONDANSETRON 2 MG/ML
4 INJECTION INTRAMUSCULAR; INTRAVENOUS EVERY 6 HOURS PRN
Status: DISCONTINUED | OUTPATIENT
Start: 2024-04-22 | End: 2024-04-23 | Stop reason: HOSPADM

## 2024-04-22 RX ORDER — SODIUM CHLORIDE 9 MG/ML
INJECTION, SOLUTION INTRAVENOUS PRN
Status: DISCONTINUED | OUTPATIENT
Start: 2024-04-22 | End: 2024-04-23 | Stop reason: HOSPADM

## 2024-04-22 RX ORDER — SODIUM CHLORIDE 0.9 % (FLUSH) 0.9 %
5-40 SYRINGE (ML) INJECTION PRN
Status: DISCONTINUED | OUTPATIENT
Start: 2024-04-22 | End: 2024-04-22 | Stop reason: HOSPADM

## 2024-04-22 RX ORDER — NALOXONE HYDROCHLORIDE 0.4 MG/ML
INJECTION, SOLUTION INTRAMUSCULAR; INTRAVENOUS; SUBCUTANEOUS PRN
Status: DISCONTINUED | OUTPATIENT
Start: 2024-04-22 | End: 2024-04-22 | Stop reason: HOSPADM

## 2024-04-22 RX ORDER — FENTANYL CITRATE 50 UG/ML
INJECTION, SOLUTION INTRAMUSCULAR; INTRAVENOUS PRN
Status: DISCONTINUED | OUTPATIENT
Start: 2024-04-22 | End: 2024-04-22 | Stop reason: SDUPTHER

## 2024-04-22 RX ORDER — LOSARTAN POTASSIUM 100 MG/1
100 TABLET ORAL DAILY
Status: DISCONTINUED | OUTPATIENT
Start: 2024-04-22 | End: 2024-04-23 | Stop reason: HOSPADM

## 2024-04-22 RX ORDER — ATORVASTATIN CALCIUM 10 MG/1
10 TABLET, FILM COATED ORAL DAILY
Status: DISCONTINUED | OUTPATIENT
Start: 2024-04-23 | End: 2024-04-23 | Stop reason: HOSPADM

## 2024-04-22 RX ORDER — GLIPIZIDE 5 MG/1
10 TABLET ORAL
Status: DISCONTINUED | OUTPATIENT
Start: 2024-04-22 | End: 2024-04-23 | Stop reason: HOSPADM

## 2024-04-22 RX ORDER — SODIUM CHLORIDE 0.9 % (FLUSH) 0.9 %
5-40 SYRINGE (ML) INJECTION EVERY 12 HOURS SCHEDULED
Status: DISCONTINUED | OUTPATIENT
Start: 2024-04-22 | End: 2024-04-23 | Stop reason: HOSPADM

## 2024-04-22 RX ORDER — IBUPROFEN 400 MG/1
400 TABLET ORAL EVERY 12 HOURS PRN
Status: DISCONTINUED | OUTPATIENT
Start: 2024-04-22 | End: 2024-04-23 | Stop reason: HOSPADM

## 2024-04-22 RX ORDER — ONDANSETRON 2 MG/ML
INJECTION INTRAMUSCULAR; INTRAVENOUS PRN
Status: DISCONTINUED | OUTPATIENT
Start: 2024-04-22 | End: 2024-04-22 | Stop reason: SDUPTHER

## 2024-04-22 RX ORDER — PROMETHAZINE HYDROCHLORIDE 25 MG/1
12.5 TABLET ORAL EVERY 6 HOURS PRN
Status: DISCONTINUED | OUTPATIENT
Start: 2024-04-22 | End: 2024-04-23 | Stop reason: HOSPADM

## 2024-04-22 RX ORDER — ENOXAPARIN SODIUM 100 MG/ML
40 INJECTION SUBCUTANEOUS ONCE
Status: COMPLETED | OUTPATIENT
Start: 2024-04-22 | End: 2024-04-22

## 2024-04-22 RX ORDER — METOPROLOL SUCCINATE 25 MG/1
50 TABLET, EXTENDED RELEASE ORAL DAILY
Status: DISCONTINUED | OUTPATIENT
Start: 2024-04-23 | End: 2024-04-23 | Stop reason: HOSPADM

## 2024-04-22 RX ORDER — SODIUM CHLORIDE 0.9 % (FLUSH) 0.9 %
5-40 SYRINGE (ML) INJECTION EVERY 12 HOURS SCHEDULED
Status: DISCONTINUED | OUTPATIENT
Start: 2024-04-22 | End: 2024-04-22 | Stop reason: HOSPADM

## 2024-04-22 RX ORDER — SIMETHICONE 80 MG
160 TABLET,CHEWABLE ORAL 4 TIMES DAILY PRN
Status: DISCONTINUED | OUTPATIENT
Start: 2024-04-22 | End: 2024-04-23 | Stop reason: HOSPADM

## 2024-04-22 RX ORDER — ACETAMINOPHEN 500 MG
1000 TABLET ORAL EVERY 8 HOURS SCHEDULED
Status: DISCONTINUED | OUTPATIENT
Start: 2024-04-22 | End: 2024-04-23 | Stop reason: HOSPADM

## 2024-04-22 RX ORDER — DEXTROSE MONOHYDRATE 100 MG/ML
INJECTION, SOLUTION INTRAVENOUS CONTINUOUS PRN
Status: DISCONTINUED | OUTPATIENT
Start: 2024-04-22 | End: 2024-04-23 | Stop reason: HOSPADM

## 2024-04-22 RX ORDER — SODIUM CHLORIDE 9 MG/ML
INJECTION, SOLUTION INTRAVENOUS PRN
Status: DISCONTINUED | OUTPATIENT
Start: 2024-04-22 | End: 2024-04-22 | Stop reason: HOSPADM

## 2024-04-22 RX ORDER — SODIUM CHLORIDE 0.9 % (FLUSH) 0.9 %
5-40 SYRINGE (ML) INJECTION EVERY 12 HOURS SCHEDULED
Status: DISCONTINUED | OUTPATIENT
Start: 2024-04-22 | End: 2024-04-22 | Stop reason: SDUPTHER

## 2024-04-22 RX ORDER — SODIUM CHLORIDE 9 MG/ML
INJECTION, SOLUTION INTRAVENOUS CONTINUOUS
Status: DISCONTINUED | OUTPATIENT
Start: 2024-04-22 | End: 2024-04-23 | Stop reason: HOSPADM

## 2024-04-22 RX ORDER — OXYCODONE HYDROCHLORIDE 5 MG/1
5 TABLET ORAL PRN
Status: DISCONTINUED | OUTPATIENT
Start: 2024-04-22 | End: 2024-04-22 | Stop reason: HOSPADM

## 2024-04-22 RX ORDER — INSULIN LISPRO 100 [IU]/ML
0-4 INJECTION, SOLUTION INTRAVENOUS; SUBCUTANEOUS
Status: DISCONTINUED | OUTPATIENT
Start: 2024-04-22 | End: 2024-04-23 | Stop reason: HOSPADM

## 2024-04-22 RX ORDER — SODIUM CHLORIDE 0.9 % (FLUSH) 0.9 %
5-40 SYRINGE (ML) INJECTION PRN
Status: DISCONTINUED | OUTPATIENT
Start: 2024-04-22 | End: 2024-04-23 | Stop reason: HOSPADM

## 2024-04-22 RX ORDER — MAGNESIUM HYDROXIDE 1200 MG/15ML
LIQUID ORAL CONTINUOUS PRN
Status: COMPLETED | OUTPATIENT
Start: 2024-04-22 | End: 2024-04-22

## 2024-04-22 RX ORDER — SODIUM CHLORIDE 9 MG/ML
INJECTION, SOLUTION INTRAVENOUS PRN
Status: DISCONTINUED | OUTPATIENT
Start: 2024-04-22 | End: 2024-04-22 | Stop reason: SDUPTHER

## 2024-04-22 RX ORDER — OXYCODONE HYDROCHLORIDE 5 MG/1
5 TABLET ORAL EVERY 4 HOURS PRN
Status: DISCONTINUED | OUTPATIENT
Start: 2024-04-22 | End: 2024-04-23 | Stop reason: HOSPADM

## 2024-04-22 RX ORDER — ONDANSETRON 2 MG/ML
4 INJECTION INTRAMUSCULAR; INTRAVENOUS
Status: DISCONTINUED | OUTPATIENT
Start: 2024-04-22 | End: 2024-04-22 | Stop reason: HOSPADM

## 2024-04-22 RX ORDER — SODIUM CHLORIDE 0.9 % (FLUSH) 0.9 %
5-40 SYRINGE (ML) INJECTION PRN
Status: DISCONTINUED | OUTPATIENT
Start: 2024-04-22 | End: 2024-04-22 | Stop reason: SDUPTHER

## 2024-04-22 RX ORDER — SODIUM CHLORIDE, SODIUM LACTATE, POTASSIUM CHLORIDE, CALCIUM CHLORIDE 600; 310; 30; 20 MG/100ML; MG/100ML; MG/100ML; MG/100ML
INJECTION, SOLUTION INTRAVENOUS CONTINUOUS
Status: DISCONTINUED | OUTPATIENT
Start: 2024-04-22 | End: 2024-04-22 | Stop reason: HOSPADM

## 2024-04-22 RX ORDER — INSULIN LISPRO 100 [IU]/ML
0-4 INJECTION, SOLUTION INTRAVENOUS; SUBCUTANEOUS NIGHTLY
Status: DISCONTINUED | OUTPATIENT
Start: 2024-04-22 | End: 2024-04-23 | Stop reason: HOSPADM

## 2024-04-22 RX ORDER — KETOROLAC TROMETHAMINE 30 MG/ML
15 INJECTION, SOLUTION INTRAMUSCULAR; INTRAVENOUS EVERY 6 HOURS PRN
Status: DISCONTINUED | OUTPATIENT
Start: 2024-04-22 | End: 2024-04-23 | Stop reason: HOSPADM

## 2024-04-22 RX ORDER — OXYCODONE HYDROCHLORIDE 10 MG/1
10 TABLET ORAL EVERY 4 HOURS PRN
Status: DISCONTINUED | OUTPATIENT
Start: 2024-04-22 | End: 2024-04-23 | Stop reason: HOSPADM

## 2024-04-22 RX ORDER — GLUCAGON 1 MG/ML
1 KIT INJECTION PRN
Status: DISCONTINUED | OUTPATIENT
Start: 2024-04-22 | End: 2024-04-23 | Stop reason: HOSPADM

## 2024-04-22 RX ORDER — OXYCODONE HYDROCHLORIDE 10 MG/1
10 TABLET ORAL PRN
Status: DISCONTINUED | OUTPATIENT
Start: 2024-04-22 | End: 2024-04-22 | Stop reason: HOSPADM

## 2024-04-22 RX ADMIN — Medication 200 MCG: at 10:58

## 2024-04-22 RX ADMIN — FENTANYL CITRATE 50 MCG: 50 INJECTION INTRAMUSCULAR; INTRAVENOUS at 09:29

## 2024-04-22 RX ADMIN — ROCURONIUM BROMIDE 5 MG: 10 INJECTION, SOLUTION INTRAVENOUS at 09:29

## 2024-04-22 RX ADMIN — SODIUM CHLORIDE: 9 INJECTION, SOLUTION INTRAVENOUS at 09:05

## 2024-04-22 RX ADMIN — SODIUM CHLORIDE: 9 INJECTION, SOLUTION INTRAVENOUS at 10:50

## 2024-04-22 RX ADMIN — DEXMEDETOMIDINE HYDROCHLORIDE 8 MCG: 100 INJECTION, SOLUTION INTRAVENOUS at 09:29

## 2024-04-22 RX ADMIN — KETOROLAC TROMETHAMINE 15 MG: 30 INJECTION INTRAMUSCULAR; INTRAVENOUS at 13:05

## 2024-04-22 RX ADMIN — GLIPIZIDE 10 MG: 5 TABLET ORAL at 17:31

## 2024-04-22 RX ADMIN — PROPOFOL 50 MG: 10 INJECTION, EMULSION INTRAVENOUS at 10:48

## 2024-04-22 RX ADMIN — LOSARTAN POTASSIUM 100 MG: 100 TABLET, FILM COATED ORAL at 17:31

## 2024-04-22 RX ADMIN — DEXMEDETOMIDINE HYDROCHLORIDE 8 MCG: 100 INJECTION, SOLUTION INTRAVENOUS at 10:48

## 2024-04-22 RX ADMIN — DEXMEDETOMIDINE HYDROCHLORIDE 4 MCG: 100 INJECTION, SOLUTION INTRAVENOUS at 10:45

## 2024-04-22 RX ADMIN — MIDAZOLAM 2 MG: 1 INJECTION INTRAMUSCULAR; INTRAVENOUS at 09:21

## 2024-04-22 RX ADMIN — PROPOFOL 140 MG: 10 INJECTION, EMULSION INTRAVENOUS at 09:29

## 2024-04-22 RX ADMIN — ONDANSETRON 4 MG: 2 INJECTION INTRAMUSCULAR; INTRAVENOUS at 10:48

## 2024-04-22 RX ADMIN — SODIUM CHLORIDE 2 MG: 900 INJECTION INTRAVENOUS at 09:32

## 2024-04-22 RX ADMIN — HYDROMORPHONE HYDROCHLORIDE 0.5 MG: 1 INJECTION, SOLUTION INTRAMUSCULAR; INTRAVENOUS; SUBCUTANEOUS at 10:48

## 2024-04-22 RX ADMIN — SUGAMMADEX 200 MG: 100 INJECTION, SOLUTION INTRAVENOUS at 11:09

## 2024-04-22 RX ADMIN — OXYCODONE 5 MG: 5 TABLET ORAL at 16:32

## 2024-04-22 RX ADMIN — HYDROMORPHONE HYDROCHLORIDE 0.25 MG: 1 INJECTION, SOLUTION INTRAMUSCULAR; INTRAVENOUS; SUBCUTANEOUS at 10:45

## 2024-04-22 RX ADMIN — DEXMEDETOMIDINE HYDROCHLORIDE 4 MCG: 100 INJECTION, SOLUTION INTRAVENOUS at 10:06

## 2024-04-22 RX ADMIN — ONDANSETRON 4 MG: 2 INJECTION INTRAMUSCULAR; INTRAVENOUS at 18:20

## 2024-04-22 RX ADMIN — ENOXAPARIN SODIUM 40 MG: 100 INJECTION SUBCUTANEOUS at 08:03

## 2024-04-22 RX ADMIN — INSULIN LISPRO 2 UNITS: 100 INJECTION, SOLUTION INTRAVENOUS; SUBCUTANEOUS at 18:33

## 2024-04-22 RX ADMIN — HYDROMORPHONE HYDROCHLORIDE 0.25 MG: 1 INJECTION, SOLUTION INTRAMUSCULAR; INTRAVENOUS; SUBCUTANEOUS at 10:06

## 2024-04-22 RX ADMIN — DEXAMETHASONE SODIUM PHOSPHATE 4 MG: 4 INJECTION, SOLUTION INTRAMUSCULAR; INTRAVENOUS at 09:37

## 2024-04-22 RX ADMIN — LIDOCAINE HYDROCHLORIDE 80 MG: 20 INJECTION, SOLUTION EPIDURAL; INFILTRATION; INTRACAUDAL; PERINEURAL at 09:29

## 2024-04-22 RX ADMIN — KETOROLAC TROMETHAMINE 15 MG: 30 INJECTION INTRAMUSCULAR; INTRAVENOUS at 19:54

## 2024-04-22 RX ADMIN — INSULIN LISPRO 1 UNITS: 100 INJECTION, SOLUTION INTRAVENOUS; SUBCUTANEOUS at 13:28

## 2024-04-22 ASSESSMENT — PAIN DESCRIPTION - LOCATION
LOCATION: BACK
LOCATION: BACK
LOCATION: ABDOMEN

## 2024-04-22 ASSESSMENT — PAIN - FUNCTIONAL ASSESSMENT
PAIN_FUNCTIONAL_ASSESSMENT: ACTIVITIES ARE NOT PREVENTED
PAIN_FUNCTIONAL_ASSESSMENT: ADULT NONVERBAL PAIN SCALE (NPVS)
PAIN_FUNCTIONAL_ASSESSMENT: NONE - DENIES PAIN

## 2024-04-22 ASSESSMENT — PAIN DESCRIPTION - DESCRIPTORS
DESCRIPTORS: DISCOMFORT
DESCRIPTORS: DISCOMFORT
DESCRIPTORS: PRESSURE

## 2024-04-22 ASSESSMENT — PAIN DESCRIPTION - ONSET: ONSET: GRADUAL

## 2024-04-22 ASSESSMENT — PAIN SCALES - GENERAL
PAINLEVEL_OUTOF10: 6
PAINLEVEL_OUTOF10: 6
PAINLEVEL_OUTOF10: 5

## 2024-04-22 ASSESSMENT — ENCOUNTER SYMPTOMS: SHORTNESS OF BREATH: 0

## 2024-04-22 ASSESSMENT — PAIN DESCRIPTION - PAIN TYPE: TYPE: ACUTE PAIN

## 2024-04-22 ASSESSMENT — PAIN DESCRIPTION - FREQUENCY: FREQUENCY: INTERMITTENT

## 2024-04-22 ASSESSMENT — PAIN DESCRIPTION - ORIENTATION
ORIENTATION: LOWER

## 2024-04-22 NOTE — FLOWSHEET NOTE
RN at bedside for evening assessment. Pt sitting in chair & eating dinner. Pt requesting to get back into bed. RN stand by assisted pt back to bed. Pt tolerated ambulation well. Pt A&O x4. Vitals obtained- BP elevated. Pt states she has hypertension & takes Metoprolol at home. Pt is asymptomatic & states \"feels fine\" @ this time. Pt complains of back pain rating 5/10- Toradol given @1954. Plan of care discussed w/ pt & all questions answered at this time. Bedside table & call light placed within reach.  Instructed to call with any questions, needs, or concerns.

## 2024-04-22 NOTE — OP NOTE
reviewed benefits and ACOG recommendations to remove if able and doesn't alter surgical approach. The patient understood all of these risks and desired to proceed.    OPERATIVE NOTE:   The patient was taken to the operating room and general anesthesia was found to be adequate.  She was prepped and draped in the normal sterile fashion and placed in Vick stirrups.   Preoperative antibiotics were administered in the patient holding area.    TOTAL VAGINAL HYSTERECTOMY: The cervix was grasped with single tooth tenacula.  The bladder was drained with a Saxena catheter.  Circumferential injection was then performed with 1% lidocaine with epinephrine, and an incision was made with a scalpel.  This was taken down using sharp dissection with Meza scissors.  The posterior cul-de-sac was entered sharply and a Heany retractor was placed.  The anterior cul-de-sac was then entered sharply and a Heany retractor placed.  The uterosacral ligaments were then clamped bilaterally and suture ligated using 0 Vicryl.    In a successive manner, the uterine arteries and cardinal ligaments were taken down using LigaSure, good hemostasis was noted.  The ligature was then placed over each cornua and was cauterized and cut.  Uterus and cervix were delivered.   Good hemostasis was noted.  The tubes would not come out of the surgical field and therefore left behind.      VAGINAL VAULT SUSPENSION: Moist laparotomy sponges were then placed in the cul-de-sac.  The uterosacral ligaments were palpated on both sides and Allis clamps were placed on the ligaments.   Two sutures of 0 PDS were placed through each uterosacral ligament and tagged to be later brought through the the vaginal mucosa.  The laparotomy sponges were removed and a preliminary sponge count was correct.    PARAVAGINAL REPAIR:  At this time, the cystocele was grasped using Allis clamps and injected with 1% lidocaine with epinephrine.  A midline incision was then made with a scalpel and

## 2024-04-22 NOTE — ANESTHESIA PRE PROCEDURE
[facility] Department of Anesthesiology  Pre-Anesthesia Evaluation/Consultation       Name:  Heike Means  : 1960  Age:  64 y.o.                                           MRN:  [id number]  Date: 2024           Surgeon: Surgeon(s):  Samir Urena MD    Procedure: Procedure(s):  VAGINAL HYSTERECTOMY, BILATERAL SALPINGECTOMY  VAGINAL VAULT SUSPENSION  ANTERIOR  AND PROSTERIOR REPAIR  CYSTOSCOPY     Allergies   Allergen Reactions   • Hydrocodone Itching     Patient Active Problem List   Diagnosis   • Obesity   • HTN (hypertension)   • Diabetes mellitus, type 2 (HCC)   • Pneumonia due to COVID-19 virus   • MARCI (acute kidney injury) (HCC)   • Acute respiratory failure with hypoxia (HCC)   • Neutropenia (HCC)   • ARDS (adult respiratory distress syndrome) (HCC)   • Flank pain   • Acute kidney injury superimposed on CKD (HCC)   • Acute pyelonephritis   • Ureterolithiasis   • Paroxysmal atrial fibrillation (HCC)   • Complete uterine prolapse   • Cystocele, midline   • Rectocele     Past Medical History:   Diagnosis Date   • Chronic kidney disease     kidney stones   • Hyperlipidemia    • Hypertension    • MDRO (multiple drug resistant organisms) resistance    • MRSA infection 2013    left lower leg   • Right pulmonary embolus (HCC)    • Type II or unspecified type diabetes mellitus without mention of complication, not stated as uncontrolled      Past Surgical History:   Procedure Laterality Date   •  SECTION     • CHOLECYSTECTOMY     • COLONOSCOPY  2018    Dr. Brooks. adenoma polyp, repeat in 5 years   • COLONOSCOPY  2023    dr brooks repeat in 5 years   • CYSTOSCOPY Right 2021    CYSTOSCOPY, RIGHT URETERAL STENT INSERTION,RIGHT RETROGRADE PYLEOGRAM performed by Moraima Dennison MD at [facility] OR   • CYSTOSCOPY Left 2023    CYSTOSCOPY URETERAL STENT INSERTION performed by Brett Lord MD at [facility] OR   • LITHOTRIPSY  2009     Social History     Tobacco

## 2024-04-22 NOTE — PROGRESS NOTES
Vaginal Sweep Documentation     Vaginal prep sponge count performed by Aysha RICO and Valorie PRADO Count correct.   Vaginal sweep performed by Dr. Urena at 1105. No foreign objects or vaginal tears noted.     1 pack of iodoform placed to vaginal vault. Tail of packing taped to thigh.  
    [facility] Pre-Admission Testing Electronic Communication Worksheet for OR/ENDO Procedures        Patient: Heike Means    DOS: 4/22    Transportation Confirmed: [x] YES    []  NO    History and Physical:  [x] YES- per GREENE    []  NO  [] N/A  If yes, please list doctor or Urgent Care and date of H&P:     Additional Clearance(Cardiac, Pulmonary, etc):  [x] YES-cardio      Pre-Admission Testing Visit:  [] YES    [x]  NO If no, do labs/testing need to be done DOS?  [] YES    [x]  NO    Medication Reconciliation Complete:  [x] YES    []  NO        Additional Notes:                Interview Complete: [x] YES    []  NO          Mylene Govea RN  3:07 PM  
 Follow Up Prior to Surgery    DOS:   :1960    Nephro Clearances:  Pt understands to call Dr. Cheikh jolly for clear.  [phone]  She is to call us back with the status of this clear    UPDATE:Dr Sierra has cleared pt with Cardiac clearance.          
Pt arrived to PACU from OR. Pt asleep on 3l/nc. Abd soft. No vaginal bleeding noted. Saxena to gravity draining clear, yellow urine.   
surgery.    C-Difficile admission screening and protocol:       * Admitted with diarrhea?                         [] YES    [x]  NO     *Prior history of C-Diff. In last 3 months? [] YES    [x]  NO     *Antibiotic use in the past 6-8 weeks?      [x]  NO    []  YES                 If yes, which ANTIBIOTIC AND REASON______     *Prior hospitalization or nursing home in the last month? []  YES    [x]  NO        SAFETY FIRST..call before you fall

## 2024-04-22 NOTE — ANESTHESIA POSTPROCEDURE EVALUATION
Department of Anesthesiology  Postprocedure Note    Patient: Heike Means  MRN: [id number]  YOB: 1960  Date of evaluation: 4/22/2024    Procedure Summary       Date: 04/22/24 Room / Location: [address]    Anesthesia Start: 0924 Anesthesia Stop: 1126    Procedures:       VAGINAL HYSTERECTOMY (Bilateral: Abdomen)      VAGINAL VAULT SUSPENSION (Vagina )      ANTERIOR  AND PROSTERIOR REPAIR (Vagina )      CYSTOSCOPY (Bladder) Diagnosis:       Complete uterine prolapse      Cystocele, midline      Rectocele      (Complete uterine prolapse [N81.3])      (Cystocele, midline [N81.11])      (Rectocele [N81.6])    Surgeons: Samir Urena MD Responsible Provider: Chip Hdez MD    Anesthesia Type: general ASA Status: 3            Anesthesia Type: No value filed.    Ramona Phase I: Ramona Score: 9    Ramona Phase II:      Anesthesia Post Evaluation    Patient location during evaluation: PACU  Patient participation: complete - patient participated  Level of consciousness: awake and alert  Pain score: 3  Airway patency: patent  Nausea & Vomiting: no nausea and no vomiting  Cardiovascular status: blood pressure returned to baseline  Respiratory status: acceptable  Hydration status: euvolemic  Pain management: adequate    No notable events documented.

## 2024-04-22 NOTE — PLAN OF CARE
Problem: Chronic Conditions and Co-morbidities  Goal: Patient's chronic conditions and co-morbidity symptoms are monitored and maintained or improved  Outcome: Progressing  Flowsheets (Taken 4/22/2024 5692)  Care Plan - Patient's Chronic Conditions and Co-Morbidity Symptoms are Monitored and Maintained or Improved:   Monitor and assess patient's chronic conditions and comorbid symptoms for stability, deterioration, or improvement   Collaborate with multidisciplinary team to address chronic and comorbid conditions and prevent exacerbation or deterioration   Update acute care plan with appropriate goals if chronic or comorbid symptoms are exacerbated and prevent overall improvement and discharge     Problem: Discharge Planning  Goal: Discharge to home or other facility with appropriate resources  Outcome: Progressing     Problem: Safety - Adult  Goal: Free from fall injury  Outcome: Progressing     Problem: Pain  Goal: Verbalizes/displays adequate comfort level or baseline comfort level  Outcome: Progressing     Problem: ABCDS Injury Assessment  Goal: Absence of physical injury  Outcome: Progressing

## 2024-04-22 NOTE — BRIEF OP NOTE
Brief Postoperative Note      Patient: Heike Means  YOB: 1960  MRN: [id number]    Date of Procedure: 4/22/2024    Pre-Op Diagnosis Codes:     * Complete uterine prolapse [N81.3]     * Cystocele, midline [N81.11]     * Rectocele [N81.6]    Post-Op Diagnosis: Same       Procedure(s):  VAGINAL HYSTERECTOMY  VAGINAL VAULT SUSPENSION  ANTERIOR  AND PROSTERIOR REPAIR  CYSTOSCOPY    Surgeon(s):  Tana Urena MD    Assistant:  Surgical Assistant: Joseph Guajardo Sharon    Anesthesia: General    Estimated Blood Loss (mL): less than 50     Complications: None    Specimens:   ID Type Source Tests Collected by Time Destination   A : A. Uterus, Cervix Tissue Tissue SURGICAL PATHOLOGY Tana Urena MD 4/22/2024 1030        Implants:  * No implants in log *      Drains:   Urinary Catheter 04/22/24 Saxena (Active)       Findings:  Infection Present At Time Of Surgery (PATOS) (choose all levels that have infection present):  No infection present  Other Findings: large uterine prolapse, cystocele, rectocele,     Electronically signed by TANA URENA MD on 4/22/2024 at 11:06 AM

## 2024-04-22 NOTE — H&P
Date of Surgery Update:  Heike Means was seen, history and physical examination reviewed, and patient examined by me today. There have been no significant clinical changes since the completion of the previous history and physical. The surgical site was confirmed by the patient and me.     I have presented reasonable alternatives to the patient's proposed care, treatment, and services. The discussion I have done encompassed risks, benefits, and side effects related to the alternatives and the risks related to not receiving the proposed care, treatment, and services.     All questions answered. Patient wishes to proceed.     Electronically signed by: TANA HAYES MD,4/22/2024,8:55 AM

## 2024-04-22 NOTE — PROGRESS NOTES
Successfully faxed pap smear results at 0919 to [facility] Medical Director (Esperanza Henry). Fax number: [phone].

## 2024-04-22 NOTE — FLOWSHEET NOTE
Patient cleaned up, new pad and mesh panties placed. Patient assisted up to chair, call light within reach. Patient tolerated well and verbalized understanding to call out when she's ready to go back to bed.

## 2024-04-23 ENCOUNTER — TELEPHONE (OUTPATIENT)
Dept: UROGYNECOLOGY | Age: 64
End: 2024-04-23

## 2024-04-23 VITALS
RESPIRATION RATE: 17 BRPM | BODY MASS INDEX: 30.04 KG/M2 | SYSTOLIC BLOOD PRESSURE: 133 MMHG | WEIGHT: 163.25 LBS | DIASTOLIC BLOOD PRESSURE: 72 MMHG | OXYGEN SATURATION: 96 % | TEMPERATURE: 98 F | HEART RATE: 69 BPM | HEIGHT: 62 IN

## 2024-04-23 PROBLEM — N81.6 RECTOCELE: Status: RESOLVED | Noted: 2024-03-25 | Resolved: 2024-04-23

## 2024-04-23 PROBLEM — N81.4 CYSTOCELE WITH UTERINE PROLAPSE: Status: RESOLVED | Noted: 2024-04-22 | Resolved: 2024-04-23

## 2024-04-23 PROBLEM — N81.3 COMPLETE UTERINE PROLAPSE: Status: RESOLVED | Noted: 2024-03-25 | Resolved: 2024-04-23

## 2024-04-23 PROBLEM — N81.11 CYSTOCELE, MIDLINE: Status: RESOLVED | Noted: 2024-03-25 | Resolved: 2024-04-23

## 2024-04-23 LAB
GLUCOSE BLD-MCNC: 165 MG/DL (ref 70–99)
PERFORMED ON: ABNORMAL

## 2024-04-23 PROCEDURE — 6360000002 HC RX W HCPCS: Performed by: OBSTETRICS & GYNECOLOGY

## 2024-04-23 PROCEDURE — 96375 TX/PRO/DX INJ NEW DRUG ADDON: CPT

## 2024-04-23 PROCEDURE — 96374 THER/PROPH/DIAG INJ IV PUSH: CPT

## 2024-04-23 PROCEDURE — 6370000000 HC RX 637 (ALT 250 FOR IP): Performed by: OBSTETRICS & GYNECOLOGY

## 2024-04-23 PROCEDURE — G0378 HOSPITAL OBSERVATION PER HR: HCPCS

## 2024-04-23 RX ORDER — OXYCODONE HYDROCHLORIDE AND ACETAMINOPHEN 5; 325 MG/1; MG/1
1 TABLET ORAL EVERY 6 HOURS PRN
Qty: 20 TABLET | Refills: 0 | Status: SHIPPED | OUTPATIENT
Start: 2024-04-23 | End: 2024-04-28

## 2024-04-23 RX ORDER — POLYETHYLENE GLYCOL 3350 17 G/17G
17 POWDER ORAL DAILY
Qty: 510 G | Refills: 0 | Status: SHIPPED | OUTPATIENT
Start: 2024-04-23 | End: 2024-05-23

## 2024-04-23 RX ADMIN — METOPROLOL SUCCINATE 50 MG: 25 TABLET, FILM COATED, EXTENDED RELEASE ORAL at 07:47

## 2024-04-23 RX ADMIN — GLIPIZIDE 10 MG: 5 TABLET ORAL at 07:47

## 2024-04-23 RX ADMIN — EMPAGLIFLOZIN 10 MG: 10 TABLET, FILM COATED ORAL at 07:47

## 2024-04-23 RX ADMIN — ATORVASTATIN CALCIUM 10 MG: 10 TABLET, FILM COATED ORAL at 07:47

## 2024-04-23 RX ADMIN — KETOROLAC TROMETHAMINE 15 MG: 30 INJECTION INTRAMUSCULAR; INTRAVENOUS at 06:03

## 2024-04-23 RX ADMIN — LOSARTAN POTASSIUM 100 MG: 100 TABLET, FILM COATED ORAL at 07:47

## 2024-04-23 RX ADMIN — HYDROCHLOROTHIAZIDE 12.5 MG: 12.5 CAPSULE ORAL at 07:47

## 2024-04-23 RX ADMIN — HYDROMORPHONE HYDROCHLORIDE 0.5 MG: 1 INJECTION, SOLUTION INTRAMUSCULAR; INTRAVENOUS; SUBCUTANEOUS at 00:00

## 2024-04-23 ASSESSMENT — PAIN DESCRIPTION - LOCATION
LOCATION: BACK

## 2024-04-23 ASSESSMENT — PAIN SCALES - GENERAL
PAINLEVEL_OUTOF10: 6
PAINLEVEL_OUTOF10: 2
PAINLEVEL_OUTOF10: 3

## 2024-04-23 ASSESSMENT — PAIN DESCRIPTION - ONSET
ONSET: ON-GOING
ONSET: ON-GOING

## 2024-04-23 ASSESSMENT — PAIN DESCRIPTION - FREQUENCY
FREQUENCY: INTERMITTENT
FREQUENCY: INTERMITTENT

## 2024-04-23 ASSESSMENT — PAIN - FUNCTIONAL ASSESSMENT
PAIN_FUNCTIONAL_ASSESSMENT: ACTIVITIES ARE NOT PREVENTED

## 2024-04-23 ASSESSMENT — PAIN DESCRIPTION - DESCRIPTORS
DESCRIPTORS: DISCOMFORT
DESCRIPTORS: DISCOMFORT
DESCRIPTORS: SHOOTING;DISCOMFORT

## 2024-04-23 ASSESSMENT — PAIN DESCRIPTION - ORIENTATION
ORIENTATION: LOWER
ORIENTATION: LOWER

## 2024-04-23 ASSESSMENT — PAIN SCALES - WONG BAKER: WONGBAKER_NUMERICALRESPONSE: NO HURT

## 2024-04-23 ASSESSMENT — PAIN DESCRIPTION - PAIN TYPE
TYPE: ACUTE PAIN
TYPE: ACUTE PAIN

## 2024-04-23 NOTE — FLOWSHEET NOTE
Discharge instructions completed and signed by patient.  Patient verbalized understanding of all teaching points.  Patient plans to follow up with OB provider as instructed.  Denies any further questions. Patient discharged via wheelchair at this time.

## 2024-04-23 NOTE — PLAN OF CARE
Problem: Chronic Conditions and Co-morbidities  Goal: Patient's chronic conditions and co-morbidity symptoms are monitored and maintained or improved  Outcome: Completed  Flowsheets  Taken 4/23/2024 0000 by Evie Maldonado, RN  Care Plan - Patient's Chronic Conditions and Co-Morbidity Symptoms are Monitored and Maintained or Improved: Monitor and assess patient's chronic conditions and comorbid symptoms for stability, deterioration, or improvement  Taken 4/22/2024 1925 by Evie Maldonado, RN  Care Plan - Patient's Chronic Conditions and Co-Morbidity Symptoms are Monitored and Maintained or Improved: Monitor and assess patient's chronic conditions and comorbid symptoms for stability, deterioration, or improvement     Problem: Discharge Planning  Goal: Discharge to home or other facility with appropriate resources  4/23/2024 0838 by Karina Alejandro RN  Outcome: Completed  4/23/2024 0510 by Evie Maldonado, RN  Outcome: Progressing  Flowsheets  Taken 4/23/2024 0000  Discharge to home or other facility with appropriate resources: Identify barriers to discharge with patient and caregiver  Taken 4/22/2024 1925  Discharge to home or other facility with appropriate resources: Identify barriers to discharge with patient and caregiver     Problem: Safety - Adult  Goal: Free from fall injury  Outcome: Completed     Problem: Pain  Goal: Verbalizes/displays adequate comfort level or baseline comfort level  Outcome: Completed     Problem: ABCDS Injury Assessment  Goal: Absence of physical injury  Outcome: Completed

## 2024-04-23 NOTE — FLOWSHEET NOTE
Bedside report received from SERGIO Arnold RN. This RN to assume care. Plan of care discussed w/ pt & all questions answered at this time. Whiteboard updated. Call light and bedside table placed within reach. Instructed to call with any questions, needs, or concerns.

## 2024-04-23 NOTE — TELEPHONE ENCOUNTER
[facility] called office and informed us that patient's surgery has now been approved.   Authorization Number: [id number]       Called patient and informed her of this. Patient thankful.

## 2024-04-23 NOTE — TELEPHONE ENCOUNTER
Discussed post op restrictions, bowel meds, as well as bleeding, signs of infection, etc. Patient verbalizes understanding of all of the above, and has no further questions or concerns at this time. Encouraged to call back the office should any questions/concerns arise. 4/23/2024 at 11:05 AM.

## 2024-04-23 NOTE — DISCHARGE SUMMARY
as: Percocet  Take 1 tablet by mouth every 6 hours as needed for Pain for up to 5 days. Max Daily Amount: 4 tablets     polyethylene glycol 17 GM/SCOOP Powd powder  Commonly known as: MIRALAX  Take 17 g by mouth daily            ASK your doctor about these medications      apixaban 5 MG Tabs tablet  Commonly known as: ELIQUIS  Take 1 tablet by mouth 2 times daily     atorvastatin 10 MG tablet  Commonly known as: LIPITOR  TAKE 1 TABLET BY MOUTH EVERY DAY     Biotin 10 MG Caps     citric acid-potassium citrate 1100-334 MG/5ML solution  Commonly known as: POLYCITRA     empagliflozin 10 MG tablet  Commonly known as: Jardiance  Take 1 tablet by mouth daily     glipiZIDE 10 MG tablet  Commonly known as: GLUCOTROL  Take 1 tablet by mouth 2 times daily (before meals)     hydroCHLOROthiazide 12.5 MG capsule  TAKE 1 CAPSULE BY MOUTH EVERY DAY IN THE MORNING     Kerendia 10 MG Tabs  Generic drug: Finerenone     losartan 100 MG tablet  Commonly known as: COZAAR  TAKE 1 TABLET BY MOUTH EVERY DAY     metoprolol succinate 50 MG extended release tablet  Commonly known as: TOPROL XL  TAKE 1 TABLET BY MOUTH EVERY DAY     Multiple Vitamin Caps     vitamin B-12 100 MCG tablet  Commonly known as: CYANOCOBALAMIN  One tablet twice a week     vitamin C 500 MG tablet  Commonly known as: ASCORBIC ACID     VITAMIN D-3 PO               Where to Get Your Medications        These medications were sent to [facility] [address] - P [phone] - F [phone]  [address] [facility] [address]      Phone: [phone]   oxyCODONE-acetaminophen 5-325 MG per tablet  polyethylene glycol 17 GM/SCOOP Powd powder         Future Appointments   Date Time Provider Department Center   5/9/2024 11:00 AM Dolly France APRN - CNP [place] [facility]   7/16/2024  1:00 PM Baltazar Moon MD [place] [facility]   8/1/2024 10:00 AM Dima Sierra MD [place]  [place] - [place]      No follow-ups on

## 2024-04-23 NOTE — FLOWSHEET NOTE
Per [facility] by . Place inpatient consult for hospitalist due to BP problems   Inpatient Hospitalist consult order placed @ this time.

## 2024-04-23 NOTE — DISCHARGE INSTRUCTIONS
[facility]  [address]  [place], [place] [address] [phone]    Dr. Samir Urena MD  [address]  [address]  [place]  [address]  Phone [phone]  Fax: [phone]      PATIENT NAME: Heike Means  MEDICAL RECORD NUMBER:  [id number]  TODAY'S DATE: 4/23/2024       Discharge Instructions: Procedure(s):  VAGINAL HYSTERECTOMY: 60558 (CPT®)  VAGINAL VAULT SUSPENSION: 84627 (CPT®)  ANTERIOR  AND PROSTERIOR REPAIR: 20227 (CPT®)  CYSTOSCOPY: 78639 (CPT®)       Activity -   Avoid strenuous physical activity and do not lift anything greater than 10 pounds (about the size of a gallon of milk) until your 6-week post-operative visit. This includes: no pushing and pulling as well as lifting (ie - vacuum). You should not drive for two weeks or until you are no longer taking narcotic medications (Percocet, Vicodin, etc). You should speak to your doctor about other limitations depending on what surgery you had done.  You can:  Walk up/down steps   Go for walks   Ride as a passenger in the car   A FEW THINGS TO REMEMBER:  You should discuss with your doctor when to return to work.   Avoid sexual intercourse until your six-week post-operative check-up.   Do not insert anything into the vagina or rectum for 6 weeks including tampons or suppositories   No pools or hot tubs for 6 weeks   It is normal to have a vaginal discharge with blood for up to 6 weeks after surgery. The amount of discharge should gradually decrease with time.     Diet -   To decrease post-operative nausea, try to eat small frequent meals. Eat foods high in protein such as meat, fish, eggs, and dairy products. Eat foods with vitamin C such as citrus fruits. Consider taking a single multivitamin, which you can buy at any pharmacy. Drink lots of fluids.    Bowels -  You will want to have a regular, soft daily bowel movement. Upon discharge from the hospital you will be given prescriptions for Colace to be

## 2024-04-23 NOTE — FLOWSHEET NOTE
RN at bedside. One piece of vaginal packing removed. Pt tolerated well. Saxena cath emptied at this time 650 mL of clear yellow urine.   Plan of care discussed for this morning. Call light and bedside table placed within reach. Instructed to call with any questions, needs, or concerns.

## 2024-04-23 NOTE — PLAN OF CARE
Problem: Discharge Planning  Goal: Discharge to home or other facility with appropriate resources  4/23/2024 0510 by Evie Maldonado, RN  Outcome: Progressing  Flowsheets  Taken 4/23/2024 0000  Discharge to home or other facility with appropriate resources: Identify barriers to discharge with patient and caregiver  Taken 4/22/2024 1925  Discharge to home or other facility with appropriate resources: Identify barriers to discharge with patient and caregiver

## 2024-04-24 ENCOUNTER — TELEPHONE (OUTPATIENT)
Dept: UROGYNECOLOGY | Age: 64
End: 2024-04-24

## 2024-04-24 ENCOUNTER — OFFICE VISIT (OUTPATIENT)
Dept: UROGYNECOLOGY | Age: 64
End: 2024-04-24

## 2024-04-24 VITALS
DIASTOLIC BLOOD PRESSURE: 96 MMHG | TEMPERATURE: 97.6 F | SYSTOLIC BLOOD PRESSURE: 161 MMHG | HEART RATE: 61 BPM | RESPIRATION RATE: 16 BRPM | OXYGEN SATURATION: 100 %

## 2024-04-24 DIAGNOSIS — R33.9 URINARY RETENTION: Primary | ICD-10-CM

## 2024-04-24 PROCEDURE — 99024 POSTOP FOLLOW-UP VISIT: CPT | Performed by: OBSTETRICS & GYNECOLOGY

## 2024-04-24 NOTE — PROGRESS NOTES
SUBJECTIVE: 64 y.o. patient complains of urinary frequency, urgency and dysuria for 2 days. Patient had a VAGINAL HYSTERECTOMY, VAGINAL VAULT SUSPENSION, ANTERIOR,  POSTERIOR REPAIR, and CYSTO on 4/22/24. Urine dip shows positive for glucose.     OBJECTIVE: BP (!) 161/96   Pulse 61   Temp 97.6 °F (36.4 °C)   Resp 16   SpO2 100% . Patient complaints of pain to lower abdomen and claudia area.     ASSESSMENT: Symptoms are consistent urinary retention.     PLAN: Patient currently does not have a urinary catheter in place. 850 ml of urine were drained when catheter was placed. Prior to this event voiding symptoms consisted of slow stream, intermittency, urgency, sense of residual urine, urge incontinence, dysuria. Patient was educated to empty her bladder through the catheter when she develops the urge to urinate. She will need to remove the catheter from the securement device, and she will point catheter down into the toilet. She will remove the blue cap and let the urine drain out through the catheter. Then, she will place cap back on catheter and place catheter back into securement device. Patient verbalized understanding to all instructions. She will follow up in 1 week with Dolly France NP. MD made aware of all nursing interventions.

## 2024-04-24 NOTE — TELEPHONE ENCOUNTER
Spoke with patient over the phone. She states she has been unable to control her bladder since surgery. She reports having an urge and not making it to the restroom, but sometimes does not have an urge.     Advised patient she is only two days post op and loss of bladder control can occur during this period. However, we will mention this to Dr. Urena once in the office this afternoon and return her call thereafter.     Patient verbalized understanding. Denies any further questions or concerns at this time.

## 2024-04-24 NOTE — TELEPHONE ENCOUNTER
Spoke with patient over the phone and advised her to come to the office today for catheter placement per Samir Urena MD. Patient is agreeable. Electronically signed by Lauryn Drake RN on 4/24/2024 at 3:52 PM

## 2024-04-25 ENCOUNTER — TELEPHONE (OUTPATIENT)
Dept: FAMILY MEDICINE CLINIC | Age: 64
End: 2024-04-25

## 2024-04-25 NOTE — TELEPHONE ENCOUNTER
Care Transitions Initial Follow Up Call    Outreach made within 2 business days of discharge: Yes    Patient: Heike Means Patient : 1960   MRN: [id number]  Reason for Admission: There are no discharge diagnoses documented for the most recent discharge.  Discharge Date: 24       Spoke with: Heike    Discharge department/facility: [facility] Interactive Patient Contact:  Was patient able to fill all prescriptions: Yes  Was patient instructed to bring all medications to the follow-up visit: Yes  Is patient taking all medications as directed in the discharge summary? Yes  Does patient understand their discharge instructions: Yes  Does patient have questions or concerns that need addressed prior to 7-14 day follow up office visit: no    Heike declined scheduling appointment with PCP, she states she will follow up with the SURGEON.     Follow Up  Future Appointments   Date Time Provider Department Center   2024 11:30 AM Dolly France APRN - CNP [place] [facility]   2024 11:00 AM Dolly France APRN - CNP [place] [facility]   2024  1:00 PM [facility]   2024 10:00 AM Dima Sierra MD [place] [facility] [place] - [place] Samantha Laker, MA

## 2024-04-30 ENCOUNTER — NURSE ONLY (OUTPATIENT)
Dept: UROGYNECOLOGY | Age: 64
End: 2024-04-30
Payer: COMMERCIAL

## 2024-04-30 VITALS
TEMPERATURE: 97.9 F | OXYGEN SATURATION: 96 % | RESPIRATION RATE: 16 BRPM | DIASTOLIC BLOOD PRESSURE: 87 MMHG | SYSTOLIC BLOOD PRESSURE: 138 MMHG

## 2024-04-30 DIAGNOSIS — Z09 POSTOP CHECK: ICD-10-CM

## 2024-04-30 DIAGNOSIS — R35.0 URINARY FREQUENCY: Primary | ICD-10-CM

## 2024-04-30 LAB
BILIRUBIN, POC: NORMAL
BLOOD URINE, POC: NORMAL
CLARITY, POC: NORMAL
COLOR, POC: YELLOW
GLUCOSE URINE, POC: NORMAL
KETONES, POC: NORMAL
LEUKOCYTE EST, POC: NORMAL
NITRITE, POC: NORMAL
PH, POC: 6.5
PROTEIN, POC: NORMAL
SPECIFIC GRAVITY, POC: 1.01
UROBILINOGEN, POC: NORMAL

## 2024-04-30 PROCEDURE — 99024 POSTOP FOLLOW-UP VISIT: CPT | Performed by: NURSE PRACTITIONER

## 2024-04-30 PROCEDURE — 81002 URINALYSIS NONAUTO W/O SCOPE: CPT | Performed by: NURSE PRACTITIONER

## 2024-04-30 NOTE — PROGRESS NOTES
2024       HPI:     Name: Heike Means  YOB: 1960    CC: Heike Means is a 64 y.o. female who is here for a 1 week post op evaluation.  HPI: Recently underwent VAGINAL HYSTERECTOMY, VAGINAL VAULT SUSPENSION, ANTERIOR  AND POSTERIOR REPAIR, CYSTOSCOPY on 24.      POST OP VOIDING TRIAL  Urinary roy in place upon arrival.  Per Dolly France, NP-Bladder emptied via roy 20 ml cloudy urine. 300 ml sterile water indwelled into bladder via roy per sterile procedure. Roy removed. Patient had time to empty bladder. Post Roy removal void 200 ml.  Patient denies bladder or urethra pain/symptoms at this time.     Pain - Patient reports pain to vulva and rectum.    Bowel Movement- Patient reports she is having bowel movements everyday.      Results for POC orders placed in visit on 24   POCT Urinalysis no Micro   Result Value Ref Range    Color, UA yellow     Clarity, UA cloudy     Glucose, UA POC pos     Bilirubin, UA neg     Ketones, UA neg     Spec Grav, UA 1.015     Blood, UA POC pos     pH, UA 6.5     Protein, UA POC neg     Urobilinogen, UA neg     Leukocytes, UA pos     Nitrite, UA pos           Past Medical History:   Past Medical History:   Diagnosis Date    Chronic kidney disease     kidney stones    Hyperlipidemia     Hypertension     MDRO (multiple drug resistant organisms) resistance     MRSA infection     left lower leg    Right pulmonary embolus (HCC)     Type II or unspecified type diabetes mellitus without mention of complication, not stated as uncontrolled      Past Surgical History:   Past Surgical History:   Procedure Laterality Date     SECTION      CHOLECYSTECTOMY      COLONOSCOPY  2018    Dr. Brooks. adenoma polyp, repeat in 5 years    COLONOSCOPY  2023    dr brooks repeat in 5 years    CYSTOSCOPY Right 2021    CYSTOSCOPY, RIGHT URETERAL STENT INSERTION,RIGHT RETROGRADE PYLEOGRAM performed by Moraima Dennison,

## 2024-05-02 DIAGNOSIS — N39.0 ACUTE UTI: Primary | ICD-10-CM

## 2024-05-02 LAB
BACTERIA UR CULT: ABNORMAL
ORGANISM: ABNORMAL

## 2024-05-02 RX ORDER — SULFAMETHOXAZOLE AND TRIMETHOPRIM 800; 160 MG/1; MG/1
1 TABLET ORAL 2 TIMES DAILY
Qty: 14 TABLET | Refills: 0 | Status: SHIPPED | OUTPATIENT
Start: 2024-05-02 | End: 2024-05-09

## 2024-05-02 NOTE — RESULT ENCOUNTER NOTE
Called patient and notified her of positive culture and need for treatment - she verbalized understanding.

## 2024-05-04 RX ORDER — EMPAGLIFLOZIN 10 MG/1
10 TABLET, FILM COATED ORAL DAILY
Qty: 90 TABLET | Refills: 1 | Status: SHIPPED | OUTPATIENT
Start: 2024-05-04

## 2024-05-04 RX ORDER — LOSARTAN POTASSIUM 100 MG/1
TABLET ORAL
Qty: 90 TABLET | Refills: 1 | Status: SHIPPED | OUTPATIENT
Start: 2024-05-04

## 2024-05-14 ENCOUNTER — OFFICE VISIT (OUTPATIENT)
Dept: UROGYNECOLOGY | Age: 64
End: 2024-05-14

## 2024-05-14 VITALS
RESPIRATION RATE: 16 BRPM | TEMPERATURE: 97.8 F | HEART RATE: 68 BPM | OXYGEN SATURATION: 94 % | SYSTOLIC BLOOD PRESSURE: 122 MMHG | DIASTOLIC BLOOD PRESSURE: 83 MMHG

## 2024-05-14 DIAGNOSIS — Z09 POSTOP CHECK: Primary | ICD-10-CM

## 2024-05-14 PROCEDURE — 99024 POSTOP FOLLOW-UP VISIT: CPT | Performed by: NURSE PRACTITIONER

## 2024-05-14 RX ORDER — POTASSIUM CITRATE 15 MEQ/1
1 TABLET, EXTENDED RELEASE ORAL 2 TIMES DAILY
COMMUNITY
Start: 2024-05-13

## 2024-05-14 NOTE — PROGRESS NOTES
follow up in 4 weeks .     No orders of the defined types were placed in this encounter.    No orders of the defined types were placed in this encounter.      Dolly France, APRN - CNP

## 2024-06-11 ENCOUNTER — OFFICE VISIT (OUTPATIENT)
Dept: UROGYNECOLOGY | Age: 64
End: 2024-06-11

## 2024-06-11 VITALS
RESPIRATION RATE: 16 BRPM | HEART RATE: 65 BPM | OXYGEN SATURATION: 99 % | SYSTOLIC BLOOD PRESSURE: 129 MMHG | TEMPERATURE: 97.7 F | DIASTOLIC BLOOD PRESSURE: 80 MMHG

## 2024-06-11 DIAGNOSIS — R35.0 URINARY FREQUENCY: ICD-10-CM

## 2024-06-11 DIAGNOSIS — R39.15 URINARY URGENCY: ICD-10-CM

## 2024-06-11 DIAGNOSIS — Z09 POSTOP CHECK: Primary | ICD-10-CM

## 2024-06-11 DIAGNOSIS — N39.41 URGE INCONTINENCE: ICD-10-CM

## 2024-06-11 PROCEDURE — 99024 POSTOP FOLLOW-UP VISIT: CPT | Performed by: NURSE PRACTITIONER

## 2024-06-11 RX ORDER — OXYBUTYNIN CHLORIDE 5 MG/1
5 TABLET, EXTENDED RELEASE ORAL DAILY
Qty: 30 TABLET | Refills: 2 | Status: SHIPPED | OUTPATIENT
Start: 2024-06-11

## 2024-06-11 NOTE — PROGRESS NOTES
Patient is doing well 6 weeks.  Restrictions reviewed  UUI:  Diagnosis and pathophysiology of urge incontinence reviewed with patient.  A handout on urge incontinence was provided to the patient.    The patient was counseled on the risk/benefits and side effects of anticholinergics including dry mouth, constipation, and the risk of falling.    She denies narrow angle glaucoma.  Efficacy expectations reviewed.  Counseled on dietary modifications including limiting coffee, tea, soda, spicy and acidic food items.   Advised of role for PFPT.   -Begin oxybutynin 5 mg XR.  Reviewed r/b, se and efficacy expectations   -Referral to PFPT    F/U in 6 weeks  JEANNIE Mckeon CNP    Orders Placed This Encounter   Procedures    Ambulatory referral to Physical Therapy     Referral Priority:   Routine     Referral Type:   Physical Therapy     Referral Reason:   Specialty Services Required     Referred to Provider:   Lynette Ferrer, PT     Number of Visits Requested:   1     Orders Placed This Encounter   Medications    oxyBUTYnin (DITROPAN XL) 5 MG extended release tablet     Sig: Take 1 tablet by mouth daily     Dispense:  30 tablet     Refill:  2       JEANNIE Mckeon CNP

## 2024-06-15 DIAGNOSIS — I10 HYPERTENSION, UNSPECIFIED TYPE: ICD-10-CM

## 2024-06-17 RX ORDER — METOPROLOL SUCCINATE 50 MG/1
50 TABLET, EXTENDED RELEASE ORAL DAILY
Qty: 90 TABLET | Refills: 1 | Status: SHIPPED | OUTPATIENT
Start: 2024-06-17

## 2024-06-17 RX ORDER — HYDROCHLOROTHIAZIDE 12.5 MG/1
CAPSULE, GELATIN COATED ORAL
Qty: 90 CAPSULE | Refills: 1 | Status: SHIPPED | OUTPATIENT
Start: 2024-06-17

## 2024-06-20 ENCOUNTER — OFFICE VISIT (OUTPATIENT)
Dept: FAMILY MEDICINE CLINIC | Age: 64
End: 2024-06-20
Payer: COMMERCIAL

## 2024-06-20 VITALS
WEIGHT: 165.6 LBS | OXYGEN SATURATION: 97 % | HEIGHT: 62 IN | TEMPERATURE: 98.1 F | BODY MASS INDEX: 30.47 KG/M2 | SYSTOLIC BLOOD PRESSURE: 110 MMHG | HEART RATE: 85 BPM | DIASTOLIC BLOOD PRESSURE: 62 MMHG

## 2024-06-20 DIAGNOSIS — J03.80 ACUTE BACTERIAL TONSILLITIS: Primary | ICD-10-CM

## 2024-06-20 DIAGNOSIS — B96.89 ACUTE BACTERIAL TONSILLITIS: Primary | ICD-10-CM

## 2024-06-20 PROCEDURE — G8417 CALC BMI ABV UP PARAM F/U: HCPCS | Performed by: NURSE PRACTITIONER

## 2024-06-20 PROCEDURE — 99213 OFFICE O/P EST LOW 20 MIN: CPT | Performed by: NURSE PRACTITIONER

## 2024-06-20 PROCEDURE — 1036F TOBACCO NON-USER: CPT | Performed by: NURSE PRACTITIONER

## 2024-06-20 PROCEDURE — 3078F DIAST BP <80 MM HG: CPT | Performed by: NURSE PRACTITIONER

## 2024-06-20 PROCEDURE — 3017F COLORECTAL CA SCREEN DOC REV: CPT | Performed by: NURSE PRACTITIONER

## 2024-06-20 PROCEDURE — G8427 DOCREV CUR MEDS BY ELIG CLIN: HCPCS | Performed by: NURSE PRACTITIONER

## 2024-06-20 PROCEDURE — 3074F SYST BP LT 130 MM HG: CPT | Performed by: NURSE PRACTITIONER

## 2024-06-20 RX ORDER — METHYLPREDNISOLONE 4 MG/1
TABLET ORAL
Qty: 1 KIT | Refills: 0 | Status: SHIPPED | OUTPATIENT
Start: 2024-06-20 | End: 2024-06-26

## 2024-06-20 RX ORDER — AMOXICILLIN 500 MG/1
500 CAPSULE ORAL 2 TIMES DAILY
Qty: 14 CAPSULE | Refills: 0 | Status: SHIPPED | OUTPATIENT
Start: 2024-06-20 | End: 2024-06-27

## 2024-06-20 ASSESSMENT — ENCOUNTER SYMPTOMS
VOICE CHANGE: 1
ABDOMINAL PAIN: 0
SORE THROAT: 1
TROUBLE SWALLOWING: 1
COUGH: 1

## 2024-06-20 NOTE — PROGRESS NOTES
Heike Means (:  1960) is a 64 y.o. female,Established patient, here for evaluation of the following chief complaint(s):  Pharyngitis (Past 10 days has had a sore throat and swollen tonsils. Hurts to swallow. Gargling with salt water and ibuprofin.Helps short term. No sinus problems or drainage. Developed a wet cough at night about 3 days ago. Had a fever when it first started.  Went to [facility] last week and Covid/Strept was negative. )      Assessment & Plan   ASSESSMENT/PLAN:  1. Acute bacterial tonsillitis  New. Meds as prescribed, follow pt education included in AVS.  - amoxicillin (AMOXIL) 500 MG capsule; Take 1 capsule by mouth 2 times daily for 7 days  Dispense: 14 capsule; Refill: 0  - methylPREDNISolone (MEDROL DOSEPACK) 4 MG tablet; Take by mouth.  Dispense: 1 kit; Refill: 0       Return if symptoms worsen or fail to improve.         Subjective   SUBJECTIVE/OBJECTIVE:  HPI  Chief Complaint   Patient presents with    Pharyngitis     Past 10 days has had a sore throat and swollen tonsils. Hurts to swallow. Gargling with salt water and ibuprofin.Helps short term. No sinus problems or drainage. Developed a wet cough at night about 3 days ago. Had a fever when it first started.  Went to [facility] last week and Covid/Strept was negative.    States ran a fever one night. State her throat is worsening, cough is not horrible. Pain and difficulty swallowing. Swallowing saliva without difficulty. PO intake is good.      Current Outpatient Medications   Medication Sig Dispense Refill    amoxicillin (AMOXIL) 500 MG capsule Take 1 capsule by mouth 2 times daily for 7 days 14 capsule 0    methylPREDNISolone (MEDROL DOSEPACK) 4 MG tablet Take by mouth. 1 kit 0    metoprolol succinate (TOPROL XL) 50 MG extended release tablet TAKE 1 TABLET BY MOUTH EVERY DAY 90 tablet 1    hydroCHLOROthiazide 12.5 MG capsule TAKE 1 CAPSULE BY MOUTH EVERY DAY IN THE MORNING 90 capsule 1    oxyBUTYnin (DITROPAN XL) 5

## 2024-07-02 ENCOUNTER — OFFICE VISIT (OUTPATIENT)
Dept: FAMILY MEDICINE CLINIC | Age: 64
End: 2024-07-02
Payer: COMMERCIAL

## 2024-07-02 VITALS
DIASTOLIC BLOOD PRESSURE: 74 MMHG | SYSTOLIC BLOOD PRESSURE: 110 MMHG | BODY MASS INDEX: 30.18 KG/M2 | HEIGHT: 62 IN | TEMPERATURE: 98.1 F | WEIGHT: 164 LBS

## 2024-07-02 DIAGNOSIS — J01.90 ACUTE BACTERIAL SINUSITIS: Primary | ICD-10-CM

## 2024-07-02 DIAGNOSIS — B96.89 ACUTE BACTERIAL SINUSITIS: Primary | ICD-10-CM

## 2024-07-02 PROCEDURE — 1036F TOBACCO NON-USER: CPT

## 2024-07-02 PROCEDURE — 99213 OFFICE O/P EST LOW 20 MIN: CPT

## 2024-07-02 PROCEDURE — 3017F COLORECTAL CA SCREEN DOC REV: CPT

## 2024-07-02 PROCEDURE — 3078F DIAST BP <80 MM HG: CPT

## 2024-07-02 PROCEDURE — G8427 DOCREV CUR MEDS BY ELIG CLIN: HCPCS

## 2024-07-02 PROCEDURE — 3074F SYST BP LT 130 MM HG: CPT

## 2024-07-02 PROCEDURE — G8417 CALC BMI ABV UP PARAM F/U: HCPCS

## 2024-07-02 RX ORDER — CEFUROXIME AXETIL 250 MG/1
250 TABLET ORAL 2 TIMES DAILY
Qty: 14 TABLET | Refills: 0 | Status: SHIPPED | OUTPATIENT
Start: 2024-07-02 | End: 2024-07-09

## 2024-07-02 RX ORDER — BENZONATATE 200 MG/1
200 CAPSULE ORAL 3 TIMES DAILY PRN
Qty: 30 CAPSULE | Refills: 0 | Status: SHIPPED | OUTPATIENT
Start: 2024-07-02 | End: 2024-07-12

## 2024-07-02 ASSESSMENT — ENCOUNTER SYMPTOMS
SINUS PRESSURE: 0
BACK PAIN: 0
NAUSEA: 0
SORE THROAT: 1
BLOOD IN STOOL: 0
APNEA: 0
SHORTNESS OF BREATH: 0
DIARRHEA: 0
COLOR CHANGE: 0
CONSTIPATION: 0
TROUBLE SWALLOWING: 0
VOMITING: 0
COUGH: 1
ABDOMINAL PAIN: 0
WHEEZING: 0

## 2024-07-02 NOTE — PATIENT INSTRUCTIONS
Thank you for choosing [facility].    Please bring a current list of medications to every appointment.    Please contact your pharmacy for any prescription refill(s) that you are requesting.

## 2024-07-02 NOTE — ASSESSMENT & PLAN NOTE
Patient acutely ill in office, continuing to have cold symptoms. Will treat with ceftin BID 7 days, tessalon for cough. Continue otc treatments.

## 2024-07-02 NOTE — PROGRESS NOTES
nervous/anxious.        Vitals:    07/02/24 1151   BP: 110/74   Site: Left Upper Arm   Position: Sitting   Cuff Size: Medium Adult   Temp: 98.1 °F (36.7 °C)   TempSrc: Temporal   Weight: 74.4 kg (164 lb)   Height: 1.575 m (5' 2.01\")       Physical Exam  Vitals reviewed.   Constitutional:       Appearance: Normal appearance. She is ill-appearing.   HENT:      Head: Normocephalic.      Right Ear: Tympanic membrane, ear canal and external ear normal.      Left Ear: Tympanic membrane, ear canal and external ear normal.      Nose: Congestion present.      Mouth/Throat:      Mouth: Mucous membranes are moist.      Pharynx: No oropharyngeal exudate or posterior oropharyngeal erythema.   Eyes:      Pupils: Pupils are equal, round, and reactive to light.   Cardiovascular:      Rate and Rhythm: Normal rate and regular rhythm.      Heart sounds: Normal heart sounds.   Pulmonary:      Effort: Pulmonary effort is normal. No respiratory distress.      Breath sounds: Normal breath sounds. No wheezing or rhonchi.   Abdominal:      General: Abdomen is flat.   Musculoskeletal:         General: No swelling. Normal range of motion.      Cervical back: Normal range of motion. Tenderness present.   Lymphadenopathy:      Cervical: Cervical adenopathy present.   Skin:     General: Skin is warm.      Coloration: Skin is not jaundiced.      Findings: No rash.   Neurological:      General: No focal deficit present.      Mental Status: She is alert and oriented to person, place, and time.   Psychiatric:         Mood and Affect: Mood normal.         Behavior: Behavior is cooperative.         Thought Content: Thought content normal.         Judgment: Judgment normal.                 An electronic signature was used to authenticate this note.    --JEANNIE Jiménez NP

## 2024-07-05 RX ORDER — OXYBUTYNIN CHLORIDE 5 MG/1
5 TABLET, EXTENDED RELEASE ORAL DAILY
Qty: 90 TABLET | Refills: 1 | OUTPATIENT
Start: 2024-07-05

## 2024-07-11 NOTE — PLAN OF CARE
for scheduled/recommended follow up visits, this note will serve as a discharge from care along with the most recent update on progress.

## 2024-07-15 NOTE — PROGRESS NOTES
[facility]  Cardiology Progress Note    Heike Means  1960 64 y.o.     CC:  A-fib with RVR         Dima Sierra MD      HPI:   Meghan is a 64 y.o. patient with history of kidney stone.  She was admitted to [facility] with nausea, flank pain and abdominal bloating.  She was found to have a large kidney stone.  She subsequently underwent ureteric stent placement.  Her admission was complicated by hypotension sepsis and lactic acidosis.  During that time the patient went to A-fib with RVR which responded to amiodarone drip.  She converted to sinus rhythm.  She has history of diabetes and hypertension.  An echocardiogram performed after her discharge shows normal LV size and function with no structural heart disease.  She is presently on Eliquis for a [id number] score of 2      Past Medical History:   Diagnosis Date    Chronic kidney disease     kidney stones    Hyperlipidemia     Hypertension     MDRO (multiple drug resistant organisms) resistance     MRSA infection     left lower leg    Right pulmonary embolus (HCC)     Type 2 diabetes mellitus without complication (HCC)     Type II or unspecified type diabetes mellitus without mention of complication, not stated as uncontrolled       Past Surgical History:   Procedure Laterality Date     SECTION      CHOLECYSTECTOMY      COLONOSCOPY  2018    Dr. Brooks. adenoma polyp, repeat in 5 years    COLONOSCOPY  2023    dr brooks repeat in 5 years    CYSTOSCOPY Right 2021    CYSTOSCOPY, RIGHT URETERAL STENT INSERTION,RIGHT RETROGRADE PYLEOGRAM performed by Moraima Dennison MD at [facility] OR    CYSTOSCOPY Left 2023    CYSTOSCOPY URETERAL STENT INSERTION performed by Brett Lord MD at [facility] OR    CYSTOSCOPY N/A 2024    CYSTOSCOPY performed by Samir Urena MD at [facility] OR    HYSTERECTOMY (CERVIX STATUS UNKNOWN) Bilateral 2024    VAGINAL HYSTERECTOMY performed by Samir Urena

## 2024-07-16 ENCOUNTER — OFFICE VISIT (OUTPATIENT)
Dept: CARDIOLOGY CLINIC | Age: 64
End: 2024-07-16
Payer: COMMERCIAL

## 2024-07-16 VITALS
WEIGHT: 164 LBS | HEART RATE: 76 BPM | SYSTOLIC BLOOD PRESSURE: 110 MMHG | BODY MASS INDEX: 30.18 KG/M2 | HEIGHT: 62 IN | OXYGEN SATURATION: 94 % | DIASTOLIC BLOOD PRESSURE: 72 MMHG

## 2024-07-16 DIAGNOSIS — I48.0 PAROXYSMAL ATRIAL FIBRILLATION (HCC): Primary | ICD-10-CM

## 2024-07-16 PROCEDURE — 3078F DIAST BP <80 MM HG: CPT | Performed by: INTERNAL MEDICINE

## 2024-07-16 PROCEDURE — 99213 OFFICE O/P EST LOW 20 MIN: CPT | Performed by: INTERNAL MEDICINE

## 2024-07-16 PROCEDURE — G8417 CALC BMI ABV UP PARAM F/U: HCPCS | Performed by: INTERNAL MEDICINE

## 2024-07-16 PROCEDURE — 1036F TOBACCO NON-USER: CPT | Performed by: INTERNAL MEDICINE

## 2024-07-16 PROCEDURE — 93000 ELECTROCARDIOGRAM COMPLETE: CPT | Performed by: INTERNAL MEDICINE

## 2024-07-16 PROCEDURE — 3017F COLORECTAL CA SCREEN DOC REV: CPT | Performed by: INTERNAL MEDICINE

## 2024-07-16 PROCEDURE — G8427 DOCREV CUR MEDS BY ELIG CLIN: HCPCS | Performed by: INTERNAL MEDICINE

## 2024-07-16 PROCEDURE — 3074F SYST BP LT 130 MM HG: CPT | Performed by: INTERNAL MEDICINE

## 2024-07-17 RX ORDER — GLIPIZIDE 10 MG/1
10 TABLET ORAL
Qty: 180 TABLET | Refills: 1 | Status: SHIPPED | OUTPATIENT
Start: 2024-07-17

## 2024-07-17 RX ORDER — CLOTRIMAZOLE AND BETAMETHASONE DIPROPIONATE 10; .64 MG/G; MG/G
CREAM TOPICAL
Qty: 45 G | Refills: 0 | Status: SHIPPED | OUTPATIENT
Start: 2024-07-17

## 2024-07-17 RX ORDER — ATORVASTATIN CALCIUM 10 MG/1
TABLET, FILM COATED ORAL
Qty: 90 TABLET | Refills: 1 | Status: SHIPPED | OUTPATIENT
Start: 2024-07-17

## 2024-07-23 ENCOUNTER — OFFICE VISIT (OUTPATIENT)
Dept: UROGYNECOLOGY | Age: 64
End: 2024-07-23
Payer: COMMERCIAL

## 2024-07-23 VITALS
RESPIRATION RATE: 15 BRPM | OXYGEN SATURATION: 95 % | HEART RATE: 80 BPM | SYSTOLIC BLOOD PRESSURE: 124 MMHG | DIASTOLIC BLOOD PRESSURE: 83 MMHG

## 2024-07-23 DIAGNOSIS — Z09 POSTOP CHECK: Primary | ICD-10-CM

## 2024-07-23 DIAGNOSIS — N39.3 STRESS INCONTINENCE: ICD-10-CM

## 2024-07-23 LAB
BILIRUBIN, POC: NORMAL
BLOOD URINE, POC: NORMAL
CLARITY, POC: CLEAR
COLOR, POC: YELLOW
EMPTY COUGH STRESS TEST: NORMAL
FIRST SENSATION: 100 CC
FULL COUGH STRESS TEST: NORMAL
GLUCOSE URINE, POC: NORMAL
KETONES, POC: NORMAL
LEUKOCYTE EST, POC: NORMAL
MAX SENSATION: 275 CC
NITRATE, URINE POC: NORMAL
NITRITE, POC: NORMAL
PH, POC: 6.5
POST VOID RESIDUAL (PVR): 125 ML
PROTEIN, POC: NORMAL
RBC URINE, POC: NORMAL
SECOND SENSATION: 200 CC
SPASM: NORMAL
SPECIFIC GRAVITY, POC: 1.01
UROBILINOGEN, POC: NORMAL
WBC URINE, POC: NORMAL

## 2024-07-23 PROCEDURE — 51725 SIMPLE CYSTOMETROGRAM: CPT | Performed by: NURSE PRACTITIONER

## 2024-07-23 PROCEDURE — 81002 URINALYSIS NONAUTO W/O SCOPE: CPT | Performed by: NURSE PRACTITIONER

## 2024-07-23 PROCEDURE — 99024 POSTOP FOLLOW-UP VISIT: CPT | Performed by: NURSE PRACTITIONER

## 2024-07-23 NOTE — PROGRESS NOTES
N/A 04/22/2024    CYSTOSCOPY performed by Samir Urena MD at [facility] OR    HYSTERECTOMY (CERVIX STATUS UNKNOWN) Bilateral 04/22/2024    VAGINAL HYSTERECTOMY performed by Samir Urena MD at [facility] OR    LITHOTRIPSY  2009    PARTIAL HYSTERECTOMY (CERVIX NOT REMOVED)  April 2024    VAGINA SURGERY N/A 04/22/2024    VAGINAL VAULT SUSPENSION performed by Samir Urena MD at [facility] OR    VAGINA SURGERY N/A 04/22/2024    ANTERIOR  AND PROSTERIOR REPAIR performed by Samir Urena MD at [facility] OR     Current Medications:  Current Outpatient Medications   Medication Sig Dispense Refill    clotrimazole-betamethasone (LOTRISONE) 1-0.05 % cream APPLY TOPICALLY 2 TIMES DAILY. USE SPARINGLY TO THE LEG FOR 14 DAYS 45 g 0    glipiZIDE (GLUCOTROL) 10 MG tablet Take 1 tablet by mouth 2 times daily (before meals) 180 tablet 1    atorvastatin (LIPITOR) 10 MG tablet TAKE 1 TABLET BY MOUTH EVERY DAY 90 tablet 1    metoprolol succinate (TOPROL XL) 50 MG extended release tablet TAKE 1 TABLET BY MOUTH EVERY DAY 90 tablet 1    hydroCHLOROthiazide 12.5 MG capsule TAKE 1 CAPSULE BY MOUTH EVERY DAY IN THE MORNING 90 capsule 1    oxyBUTYnin (DITROPAN XL) 5 MG extended release tablet Take 1 tablet by mouth daily 30 tablet 2    Potassium Citrate ER (UROCIT-K) 15 MEQ (1620 MG) TBCR extended release tablet Take 1 tablet by mouth 2 times daily      losartan (COZAAR) 100 MG tablet TAKE 1 TABLET BY MOUTH EVERY DAY 90 tablet 1    JARDIANCE 10 MG tablet TAKE 1 TABLET BY MOUTH EVERY DAY 90 tablet 1    KERENDIA 10 MG TABS Take 1 tablet by mouth daily      Cholecalciferol (VITAMIN D-3 PO) Take by mouth      vitamin B-12 (CYANOCOBALAMIN) 100 MCG tablet One tablet twice a week 30 tablet 0    vitamin C (ASCORBIC ACID) 500 MG tablet Take 1 tablet by mouth daily      Biotin 10 MG CAPS Take by mouth      Multiple Vitamin CAPS Take 1 capsule by mouth daily        No current facility-administered medications for this visit.     Allergies:   Allergies

## 2024-07-24 ENCOUNTER — HOSPITAL ENCOUNTER (OUTPATIENT)
Dept: PHYSICAL THERAPY | Age: 64
Setting detail: THERAPIES SERIES
Discharge: HOME | End: 2024-07-24
Payer: COMMERCIAL

## 2024-07-24 DIAGNOSIS — R39.15 URINARY URGENCY: ICD-10-CM

## 2024-07-24 DIAGNOSIS — M62.89 PELVIC FLOOR DYSFUNCTION: Primary | ICD-10-CM

## 2024-07-24 PROCEDURE — 97530 THERAPEUTIC ACTIVITIES: CPT | Performed by: PHYSICAL THERAPIST

## 2024-07-24 PROCEDURE — 97112 NEUROMUSCULAR REEDUCATION: CPT | Performed by: PHYSICAL THERAPIST

## 2024-07-24 PROCEDURE — 97161 PT EVAL LOW COMPLEX 20 MIN: CPT | Performed by: PHYSICAL THERAPIST

## 2024-07-29 RX ORDER — HYDROCHLOROTHIAZIDE 12.5 MG/1
12.5 CAPSULE, GELATIN COATED ORAL EVERY MORNING
Qty: 90 CAPSULE | Refills: 1 | Status: SHIPPED | OUTPATIENT
Start: 2024-07-29

## 2024-08-01 ENCOUNTER — OFFICE VISIT (OUTPATIENT)
Dept: UROGYNECOLOGY | Age: 64
End: 2024-08-01
Payer: COMMERCIAL

## 2024-08-01 ENCOUNTER — OFFICE VISIT (OUTPATIENT)
Dept: FAMILY MEDICINE CLINIC | Age: 64
End: 2024-08-01
Payer: COMMERCIAL

## 2024-08-01 VITALS
TEMPERATURE: 98.1 F | DIASTOLIC BLOOD PRESSURE: 80 MMHG | BODY MASS INDEX: 30.47 KG/M2 | SYSTOLIC BLOOD PRESSURE: 124 MMHG | HEART RATE: 80 BPM | WEIGHT: 165.6 LBS | HEIGHT: 62 IN

## 2024-08-01 VITALS
TEMPERATURE: 97.8 F | HEART RATE: 89 BPM | DIASTOLIC BLOOD PRESSURE: 84 MMHG | OXYGEN SATURATION: 99 % | RESPIRATION RATE: 16 BRPM | SYSTOLIC BLOOD PRESSURE: 132 MMHG

## 2024-08-01 DIAGNOSIS — R80.9 TYPE 2 DIABETES MELLITUS WITH MICROALBUMINURIA, WITHOUT LONG-TERM CURRENT USE OF INSULIN (HCC): Primary | ICD-10-CM

## 2024-08-01 DIAGNOSIS — E11.29 TYPE 2 DIABETES MELLITUS WITH MICROALBUMINURIA, WITHOUT LONG-TERM CURRENT USE OF INSULIN (HCC): ICD-10-CM

## 2024-08-01 DIAGNOSIS — R80.9 TYPE 2 DIABETES MELLITUS WITH MICROALBUMINURIA, WITHOUT LONG-TERM CURRENT USE OF INSULIN (HCC): ICD-10-CM

## 2024-08-01 DIAGNOSIS — N39.3 STRESS INCONTINENCE: Primary | ICD-10-CM

## 2024-08-01 DIAGNOSIS — E11.29 TYPE 2 DIABETES MELLITUS WITH MICROALBUMINURIA, WITHOUT LONG-TERM CURRENT USE OF INSULIN (HCC): Primary | ICD-10-CM

## 2024-08-01 DIAGNOSIS — I10 PRIMARY HYPERTENSION: ICD-10-CM

## 2024-08-01 LAB
CHOLEST SERPL-MCNC: 189 MG/DL (ref 0–199)
EST. AVERAGE GLUCOSE BLD GHB EST-MCNC: 220.2 MG/DL
HBA1C MFR BLD: 9.3 %
HDLC SERPL-MCNC: 50 MG/DL (ref 40–60)
LDLC SERPL CALC-MCNC: 80 MG/DL
TRIGL SERPL-MCNC: 295 MG/DL (ref 0–150)
VLDLC SERPL CALC-MCNC: 59 MG/DL

## 2024-08-01 PROCEDURE — 3079F DIAST BP 80-89 MM HG: CPT | Performed by: OBSTETRICS & GYNECOLOGY

## 2024-08-01 PROCEDURE — 3074F SYST BP LT 130 MM HG: CPT | Performed by: FAMILY MEDICINE

## 2024-08-01 PROCEDURE — 1036F TOBACCO NON-USER: CPT | Performed by: FAMILY MEDICINE

## 2024-08-01 PROCEDURE — G8427 DOCREV CUR MEDS BY ELIG CLIN: HCPCS | Performed by: FAMILY MEDICINE

## 2024-08-01 PROCEDURE — 3017F COLORECTAL CA SCREEN DOC REV: CPT | Performed by: FAMILY MEDICINE

## 2024-08-01 PROCEDURE — 99214 OFFICE O/P EST MOD 30 MIN: CPT | Performed by: OBSTETRICS & GYNECOLOGY

## 2024-08-01 PROCEDURE — 1036F TOBACCO NON-USER: CPT | Performed by: OBSTETRICS & GYNECOLOGY

## 2024-08-01 PROCEDURE — 3075F SYST BP GE 130 - 139MM HG: CPT | Performed by: OBSTETRICS & GYNECOLOGY

## 2024-08-01 PROCEDURE — G8417 CALC BMI ABV UP PARAM F/U: HCPCS | Performed by: OBSTETRICS & GYNECOLOGY

## 2024-08-01 PROCEDURE — 2022F DILAT RTA XM EVC RTNOPTHY: CPT | Performed by: FAMILY MEDICINE

## 2024-08-01 PROCEDURE — G8427 DOCREV CUR MEDS BY ELIG CLIN: HCPCS | Performed by: OBSTETRICS & GYNECOLOGY

## 2024-08-01 PROCEDURE — 99214 OFFICE O/P EST MOD 30 MIN: CPT | Performed by: FAMILY MEDICINE

## 2024-08-01 PROCEDURE — 3079F DIAST BP 80-89 MM HG: CPT | Performed by: FAMILY MEDICINE

## 2024-08-01 PROCEDURE — 3017F COLORECTAL CA SCREEN DOC REV: CPT | Performed by: OBSTETRICS & GYNECOLOGY

## 2024-08-01 PROCEDURE — G8417 CALC BMI ABV UP PARAM F/U: HCPCS | Performed by: FAMILY MEDICINE

## 2024-08-01 PROCEDURE — 3051F HG A1C>EQUAL 7.0%<8.0%: CPT | Performed by: FAMILY MEDICINE

## 2024-08-01 RX ORDER — SODIUM CHLORIDE 0.9 % (FLUSH) 0.9 %
5-40 SYRINGE (ML) INJECTION EVERY 12 HOURS SCHEDULED
OUTPATIENT
Start: 2024-08-01

## 2024-08-01 RX ORDER — SODIUM CHLORIDE, SODIUM LACTATE, POTASSIUM CHLORIDE, CALCIUM CHLORIDE 600; 310; 30; 20 MG/100ML; MG/100ML; MG/100ML; MG/100ML
INJECTION, SOLUTION INTRAVENOUS CONTINUOUS
OUTPATIENT
Start: 2024-08-01

## 2024-08-01 RX ORDER — SODIUM CHLORIDE 9 MG/ML
INJECTION, SOLUTION INTRAVENOUS PRN
OUTPATIENT
Start: 2024-08-01

## 2024-08-01 RX ORDER — SODIUM CHLORIDE 0.9 % (FLUSH) 0.9 %
5-40 SYRINGE (ML) INJECTION PRN
OUTPATIENT
Start: 2024-08-01

## 2024-08-01 ASSESSMENT — ENCOUNTER SYMPTOMS
SHORTNESS OF BREATH: 0
BLOOD IN STOOL: 0
CONSTIPATION: 0
VOMITING: 0
ABDOMINAL PAIN: 0
CHEST TIGHTNESS: 0
ABDOMINAL DISTENTION: 0
DIARRHEA: 0
NAUSEA: 0

## 2024-08-01 NOTE — PATIENT INSTRUCTIONS
Continue the medicines and the diet  See in about 4 months   Consider getting the pneumonia vaccine called Prevnar 20    Consider the RSV vaccine

## 2024-08-01 NOTE — PROGRESS NOTES
effort is normal.   Abdominal:      Palpations: Abdomen is soft.   Musculoskeletal:      Cervical back: Normal range of motion and neck supple.   Skin:     General: Skin is warm and dry.   Neurological:      Mental Status: She is alert and oriented to person, place, and time.         No results found for this visit on 08/01/24.    Assessment/Plan:     Heike Means is a 64 y.o. female with   1. Stress incontinence    Old records reviewed, outside records reviewed    Heike presents today for follow-up of urinary incontinence.  Prior to her surgery she had urodynamics and an office filling study neither 1 of which demonstrated stress incontinence however she seems to have developed this afterwards.  She did have up positive cough stress test on her last visit.  Because of this I did speak with her today about physical therapy and a suburethral sling.  She would like to do both and will schedule a potential sling placement for early November.  The patient was counseled on surgical and non-surgical options.  The patient elected to move forward with surgery because of worsening symptoms.  The risks and benefits of surgery including but not limited to bleeding, infection, injury to bowel, bladder, ureter, or other internal organs, transfusion, pain, bowel dysfunction, urinary incontinence, and sexual dysfunction were discussed at length.  All questions were answered to the patients satisfaction.  The patient elected to undergo synthetic mid-urethral sling and cystourethroscopy.  The risk and benefits of synthetic material, including mesh, were explained to the patient and all questions were answered.   Preprocedure medications were placed  Orders Placed This Encounter   Procedures    Initiate PAT Protocol     Standing Status:   Future     Standing Expiration Date:   9/30/2024     No orders of the defined types were placed in this encounter.      TANA HAYES MD

## 2024-08-01 NOTE — PROGRESS NOTES
decreased breath sounds, wheezing, rhonchi or rales.   Musculoskeletal:      Cervical back: Neck supple.   Lymphadenopathy:      Cervical: No cervical adenopathy.      Upper Body:      Right upper body: No supraclavicular adenopathy.      Left upper body: No supraclavicular adenopathy.   Skin:     General: Skin is warm and dry.      Coloration: Skin is not pale.   Neurological:      Mental Status: She is alert.         Assessment:   Assessment     Diagnosis Orders   1. Type 2 diabetes mellitus with microalbuminuria, without long-term current use of insulin ([facility])  Lipid Panel    Hemoglobin A1C      2. Primary hypertension            Hemoglobin A1C   Date Value Ref Range Status   04/03/2024 7.2 See comment % Final     Comment:     Comment:  Diagnosis of Diabetes: > or = 6.5%  Increased risk of diabetes (Prediabetes): 5.7-6.4%  Glycemic Control: Nonpregnant Adults: <7.0%                    Pregnant: <6.0%         Herliver testing came down to almost nml in februrary     Since being off the metformin the glucose has increased   211 this am   Utd colon utd mammogram    Interval Hx:  Renal medicine visit dr power and ok on 7/31/24 for the ckd 3 a  Cardio visit dr hebert on 7/16/24. She was taken off the eliquis as the a fib was brief one time event and will monitor        PLAN:   Plan    Continue the medicines and the diet  See in about 4 months   Consider getting the pneumonia vaccine called Prevnar 20    Consider the RSV vaccine         Dima Sierra MD

## 2024-08-02 ENCOUNTER — PREP FOR PROCEDURE (OUTPATIENT)
Dept: UROGYNECOLOGY | Age: 64
End: 2024-08-02

## 2024-08-02 DIAGNOSIS — N39.3 GENUINE STRESS INCONTINENCE, FEMALE: ICD-10-CM

## 2024-08-12 ENCOUNTER — OFFICE VISIT (OUTPATIENT)
Dept: FAMILY MEDICINE CLINIC | Age: 64
End: 2024-08-12
Payer: COMMERCIAL

## 2024-08-12 VITALS
TEMPERATURE: 97.5 F | WEIGHT: 165.6 LBS | DIASTOLIC BLOOD PRESSURE: 70 MMHG | BODY MASS INDEX: 30.47 KG/M2 | SYSTOLIC BLOOD PRESSURE: 120 MMHG | HEIGHT: 62 IN

## 2024-08-12 DIAGNOSIS — U07.1 COVID: Primary | ICD-10-CM

## 2024-08-12 LAB
INFLUENZA A ANTIGEN, POC: NEGATIVE
INFLUENZA B ANTIGEN, POC: NEGATIVE
LOT EXPIRE DATE: ABNORMAL
LOT KIT NUMBER: ABNORMAL
SARS-COV-2, POC: DETECTED
VALID INTERNAL CONTROL: ABNORMAL
VENDOR AND KIT NAME POC: ABNORMAL

## 2024-08-12 PROCEDURE — G8427 DOCREV CUR MEDS BY ELIG CLIN: HCPCS

## 2024-08-12 PROCEDURE — 87428 SARSCOV & INF VIR A&B AG IA: CPT

## 2024-08-12 PROCEDURE — 99213 OFFICE O/P EST LOW 20 MIN: CPT

## 2024-08-12 PROCEDURE — 3078F DIAST BP <80 MM HG: CPT

## 2024-08-12 PROCEDURE — 3017F COLORECTAL CA SCREEN DOC REV: CPT

## 2024-08-12 PROCEDURE — 3074F SYST BP LT 130 MM HG: CPT

## 2024-08-12 PROCEDURE — G8417 CALC BMI ABV UP PARAM F/U: HCPCS

## 2024-08-12 PROCEDURE — 1036F TOBACCO NON-USER: CPT

## 2024-08-12 RX ORDER — DEXTROMETHORPHAN HYDROBROMIDE 15 MG/1
15 TABLET ORAL EVERY 8 HOURS PRN
Qty: 15 TABLET | Refills: 1 | Status: SHIPPED | OUTPATIENT
Start: 2024-08-12

## 2024-08-12 RX ORDER — PREDNISONE 20 MG/1
20 TABLET ORAL DAILY
Qty: 5 TABLET | Refills: 0 | Status: SHIPPED | OUTPATIENT
Start: 2024-08-12 | End: 2024-08-17

## 2024-08-12 RX ORDER — CEFUROXIME AXETIL 250 MG/1
250 TABLET ORAL 2 TIMES DAILY
Qty: 14 TABLET | Refills: 0 | Status: CANCELLED | OUTPATIENT
Start: 2024-08-12 | End: 2024-08-19

## 2024-08-12 ASSESSMENT — ENCOUNTER SYMPTOMS
DIARRHEA: 0
ABDOMINAL PAIN: 0
BACK PAIN: 0
SINUS PRESSURE: 0
SORE THROAT: 0
CONSTIPATION: 0
APNEA: 0
NAUSEA: 0
COLOR CHANGE: 0
TROUBLE SWALLOWING: 0
SHORTNESS OF BREATH: 0
WHEEZING: 0
VOMITING: 0
COUGH: 1
BLOOD IN STOOL: 0

## 2024-08-12 NOTE — ASSESSMENT & PLAN NOTE
Covid positive in office. Lungs CTA. Recommend rest, hydration, delsym for cough. Will do 5 days of steroids due to worsening cough. Discussed social distancing and covid protocols. Pt voiced understanding

## 2024-08-12 NOTE — PROGRESS NOTES
urinating, dysuria, flank pain, frequency and urgency.   Musculoskeletal:  Negative for arthralgias, back pain, gait problem and myalgias.   Skin:  Negative for color change, rash and wound.   Neurological:  Negative for dizziness, weakness, light-headedness and headaches.   Hematological:  Does not bruise/bleed easily.   Psychiatric/Behavioral:  Negative for dysphoric mood and suicidal ideas. The patient is not nervous/anxious.        Vitals:    08/12/24 1414   BP: 120/70   Site: Left Upper Arm   Position: Sitting   Cuff Size: Medium Adult   Temp: 97.5 °F (36.4 °C)   TempSrc: Temporal   Weight: 75.1 kg (165 lb 9.6 oz)   Height: 1.575 m (5' 2\")       Physical Exam  Vitals reviewed.   Constitutional:       Appearance: Normal appearance. She is ill-appearing.   HENT:      Head: Normocephalic.      Right Ear: Ear canal and external ear normal. A middle ear effusion is present.      Left Ear: Ear canal and external ear normal. A middle ear effusion is present.      Nose: Congestion present.      Mouth/Throat:      Mouth: Mucous membranes are moist.      Pharynx: No oropharyngeal exudate or posterior oropharyngeal erythema.   Eyes:      Pupils: Pupils are equal, round, and reactive to light.   Cardiovascular:      Rate and Rhythm: Normal rate and regular rhythm.      Heart sounds: Normal heart sounds.   Pulmonary:      Effort: Pulmonary effort is normal. No respiratory distress.      Breath sounds: Normal breath sounds. No wheezing, rhonchi or rales.   Abdominal:      General: Abdomen is flat. Bowel sounds are normal.      Palpations: Abdomen is soft.   Musculoskeletal:         General: No swelling. Normal range of motion.      Cervical back: Normal range of motion.      Right lower leg: No edema.      Left lower leg: No edema.   Lymphadenopathy:      Cervical: Cervical adenopathy present.   Skin:     General: Skin is warm.      Coloration: Skin is not jaundiced.      Findings: No rash.   Neurological:      General: No

## 2024-08-12 NOTE — PATIENT INSTRUCTIONS
Thank you for choosing [facility], [place].    Please bring a current list of medications to every appointment.    Please contact your pharmacy for any prescription refill(s) that you are requesting.

## 2024-08-13 ENCOUNTER — HOSPITAL ENCOUNTER (OUTPATIENT)
Dept: PHYSICAL THERAPY | Age: 64
Setting detail: THERAPIES SERIES
End: 2024-08-13
Payer: COMMERCIAL

## 2024-08-13 DIAGNOSIS — R39.15 URINARY URGENCY: ICD-10-CM

## 2024-08-13 DIAGNOSIS — M62.89 PELVIC FLOOR DYSFUNCTION: Primary | ICD-10-CM

## 2024-08-14 NOTE — FLOWSHEET NOTE
[facility]- Outpatient Rehabilitation and Therapy [address], [address], [place] [address] office: [phone] fax: [phone]     Physical Therapy Treatment Note          Physical Therapy: TREATMENT/PROGRESS NOTE   Patient: Heike Means (64 y.o. female)   Examination Date: 2024   :  1960 MRN: [id number]   Visit #: 2   Insurance Allowable Auth Needed   10+ []Yes    [x]No   **Insurance change to [facility] on 2024** Insurance: Payor: [employer] / Plan: [employer] - CHOICE PLU / Product Type: *No Product type* /   Insurance ID: [id number] - (Commercial)  0% CI/ BMN/ NO AUTH   Secondary Insurance (if applicable):    Treatment Diagnosis:   No diagnosis found.     Medical Diagnosis:  Urinary urgency [R39.15]   Referring Physician: Dolly France APRN*  PCP: Dima Sierra MD   Plan of care signed (Y/N): Y  Cosigned by: Dolly France APRN - CNP at 2024  1:55 PM       Date of Patient follow up with Physician: 2024 - urogyn - for possible bladder sling - BUT patient is hopeful that with additional PF PT she is able to AVOID need for future surgery      Progress Report/POC: NO  POC update due: (10 visits /OR AUTH LIMITS, whichever is less)                                             Precautions/ Contra-indications:           Latex allergy:  NO  Pacemaker:    NO  Contraindications for Manipulation: None  Date of Surgery:  Recently underwent VAGINAL HYSTERECTOMY, VAGINAL VAULT SUSPENSION, ANTERIOR  AND POSTERIOR REPAIR, PERINEORRHAPHY, CYSTOSCOPY on 24.  Other:    Red Flags:  None    C-SSRS Triggered by Intake questionnaire:   Patient answered 'NO' to both behavioral questions on intake.  No further screening warranted    Preferred Language for Healthcare:   [x] English       [] other:    SUBJECTIVE EXAMINATION     2024 - Patient stated complaint/comments: urinary leakage carly with coughing - needing adult diaper or moderate to heavy pad - 1  continue current phase     Add hip flexor and seated piriformis stretches.      Review and offer modifications as requested for current stretches and integrated home program.     Continued education on multifactorial contributions to increased symptoms/flares.     Reassess and address PF muscle tissues externally and transvaginally as patient consents.      Eventually, add in core stabilization activities while monitoring for response in PF muscles, ensuring tightness and tension does not return to previous levels.     Electronically Signed by RANDY GARCIA, PT  Date: 08/27/2024     Note: Portions of this note have been templated and/or copied from initial evaluation, reassessments and prior notes for documentation efficiency.    Note: If patient does not return for scheduled/recommended follow up visits, this note will serve as a discharge from care along with the most recent update on progress.

## 2024-08-27 ENCOUNTER — HOSPITAL ENCOUNTER (OUTPATIENT)
Dept: PHYSICAL THERAPY | Age: 64
Setting detail: THERAPIES SERIES
Discharge: HOME | End: 2024-08-27
Payer: COMMERCIAL

## 2024-08-27 PROCEDURE — 97112 NEUROMUSCULAR REEDUCATION: CPT | Performed by: PHYSICAL THERAPIST

## 2024-08-27 PROCEDURE — 97110 THERAPEUTIC EXERCISES: CPT | Performed by: PHYSICAL THERAPIST

## 2024-08-27 PROCEDURE — 97530 THERAPEUTIC ACTIVITIES: CPT | Performed by: PHYSICAL THERAPIST

## 2024-09-09 RX ORDER — OXYBUTYNIN CHLORIDE 5 MG/1
5 TABLET, EXTENDED RELEASE ORAL DAILY
Qty: 30 TABLET | Refills: 2 | Status: SHIPPED | OUTPATIENT
Start: 2024-09-09

## 2024-09-10 ENCOUNTER — HOSPITAL ENCOUNTER (OUTPATIENT)
Dept: PHYSICAL THERAPY | Age: 64
Setting detail: THERAPIES SERIES
Discharge: HOME | End: 2024-09-10
Payer: COMMERCIAL

## 2024-09-10 DIAGNOSIS — N39.46 MIXED STRESS AND URGE URINARY INCONTINENCE: ICD-10-CM

## 2024-09-10 DIAGNOSIS — R39.15 URINARY URGENCY: ICD-10-CM

## 2024-09-10 DIAGNOSIS — M62.89 PELVIC FLOOR DYSFUNCTION: Primary | ICD-10-CM

## 2024-09-10 PROCEDURE — 97110 THERAPEUTIC EXERCISES: CPT | Performed by: PHYSICAL THERAPIST

## 2024-09-10 PROCEDURE — 97530 THERAPEUTIC ACTIVITIES: CPT | Performed by: PHYSICAL THERAPIST

## 2024-09-10 PROCEDURE — 97112 NEUROMUSCULAR REEDUCATION: CPT | Performed by: PHYSICAL THERAPIST

## 2024-09-16 ENCOUNTER — TELEPHONE (OUTPATIENT)
Dept: FAMILY MEDICINE CLINIC | Age: 64
End: 2024-09-16

## 2024-09-23 ENCOUNTER — HOSPITAL ENCOUNTER (OUTPATIENT)
Dept: GENERAL RADIOLOGY | Age: 64
Discharge: HOME | End: 2024-09-23
Payer: COMMERCIAL

## 2024-09-23 ENCOUNTER — HOSPITAL ENCOUNTER (OUTPATIENT)
Age: 64
Discharge: HOME | End: 2024-09-23
Payer: COMMERCIAL

## 2024-09-23 ENCOUNTER — OFFICE VISIT (OUTPATIENT)
Dept: FAMILY MEDICINE CLINIC | Age: 64
End: 2024-09-23

## 2024-09-23 VITALS
DIASTOLIC BLOOD PRESSURE: 80 MMHG | WEIGHT: 168.6 LBS | SYSTOLIC BLOOD PRESSURE: 128 MMHG | HEIGHT: 62 IN | TEMPERATURE: 98 F | HEART RATE: 76 BPM | BODY MASS INDEX: 31.03 KG/M2

## 2024-09-23 DIAGNOSIS — R07.89 CHEST WALL PAIN: ICD-10-CM

## 2024-09-23 DIAGNOSIS — R07.89 CHEST WALL PAIN: Primary | ICD-10-CM
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
## 2024-09-23 PROCEDURE — 71101 X-RAY EXAM UNILAT RIBS/CHEST: CPT

## 2024-09-24 ENCOUNTER — CLINICAL DOCUMENTATION (OUTPATIENT)
Dept: FAMILY MEDICINE CLINIC | Age: 64
End: 2024-09-24

## 2024-09-24 DIAGNOSIS — R07.89 CHEST WALL PAIN: Primary | ICD-10-CM
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
## 2024-09-24 RX ORDER — TRAMADOL HYDROCHLORIDE 50 MG/1
50 TABLET ORAL EVERY 8 HOURS PRN
Qty: 9 TABLET | Refills: 0 | Status: SHIPPED | OUTPATIENT
Start: 2024-09-24 | End: 2024-09-27

## 2024-10-01 ENCOUNTER — APPOINTMENT (OUTPATIENT)
Dept: PHYSICAL THERAPY | Age: 64
End: 2024-10-01
Payer: COMMERCIAL

## 2024-10-01 NOTE — FLOWSHEET NOTE
[facility]- Outpatient Rehabilitation and Therapy [address], [address], [place] [address] office: [phone] fax: [phone]     Physical Therapy Treatment Note          Physical Therapy: TREATMENT/PROGRESS NOTE   Patient: Heike Means (64 y.o. female)   Examination Date: 10/08/2024   :  1960 MRN: [id number]   Visit #: 4   Insurance Allowable Auth Needed   10 [x]Yes    []No   10V -2024 Insurance: Payor: [employer] / Plan: AMBETTER / Product Type: *No Product type* /   Insurance ID: [id number] - (Commercial)  **Insurance change to [employer] on 2024    Secondary Insurance (if applicable):    Treatment Diagnosis:     ICD-10-CM    1. Pelvic floor dysfunction  M62.89       2. Urinary urgency  R39.15       3. Mixed stress and urge urinary incontinence  N39.46              Medical Diagnosis:  Urinary urgency [R39.15]   Referring Physician: Dolly France APRN*  PCP: Dima Sierra MD   Plan of care signed (Y/N): Y  Cosigned by: Dolly France APRN - CNP at 2024  1:55 PM       Date of Patient follow up with Physician: 2024 - urogyn - for possible bladder sling - BUT patient is hopeful that with additional PF PT she is able to AVOID need for future surgery      Progress Report/POC: NO   POC update due: (10 visits /OR AUTH LIMITS, whichever is less)                                             Precautions/ Contra-indications:           Latex allergy:  NO  Pacemaker:    NO  Contraindications for Manipulation: None  Date of Surgery:  Recently underwent VAGINAL HYSTERECTOMY, VAGINAL VAULT SUSPENSION, ANTERIOR  AND POSTERIOR REPAIR, PERINEORRHAPHY, CYSTOSCOPY on 24.  Other:    Red Flags:  None    C-SSRS Triggered by Intake questionnaire:   Patient answered 'NO' to both behavioral questions on intake.  No further screening warranted    Preferred Language for Healthcare:   [x] English       [] other:    SUBJECTIVE EXAMINATION     2024 -

## 2024-10-08 ENCOUNTER — HOSPITAL ENCOUNTER (OUTPATIENT)
Dept: PHYSICAL THERAPY | Age: 64
Setting detail: THERAPIES SERIES
Discharge: HOME | End: 2024-10-08
Payer: COMMERCIAL

## 2024-10-08 DIAGNOSIS — N39.46 MIXED STRESS AND URGE URINARY INCONTINENCE: ICD-10-CM

## 2024-10-08 DIAGNOSIS — M62.89 PELVIC FLOOR DYSFUNCTION: Primary | ICD-10-CM

## 2024-10-08 DIAGNOSIS — R39.15 URINARY URGENCY: ICD-10-CM

## 2024-10-08 PROCEDURE — 97110 THERAPEUTIC EXERCISES: CPT | Performed by: PHYSICAL THERAPIST

## 2024-10-08 PROCEDURE — 97112 NEUROMUSCULAR REEDUCATION: CPT | Performed by: PHYSICAL THERAPIST

## 2024-10-08 PROCEDURE — 97530 THERAPEUTIC ACTIVITIES: CPT | Performed by: PHYSICAL THERAPIST

## 2024-10-22 NOTE — FLOWSHEET NOTE
PF muscles and pelvic organs.  Emphasized need for stretching and down training of tight muscles and strengthening of weak muscles to promote improve muscle balance in pelvis/low back and legs.     Reviewed pelvic model to demonstrate sciatic and pudendal nerves running under the piriformis and deep hip rotators.  Explained relationship to pain felt in her buttocks and down her leg as well as potentially related to her bladder symptoms.     Ongoing education/techniques/strategies on need for improved bladder control.     Reviewed urge suppression techniques (slow breathing + ankle pumps) and emphasized additional benefits of adding targeted stretches that should improve baseline flexibility of PF and surrounding muscles.   - reports implementing when returning home which has improved bladder symptoms slightly  - recommended use of urge suppression techniques at night     Reviewed PROPER stretching techniques to avoid pain and allow tension to release prior to continuing/progressing with a stretch.    Reviewed differences in sympathetic and parasympathetic nervous systems.  Discussed strategies to down regulate her sympathetic (flight or fright) nervous system by implementing deep breathing and other activities to stimulate the parasympathetic (rest and digest) nervous system.     Discussed strategies to avoid surgery for bladder which is tentatively schedule for 12/9/2024 - emphasized importance of improving muscle tension through consistent targeted stretches and diaphragmatic breathing to better relax and down regulate the nervous system, both of which are contributing to her bladder symptoms.         Addressed specific concerns of patient as they arose during session.     Patient appeared to have better understanding of current bladder/bowel issues and improved outlook on situation by end of PF PT therapy session.       Modalities:    No modalities applied this session    Education/Home Exercise Program: HEP

## 2024-10-27 DIAGNOSIS — I10 HYPERTENSION, UNSPECIFIED TYPE: ICD-10-CM

## 2024-10-28 RX ORDER — METOPROLOL SUCCINATE 50 MG/1
50 TABLET, EXTENDED RELEASE ORAL DAILY
Qty: 90 TABLET | Refills: 1 | Status: SHIPPED | OUTPATIENT
Start: 2024-10-28

## 2024-10-29 ENCOUNTER — HOSPITAL ENCOUNTER (OUTPATIENT)
Dept: PHYSICAL THERAPY | Age: 64
Setting detail: THERAPIES SERIES
Discharge: HOME | End: 2024-10-29
Payer: COMMERCIAL

## 2024-10-29 DIAGNOSIS — R39.15 URINARY URGENCY: ICD-10-CM

## 2024-10-29 DIAGNOSIS — M62.89 PELVIC FLOOR DYSFUNCTION: Primary | ICD-10-CM

## 2024-10-29 DIAGNOSIS — N39.46 MIXED STRESS AND URGE URINARY INCONTINENCE: ICD-10-CM

## 2024-10-29 PROCEDURE — 97530 THERAPEUTIC ACTIVITIES: CPT | Performed by: PHYSICAL THERAPIST

## 2024-10-29 PROCEDURE — 97112 NEUROMUSCULAR REEDUCATION: CPT | Performed by: PHYSICAL THERAPIST

## 2024-10-29 PROCEDURE — 97110 THERAPEUTIC EXERCISES: CPT | Performed by: PHYSICAL THERAPIST

## 2024-10-30 ENCOUNTER — TELEPHONE (OUTPATIENT)
Dept: UROGYNECOLOGY | Age: 64
End: 2024-10-30

## 2024-10-30 NOTE — TELEPHONE ENCOUNTER
Called patient and confirmed cancellation request. Patient states she is doing much better since attending PFPT. Denies any further needs at this time.

## 2024-10-30 NOTE — TELEPHONE ENCOUNTER
Wants to cancel her surgery for 12/9/24. Pelvic Floor therapy is helping and she doesn't want to follow through with surgery anymore

## 2024-11-13 NOTE — PLAN OF CARE
[facility]- Outpatient Rehabilitation and Therapy [address], [address], [place] [address] office: [phone] fax: [phone]     Physical Therapy Treatment Note and Discharge Summary        Physical Therapy: TREATMENT/PROGRESS NOTE   Patient: Heike Means (64 y.o. female)   Examination Date: 2024   :  1960 MRN: [id number]   Visit #: 6   Insurance Allowable Auth Needed   10 [x]Yes    []No    Insurance: Payor: [employer] / Plan: [employer]TER / Product Type: *No Product type* /   Insurance ID: [id number] - (Commercial)  **Insurance change to [employer] on 2024-2024  Secondary Insurance (if applicable):    Treatment Diagnosis:     ICD-10-CM    1. Pelvic floor dysfunction  M62.89       2. Urinary urgency  R39.15       3. Mixed stress and urge urinary incontinence  N39.46                  Medical Diagnosis:  Urinary urgency [R39.15]   Referring Physician: Dolly France APRN*  PCP: Dima Sierra MD   Plan of care signed (Y/N): Y  Cosigned by: Dolly France APRN - CNP at 2024  1:55 PM     Cosigned by: Dolly France APRN - CNP at 10/30/2024  9:19 AM       Date of Patient follow up with Physician: 2024 - urogyn - for possible bladder sling - BUT patient is hopeful that with additional PF PT she is able to AVOID need for future surgery    *Patient cancelled her surgery, reports that PF PT has been helpful with managing her symptoms.      Progress Report/POC: YES, Date Range for this report: 2024 to 2024    POC update due: (10 visits /OR AUTH LIMITS, whichever is less)                                             Precautions/ Contra-indications:           Latex allergy:  NO  Pacemaker:    NO  Contraindications for Manipulation: None  Date of Surgery:  Recently underwent VAGINAL HYSTERECTOMY, VAGINAL VAULT SUSPENSION, ANTERIOR  AND POSTERIOR REPAIR, PERINEORRHAPHY, CYSTOSCOPY on 24.  Other:    Red

## 2024-11-15 ENCOUNTER — HOSPITAL ENCOUNTER (OUTPATIENT)
Dept: MAMMOGRAPHY | Age: 64
Discharge: HOME | End: 2024-11-20
Payer: COMMERCIAL

## 2024-11-15 DIAGNOSIS — Z12.31 VISIT FOR SCREENING MAMMOGRAM: ICD-10-CM

## 2024-11-15 PROCEDURE — 77063 BREAST TOMOSYNTHESIS BI: CPT

## 2024-11-20 ENCOUNTER — HOSPITAL ENCOUNTER (OUTPATIENT)
Dept: PHYSICAL THERAPY | Age: 64
Setting detail: THERAPIES SERIES
Discharge: HOME | End: 2024-11-20
Payer: COMMERCIAL

## 2024-11-20 DIAGNOSIS — N39.46 MIXED STRESS AND URGE URINARY INCONTINENCE: ICD-10-CM

## 2024-11-20 DIAGNOSIS — M62.89 PELVIC FLOOR DYSFUNCTION: Primary | ICD-10-CM

## 2024-11-20 DIAGNOSIS — R39.15 URINARY URGENCY: ICD-10-CM

## 2024-11-20 PROCEDURE — 97530 THERAPEUTIC ACTIVITIES: CPT | Performed by: PHYSICAL THERAPIST

## 2024-11-20 PROCEDURE — 97110 THERAPEUTIC EXERCISES: CPT | Performed by: PHYSICAL THERAPIST

## 2024-11-20 PROCEDURE — 97112 NEUROMUSCULAR REEDUCATION: CPT | Performed by: PHYSICAL THERAPIST

## 2024-11-23 RX ORDER — LOSARTAN POTASSIUM 100 MG/1
TABLET ORAL
Qty: 90 TABLET | Refills: 1 | Status: SHIPPED | OUTPATIENT
Start: 2024-11-23

## 2024-12-05 ENCOUNTER — OFFICE VISIT (OUTPATIENT)
Dept: FAMILY MEDICINE CLINIC | Age: 64
End: 2024-12-05
Payer: COMMERCIAL

## 2024-12-05 VITALS
TEMPERATURE: 97.2 F | SYSTOLIC BLOOD PRESSURE: 136 MMHG | HEART RATE: 80 BPM | HEIGHT: 62 IN | BODY MASS INDEX: 31.36 KG/M2 | WEIGHT: 170.4 LBS | DIASTOLIC BLOOD PRESSURE: 80 MMHG

## 2024-12-05 DIAGNOSIS — I10 PRIMARY HYPERTENSION: ICD-10-CM

## 2024-12-05 DIAGNOSIS — E11.29 TYPE 2 DIABETES MELLITUS WITH DIABETIC MICROALBUMINURIA, WITHOUT LONG-TERM CURRENT USE OF INSULIN (HCC): Primary | ICD-10-CM

## 2024-12-05 DIAGNOSIS — R30.0 DYSURIA: ICD-10-CM

## 2024-12-05 DIAGNOSIS — R80.9 TYPE 2 DIABETES MELLITUS WITH DIABETIC MICROALBUMINURIA, WITHOUT LONG-TERM CURRENT USE OF INSULIN (HCC): Primary | ICD-10-CM

## 2024-12-05 DIAGNOSIS — E11.29 TYPE 2 DIABETES MELLITUS WITH DIABETIC MICROALBUMINURIA, WITHOUT LONG-TERM CURRENT USE OF INSULIN (HCC): ICD-10-CM

## 2024-12-05 DIAGNOSIS — R80.9 TYPE 2 DIABETES MELLITUS WITH DIABETIC MICROALBUMINURIA, WITHOUT LONG-TERM CURRENT USE OF INSULIN (HCC): ICD-10-CM

## 2024-12-05 LAB
BACTERIA URNS QL MICRO: ABNORMAL /HPF
BILIRUB UR QL STRIP.AUTO: NEGATIVE
CLARITY UR: ABNORMAL
COLOR UR: YELLOW
EPI CELLS #/AREA URNS AUTO: 0 /HPF (ref 0–5)
GLUCOSE UR STRIP.AUTO-MCNC: >=1000 MG/DL
HGB UR QL STRIP.AUTO: ABNORMAL
HYALINE CASTS #/AREA URNS AUTO: 0 /LPF (ref 0–8)
KETONES UR STRIP.AUTO-MCNC: NEGATIVE MG/DL
LEUKOCYTE ESTERASE UR QL STRIP.AUTO: ABNORMAL
NITRITE UR QL STRIP.AUTO: POSITIVE
PH UR STRIP.AUTO: 6.5 [PH] (ref 5–8)
PROT UR STRIP.AUTO-MCNC: 30 MG/DL
RBC CLUMPS #/AREA URNS AUTO: 2 /HPF (ref 0–4)
SP GR UR STRIP.AUTO: 1.02 (ref 1–1.03)
UA DIPSTICK W REFLEX MICRO PNL UR: YES
URN SPEC COLLECT METH UR: ABNORMAL
UROBILINOGEN UR STRIP-ACNC: 0.2 E.U./DL
WBC #/AREA URNS AUTO: 299 /HPF (ref 0–5)

## 2024-12-05 PROCEDURE — 3079F DIAST BP 80-89 MM HG: CPT | Performed by: FAMILY MEDICINE

## 2024-12-05 PROCEDURE — 3046F HEMOGLOBIN A1C LEVEL >9.0%: CPT | Performed by: FAMILY MEDICINE

## 2024-12-05 PROCEDURE — 3075F SYST BP GE 130 - 139MM HG: CPT | Performed by: FAMILY MEDICINE

## 2024-12-05 PROCEDURE — 99213 OFFICE O/P EST LOW 20 MIN: CPT | Performed by: FAMILY MEDICINE

## 2024-12-05 RX ORDER — METFORMIN HYDROCHLORIDE 500 MG/1
500 TABLET, EXTENDED RELEASE ORAL
Qty: 180 TABLET | Refills: 1 | Status: SHIPPED | OUTPATIENT
Start: 2024-12-05

## 2024-12-05 ASSESSMENT — ENCOUNTER SYMPTOMS
DIARRHEA: 0
BLOOD IN STOOL: 0
ABDOMINAL PAIN: 0
SHORTNESS OF BREATH: 0
CONSTIPATION: 0

## 2024-12-05 NOTE — PATIENT INSTRUCTIONS
Take fluids  Use the medicines and the diet     We are going to add metformin back   See us back in 3 months

## 2024-12-05 NOTE — PROGRESS NOTES
Subjective:      Patient ID: Heike Means is a 64 y.o. female.    Chief Complaint   Patient presents with    Follow-up     Diabetes, anemia - pt is fasting        Patient presents with:  Follow-up: Diabetes, anemia - pt is fasting    Here for the above   She tells me she is well   She has no c/o    Meds same     YOB: 1960    Date of Visit:  12/5/2024     -- Cipro [Ciprofloxacin Hcl] -- Hallucinations   -- Hydrocodone -- Itching    Current Outpatient Medications:  losartan (COZAAR) 100 MG tablet, TAKE 1 TABLET BY MOUTH EVERY DAY, Disp: 90 tablet, Rfl: 1  metoprolol succinate (TOPROL XL) 50 MG extended release tablet, TAKE 1 TABLET BY MOUTH EVERY DAY, Disp: 90 tablet, Rfl: 1  empagliflozin (JARDIANCE) 25 MG tablet, Take 1 tablet by mouth daily, Disp: 90 tablet, Rfl: 1  hydroCHLOROthiazide 12.5 MG capsule, Take 1 capsule by mouth every morning, Disp: 90 capsule, Rfl: 1  glipiZIDE (GLUCOTROL) 10 MG tablet, Take 1 tablet by mouth 2 times daily (before meals), Disp: 180 tablet, Rfl: 1  atorvastatin (LIPITOR) 10 MG tablet, TAKE 1 TABLET BY MOUTH EVERY DAY, Disp: 90 tablet, Rfl: 1  Potassium Citrate ER (UROCIT-K) 15 MEQ (1620 MG) TBCR extended release tablet, Take 1 tablet by mouth 2 times daily, Disp: , Rfl:   Cholecalciferol (VITAMIN D-3 PO), Take by mouth, Disp: , Rfl:   vitamin B-12 (CYANOCOBALAMIN) 100 MCG tablet, One tablet twice a week, Disp: 30 tablet, Rfl: 0  vitamin C (ASCORBIC ACID) 500 MG tablet, Take 1 tablet by mouth daily, Disp: , Rfl:   Biotin 10 MG CAPS, Take by mouth, Disp: , Rfl:   Multiple Vitamin CAPS, Take 1 capsule by mouth daily , Disp: , Rfl:   oxyBUTYnin (DITROPAN-XL) 5 MG extended release tablet, TAKE 1 TABLET BY MOUTH EVERY DAY, Disp: 30 tablet, Rfl: 2  Dextromethorphan HBr (DELSYM) 15 MG TABS, Take 15 mg by mouth every 8 hours as needed (cough), Disp: 15 tablet, Rfl: 1  KERENDIA 10 MG TABS, Take 1 tablet by mouth daily, Disp: , Rfl:     No current facility-administered

## 2024-12-06 LAB
EST. AVERAGE GLUCOSE BLD GHB EST-MCNC: 269 MG/DL
HBA1C MFR BLD: 11 %

## 2024-12-06 RX ORDER — SULFAMETHOXAZOLE AND TRIMETHOPRIM 800; 160 MG/1; MG/1
1 TABLET ORAL 2 TIMES DAILY
Qty: 14 TABLET | Refills: 0 | Status: SHIPPED | OUTPATIENT
Start: 2024-12-06 | End: 2024-12-13

## 2024-12-07 LAB
BACTERIA UR CULT: ABNORMAL
ORGANISM: ABNORMAL

## 2024-12-09 DIAGNOSIS — Z87.440 RECENT URINARY TRACT INFECTION: Primary | ICD-10-CM

## 2024-12-10 DIAGNOSIS — Z87.440 RECENT URINARY TRACT INFECTION: Primary | ICD-10-CM

## 2024-12-10 RX ORDER — SULFAMETHOXAZOLE AND TRIMETHOPRIM 800; 160 MG/1; MG/1
1 TABLET ORAL 2 TIMES DAILY
Qty: 6 TABLET | Refills: 0 | Status: SHIPPED | OUTPATIENT
Start: 2024-12-10 | End: 2024-12-13

## 2024-12-16 DIAGNOSIS — Z87.440 RECENT URINARY TRACT INFECTION: ICD-10-CM

## 2024-12-16 LAB
BACTERIA URNS QL MICRO: ABNORMAL /HPF
BILIRUB UR QL STRIP.AUTO: NEGATIVE
CLARITY UR: ABNORMAL
COLOR UR: YELLOW
EPI CELLS #/AREA URNS AUTO: 1 /HPF (ref 0–5)
GLUCOSE UR STRIP.AUTO-MCNC: >=1000 MG/DL
HGB UR QL STRIP.AUTO: ABNORMAL
HYALINE CASTS #/AREA URNS AUTO: 0 /LPF (ref 0–8)
KETONES UR STRIP.AUTO-MCNC: NEGATIVE MG/DL
LEUKOCYTE ESTERASE UR QL STRIP.AUTO: ABNORMAL
NITRITE UR QL STRIP.AUTO: NEGATIVE
PH UR STRIP.AUTO: 6 [PH] (ref 5–8)
PROT UR STRIP.AUTO-MCNC: 30 MG/DL
RBC CLUMPS #/AREA URNS AUTO: 3 /HPF (ref 0–4)
SP GR UR STRIP.AUTO: 1.02 (ref 1–1.03)
UA DIPSTICK W REFLEX MICRO PNL UR: YES
URN SPEC COLLECT METH UR: ABNORMAL
UROBILINOGEN UR STRIP-ACNC: 0.2 E.U./DL
WBC #/AREA URNS AUTO: 649 /HPF (ref 0–5)

## 2024-12-17 RX ORDER — SULFAMETHOXAZOLE AND TRIMETHOPRIM 800; 160 MG/1; MG/1
1 TABLET ORAL 2 TIMES DAILY
Qty: 14 TABLET | Refills: 0 | Status: SHIPPED | OUTPATIENT
Start: 2024-12-17 | End: 2024-12-24

## 2025-01-09 RX ORDER — ATORVASTATIN CALCIUM 10 MG/1
TABLET, FILM COATED ORAL
Qty: 90 TABLET | Refills: 1 | Status: SHIPPED | OUTPATIENT
Start: 2025-01-09

## 2025-01-21 ENCOUNTER — OFFICE VISIT (OUTPATIENT)
Dept: FAMILY MEDICINE CLINIC | Age: 65
End: 2025-01-21
Payer: COMMERCIAL

## 2025-01-21 VITALS
DIASTOLIC BLOOD PRESSURE: 80 MMHG | BODY MASS INDEX: 30.69 KG/M2 | WEIGHT: 166.8 LBS | SYSTOLIC BLOOD PRESSURE: 126 MMHG | HEIGHT: 62 IN | TEMPERATURE: 99.7 F

## 2025-01-21 DIAGNOSIS — J02.9 SORE THROAT: ICD-10-CM

## 2025-01-21 DIAGNOSIS — J06.9 UPPER RESPIRATORY TRACT INFECTION, UNSPECIFIED TYPE: Primary | ICD-10-CM

## 2025-01-21 LAB
INFLUENZA A ANTIGEN, POC: NEGATIVE
INFLUENZA B ANTIGEN, POC: NEGATIVE
LOT EXPIRE DATE: NORMAL
LOT KIT NUMBER: NORMAL
SARS-COV-2, POC: NORMAL
VALID INTERNAL CONTROL: YES
VENDOR AND KIT NAME POC: NORMAL

## 2025-01-21 PROCEDURE — 3079F DIAST BP 80-89 MM HG: CPT | Performed by: FAMILY MEDICINE

## 2025-01-21 PROCEDURE — 3074F SYST BP LT 130 MM HG: CPT | Performed by: FAMILY MEDICINE

## 2025-01-21 PROCEDURE — 87428 SARSCOV & INF VIR A&B AG IA: CPT | Performed by: FAMILY MEDICINE

## 2025-01-21 PROCEDURE — 99213 OFFICE O/P EST LOW 20 MIN: CPT | Performed by: FAMILY MEDICINE

## 2025-01-21 SDOH — ECONOMIC STABILITY: FOOD INSECURITY: WITHIN THE PAST 12 MONTHS, YOU WORRIED THAT YOUR FOOD WOULD RUN OUT BEFORE YOU GOT MONEY TO BUY MORE.: NEVER TRUE

## 2025-01-21 SDOH — ECONOMIC STABILITY: FOOD INSECURITY: WITHIN THE PAST 12 MONTHS, THE FOOD YOU BOUGHT JUST DIDN'T LAST AND YOU DIDN'T HAVE MONEY TO GET MORE.: NEVER TRUE

## 2025-01-21 ASSESSMENT — PATIENT HEALTH QUESTIONNAIRE - PHQ9
SUM OF ALL RESPONSES TO PHQ QUESTIONS 1-9: 0
2. FEELING DOWN, DEPRESSED OR HOPELESS: NOT AT ALL
SUM OF ALL RESPONSES TO PHQ QUESTIONS 1-9: 0
1. LITTLE INTEREST OR PLEASURE IN DOING THINGS: NOT AT ALL
SUM OF ALL RESPONSES TO PHQ9 QUESTIONS 1 & 2: 0

## 2025-01-21 NOTE — PROGRESS NOTES
Subjective:      Patient ID: Heike Means is a 64 y.o. female.    Chief Complaint   Patient presents with    Congestion     Sore throat, headache, bodyaches, chills, fever x 3 days        Patient presents with:  Congestion: Sore throat, headache, bodyaches, chills, fever x 3 days    Here for the above   Using otc  Temp at home and not taken  No cough  No v no d     YOB: 1960    Date of Visit:  1/21/2025     -- Cipro [Ciprofloxacin Hcl] -- Hallucinations   -- Hydrocodone -- Itching    Current Outpatient Medications:  atorvastatin (LIPITOR) 10 MG tablet, TAKE 1 TABLET BY MOUTH EVERY DAY, Disp: 90 tablet, Rfl: 1  metFORMIN (GLUCOPHAGE-XR) 500 MG extended release tablet, Take 1 tablet by mouth 2 times daily (before meals), Disp: 180 tablet, Rfl: 1  losartan (COZAAR) 100 MG tablet, TAKE 1 TABLET BY MOUTH EVERY DAY, Disp: 90 tablet, Rfl: 1  metoprolol succinate (TOPROL XL) 50 MG extended release tablet, TAKE 1 TABLET BY MOUTH EVERY DAY, Disp: 90 tablet, Rfl: 1  empagliflozin (JARDIANCE) 25 MG tablet, Take 1 tablet by mouth daily, Disp: 90 tablet, Rfl: 1  hydroCHLOROthiazide 12.5 MG capsule, Take 1 capsule by mouth every morning, Disp: 90 capsule, Rfl: 1  glipiZIDE (GLUCOTROL) 10 MG tablet, Take 1 tablet by mouth 2 times daily (before meals), Disp: 180 tablet, Rfl: 1  Potassium Citrate ER (UROCIT-K) 15 MEQ (1620 MG) TBCR extended release tablet, Take 1 tablet by mouth 2 times daily, Disp: , Rfl:   Cholecalciferol (VITAMIN D-3 PO), Take by mouth, Disp: , Rfl:   vitamin B-12 (CYANOCOBALAMIN) 100 MCG tablet, One tablet twice a week, Disp: 30 tablet, Rfl: 0  vitamin C (ASCORBIC ACID) 500 MG tablet, Take 1 tablet by mouth daily, Disp: , Rfl:   Biotin 10 MG CAPS, Take by mouth, Disp: , Rfl:   Multiple Vitamin CAPS, Take 1 capsule by mouth daily , Disp: , Rfl:   oxyBUTYnin (DITROPAN-XL) 5 MG extended release tablet, TAKE 1 TABLET BY MOUTH EVERY DAY, Disp: 30 tablet, Rfl: 2  Dextromethorphan HBr (DELSYM) 15 MG

## 2025-01-21 NOTE — PATIENT INSTRUCTIONS
Take the antibiotic  Gargle with warm salt water  Take tylenol   Do get rest  Call if a problem   See me end of February

## 2025-01-23 RX ORDER — HYDROCHLOROTHIAZIDE 12.5 MG/1
12.5 CAPSULE ORAL EVERY MORNING
Qty: 90 CAPSULE | Refills: 1 | Status: SHIPPED | OUTPATIENT
Start: 2025-01-23

## 2025-01-24 LAB — BACTERIA THROAT AEROBE CULT: NORMAL

## 2025-01-25 RX ORDER — EMPAGLIFLOZIN 25 MG/1
25 TABLET, FILM COATED ORAL DAILY
Qty: 90 TABLET | Refills: 1 | Status: SHIPPED | OUTPATIENT
Start: 2025-01-25

## 2025-02-21 ENCOUNTER — OFFICE VISIT (OUTPATIENT)
Dept: FAMILY MEDICINE CLINIC | Age: 65
End: 2025-02-21
Payer: COMMERCIAL

## 2025-02-21 VITALS
HEART RATE: 72 BPM | WEIGHT: 167.4 LBS | BODY MASS INDEX: 30.8 KG/M2 | HEIGHT: 62 IN | DIASTOLIC BLOOD PRESSURE: 84 MMHG | TEMPERATURE: 98.2 F | SYSTOLIC BLOOD PRESSURE: 130 MMHG

## 2025-02-21 DIAGNOSIS — E11.65 POORLY CONTROLLED DIABETES MELLITUS (HCC): ICD-10-CM

## 2025-02-21 DIAGNOSIS — R79.89 ABNORMAL CBC: ICD-10-CM

## 2025-02-21 DIAGNOSIS — E11.65 POORLY CONTROLLED DIABETES MELLITUS (HCC): Primary | ICD-10-CM

## 2025-02-21 DIAGNOSIS — R82.81 PYURIA: ICD-10-CM

## 2025-02-21 DIAGNOSIS — I10 PRIMARY HYPERTENSION: ICD-10-CM

## 2025-02-21 PROCEDURE — 3075F SYST BP GE 130 - 139MM HG: CPT | Performed by: FAMILY MEDICINE

## 2025-02-21 PROCEDURE — 3079F DIAST BP 80-89 MM HG: CPT | Performed by: FAMILY MEDICINE

## 2025-02-21 PROCEDURE — 99214 OFFICE O/P EST MOD 30 MIN: CPT | Performed by: FAMILY MEDICINE

## 2025-02-21 ASSESSMENT — ENCOUNTER SYMPTOMS
CHEST TIGHTNESS: 0
ABDOMINAL DISTENTION: 0
NAUSEA: 0
ABDOMINAL PAIN: 0
BLOOD IN STOOL: 0
DIARRHEA: 0
CONSTIPATION: 0
SHORTNESS OF BREATH: 0
VOMITING: 0

## 2025-02-21 NOTE — PATIENT INSTRUCTIONS
Do stay with the diet and the medicines   See us in about 4 months   Use the low purine diet   Use over the lotrimin (clotrimazole) small amount for 14 days twice a day   Call if not resolved

## 2025-02-21 NOTE — PROGRESS NOTES
Palpations: Abdomen is soft. There is no mass.      Tenderness: There is no abdominal tenderness. There is no guarding.   Lymphadenopathy:      Cervical: No cervical adenopathy.      Upper Body:      Right upper body: No supraclavicular adenopathy.      Left upper body: No supraclavicular adenopathy.   Skin:     General: Skin is warm and dry.      Coloration: Skin is not pale.      Nails: There is no clubbing.      Comments: Small oval red scaly area on the lower left arm consistent with tinea    Neurological:      Mental Status: She is alert.         Assessment:   Assessment     Diagnosis Orders   1. Poorly controlled diabetes mellitus (HCC)  Hemoglobin A1C    Comprehensive Metabolic Panel      2. Primary hypertension  Hemoglobin A1C    Comprehensive Metabolic Panel      3. Pyuria  Urinalysis with Microscopic    Culture, Urine      4. Abnormal CBC  CBC with Auto Differential        Suspect \"ringworm\"     Discussed the uric acid and diet to help control   Tolerating metformin fine   Using b12 qod    Utd colon no tobacco   I asked and she declined vaccines   Utd mammogram     Family hx pancreas ca dad lung ca  No thyroid cancer  No hx of pancreatitis in self             PLAN:   Plan    Do stay with the diet and the medicines   See us in about 4 months   Use the low purine diet   Use over the lotrimin (clotrimazole) small amount for 14 days twice a day   Call if not resolved          Dima Sierra MD

## 2025-02-22 LAB
ALBUMIN SERPL-MCNC: 4.6 G/DL (ref 3.4–5)
ALBUMIN/GLOB SERPL: 1.6 {RATIO} (ref 1.1–2.2)
ALP SERPL-CCNC: 77 U/L (ref 40–129)
ALT SERPL-CCNC: 72 U/L (ref 10–40)
ANION GAP SERPL CALCULATED.3IONS-SCNC: 15 MMOL/L (ref 3–16)
AST SERPL-CCNC: 35 U/L (ref 15–37)
BACTERIA URNS QL MICRO: ABNORMAL /HPF
BASOPHILS # BLD: 0.1 K/UL (ref 0–0.2)
BASOPHILS NFR BLD: 0.7 %
BILIRUB SERPL-MCNC: 0.3 MG/DL (ref 0–1)
BILIRUB UR QL STRIP.AUTO: NEGATIVE
BUN SERPL-MCNC: 29 MG/DL (ref 7–20)
CALCIUM SERPL-MCNC: 10.5 MG/DL (ref 8.3–10.6)
CHLORIDE SERPL-SCNC: 99 MMOL/L (ref 99–110)
CLARITY UR: ABNORMAL
CO2 SERPL-SCNC: 29 MMOL/L (ref 21–32)
COLOR UR: YELLOW
CREAT SERPL-MCNC: 1.1 MG/DL (ref 0.6–1.2)
DEPRECATED RDW RBC AUTO: 14.6 % (ref 12.4–15.4)
EOSINOPHIL # BLD: 0.2 K/UL (ref 0–0.6)
EOSINOPHIL NFR BLD: 2 %
EPI CELLS #/AREA URNS AUTO: 0 /HPF (ref 0–5)
EST. AVERAGE GLUCOSE BLD GHB EST-MCNC: 203 MG/DL
GFR SERPLBLD CREATININE-BSD FMLA CKD-EPI: 56 ML/MIN/{1.73_M2}
GLUCOSE SERPL-MCNC: 161 MG/DL (ref 70–99)
GLUCOSE UR STRIP.AUTO-MCNC: >=1000 MG/DL
HBA1C MFR BLD: 8.7 %
HCT VFR BLD AUTO: 48.4 % (ref 36–48)
HGB BLD-MCNC: 16.4 G/DL (ref 12–16)
HGB UR QL STRIP.AUTO: ABNORMAL
HYALINE CASTS #/AREA URNS AUTO: 0 /LPF (ref 0–8)
KETONES UR STRIP.AUTO-MCNC: NEGATIVE MG/DL
LEUKOCYTE ESTERASE UR QL STRIP.AUTO: ABNORMAL
LYMPHOCYTES # BLD: 2.3 K/UL (ref 1–5.1)
LYMPHOCYTES NFR BLD: 29.7 %
MCH RBC QN AUTO: 31.6 PG (ref 26–34)
MCHC RBC AUTO-ENTMCNC: 33.8 G/DL (ref 31–36)
MCV RBC AUTO: 93.4 FL (ref 80–100)
MONOCYTES # BLD: 0.4 K/UL (ref 0–1.3)
MONOCYTES NFR BLD: 5.3 %
NEUTROPHILS # BLD: 4.9 K/UL (ref 1.7–7.7)
NEUTROPHILS NFR BLD: 62.3 %
NITRITE UR QL STRIP.AUTO: POSITIVE
PH UR STRIP.AUTO: 6.5 [PH] (ref 5–8)
PLATELET # BLD AUTO: 178 K/UL (ref 135–450)
PMV BLD AUTO: 9.5 FL (ref 5–10.5)
POTASSIUM SERPL-SCNC: 4.2 MMOL/L (ref 3.5–5.1)
PROT SERPL-MCNC: 7.5 G/DL (ref 6.4–8.2)
PROT UR STRIP.AUTO-MCNC: 30 MG/DL
RBC # BLD AUTO: 5.19 M/UL (ref 4–5.2)
RBC CLUMPS #/AREA URNS AUTO: 3 /HPF (ref 0–4)
SODIUM SERPL-SCNC: 143 MMOL/L (ref 136–145)
SP GR UR STRIP.AUTO: 1.03 (ref 1–1.03)
UA DIPSTICK W REFLEX MICRO PNL UR: YES
URN SPEC COLLECT METH UR: ABNORMAL
UROBILINOGEN UR STRIP-ACNC: 0.2 E.U./DL
WBC # BLD AUTO: 7.9 K/UL (ref 4–11)
WBC #/AREA URNS AUTO: 673 /HPF (ref 0–5)

## 2025-02-24 LAB
BACTERIA UR CULT: ABNORMAL
ORGANISM: ABNORMAL

## 2025-02-24 RX ORDER — NITROFURANTOIN 25; 75 MG/1; MG/1
100 CAPSULE ORAL DAILY
Qty: 10 CAPSULE | Refills: 0 | Status: SHIPPED | OUTPATIENT
Start: 2025-02-24 | End: 2025-03-06

## 2025-02-25 DIAGNOSIS — R82.71 BACTERIA IN URINE: Primary | ICD-10-CM

## 2025-02-27 ENCOUNTER — OFFICE VISIT (OUTPATIENT)
Dept: INFECTIOUS DISEASES | Age: 65
End: 2025-02-27
Payer: COMMERCIAL

## 2025-02-27 VITALS
WEIGHT: 166.2 LBS | TEMPERATURE: 97.3 F | HEIGHT: 62 IN | DIASTOLIC BLOOD PRESSURE: 93 MMHG | OXYGEN SATURATION: 98 % | HEART RATE: 76 BPM | BODY MASS INDEX: 30.59 KG/M2 | SYSTOLIC BLOOD PRESSURE: 161 MMHG

## 2025-02-27 DIAGNOSIS — B96.89 URINARY TRACT INFECTION DUE TO ESBL KLEBSIELLA: Primary | ICD-10-CM

## 2025-02-27 DIAGNOSIS — N20.0 NEPHROLITHIASIS: ICD-10-CM

## 2025-02-27 DIAGNOSIS — Z16.24 MULTIPLE DRUG RESISTANT ORGANISM (MDRO) CULTURE POSITIVE: ICD-10-CM

## 2025-02-27 DIAGNOSIS — N39.0 RECURRENT UTI: ICD-10-CM

## 2025-02-27 DIAGNOSIS — E11.65 POORLY CONTROLLED DIABETES MELLITUS (HCC): ICD-10-CM

## 2025-02-27 DIAGNOSIS — N39.0 URINARY TRACT INFECTION DUE TO ESBL KLEBSIELLA: Primary | ICD-10-CM

## 2025-02-27 DIAGNOSIS — N39.3 GENUINE STRESS INCONTINENCE, FEMALE: ICD-10-CM

## 2025-02-27 PROCEDURE — 3080F DIAST BP >= 90 MM HG: CPT | Performed by: INTERNAL MEDICINE

## 2025-02-27 PROCEDURE — 99205 OFFICE O/P NEW HI 60 MIN: CPT | Performed by: INTERNAL MEDICINE

## 2025-02-27 PROCEDURE — 3077F SYST BP >= 140 MM HG: CPT | Performed by: INTERNAL MEDICINE

## 2025-02-27 PROCEDURE — 3052F HG A1C>EQUAL 8.0%<EQUAL 9.0%: CPT | Performed by: INTERNAL MEDICINE

## 2025-02-27 NOTE — PROGRESS NOTES
repeat CT scan to check for obstruction or worsening history renal stones       D/W Patient/Family in detail at the time of consultation   .   There is potential for worsening infection/ sudden clinically significant or life-threatening deterioration in the patient's condition without appropriate antimicrobial therapy.  Complex medical decision making process was involved to select appropriate antimicrobial agents to reverse the cause of patient's severe infection/ illness.  Antimicrobial therapy requires intensive monitoring for toxicity and frequent dose adjustments to prevent toxicity and permanent end-organ dysfunction    --Prachi Miller MD on 3/4/2025 at 9:13 AM    Total time spent for this encounter:  60 min    (including interval history, review of data including labs, physical exam,  cultures, imaging,  ordering labs, development and implementation of treatment plan, co ordination of care, counseling and education ).     --Prachi Miller MD on 3/4/2025 at 9:13 AM    An electronic signature was used to authenticate this note.    Thanks for allowing me to participate your patient's care and please call me with any questions or concerns.    Dr.Ravindhar Angela DOHERTY  Infectious Disease  [facility] Physician  Phone: [phone]   Fax : [phone]

## 2025-02-28 ENCOUNTER — TELEPHONE (OUTPATIENT)
Dept: FAMILY MEDICINE CLINIC | Age: 65
End: 2025-02-28

## 2025-02-28 ENCOUNTER — TELEPHONE (OUTPATIENT)
Dept: INFECTIOUS DISEASES | Age: 65
End: 2025-02-28

## 2025-02-28 DIAGNOSIS — A49.9 ESBL (EXTENDED SPECTRUM BETA-LACTAMASE) PRODUCING BACTERIA INFECTION: Primary | ICD-10-CM

## 2025-02-28 DIAGNOSIS — E11.65 POORLY CONTROLLED DIABETES MELLITUS (HCC): Primary | ICD-10-CM

## 2025-02-28 DIAGNOSIS — Z16.12 ESBL (EXTENDED SPECTRUM BETA-LACTAMASE) PRODUCING BACTERIA INFECTION: Primary | ICD-10-CM

## 2025-02-28 PROBLEM — N39.0 URINARY TRACT INFECTION: Status: ACTIVE | Noted: 2025-02-28

## 2025-02-28 RX ORDER — DIPHENHYDRAMINE HYDROCHLORIDE 50 MG/ML
50 INJECTION INTRAMUSCULAR; INTRAVENOUS
OUTPATIENT
Start: 2025-03-03

## 2025-02-28 RX ORDER — ONDANSETRON 2 MG/ML
8 INJECTION INTRAMUSCULAR; INTRAVENOUS
OUTPATIENT
Start: 2025-03-03

## 2025-02-28 RX ORDER — HYDROCORTISONE SODIUM SUCCINATE 100 MG/2ML
100 INJECTION INTRAMUSCULAR; INTRAVENOUS
OUTPATIENT
Start: 2025-03-03

## 2025-02-28 RX ORDER — ACETAMINOPHEN 325 MG/1
650 TABLET ORAL
OUTPATIENT
Start: 2025-03-03

## 2025-02-28 RX ORDER — ALBUTEROL SULFATE 90 UG/1
4 INHALANT RESPIRATORY (INHALATION) PRN
OUTPATIENT
Start: 2025-03-03

## 2025-02-28 RX ORDER — SODIUM CHLORIDE 9 MG/ML
INJECTION, SOLUTION INTRAVENOUS CONTINUOUS
OUTPATIENT
Start: 2025-03-03

## 2025-02-28 RX ORDER — EPINEPHRINE 1 MG/ML
0.3 INJECTION, SOLUTION, CONCENTRATE INTRAVENOUS PRN
OUTPATIENT
Start: 2025-03-03

## 2025-02-28 NOTE — TELEPHONE ENCOUNTER
Heike seen the infectious disease dr yesterday. She was told to stop taking the Jaurdiance. He told her that this could be causing her issues. She would like to know what she should do?

## 2025-02-28 NOTE — TELEPHONE ENCOUNTER
Change made    Orders Placed This Encounter   Medications    Semaglutide,0.25 or 0.5MG/DOS, 2 MG/3ML SOPN     Sig: Inject 0.25 mg into the skin every 7 days Call for new dose when refill needed     Dispense:  3 mL     Refill:  0

## 2025-02-28 NOTE — TELEPHONE ENCOUNTER
PICC ordered and scheduled for 3-3-25 @ 11:30.    Order for IV Invanz 1 gm daily x 3-9-25, IV Ertapenem 1 gm daily x 3-9-25 faxed to [facility].    Voicemail left for pt with updated plan.

## 2025-02-28 NOTE — TELEPHONE ENCOUNTER
Heike advised and verbalized understanding. Please send to [facility].

## 2025-02-28 NOTE — TELEPHONE ENCOUNTER
We can try something like ozempic weekly   We start low and go up after a month  If abd pain n or v call   Stop jardiance

## 2025-03-03 ENCOUNTER — HOSPITAL ENCOUNTER (OUTPATIENT)
Dept: INTERVENTIONAL RADIOLOGY/VASCULAR | Age: 65
Discharge: HOME | End: 2025-03-03
Attending: INTERNAL MEDICINE

## 2025-03-03 ENCOUNTER — TELEPHONE (OUTPATIENT)
Dept: FAMILY MEDICINE CLINIC | Age: 65
End: 2025-03-03

## 2025-03-03 ENCOUNTER — HOSPITAL ENCOUNTER (OUTPATIENT)
Dept: INFUSION THERAPY | Age: 65
Setting detail: INFUSION SERIES
Discharge: HOME | End: 2025-03-03
Payer: COMMERCIAL

## 2025-03-03 VITALS
HEIGHT: 62 IN | HEART RATE: 71 BPM | RESPIRATION RATE: 16 BRPM | TEMPERATURE: 98.1 F | SYSTOLIC BLOOD PRESSURE: 139 MMHG | WEIGHT: 164 LBS | OXYGEN SATURATION: 97 % | BODY MASS INDEX: 30.18 KG/M2 | DIASTOLIC BLOOD PRESSURE: 75 MMHG

## 2025-03-03 DIAGNOSIS — Z16.12 ESBL (EXTENDED SPECTRUM BETA-LACTAMASE) PRODUCING BACTERIA INFECTION: ICD-10-CM

## 2025-03-03 DIAGNOSIS — E11.65 POORLY CONTROLLED DIABETES MELLITUS (HCC): Primary | ICD-10-CM

## 2025-03-03 DIAGNOSIS — A49.9 ESBL (EXTENDED SPECTRUM BETA-LACTAMASE) PRODUCING BACTERIA INFECTION: ICD-10-CM

## 2025-03-03 DIAGNOSIS — R31.9 URINARY TRACT INFECTION WITH HEMATURIA, SITE UNSPECIFIED: Primary | ICD-10-CM

## 2025-03-03 DIAGNOSIS — N39.0 URINARY TRACT INFECTION WITH HEMATURIA, SITE UNSPECIFIED: Primary | ICD-10-CM

## 2025-03-03 LAB
ALBUMIN SERPL-MCNC: 4.2 G/DL (ref 3.4–5)
ALBUMIN/GLOB SERPL: 1.4 {RATIO} (ref 1.1–2.2)
ALP SERPL-CCNC: 65 U/L (ref 40–129)
ALT SERPL-CCNC: 76 U/L (ref 10–40)
ANION GAP SERPL CALCULATED.3IONS-SCNC: 12 MMOL/L (ref 3–16)
AST SERPL-CCNC: 37 U/L (ref 15–37)
BASOPHILS # BLD: 0.1 K/UL (ref 0–0.2)
BASOPHILS NFR BLD: 0.9 %
BILIRUB SERPL-MCNC: 0.4 MG/DL (ref 0–1)
BUN SERPL-MCNC: 29 MG/DL (ref 7–20)
CALCIUM SERPL-MCNC: 10.6 MG/DL (ref 8.3–10.6)
CHLORIDE SERPL-SCNC: 101 MMOL/L (ref 99–110)
CO2 SERPL-SCNC: 27 MMOL/L (ref 21–32)
CREAT SERPL-MCNC: 0.9 MG/DL (ref 0.6–1.2)
CRP SERPL-MCNC: <3 MG/L (ref 0–5.1)
DEPRECATED RDW RBC AUTO: 14.2 % (ref 12.4–15.4)
EOSINOPHIL # BLD: 0.1 K/UL (ref 0–0.6)
EOSINOPHIL NFR BLD: 1.7 %
ERYTHROCYTE [SEDIMENTATION RATE] IN BLOOD BY WESTERGREN METHOD: 16 MM/HR (ref 0–30)
GFR SERPLBLD CREATININE-BSD FMLA CKD-EPI: 71 ML/MIN/{1.73_M2}
GLUCOSE SERPL-MCNC: 166 MG/DL (ref 70–99)
HCT VFR BLD AUTO: 44 % (ref 36–48)
HGB BLD-MCNC: 15 G/DL (ref 12–16)
LYMPHOCYTES # BLD: 2.4 K/UL (ref 1–5.1)
LYMPHOCYTES NFR BLD: 39.5 %
MCH RBC QN AUTO: 31 PG (ref 26–34)
MCHC RBC AUTO-ENTMCNC: 34.1 G/DL (ref 31–36)
MCV RBC AUTO: 91 FL (ref 80–100)
MONOCYTES # BLD: 0.4 K/UL (ref 0–1.3)
MONOCYTES NFR BLD: 6.2 %
NEUTROPHILS # BLD: 3.1 K/UL (ref 1.7–7.7)
NEUTROPHILS NFR BLD: 51.7 %
PLATELET # BLD AUTO: 201 K/UL (ref 135–450)
PMV BLD AUTO: 9.2 FL (ref 5–10.5)
POTASSIUM SERPL-SCNC: 3.9 MMOL/L (ref 3.5–5.1)
PROT SERPL-MCNC: 7.3 G/DL (ref 6.4–8.2)
RBC # BLD AUTO: 4.84 M/UL (ref 4–5.2)
SODIUM SERPL-SCNC: 140 MMOL/L (ref 136–145)
WBC # BLD AUTO: 6 K/UL (ref 4–11)

## 2025-03-03 PROCEDURE — 85652 RBC SED RATE AUTOMATED: CPT

## 2025-03-03 PROCEDURE — 85025 COMPLETE CBC W/AUTO DIFF WBC: CPT

## 2025-03-03 PROCEDURE — 6360000002 HC RX W HCPCS: Performed by: INTERNAL MEDICINE

## 2025-03-03 PROCEDURE — 80053 COMPREHEN METABOLIC PANEL: CPT

## 2025-03-03 PROCEDURE — 96374 THER/PROPH/DIAG INJ IV PUSH: CPT

## 2025-03-03 PROCEDURE — 2500000003 HC RX 250 WO HCPCS: Performed by: INTERNAL MEDICINE

## 2025-03-03 PROCEDURE — 86140 C-REACTIVE PROTEIN: CPT

## 2025-03-03 PROCEDURE — 36569 INSJ PICC 5 YR+ W/O IMAGING: CPT

## 2025-03-03 PROCEDURE — 2580000003 HC RX 258: Performed by: INTERNAL MEDICINE

## 2025-03-03 PROCEDURE — 36592 COLLECT BLOOD FROM PICC: CPT

## 2025-03-03 PROCEDURE — C1751 CATH, INF, PER/CENT/MIDLINE: HCPCS

## 2025-03-03 RX ORDER — HYDROCORTISONE SODIUM SUCCINATE 100 MG/2ML
100 INJECTION INTRAMUSCULAR; INTRAVENOUS
OUTPATIENT
Start: 2025-03-04

## 2025-03-03 RX ORDER — SODIUM CHLORIDE 0.9 % (FLUSH) 0.9 %
5-40 SYRINGE (ML) INJECTION PRN
OUTPATIENT
Start: 2025-03-04

## 2025-03-03 RX ORDER — ACETAMINOPHEN 325 MG/1
650 TABLET ORAL
OUTPATIENT
Start: 2025-03-04

## 2025-03-03 RX ORDER — SODIUM CHLORIDE 0.9 % (FLUSH) 0.9 %
5-40 SYRINGE (ML) INJECTION PRN
Status: DISCONTINUED | OUTPATIENT
Start: 2025-03-03 | End: 2025-03-04 | Stop reason: HOSPADM

## 2025-03-03 RX ORDER — EPINEPHRINE 1 MG/ML
0.3 INJECTION, SOLUTION, CONCENTRATE INTRAVENOUS PRN
OUTPATIENT
Start: 2025-03-04

## 2025-03-03 RX ORDER — ONDANSETRON 2 MG/ML
8 INJECTION INTRAMUSCULAR; INTRAVENOUS
OUTPATIENT
Start: 2025-03-04

## 2025-03-03 RX ORDER — SODIUM CHLORIDE 9 MG/ML
INJECTION, SOLUTION INTRAVENOUS CONTINUOUS
OUTPATIENT
Start: 2025-03-04

## 2025-03-03 RX ORDER — DIPHENHYDRAMINE HYDROCHLORIDE 50 MG/ML
50 INJECTION INTRAMUSCULAR; INTRAVENOUS
OUTPATIENT
Start: 2025-03-04

## 2025-03-03 RX ORDER — ALBUTEROL SULFATE 90 UG/1
4 INHALANT RESPIRATORY (INHALATION) PRN
OUTPATIENT
Start: 2025-03-04

## 2025-03-03 RX ADMIN — Medication 30 ML: at 13:27

## 2025-03-03 RX ADMIN — Medication 10 ML: at 13:09

## 2025-03-03 RX ADMIN — ERTAPENEM SODIUM 1000 MG: 1 INJECTION INTRAMUSCULAR; INTRAVENOUS at 13:09

## 2025-03-03 ASSESSMENT — PAIN SCALES - GENERAL: PAINLEVEL_OUTOF10: 4

## 2025-03-03 ASSESSMENT — PAIN DESCRIPTION - LOCATION: LOCATION: PELVIS

## 2025-03-03 ASSESSMENT — PAIN DESCRIPTION - DESCRIPTORS: DESCRIPTORS: BURNING

## 2025-03-03 NOTE — PROGRESS NOTES
Arrived to place PICC line with bedside RN. Pre-procedure and timeout done with RN, discussed limitations of placement and allergies.      Pt's basilic, brachial, cephalic are all easily collapsible with no indication for a clot. Vein selected is large enough for catheter. Pt tolerated sterile procedure well, with no difficulty accessing basilic vein, when accessed - blood was free flowing and non-pulsatile. Guidewire, introducer, and catheter went in smoothly.   PICC line verified with 3CG technology with peaked P-waves (please see image below). PICC tip terminates in the SVC according to [employer] 3CG tip confirmation system. PICC was seen dropping into SVC with tip tracking technology and discernable peaked p waves were noted without negative deflection.   OK to use PICC. Please use new IV tubing when connecting to the newly placed central line.      Post procedure - reorganized pt table, placed pt in lowest position, with call light and educated on line care. Reported off to bedside RN.      Please call if you have any questions about the PICC or ML. The  will direct you to the PICC RN that is on call.      [phone]

## 2025-03-03 NOTE — PROGRESS NOTES
Outpatient Infusion Center  [facility]    IV Antibiotic Visit    NAME:  Heike Means  YOB: 1960  MEDICAL RECORD NUMBER:  [id number]  DATE:  3/3/2025    Patient arrived to Outpatient Infusion Center   [] per wheelchair   [x] ambulatory     Itching: No  Rash: No  Mouth Sores: No  Nausea: No  Vomiting: No  Diarrhea: No  Night Sweats: No  Fever: No    Administered: [] Peripheral access    [x] PICC access    [] Port access    Allergies   Allergen Reactions    Cipro [Ciprofloxacin Hcl] Hallucinations    Hydrocodone Itching       Name of Antibiotic Administered: Invanz  Dose of Antibiotic Administered: 1000 mg.    Indication for Antibiotic: UTI      Response to treatment:  Well tolerated by patient.       Patient having home care for antibiotic. Patient possibly getting PICC removed next Monday March 10th.     Electronically signed by Georgia Louis RN on 3/3/2025 at 1:28 PM

## 2025-03-03 NOTE — DISCHARGE INSTRUCTIONS
Outpatient Infusion Discharge Instructions  [facility]  [address] [place], [place] [address]  Telephone: [phone]      FAX [phone]    NAME:  Heike Means  YOB: 1960  MEDICAL RECORD NUMBER:  [id number]  DATE:  @ED@    Reason for Outpatient Infusion Visit: IV antibiotics    If you develop any these symptoms please contact you Doctor    [x] Nausea and/or vomiting not relieved with medication   [x] Swelling, redness, and/or bleeding at injection or IV site    [x] Fever or chills  [x] Rash or itching   [x] Shortness of breath  [x] Please review After Visit Summary (AVS) information on    [x] Other / Call next Monday March 10 th about pulling PICC line      Outpatient Infusion Center Information: Should you experience any significant changes in your health or have questions about your care please contact the [facility] at [phone] MONDAY - FRIDAY 8:00 am - 4:00 pm.  If you need help outside these hours and cannot wait until we are again available, contact your Primary Care Physician or go to the hospital emergency room.       Electronically signed by Georgia Louis RN on 3/3/2025 at 1:17 PM

## 2025-03-03 NOTE — TELEPHONE ENCOUNTER
Patient insurance will not cover Ozempic. She is asking if you want to try something else.    Jose Quiroga,    Please call, [phone]

## 2025-03-03 NOTE — PROGRESS NOTES
Outpatient Infusion Center   [facility]    PICC Lab Draw    NAME:  Heike Means  YOB: 1960  MEDICAL RECORD NUMBER:  [id number]  DATE:  3/3/2025    Patient arrived to Outpatient Infusion Center   [] per wheelchair   [x] ambulatory     Patient alert and oriented x4.    PICC Location:right arm    PICC Site:  Redness: No  Bruising: No   Edema: No  Pain: No     PICC Labs  Cap cleansed with Chloroprep Scrub for 30 seconds and air dried completely for 1 minute on sterile 4X4: Yes  Blood drawn using a 10/ml syringe  Amount of blood wasted 10/ml syringe    Labs Obtained: Yes  Lab Test(s) Ordered: CBC with diff; CMP; ESR; CRP  PICC Flushed with 20ml/NS: Yes  New Cap applied: Yes     Response to treatment:  Well tolerated by patient.      Electronically signed by Georgia Louis RN on 3/3/2025 at 12:42 PM

## 2025-03-07 ENCOUNTER — TELEPHONE (OUTPATIENT)
Dept: INFECTIOUS DISEASES | Age: 65
End: 2025-03-07

## 2025-03-07 DIAGNOSIS — B96.89 URINARY TRACT INFECTION DUE TO ESBL KLEBSIELLA: Primary | ICD-10-CM

## 2025-03-07 DIAGNOSIS — N39.0 URINARY TRACT INFECTION DUE TO ESBL KLEBSIELLA: Primary | ICD-10-CM

## 2025-03-07 NOTE — TELEPHONE ENCOUNTER
Pt states she fells better, only a \"slight tinge of burning when urinating.\" Pt due to end on 3-9-25.

## 2025-03-07 NOTE — TELEPHONE ENCOUNTER
Nettie with [employer] verbalized understanding to extend IV abx until 3-11-25.    Order faxed to [facility]. Pt verbalized understanding of plan.

## 2025-03-09 RX ORDER — GLIPIZIDE 10 MG/1
20 TABLET ORAL
Qty: 180 TABLET | Refills: 1 | Status: SHIPPED | OUTPATIENT
Start: 2025-03-09

## 2025-03-10 ENCOUNTER — HOSPITAL ENCOUNTER (OUTPATIENT)
Dept: INFUSION THERAPY | Age: 65
Setting detail: INFUSION SERIES
Discharge: HOME | End: 2025-03-10
Payer: COMMERCIAL

## 2025-03-10 LAB
ALBUMIN SERPL-MCNC: 4.1 G/DL (ref 3.4–5)
ALBUMIN/GLOB SERPL: 1.4 {RATIO} (ref 1.1–2.2)
ALP SERPL-CCNC: 83 U/L (ref 40–129)
ALT SERPL-CCNC: 71 U/L (ref 10–40)
ANION GAP SERPL CALCULATED.3IONS-SCNC: 15 MMOL/L (ref 3–16)
AST SERPL-CCNC: 37 U/L (ref 15–37)
BASOPHILS # BLD: 0 K/UL (ref 0–0.2)
BASOPHILS NFR BLD: 0.5 %
BILIRUB SERPL-MCNC: 0.4 MG/DL (ref 0–1)
BUN SERPL-MCNC: 20 MG/DL (ref 7–20)
CALCIUM SERPL-MCNC: 10 MG/DL (ref 8.3–10.6)
CHLORIDE SERPL-SCNC: 100 MMOL/L (ref 99–110)
CO2 SERPL-SCNC: 27 MMOL/L (ref 21–32)
CREAT SERPL-MCNC: 0.8 MG/DL (ref 0.6–1.2)
CRP SERPL-MCNC: <3 MG/L (ref 0–5.1)
DEPRECATED RDW RBC AUTO: 14 % (ref 12.4–15.4)
EOSINOPHIL # BLD: 0.2 K/UL (ref 0–0.6)
EOSINOPHIL NFR BLD: 4 %
ERYTHROCYTE [SEDIMENTATION RATE] IN BLOOD BY WESTERGREN METHOD: 17 MM/HR (ref 0–30)
GFR SERPLBLD CREATININE-BSD FMLA CKD-EPI: 82 ML/MIN/{1.73_M2}
GLUCOSE SERPL-MCNC: 146 MG/DL (ref 70–99)
HCT VFR BLD AUTO: 43.9 % (ref 36–48)
HGB BLD-MCNC: 14.9 G/DL (ref 12–16)
LYMPHOCYTES # BLD: 2.4 K/UL (ref 1–5.1)
LYMPHOCYTES NFR BLD: 40.3 %
MCH RBC QN AUTO: 30.9 PG (ref 26–34)
MCHC RBC AUTO-ENTMCNC: 34 G/DL (ref 31–36)
MCV RBC AUTO: 90.9 FL (ref 80–100)
MONOCYTES # BLD: 0.4 K/UL (ref 0–1.3)
MONOCYTES NFR BLD: 6.5 %
NEUTROPHILS # BLD: 2.9 K/UL (ref 1.7–7.7)
NEUTROPHILS NFR BLD: 48.7 %
PLATELET # BLD AUTO: 197 K/UL (ref 135–450)
PMV BLD AUTO: 9.7 FL (ref 5–10.5)
POTASSIUM SERPL-SCNC: 3.8 MMOL/L (ref 3.5–5.1)
PROT SERPL-MCNC: 7 G/DL (ref 6.4–8.2)
RBC # BLD AUTO: 4.83 M/UL (ref 4–5.2)
SODIUM SERPL-SCNC: 142 MMOL/L (ref 136–145)
WBC # BLD AUTO: 5.9 K/UL (ref 4–11)

## 2025-03-10 PROCEDURE — 80053 COMPREHEN METABOLIC PANEL: CPT

## 2025-03-10 PROCEDURE — 85652 RBC SED RATE AUTOMATED: CPT

## 2025-03-10 PROCEDURE — 86140 C-REACTIVE PROTEIN: CPT

## 2025-03-10 PROCEDURE — 99212 OFFICE O/P EST SF 10 MIN: CPT

## 2025-03-10 PROCEDURE — 36592 COLLECT BLOOD FROM PICC: CPT

## 2025-03-10 PROCEDURE — 85025 COMPLETE CBC W/AUTO DIFF WBC: CPT

## 2025-03-10 NOTE — PROGRESS NOTES
Outpatient Infusion Center   [facility]    PICC Lab Draw    NAME:  Heike Means  YOB: 1960  MEDICAL RECORD NUMBER:  [id number]  DATE:  3/10/2025    Patient arrived to Outpatient Infusion Center   [] per wheelchair   [x] ambulatory     PICC Location:right arm    PICC Site:  Redness: No  Bruising: No   Edema: No  Pain: No     PICC Labs  Cap cleansed with Chloroprep Scrub for 30 seconds and air dried completely for 1 minute on sterile 4X4: Yes  Blood drawn using a 10/ml syringe  Amount of blood wasted 10/ml syringe    Labs Obtained: Yes  Lab Test(s) Ordered: CBC WITH DIFF, CMP, ESR, CRP  PICC Flushed with 40ml/NS: Yes  New Cap applied: Yes     Response to treatment:  Well tolerated by patient.      Electronically signed by Alyson Le RN on 3/10/2025 at 6:32 PM            Outpatient Infusion Center   [facility]    PICC Dressing Change    NAME:  Heike Means  YOB: 1960  MEDICAL RECORD NUMBER:  [id number]  DATE:  3/10/2025    Patient arrived to Outpatient Infusion Center   [] per wheelchair   [] ambulatory     PICC Location:right arm    PICC Site:  Redness: No  Bruising: No   Edema: No  Pain: No     PICC Cleansed:  Current dressing and stat lock removed: Yes  Chloroprep Scrub for 30 seconds and air dried completely for 1 minute: Yes     PICC Dressing Change  Skin barrier: Yes  Stat lock applied and PICC line secured Yes  Biopatch applied: Yes   PICC site covered with Tegaderm Yes  Date and initials applied to PICC dressing Yes  [] Other:    Next Dressing Due: March 17, 2025.     Response to treatment:  Well tolerated by patient.      Electronically signed by Alyson Le RN on 3/10/2025 at 6:32 [facility] Level of Care Assessment  Infusion Center      NAME:  Heike Means  YOB: 1960 GENDER: female  MEDICAL RECORD NUMBER:  [id number]   DATE:  3/10/2025     Ambulation Status Document in Daily Care/Safety Tab   Status  patient'sTherapy Plan.      [x]   1   Intermediate Complete all tabs in patient assessment navigator.  Review or completed patient'sTherapy Plan.  Contact doctor based on nursing assessment.     []   2   Complex Complete all tabs in patient assessment navigator.  Completed patient's Therapy Plan.  Contact doctor based on nursing assessment and write order related to needed care. []   3     Patient Planning   Planning Definition Points   Simple Discharged, instructions and After Visit Summary given to patient/caregiver and instructions completed.  Simple follow-up with routine assessment and planning.      []   1   Intermediate Discharged, instructions and After Visit Summary given to patient/caregiver and instructions completed.  Contact with outside resources; i.e. Telephone calls to PCP, home health, ECF and/or arranging transportation.         []   2   Complex Discharged, instructions and After Visit Summary given to patient/caregiver and instructions completed.  Contact with outside resources; i.e. Telephone calls to PCP, home health, ECF and/or arranging transportation.  May include filling out forms and/or writing letters, communication with insurance , preauthorization for treatment.   []   3       Is this the Patient's First Visit to the Infusion Center  No    Is this Patient Established @ [facility]  Yes              Clinical Level of Care      Points  0-2  Level 1 []     Points  3-5  Level 2 [x]     Points  6-9  Level 3 []     Points  10-12  Level 4 []     Points  13-15  Level 5 []       Electronically signed by Alyson Le RN on 3/10/2025 at 6:32 PM

## 2025-03-12 ENCOUNTER — RESULTS FOLLOW-UP (OUTPATIENT)
Dept: INFECTIOUS DISEASES | Age: 65
End: 2025-03-12

## 2025-03-12 ENCOUNTER — TELEPHONE (OUTPATIENT)
Dept: INFECTIOUS DISEASES | Age: 65
End: 2025-03-12

## 2025-03-12 LAB
BACTERIA URNS QL MICRO: ABNORMAL /HPF
BILIRUB UR QL STRIP.AUTO: NEGATIVE
CLARITY UR: CLEAR
COLOR UR: YELLOW
EPI CELLS #/AREA URNS AUTO: 2 /HPF (ref 0–5)
GLUCOSE UR STRIP.AUTO-MCNC: NEGATIVE MG/DL
HGB UR QL STRIP.AUTO: NEGATIVE
HYALINE CASTS #/AREA URNS AUTO: 1 /LPF (ref 0–8)
KETONES UR STRIP.AUTO-MCNC: NEGATIVE MG/DL
LEUKOCYTE ESTERASE UR QL STRIP.AUTO: ABNORMAL
NITRITE UR QL STRIP.AUTO: NEGATIVE
PH UR STRIP.AUTO: 6.5 [PH] (ref 5–8)
PROT UR STRIP.AUTO-MCNC: 100 MG/DL
RBC CLUMPS #/AREA URNS AUTO: 5 /HPF (ref 0–4)
SP GR UR STRIP.AUTO: 1.02 (ref 1–1.03)
UA DIPSTICK W REFLEX MICRO PNL UR: YES
URN SPEC COLLECT METH UR: ABNORMAL
UROBILINOGEN UR STRIP-ACNC: 0.2 E.U./DL
WBC #/AREA URNS AUTO: 2 /HPF (ref 0–5)

## 2025-03-13 DIAGNOSIS — N39.0 URINARY TRACT INFECTION DUE TO ESBL KLEBSIELLA: Primary | ICD-10-CM

## 2025-03-13 DIAGNOSIS — B96.89 URINARY TRACT INFECTION DUE TO ESBL KLEBSIELLA: Primary | ICD-10-CM

## 2025-03-13 LAB
BACTERIA URNS QL MICRO: NORMAL /HPF
BILIRUB UR QL STRIP.AUTO: NEGATIVE
CLARITY UR: CLEAR
COLOR UR: YELLOW
EPI CELLS #/AREA URNS AUTO: 1 /HPF (ref 0–5)
GLUCOSE UR STRIP.AUTO-MCNC: NEGATIVE MG/DL
HGB UR QL STRIP.AUTO: NEGATIVE
HYALINE CASTS #/AREA URNS AUTO: 0 /LPF (ref 0–8)
KETONES UR STRIP.AUTO-MCNC: NEGATIVE MG/DL
LEUKOCYTE ESTERASE UR QL STRIP.AUTO: NEGATIVE
NITRITE UR QL STRIP.AUTO: NEGATIVE
PH UR STRIP.AUTO: 7 [PH] (ref 5–8)
PROT UR STRIP.AUTO-MCNC: 30 MG/DL
RBC CLUMPS #/AREA URNS AUTO: 0 /HPF (ref 0–4)
SP GR UR STRIP.AUTO: 1.02 (ref 1–1.03)
UA DIPSTICK W REFLEX MICRO PNL UR: YES
URN SPEC COLLECT METH UR: ABNORMAL
UROBILINOGEN UR STRIP-ACNC: 0.2 E.U./DL
WBC #/AREA URNS AUTO: 2 /HPF (ref 0–5)

## 2025-03-14 ENCOUNTER — HOSPITAL ENCOUNTER (OUTPATIENT)
Dept: INFUSION THERAPY | Age: 65
Setting detail: INFUSION SERIES
Discharge: HOME | End: 2025-03-14
Payer: COMMERCIAL

## 2025-03-14 PROCEDURE — 99212 OFFICE O/P EST SF 10 MIN: CPT

## 2025-03-14 NOTE — PROGRESS NOTES
Outpatient Infusion Center   [facility]    PICC Line Removal    NAME:  Heike Means  YOB: 1960  MEDICAL RECORD NUMBER:  [id number]  DATE:  3/14/2025    Patient arrived to Outpatient Infusion Center   [] per wheelchair   [x] ambulatory     PICC Location:  right arm    Patient has been treated for:  UTI     Patient has completed treatment of: 11 days of IV Invanz treatment     Order for removal of PICC line given by:  Dr.. Miller    PICC placed on:   March 3,2025.  PICC cut to 41 cm.    Patient positioned:   [] Lying   [] Sitting   [] Other         PICC dressing removed:  Yes     New pair of sterile gloves donned and sterile 4 x 4 gauze placed under PICC line:  Yes    Keeping a sterile 4x4 over top of exit site, PICC removed slowly by small increments and line not stretched.      PICC line removed without any complications:  Yes    Pressure held to exit site for 30 - 60 seconds:  Yes    Bleeding at site:  yes just for about 15 seconds    Site scrubbed with Chloroprep Scrub for 30 seconds and air dried completely for 1 minute.  Yes  Small piece of vaseline gauze placed over exit site and covered with a dry 2 x 2 gauze and covered with a tagaderm dressing. Yes     Patient instructed to keep dressing dry and intact with no showers for 48 hours. Yes      Response to treatment:  Well tolerated by patient.      Electronically signed by Alyson Le RN on 3/14/2025 at 4:48 PM  
References and Attachments. May also be documentation in Education Navigator.  Copy given to patient. []   3           Patient Assessment  Planning Definition Points   Simple Complete all tabs in patient assessment navigator.  Review or complete patient'sTherapy Plan.      [x]   1   Intermediate Complete all tabs in patient assessment navigator.  Review or completed patient'sTherapy Plan.  Contact doctor based on nursing assessment.     []   2   Complex Complete all tabs in patient assessment navigator.  Completed patient's Therapy Plan.  Contact doctor based on nursing assessment and write order related to needed care. []   3     Patient Planning   Planning Definition Points   Simple Discharged, instructions and After Visit Summary given to patient/caregiver and instructions completed.  Simple follow-up with routine assessment and planning.      []   1   Intermediate Discharged, instructions and After Visit Summary given to patient/caregiver and instructions completed.  Contact with outside resources; i.e. Telephone calls to PCP, home health, ECF and/or arranging transportation.         []   2   Complex Discharged, instructions and After Visit Summary given to patient/caregiver and instructions completed.  Contact with outside resources; i.e. Telephone calls to PCP, home health, ECF and/or arranging transportation.  May include filling out forms and/or writing letters, communication with insurance , preauthorization for treatment.   []   3       Is this the Patient's First Visit to the Infusion Center  No    Is this Patient Established @ [facility]  Yes              Clinical Level of Care      Points  0-2  Level 1 []     Points  3-5  Level 2 [x]     Points  6-9  Level 3 []     Points  10-12  Level 4 []     Points  13-15  Level 5 []       Electronically signed by Alyson Le RN on 3/14/2025 at 4:55 PM

## 2025-03-14 NOTE — TELEPHONE ENCOUNTER
Ask them to add Urine cx sample is valid for x 48 hrs   
Microlab sates they will need a recollect. Do you want me to have the pt give another sample? Please advise.  
Pt asking about UA results.  
Pt very frustrated and wants PICC removed as soon as possible. Pt verbalized understanding to give another urine sample.  
See if she can submit a sample with UA and urine cx  as we cannot interpret with out UA  If all cx negative then nancy the [facility] is the plan  
The labs states only a UA was ordered, they would need another sample for a cx. Please advise.    Thanks.  
UA looks good d/c picc   
15-Mar-2020 06:58

## 2025-03-15 LAB — BACTERIA UR CULT: NORMAL

## 2025-03-17 ENCOUNTER — TELEPHONE (OUTPATIENT)
Dept: INFECTIOUS DISEASES | Age: 65
End: 2025-03-17

## 2025-03-17 NOTE — TELEPHONE ENCOUNTER
Per chart review, Alyson RICO documented, \"PICC line removed without any complications:  Yes.\"

## 2025-03-18 ENCOUNTER — TELEPHONE (OUTPATIENT)
Dept: FAMILY MEDICINE CLINIC | Age: 65
End: 2025-03-18

## 2025-03-18 DIAGNOSIS — E11.65 POORLY CONTROLLED DIABETES MELLITUS (HCC): ICD-10-CM

## 2025-03-30 PROBLEM — N39.0 URINARY TRACT INFECTION: Status: RESOLVED | Noted: 2025-02-28 | Resolved: 2025-03-30

## 2025-04-23 DIAGNOSIS — I10 HYPERTENSION, UNSPECIFIED TYPE: ICD-10-CM

## 2025-04-23 RX ORDER — METOPROLOL SUCCINATE 50 MG/1
50 TABLET, EXTENDED RELEASE ORAL DAILY
Qty: 90 TABLET | Refills: 1 | Status: SHIPPED | OUTPATIENT
Start: 2025-04-23

## 2025-04-25 RX ORDER — LOSARTAN POTASSIUM 100 MG/1
100 TABLET ORAL DAILY
Qty: 90 TABLET | Refills: 1 | Status: SHIPPED | OUTPATIENT
Start: 2025-04-25

## 2025-04-29 ENCOUNTER — TELEPHONE (OUTPATIENT)
Dept: FAMILY MEDICINE CLINIC | Age: 65
End: 2025-04-29

## 2025-04-29 DIAGNOSIS — E11.65 POORLY CONTROLLED DIABETES MELLITUS (HCC): ICD-10-CM

## 2025-04-29 NOTE — TELEPHONE ENCOUNTER
Patient called back she called [facility] about Trulicity and why they denied it.    [facility]yn said we did not get back to them so they closed out the order.      [facility] phone number is [phone]      Please call patient at [phone]

## 2025-04-29 NOTE — TELEPHONE ENCOUNTER
[phone] ([facility])  - Heike advised and verbalized understanding. Please send Trulicity to [employer] so it prompts a PA.

## 2025-04-29 NOTE — TELEPHONE ENCOUNTER
Done    Orders Placed This Encounter   Medications    dulaglutide (TRULICITY) 0.75 MG/0.5ML SOAJ SC injection     Sig: Inject 0.5 mLs into the skin every 7 days Call for refill in a month     Dispense:  2 mL     Refill:  0

## 2025-05-28 RX ORDER — METFORMIN HYDROCHLORIDE 500 MG/1
1000 TABLET, EXTENDED RELEASE ORAL
Qty: 180 TABLET | Refills: 1 | Status: SHIPPED | OUTPATIENT
Start: 2025-05-28

## 2025-05-31 SDOH — HEALTH STABILITY: PHYSICAL HEALTH: ON AVERAGE, HOW MANY DAYS PER WEEK DO YOU ENGAGE IN MODERATE TO STRENUOUS EXERCISE (LIKE A BRISK WALK)?: 1 DAY

## 2025-05-31 SDOH — HEALTH STABILITY: PHYSICAL HEALTH: ON AVERAGE, HOW MANY MINUTES DO YOU ENGAGE IN EXERCISE AT THIS LEVEL?: 10 MIN

## 2025-06-01 PROBLEM — N10 ACUTE PYELONEPHRITIS: Status: RESOLVED | Noted: 2023-11-16 | Resolved: 2025-06-01

## 2025-06-01 PROBLEM — N18.9 ACUTE KIDNEY INJURY SUPERIMPOSED ON CKD: Status: RESOLVED | Noted: 2023-02-07 | Resolved: 2025-06-01

## 2025-06-01 PROBLEM — U07.1 COVID: Status: RESOLVED | Noted: 2021-07-14 | Resolved: 2025-06-01

## 2025-06-01 PROBLEM — B96.89 ACUTE BACTERIAL SINUSITIS: Status: RESOLVED | Noted: 2024-07-02 | Resolved: 2025-06-01

## 2025-06-01 PROBLEM — J01.90 ACUTE BACTERIAL SINUSITIS: Status: RESOLVED | Noted: 2024-07-02 | Resolved: 2025-06-01

## 2025-06-01 PROBLEM — N17.9 ACUTE KIDNEY INJURY SUPERIMPOSED ON CKD: Status: RESOLVED | Noted: 2023-02-07 | Resolved: 2025-06-01

## 2025-06-01 PROBLEM — R10.9 FLANK PAIN: Status: RESOLVED | Noted: 2023-02-07 | Resolved: 2025-06-01

## 2025-06-01 PROBLEM — N17.9 AKI (ACUTE KIDNEY INJURY): Status: RESOLVED | Noted: 2021-07-14 | Resolved: 2025-06-01

## 2025-06-01 NOTE — PROGRESS NOTES
Heike Maens (:  1960) is a 65 y.o. female,Established patient, here for evaluation of the following chief complaint(s):  Established New Doctor (Here to establish care - diabetes, hypertension - pt is fasting)      Subjective       HPI    DM with microalbuminuria-Pt is on statin, trulicity (just started yesterday), glipizide, metformin, losartan. Last HgA1c was 8.7 on 2025. -200s; lowest was 140s..  Trouble with nausea since started trulicity  Last diabetic eye exam-gets this every yr  Last urine microalbumin/Cr-2024    CKD-stage 3-follows with nephro. Cr 1.2 and GFR 51.     Hx of one episode of A fib-episode when pt was septic. Seen by Dr Moon. [id number] 2. Taken off of eliquis.  No palpitations/chest pain    HTN-HCTZ, Losartan. Pt does not check BP at home    Hx of MDRO ESBL-seen by ID. Completed course of IV abx in the last few mths.  No symptoms.     Headaches for past 2 weeks. Behind eyes/frontal. Not waking up with headaches.  Comes and goes. + phonophobia.Tylenol is helping but not making it go away completely.. No visual auras. No allergy symptoms. ?? If dental issue b/c occ get pain in left upper jaw.  Denies stress. Goes to eye doctor every yr. No neuro sx. No hx of headaches    Last fasting blood work-2024         Review of Systems   Constitutional:  Negative for chills, fever and unexpected weight change.   Eyes:  Negative for visual disturbance.   Respiratory:  Negative for cough, shortness of breath and wheezing.    Cardiovascular:  Negative for chest pain, palpitations and leg swelling.   Gastrointestinal:  Positive for nausea. Negative for blood in stool, constipation, diarrhea and vomiting.   Genitourinary:  Negative for dysuria and frequency.   Neurological:  Positive for headaches. Negative for dizziness and light-headedness.   Psychiatric/Behavioral:  Negative for dysphoric mood. The patient is not nervous/anxious.              Objective     BP (!)

## 2025-06-02 ENCOUNTER — OFFICE VISIT (OUTPATIENT)
Dept: FAMILY MEDICINE CLINIC | Age: 65
End: 2025-06-02
Payer: COMMERCIAL

## 2025-06-02 VITALS
DIASTOLIC BLOOD PRESSURE: 93 MMHG | WEIGHT: 169.8 LBS | BODY MASS INDEX: 31.25 KG/M2 | HEART RATE: 94 BPM | SYSTOLIC BLOOD PRESSURE: 140 MMHG | OXYGEN SATURATION: 96 % | HEIGHT: 62 IN | TEMPERATURE: 97.4 F

## 2025-06-02 DIAGNOSIS — I10 PRIMARY HYPERTENSION: ICD-10-CM

## 2025-06-02 DIAGNOSIS — E11.29 TYPE 2 DIABETES MELLITUS WITH DIABETIC MICROALBUMINURIA, WITHOUT LONG-TERM CURRENT USE OF INSULIN (HCC): Primary | ICD-10-CM

## 2025-06-02 DIAGNOSIS — I48.0 PAROXYSMAL ATRIAL FIBRILLATION (HCC): ICD-10-CM

## 2025-06-02 DIAGNOSIS — R80.9 TYPE 2 DIABETES MELLITUS WITH DIABETIC MICROALBUMINURIA, WITHOUT LONG-TERM CURRENT USE OF INSULIN (HCC): Primary | ICD-10-CM

## 2025-06-02 DIAGNOSIS — R11.0 NAUSEA: ICD-10-CM

## 2025-06-02 DIAGNOSIS — R51.9 ACUTE NONINTRACTABLE HEADACHE, UNSPECIFIED HEADACHE TYPE: ICD-10-CM

## 2025-06-02 PROCEDURE — 99214 OFFICE O/P EST MOD 30 MIN: CPT | Performed by: INTERNAL MEDICINE

## 2025-06-02 PROCEDURE — 3075F SYST BP GE 130 - 139MM HG: CPT | Performed by: INTERNAL MEDICINE

## 2025-06-02 PROCEDURE — 3080F DIAST BP >= 90 MM HG: CPT | Performed by: INTERNAL MEDICINE

## 2025-06-02 PROCEDURE — 3052F HG A1C>EQUAL 8.0%<EQUAL 9.0%: CPT | Performed by: INTERNAL MEDICINE

## 2025-06-02 RX ORDER — ONDANSETRON 4 MG/1
4 TABLET, ORALLY DISINTEGRATING ORAL 3 TIMES DAILY PRN
Qty: 21 TABLET | Refills: 0 | Status: SHIPPED | OUTPATIENT
Start: 2025-06-02

## 2025-06-02 ASSESSMENT — ENCOUNTER SYMPTOMS
WHEEZING: 0
COUGH: 0
VOMITING: 0
DIARRHEA: 0
CONSTIPATION: 0
SHORTNESS OF BREATH: 0
NAUSEA: 1
BLOOD IN STOOL: 0

## 2025-06-02 NOTE — PATIENT INSTRUCTIONS
Can increase trulicity in 4 weeks. Contact office in 4 weeks with update on how doing with trulicity and fasting glucose levels.  Try excedrin migraine. Call tomorrow with update.  Get home BP cuff. Monitor home BP and write down values. Come back on Thursday/Friday this week for nurse BP check. Bring in home cuff and home BP readings at that visit.   Fasting blood work order given

## 2025-06-06 ENCOUNTER — CLINICAL SUPPORT (OUTPATIENT)
Dept: FAMILY MEDICINE CLINIC | Age: 65
End: 2025-06-06

## 2025-06-06 VITALS — SYSTOLIC BLOOD PRESSURE: 138 MMHG | DIASTOLIC BLOOD PRESSURE: 88 MMHG

## 2025-06-06 DIAGNOSIS — R80.9 TYPE 2 DIABETES MELLITUS WITH DIABETIC MICROALBUMINURIA, WITHOUT LONG-TERM CURRENT USE OF INSULIN (HCC): ICD-10-CM

## 2025-06-06 DIAGNOSIS — I10 PRIMARY HYPERTENSION: Primary | ICD-10-CM

## 2025-06-06 DIAGNOSIS — I48.0 PAROXYSMAL ATRIAL FIBRILLATION (HCC): ICD-10-CM

## 2025-06-06 DIAGNOSIS — E11.29 TYPE 2 DIABETES MELLITUS WITH DIABETIC MICROALBUMINURIA, WITHOUT LONG-TERM CURRENT USE OF INSULIN (HCC): ICD-10-CM

## 2025-06-06 LAB
CHOLEST SERPL-MCNC: 136 MG/DL (ref 0–199)
EST. AVERAGE GLUCOSE BLD GHB EST-MCNC: 223.1 MG/DL
HBA1C MFR BLD: 9.4 %
HDLC SERPL-MCNC: 42 MG/DL (ref 40–60)
LDLC SERPL CALC-MCNC: 45 MG/DL
TRIGL SERPL-MCNC: 244 MG/DL (ref 0–150)
TSH SERPL DL<=0.005 MIU/L-ACNC: 2.22 UIU/ML (ref 0.27–4.2)
VLDLC SERPL CALC-MCNC: 49 MG/DL

## 2025-06-06 PROCEDURE — 2000F BLOOD PRESSURE MEASURE: CPT | Performed by: INTERNAL MEDICINE

## 2025-06-09 ENCOUNTER — RESULTS FOLLOW-UP (OUTPATIENT)
Dept: FAMILY MEDICINE CLINIC | Age: 65
End: 2025-06-09

## 2025-06-27 ENCOUNTER — OFFICE VISIT (OUTPATIENT)
Dept: FAMILY MEDICINE CLINIC | Age: 65
End: 2025-06-27
Payer: COMMERCIAL

## 2025-06-27 VITALS
OXYGEN SATURATION: 98 % | HEIGHT: 62 IN | TEMPERATURE: 98.1 F | WEIGHT: 172 LBS | SYSTOLIC BLOOD PRESSURE: 166 MMHG | HEART RATE: 71 BPM | DIASTOLIC BLOOD PRESSURE: 98 MMHG | BODY MASS INDEX: 31.65 KG/M2

## 2025-06-27 DIAGNOSIS — I10 PRIMARY HYPERTENSION: Primary | ICD-10-CM

## 2025-06-27 PROCEDURE — 99213 OFFICE O/P EST LOW 20 MIN: CPT

## 2025-06-27 PROCEDURE — 3077F SYST BP >= 140 MM HG: CPT

## 2025-06-27 PROCEDURE — 3080F DIAST BP >= 90 MM HG: CPT

## 2025-06-27 RX ORDER — HYDROCHLOROTHIAZIDE 25 MG/1
25 TABLET ORAL EVERY MORNING
Qty: 90 TABLET | Refills: 1 | Status: SHIPPED | OUTPATIENT
Start: 2025-06-27

## 2025-06-27 NOTE — PROGRESS NOTES
Negative for visual disturbance.   Respiratory:  Negative for apnea, cough, shortness of breath and wheezing.    Cardiovascular:  Negative for chest pain, palpitations and leg swelling.        Elevated bp   Gastrointestinal:  Negative for abdominal pain, blood in stool, constipation, diarrhea, nausea and vomiting.   Endocrine: Negative for cold intolerance and heat intolerance.   Genitourinary:  Negative for difficulty urinating, dysuria, flank pain, frequency and urgency.   Musculoskeletal:  Negative for arthralgias, back pain, gait problem and myalgias.   Skin:  Negative for color change, rash and wound.   Neurological:  Positive for headaches (intermittent). Negative for dizziness, weakness and light-headedness.   Hematological:  Does not bruise/bleed easily.   Psychiatric/Behavioral:  Negative for dysphoric mood and suicidal ideas. The patient is not nervous/anxious.        Vitals:    06/27/25 1556   BP: (!) 166/98   BP Site: Left Upper Arm   Patient Position: Sitting   BP Cuff Size: Medium Adult   Pulse: 71   Temp: 98.1 °F (36.7 °C)   TempSrc: Temporal   SpO2: 98%   Weight: 78 kg (172 lb)   Height: 1.575 m (5' 2\")       Physical Exam  Vitals reviewed.   Constitutional:       General: She is not in acute distress.     Appearance: Normal appearance.   HENT:      Head: Normocephalic.      Right Ear: External ear normal.      Left Ear: External ear normal.      Nose: Nose normal. No congestion.      Mouth/Throat:      Mouth: Mucous membranes are moist.   Eyes:      Extraocular Movements: Extraocular movements intact.      Pupils: Pupils are equal, round, and reactive to light.   Cardiovascular:      Rate and Rhythm: Normal rate and regular rhythm.      Pulses: Normal pulses.      Heart sounds: Normal heart sounds.   Pulmonary:      Effort: Pulmonary effort is normal.      Breath sounds: Normal breath sounds. No wheezing or rhonchi.   Abdominal:      General: Abdomen is flat.      Palpations: Abdomen is soft.

## 2025-06-27 NOTE — PATIENT INSTRUCTIONS
Thank you for choosing [facility].    Please bring a current list of medications to every appointment.    Please contact your pharmacy for any prescription refill(s) that you are requesting.      
Detail Level: Generalized
Hide Include Location In Plan Question?: No
Additional Note: Benign nature stressed.  Likelihood of additional lesions developing discussed.

## 2025-06-30 RX ORDER — ATORVASTATIN CALCIUM 10 MG/1
TABLET, FILM COATED ORAL
Qty: 90 TABLET | Refills: 0 | Status: SHIPPED | OUTPATIENT
Start: 2025-06-30

## 2025-06-30 ASSESSMENT — ENCOUNTER SYMPTOMS
BACK PAIN: 0
CONSTIPATION: 0
COLOR CHANGE: 0
SINUS PRESSURE: 0
SORE THROAT: 0
WHEEZING: 0
BLOOD IN STOOL: 0
APNEA: 0
SHORTNESS OF BREATH: 0
VOMITING: 0
TROUBLE SWALLOWING: 0
NAUSEA: 0
ABDOMINAL PAIN: 0
DIARRHEA: 0
COUGH: 0

## 2025-06-30 NOTE — ASSESSMENT & PLAN NOTE
Chronic, not at goal (unstable), plan to increase HCTZ to 25 mg daily. Continue BP checks daily. Continue good water intake. Unclear etiology- may consider another CMP given hx of MARCI in past. Limit excedrin use as this can elevate BP. Hx snoring- may benefit from sleep study in future.  If blood pressure continues to remain elevated please make another appointment with Dr. Helton.  pt voiced understanding.

## 2025-07-07 DIAGNOSIS — I10 PRIMARY HYPERTENSION: ICD-10-CM

## 2025-07-07 RX ORDER — HYDROCHLOROTHIAZIDE 25 MG/1
12.5 TABLET ORAL EVERY MORNING
Qty: 45 TABLET | Refills: 1 | OUTPATIENT
Start: 2025-07-07

## 2025-07-14 DIAGNOSIS — E11.65 POORLY CONTROLLED DIABETES MELLITUS (HCC): ICD-10-CM

## 2025-07-14 RX ORDER — ATORVASTATIN CALCIUM 10 MG/1
10 TABLET, FILM COATED ORAL DAILY
Qty: 90 TABLET | Refills: 0 | OUTPATIENT
Start: 2025-07-14

## 2025-08-11 DIAGNOSIS — I10 HYPERTENSION, UNSPECIFIED TYPE: ICD-10-CM

## 2025-08-11 DIAGNOSIS — E11.65 POORLY CONTROLLED DIABETES MELLITUS (HCC): ICD-10-CM

## 2025-08-11 DIAGNOSIS — I10 PRIMARY HYPERTENSION: ICD-10-CM

## 2025-08-11 RX ORDER — HYDROCHLOROTHIAZIDE 25 MG/1
25 TABLET ORAL EVERY MORNING
Qty: 90 TABLET | Refills: 1 | Status: SHIPPED | OUTPATIENT
Start: 2025-08-11

## 2025-08-11 RX ORDER — DULAGLUTIDE 0.75 MG/.5ML
INJECTION, SOLUTION SUBCUTANEOUS
Qty: 0.5 ML | Refills: 2 | Status: SHIPPED | OUTPATIENT
Start: 2025-08-11

## 2025-08-11 RX ORDER — ATORVASTATIN CALCIUM 10 MG/1
10 TABLET, FILM COATED ORAL DAILY
Qty: 90 TABLET | Refills: 0 | Status: SHIPPED | OUTPATIENT
Start: 2025-08-11

## 2025-08-11 RX ORDER — METOPROLOL SUCCINATE 50 MG/1
50 TABLET, EXTENDED RELEASE ORAL DAILY
Qty: 90 TABLET | Refills: 1 | Status: SHIPPED | OUTPATIENT
Start: 2025-08-11

## 2025-08-13 ENCOUNTER — HOSPITAL ENCOUNTER (EMERGENCY)
Age: 65
Discharge: HOME | End: 2025-08-13
Attending: EMERGENCY MEDICINE
Payer: COMMERCIAL

## 2025-08-13 VITALS
TEMPERATURE: 98.1 F | HEART RATE: 89 BPM | SYSTOLIC BLOOD PRESSURE: 163 MMHG | WEIGHT: 174.82 LBS | BODY MASS INDEX: 32.17 KG/M2 | OXYGEN SATURATION: 99 % | DIASTOLIC BLOOD PRESSURE: 81 MMHG | HEIGHT: 62 IN | RESPIRATION RATE: 16 BRPM

## 2025-08-13 DIAGNOSIS — N39.0 ACUTE URINARY TRACT INFECTION: ICD-10-CM

## 2025-08-13 DIAGNOSIS — R10.9 LEFT FLANK PAIN: Primary | ICD-10-CM

## 2025-08-13 LAB
AMORPH SED URNS QL MICRO: ABNORMAL /HPF
BILIRUB UR QL STRIP.AUTO: NEGATIVE
CLARITY UR: CLEAR
COLOR UR: YELLOW
EPI CELLS #/AREA URNS HPF: ABNORMAL /HPF (ref 0–5)
GLUCOSE UR STRIP.AUTO-MCNC: 500 MG/DL
HGB UR QL STRIP.AUTO: NEGATIVE
KETONES UR STRIP.AUTO-MCNC: NEGATIVE MG/DL
LEUKOCYTE ESTERASE UR QL STRIP.AUTO: ABNORMAL
NITRITE UR QL STRIP.AUTO: NEGATIVE
PH UR STRIP.AUTO: 5.5 [PH] (ref 5–8)
PROT UR STRIP.AUTO-MCNC: >=300 MG/DL
RBC #/AREA URNS HPF: ABNORMAL /HPF (ref 0–4)
RENAL EPI CELLS #/AREA UR COMP ASSIST: ABNORMAL /HPF (ref 0–1)
SP GR UR STRIP.AUTO: 1.02 (ref 1–1.03)
UA COMPLETE W REFLEX CULTURE PNL UR: ABNORMAL
UA DIPSTICK W REFLEX MICRO PNL UR: YES
URN SPEC COLLECT METH UR: ABNORMAL
UROBILINOGEN UR STRIP-ACNC: 0.2 E.U./DL
WBC #/AREA URNS HPF: ABNORMAL /HPF (ref 0–5)

## 2025-08-13 PROCEDURE — 99283 EMERGENCY DEPT VISIT LOW MDM: CPT

## 2025-08-13 PROCEDURE — 81001 URINALYSIS AUTO W/SCOPE: CPT

## 2025-08-13 PROCEDURE — 6370000000 HC RX 637 (ALT 250 FOR IP): Performed by: EMERGENCY MEDICINE

## 2025-08-13 RX ORDER — LIDOCAINE 4 G/G
1 PATCH TOPICAL ONCE
Status: DISCONTINUED | OUTPATIENT
Start: 2025-08-13 | End: 2025-08-13 | Stop reason: HOSPADM

## 2025-08-13 RX ORDER — CEPHALEXIN 500 MG/1
500 CAPSULE ORAL 4 TIMES DAILY
Qty: 40 CAPSULE | Refills: 0 | Status: SHIPPED | OUTPATIENT
Start: 2025-08-13 | End: 2025-08-23

## 2025-08-13 ASSESSMENT — PAIN SCALES - GENERAL
PAINLEVEL_OUTOF10: 5
PAINLEVEL_OUTOF10: 5

## 2025-08-13 ASSESSMENT — PAIN DESCRIPTION - ORIENTATION: ORIENTATION: LEFT

## 2025-08-13 ASSESSMENT — PAIN - FUNCTIONAL ASSESSMENT
PAIN_FUNCTIONAL_ASSESSMENT: 0-10
PAIN_FUNCTIONAL_ASSESSMENT: 0-10

## 2025-08-13 ASSESSMENT — PAIN DESCRIPTION - LOCATION
LOCATION: BACK;FLANK
LOCATION: BACK

## 2025-08-13 ASSESSMENT — PAIN DESCRIPTION - DESCRIPTORS: DESCRIPTORS: ACHING

## 2025-08-13 ASSESSMENT — PAIN DESCRIPTION - PAIN TYPE: TYPE: ACUTE PAIN

## 2025-08-15 ENCOUNTER — TELEPHONE (OUTPATIENT)
Dept: FAMILY MEDICINE CLINIC | Age: 65
End: 2025-08-15

## 2025-08-21 ENCOUNTER — OFFICE VISIT (OUTPATIENT)
Dept: FAMILY MEDICINE CLINIC | Age: 65
End: 2025-08-21
Payer: COMMERCIAL

## 2025-08-21 VITALS
BODY MASS INDEX: 31.76 KG/M2 | WEIGHT: 172.6 LBS | OXYGEN SATURATION: 98 % | HEART RATE: 82 BPM | HEIGHT: 62 IN | TEMPERATURE: 97.4 F | DIASTOLIC BLOOD PRESSURE: 92 MMHG | SYSTOLIC BLOOD PRESSURE: 130 MMHG

## 2025-08-21 DIAGNOSIS — E11.65 POORLY CONTROLLED DIABETES MELLITUS (HCC): ICD-10-CM

## 2025-08-21 DIAGNOSIS — R81 GLUCOSURIA: ICD-10-CM

## 2025-08-21 DIAGNOSIS — R10.9 LEFT FLANK PAIN: Primary | ICD-10-CM

## 2025-08-21 DIAGNOSIS — R80.9 TYPE 2 DIABETES MELLITUS WITH DIABETIC MICROALBUMINURIA, WITHOUT LONG-TERM CURRENT USE OF INSULIN (HCC): ICD-10-CM

## 2025-08-21 DIAGNOSIS — I10 PRIMARY HYPERTENSION: ICD-10-CM

## 2025-08-21 DIAGNOSIS — E11.29 TYPE 2 DIABETES MELLITUS WITH DIABETIC MICROALBUMINURIA, WITHOUT LONG-TERM CURRENT USE OF INSULIN (HCC): ICD-10-CM

## 2025-08-21 PROCEDURE — 3046F HEMOGLOBIN A1C LEVEL >9.0%: CPT | Performed by: INTERNAL MEDICINE

## 2025-08-21 PROCEDURE — 99214 OFFICE O/P EST MOD 30 MIN: CPT | Performed by: INTERNAL MEDICINE

## 2025-08-21 PROCEDURE — 3075F SYST BP GE 130 - 139MM HG: CPT | Performed by: INTERNAL MEDICINE

## 2025-08-21 PROCEDURE — 3080F DIAST BP >= 90 MM HG: CPT | Performed by: INTERNAL MEDICINE

## 2025-08-21 RX ORDER — AMLODIPINE BESYLATE 2.5 MG/1
2.5 TABLET ORAL DAILY
Qty: 90 TABLET | Refills: 1 | Status: SHIPPED | OUTPATIENT
Start: 2025-08-21

## 2025-08-21 ASSESSMENT — ENCOUNTER SYMPTOMS
SHORTNESS OF BREATH: 0
COUGH: 0
WHEEZING: 0
BACK PAIN: 1

## 2025-08-22 ENCOUNTER — TELEPHONE (OUTPATIENT)
Dept: FAMILY MEDICINE CLINIC | Age: 65
End: 2025-08-22

## 2025-08-22 DIAGNOSIS — I10 PRIMARY HYPERTENSION: ICD-10-CM

## 2025-08-22 LAB
ANION GAP SERPL CALCULATED.3IONS-SCNC: 11 MMOL/L (ref 3–16)
BUN SERPL-MCNC: 22 MG/DL (ref 7–20)
CALCIUM SERPL-MCNC: 9.9 MG/DL (ref 8.3–10.6)
CHLORIDE SERPL-SCNC: 101 MMOL/L (ref 99–110)
CO2 SERPL-SCNC: 28 MMOL/L (ref 21–32)
CREAT SERPL-MCNC: 1 MG/DL (ref 0.6–1.2)
GFR SERPLBLD CREATININE-BSD FMLA CKD-EPI: 63 ML/MIN/{1.73_M2}
GLUCOSE SERPL-MCNC: 214 MG/DL (ref 70–99)
POTASSIUM SERPL-SCNC: 4.6 MMOL/L (ref 3.5–5.1)
SODIUM SERPL-SCNC: 140 MMOL/L (ref 136–145)

## 2025-09-02 RX ORDER — GLIPIZIDE 10 MG/1
20 TABLET ORAL
Qty: 180 TABLET | Refills: 1 | Status: SHIPPED | OUTPATIENT
Start: 2025-09-02

## 2025-09-07 PROBLEM — N20.1 URETEROLITHIASIS: Status: RESOLVED | Noted: 2023-11-17 | Resolved: 2025-09-07

## 2025-09-07 PROBLEM — Z86.79 HISTORY OF ATRIAL FIBRILLATION: Status: ACTIVE | Noted: 2023-11-18

## (undated) DEVICE — SUTURE VICRYL + SZ 3-0 L36IN ABSRB UD L36MM CT-1 1/2 CIR VCP944H

## (undated) DEVICE — CYSTO/BLADDER IRRIGATION SET, REGULATING CLAMP

## (undated) DEVICE — NEEDLE SUT MAYO CATGUT NO 2 TAPR PT 1/2 CIR

## (undated) DEVICE — RETRACTOR SURG W18.3XL32.5CM PLAS SELF RET W/ 2 CATH CLP

## (undated) DEVICE — TOTAL TRAY, DB, 100% SILI FOLEY, 16FR 10: Brand: MEDLINE

## (undated) DEVICE — MERCY HEALTH WEST TURNOVER: Brand: MEDLINE INDUSTRIES, INC.

## (undated) DEVICE — GAUZE,SPONGE,4"X4",16PLY,XRAY,STRL,LF: Brand: MEDLINE

## (undated) DEVICE — DRAPE LAP 104X35X124IN BLU CTRL + FAB REINF ABD FEN

## (undated) DEVICE — UNDERPAD INCONT XL MESH PROTCT + DISP FOR MAT USE

## (undated) DEVICE — GUIDEWIRE URO L150CM DIA0.035IN TAPR 8CM STR TIP STD SHFT

## (undated) DEVICE — SYRINGE MED 10ML LUERLOCK TIP W/O SFTY DISP

## (undated) DEVICE — SPONGE,LAP,4"X18",XR,ST,5/PK,40PK/CS: Brand: MEDLINE INDUSTRIES, INC.

## (undated) DEVICE — STRIP,CLOSURE,WOUND,MEDI-STRIP,1/2X4: Brand: MEDLINE

## (undated) DEVICE — HOOK RETRCT L5MM E SHRP SELF RET SYS LONE STAR

## (undated) DEVICE — SUTURE PDS + SZ 2 0 L27IN ABSRB VLT L26MM CT 2 1 2 CIR PDP333H

## (undated) DEVICE — DRAPE,UNDERBUTTOCKS,PCH,STERILE: Brand: MEDLINE

## (undated) DEVICE — Z DISCONTINUED BY MEDLINE USE 2711682 TRAY SKIN PREP DRY W/ PREM GLV

## (undated) DEVICE — SUTURE VICRYL + SZ 0 L18IN ABSRB VLT L26MM CT-2 1/2 CIR VCP727D

## (undated) DEVICE — SUTURE VICRYL + SZ 2-0 L18IN ABSRB VLT L26MM SH 1/2 CIR VCP775D

## (undated) DEVICE — OPEN-END FLEXI-TIP URETERAL CATHETER: Brand: FLEXI-TIP

## (undated) DEVICE — SYRINGE MED 10ML TRNSLUC BRL PLUNG BLK MRK POLYPR CTRL

## (undated) DEVICE — Y-TYPE TUR/BLADDER IRRIGATION SET, REGULATING CLAMP

## (undated) DEVICE — STERILE SURGICAL LUBRICANT, METAL TUBE: Brand: SURGILUBE

## (undated) DEVICE — SUTURE VICRYL + SZ 0 L27IN ABSRB UD CT-1 L36MM 1/2 CIR TAPR VCP260H

## (undated) DEVICE — GOWN SIRUS NONREIN XL W/TWL: Brand: MEDLINE INDUSTRIES, INC.

## (undated) DEVICE — BAG DRAINAGE FOR SIEMANS DORNIER

## (undated) DEVICE — GLOVE SURG SZ 65 CRM LTX FREE POLYISOPRENE POLYMER BEAD ANTI

## (undated) DEVICE — SOLUTION SCRB 4OZ 7.5% POVIDONE IOD ANTIMIC BTL

## (undated) DEVICE — SYRINGE MED 10ML SLIP TIP BLNT FILL AND LUERLOCK DISP

## (undated) DEVICE — SOLUTION SCRB 4OZ 10% POVIDONE IOD ANTIMIC BTL

## (undated) DEVICE — SUTURE VICRYL + SZ 3-0 L27IN ABSRB UD L26MM SH 1/2 CIR VCP416H

## (undated) DEVICE — SOLUTION IRRIG 3000ML STRL H2O USP UROMATIC PLAS CONT

## (undated) DEVICE — HYPODERMIC SAFETY NEEDLE: Brand: MONOJECT

## (undated) DEVICE — PAD,ABDOMINAL,8"X7.5",STERILE,LF,1/PK: Brand: MEDLINE

## (undated) DEVICE — CYSTOSCOPY: Brand: MEDLINE INDUSTRIES, INC.

## (undated) DEVICE — PREMIUM DRY TRAY LF: Brand: MEDLINE INDUSTRIES, INC.

## (undated) DEVICE — GLOVE SURG SZ 85 L12IN FNGR ORTHO 126MIL CRM LTX FREE

## (undated) DEVICE — TOWEL,OR,DSP,ST,BLUE,STD,4/PK,20PK/CS: Brand: MEDLINE

## (undated) DEVICE — SOLUTION IV IRRIG WATER 1000ML POUR BRL 2F7114

## (undated) DEVICE — HYPODERMIC SAFETY NEEDLE: Brand: MAGELLAN

## (undated) DEVICE — LEGGINGS, PAIR, CLEAR, STERILE: Brand: MEDLINE

## (undated) DEVICE — CATHETER F BLLN 5CC 16FR 2 W HYDRGEL COAT LESS TRAUM LUB

## (undated) DEVICE — SUTURE VICRYL + SZ 2-0 L27IN ABSRB CLR CT-1 1/2 CIR TAPERCUT VCP259H

## (undated) DEVICE — SKIN MARKER REGULAR TIP WITH RULER CAP AND LABELS: Brand: DEVON

## (undated) DEVICE — SUTURE VICRYL + SZ 2-0 L36IN ABSRB UD L36MM CT-1 1/2 CIR VCP945H

## (undated) DEVICE — SUTURE ETHIBOND EXCEL SZ 2-0 L36IN NONABSORBABLE GRN L26MM SH X523H

## (undated) DEVICE — SOLUTION IRRIG 1000ML 0.9% SOD CHL USP POUR PLAS BTL

## (undated) DEVICE — SYRINGE MED 30ML STD CLR PLAS LUERLOCK TIP N CTRL DISP

## (undated) DEVICE — GLOVE SURG SZ 75 CRM LTX FREE POLYISOPRENE POLYMER BEAD ANTI

## (undated) DEVICE — GAUZE,PACKING STRIP,IODOFORM,2"X5YD,STRL: Brand: CURAD

## (undated) DEVICE — SOLUTION PREP PAINT POV IOD FOR SKIN MUCOUS MEM

## (undated) DEVICE — SOLUTION IRRIG 1000ML STRL H2O USP PLAS POUR BTL

## (undated) DEVICE — PACK,CYSTOSCOPY,PK III,AURORA: Brand: MEDLINE

## (undated) DEVICE — DECANTER BTL 6IN: Brand: MEDLINE INDUSTRIES, INC.

## (undated) DEVICE — MAJOR SET UP: Brand: MEDLINE INDUSTRIES, INC.